# Patient Record
Sex: MALE | Race: WHITE | NOT HISPANIC OR LATINO | Employment: OTHER | URBAN - METROPOLITAN AREA
[De-identification: names, ages, dates, MRNs, and addresses within clinical notes are randomized per-mention and may not be internally consistent; named-entity substitution may affect disease eponyms.]

---

## 2017-04-19 ENCOUNTER — LAB (OUTPATIENT)
Dept: FAMILY MEDICINE CLINIC | Facility: CLINIC | Age: 67
End: 2017-04-19

## 2017-04-19 DIAGNOSIS — I10 ESSENTIAL HYPERTENSION: ICD-10-CM

## 2017-04-19 DIAGNOSIS — Z13.1 SCREENING FOR DIABETES MELLITUS (DM): ICD-10-CM

## 2017-04-19 DIAGNOSIS — E03.9 ACQUIRED HYPOTHYROIDISM: ICD-10-CM

## 2017-04-19 DIAGNOSIS — E03.9 ACQUIRED HYPOTHYROIDISM: Primary | ICD-10-CM

## 2017-04-19 DIAGNOSIS — N40.1 BENIGN NON-NODULAR PROSTATIC HYPERPLASIA WITH LOWER URINARY TRACT SYMPTOMS: Primary | ICD-10-CM

## 2017-04-19 DIAGNOSIS — Z11.59 NEED FOR HEPATITIS C SCREENING TEST: ICD-10-CM

## 2017-04-19 DIAGNOSIS — Z79.899 ENCOUNTER FOR LONG-TERM (CURRENT) USE OF MEDICATIONS: ICD-10-CM

## 2017-04-20 LAB
ALBUMIN SERPL-MCNC: 4.1 G/DL (ref 3.5–5.2)
ALBUMIN/GLOB SERPL: 1.6 G/DL
ALP SERPL-CCNC: 63 U/L (ref 39–117)
ALT SERPL-CCNC: 21 U/L (ref 1–41)
AST SERPL-CCNC: 18 U/L (ref 1–40)
BASOPHILS # BLD AUTO: 0.04 10*3/MM3 (ref 0–0.2)
BASOPHILS NFR BLD AUTO: 0.7 % (ref 0–1.5)
BILIRUB SERPL-MCNC: 0.3 MG/DL (ref 0.1–1.2)
BUN SERPL-MCNC: 12 MG/DL (ref 8–23)
BUN/CREAT SERPL: 9.3 (ref 7–25)
CALCIUM SERPL-MCNC: 9.8 MG/DL (ref 8.6–10.5)
CHLORIDE SERPL-SCNC: 100 MMOL/L (ref 98–107)
CHOLEST SERPL-MCNC: 151 MG/DL (ref 0–200)
CO2 SERPL-SCNC: 27.4 MMOL/L (ref 22–29)
CREAT SERPL-MCNC: 1.29 MG/DL (ref 0.76–1.27)
EOSINOPHIL # BLD AUTO: 0.1 10*3/MM3 (ref 0–0.7)
EOSINOPHIL NFR BLD AUTO: 1.7 % (ref 0.3–6.2)
ERYTHROCYTE [DISTWIDTH] IN BLOOD BY AUTOMATED COUNT: 13.5 % (ref 11.5–14.5)
FT4I SERPL CALC-MCNC: 2.4 (ref 1.2–4.9)
GLOBULIN SER CALC-MCNC: 2.6 GM/DL
GLUCOSE SERPL-MCNC: 102 MG/DL (ref 65–99)
HBA1C MFR BLD: 5.8 % (ref 4.8–5.6)
HCT VFR BLD AUTO: 43.3 % (ref 40.4–52.2)
HCV AB S/CO SERPL IA: 0.1 S/CO RATIO (ref 0–0.9)
HDLC SERPL-MCNC: 44 MG/DL (ref 40–60)
HGB BLD-MCNC: 14.1 G/DL (ref 13.7–17.6)
IMM GRANULOCYTES # BLD: 0 10*3/MM3 (ref 0–0.03)
IMM GRANULOCYTES NFR BLD: 0 % (ref 0–0.5)
LDLC SERPL CALC-MCNC: 85 MG/DL (ref 0–100)
LDLC/HDLC SERPL: 1.92 {RATIO}
LYMPHOCYTES # BLD AUTO: 1.49 10*3/MM3 (ref 0.9–4.8)
LYMPHOCYTES NFR BLD AUTO: 26 % (ref 19.6–45.3)
MCH RBC QN AUTO: 31.3 PG (ref 27–32.7)
MCHC RBC AUTO-ENTMCNC: 32.6 G/DL (ref 32.6–36.4)
MCV RBC AUTO: 96.2 FL (ref 79.8–96.2)
MONOCYTES # BLD AUTO: 0.42 10*3/MM3 (ref 0.2–1.2)
MONOCYTES NFR BLD AUTO: 7.3 % (ref 5–12)
NEUTROPHILS # BLD AUTO: 3.69 10*3/MM3 (ref 1.9–8.1)
NEUTROPHILS NFR BLD AUTO: 64.3 % (ref 42.7–76)
PLATELET # BLD AUTO: 229 10*3/MM3 (ref 140–500)
POTASSIUM SERPL-SCNC: 4.7 MMOL/L (ref 3.5–5.2)
PROT SERPL-MCNC: 6.7 G/DL (ref 6–8.5)
PSA SERPL-MCNC: 0.84 NG/ML (ref 0–4)
RBC # BLD AUTO: 4.5 10*6/MM3 (ref 4.6–6)
SODIUM SERPL-SCNC: 141 MMOL/L (ref 136–145)
T3RU NFR SERPL: 28 % (ref 24–39)
T4 SERPL-MCNC: 8.6 UG/DL (ref 4.5–12)
TRIGL SERPL-MCNC: 112 MG/DL (ref 0–150)
TSH SERPL DL<=0.005 MIU/L-ACNC: 1.44 UIU/ML (ref 0.45–4.5)
VLDLC SERPL CALC-MCNC: 22.4 MG/DL (ref 5–40)
WBC # BLD AUTO: 5.74 10*3/MM3 (ref 4.5–10.7)

## 2017-04-26 ENCOUNTER — OFFICE VISIT (OUTPATIENT)
Dept: FAMILY MEDICINE CLINIC | Facility: CLINIC | Age: 67
End: 2017-04-26

## 2017-04-26 VITALS
DIASTOLIC BLOOD PRESSURE: 84 MMHG | TEMPERATURE: 97.6 F | BODY MASS INDEX: 34.16 KG/M2 | SYSTOLIC BLOOD PRESSURE: 136 MMHG | OXYGEN SATURATION: 98 % | HEART RATE: 72 BPM | WEIGHT: 244 LBS | HEIGHT: 71 IN

## 2017-04-26 DIAGNOSIS — I10 ESSENTIAL HYPERTENSION: ICD-10-CM

## 2017-04-26 DIAGNOSIS — M54.16 LUMBAR RADICULOPATHY: Primary | ICD-10-CM

## 2017-04-26 DIAGNOSIS — M25.561 RIGHT KNEE PAIN, UNSPECIFIED CHRONICITY: ICD-10-CM

## 2017-04-26 PROCEDURE — 99213 OFFICE O/P EST LOW 20 MIN: CPT | Performed by: FAMILY MEDICINE

## 2017-04-26 RX ORDER — MECLIZINE HYDROCHLORIDE 25 MG/1
25 TABLET ORAL 3 TIMES DAILY PRN
Qty: 100 TABLET | Refills: 5 | Status: SHIPPED | OUTPATIENT
Start: 2017-04-26 | End: 2017-11-01

## 2017-04-26 RX ORDER — FUROSEMIDE 40 MG/1
40 TABLET ORAL DAILY
Qty: 90 TABLET | Refills: 3 | Status: SHIPPED | OUTPATIENT
Start: 2017-04-26 | End: 2017-11-01 | Stop reason: SDUPTHER

## 2017-04-26 RX ORDER — ESOMEPRAZOLE MAGNESIUM 40 MG/1
40 CAPSULE, DELAYED RELEASE ORAL 2 TIMES DAILY
Qty: 180 CAPSULE | Refills: 3 | Status: SHIPPED | OUTPATIENT
Start: 2017-04-26 | End: 2017-11-01 | Stop reason: SDUPTHER

## 2017-04-26 RX ORDER — PREGABALIN 100 MG/1
100 CAPSULE ORAL 3 TIMES DAILY
Qty: 270 CAPSULE | Refills: 3 | Status: SHIPPED | OUTPATIENT
Start: 2017-04-26 | End: 2017-11-01

## 2017-04-26 RX ORDER — IRBESARTAN 75 MG/1
75 TABLET ORAL NIGHTLY
Qty: 90 TABLET | Refills: 3 | Status: SHIPPED | OUTPATIENT
Start: 2017-04-26 | End: 2017-11-01 | Stop reason: SDUPTHER

## 2017-04-26 RX ORDER — FINASTERIDE 5 MG/1
5 TABLET, FILM COATED ORAL DAILY
Qty: 90 TABLET | Refills: 3 | Status: SHIPPED | OUTPATIENT
Start: 2017-04-26 | End: 2017-11-01 | Stop reason: SDUPTHER

## 2017-04-26 RX ORDER — ESOMEPRAZOLE MAGNESIUM 40 MG/1
40 FOR SUSPENSION ORAL 2 TIMES DAILY
Qty: 180 PACKET | Refills: 3 | Status: SHIPPED | OUTPATIENT
Start: 2017-04-26 | End: 2017-04-26

## 2017-04-26 RX ORDER — LEVOTHYROXINE SODIUM 0.1 MG/1
100 TABLET ORAL DAILY
Qty: 90 TABLET | Refills: 3 | Status: SHIPPED | OUTPATIENT
Start: 2017-04-26 | End: 2017-11-01 | Stop reason: SDUPTHER

## 2017-04-26 NOTE — PROGRESS NOTES
Chief Complaint and HPI I have reviewed the patient's medical history in detail and updated the computerized patient record.    Subjective: Marin Cuenca is a 67 y.o. male presents   here today for     Hypertension (follow up pt doing well); Leg Pain (R knee); and Benign Prostatic Hypertrophy (doing well)      Review of Systems   Constitutional: Negative.    HENT: Negative.    Eyes: Negative.    Respiratory: Negative.    Cardiovascular: Negative.    Gastrointestinal: Negative.    Endocrine: Negative.    Genitourinary: Negative.    Musculoskeletal: Positive for arthralgias and back pain.   Skin: Negative.    Allergic/Immunologic: Positive for environmental allergies.   Neurological: Negative.    Hematological: Negative.    Psychiatric/Behavioral: Negative.        Physical Exam   HENT:   Head: Atraumatic.   Cardiovascular: Normal rate, regular rhythm, normal heart sounds and intact distal pulses.    Pulmonary/Chest: Effort normal.   Musculoskeletal:   R knee wobbles   Skin: Skin is warm.   Psychiatric: His behavior is normal.   Vitals reviewed.      Procedures    Assessment:   Diagnosis Plan   1. Lumbar radiculopathy  Ambulatory Referral to Orthopedic Surgery   2. Essential hypertension  Ambulatory Referral to Orthopedic Surgery   3. Right knee pain, unspecified chronicity  Ambulatory Referral to Orthopedic Surgery       Plan:  Orders Placed This Encounter   Procedures   • Ambulatory Referral to Orthopedic Surgery     Referral Type:   Consultation     Referral Reason:   Specialty Services Required     Referred to Provider:   Srini Moss MD     Requested Specialty:   Orthopedic Surgery     Number of Visits Requested:   1       Requested Prescriptions     Signed Prescriptions Disp Refills   • pregabalin (LYRICA) 100 MG capsule 270 capsule 3     Sig: Take 1 capsule by mouth 3 (Three) Times a Day.   • esomeprazole (NEXIUM) 40 MG packet 180 packet 3     Sig: Take 40 mg by mouth 2 (Two) Times a Day.   •  levothyroxine (SYNTHROID, LEVOTHROID) 100 MCG tablet 90 tablet 3     Sig: Take 1 tablet by mouth Daily.   • finasteride (PROSCAR) 5 MG tablet 90 tablet 3     Sig: Take 1 tablet by mouth Daily.   • irbesartan (AVAPRO) 75 MG tablet 90 tablet 3     Sig: Take 1 tablet by mouth Every Night.   • furosemide (LASIX) 40 MG tablet 90 tablet 3     Sig: Take 1 tablet by mouth Daily.   • aspirin 81 MG tablet 100 tablet 3     Sig: Take 1 tablet by mouth Daily.   • meclizine (ANTIVERT) 25 MG tablet 100 tablet 5     Sig: Take 1 tablet by mouth 3 (Three) Times a Day As Needed for dizziness.       All tests and consults since last visit reviewed with patient    Return in about 6 months (around 10/26/2017).

## 2017-07-06 ENCOUNTER — OFFICE VISIT (OUTPATIENT)
Dept: ORTHOPEDIC SURGERY | Facility: CLINIC | Age: 67
End: 2017-07-06

## 2017-07-06 VITALS — WEIGHT: 230 LBS | BODY MASS INDEX: 32.2 KG/M2 | TEMPERATURE: 97.1 F | HEIGHT: 71 IN

## 2017-07-06 DIAGNOSIS — M25.561 CHRONIC PAIN OF RIGHT KNEE: Primary | ICD-10-CM

## 2017-07-06 DIAGNOSIS — G89.29 CHRONIC PAIN OF RIGHT KNEE: Primary | ICD-10-CM

## 2017-07-06 PROCEDURE — 99203 OFFICE O/P NEW LOW 30 MIN: CPT | Performed by: ORTHOPAEDIC SURGERY

## 2017-07-06 PROCEDURE — 73562 X-RAY EXAM OF KNEE 3: CPT | Performed by: ORTHOPAEDIC SURGERY

## 2017-07-06 NOTE — PROGRESS NOTES
Patient: Marin Cuenca  YOB: 1950 67 y.o. male  Medical Record Number: [id number]    Chief Complaints:   Chief Complaint   Patient presents with   • Right Knee - Pain       History of Present Illness:Marin Cuenca is a 67 y.o. male who presents with Complaints of a history of right knee pain.  About 1 year ago he had an injection into the knee by pain management doctor and he had considerable soreness for months after the injection.  He still occasionally will have soreness if he pivots or twists the knee.  He describes a diffuse ache occurs it is also associated with some swelling.  Overall he still gets around fine without major difficulties    Allergies:   Allergies   Allergen Reactions   • Clarithromycin      Other reaction(s): Abdominal pain   • Tetracyclines & Related Rash       Medications:   Current Outpatient Prescriptions   Medication Sig Dispense Refill   • aspirin 81 MG tablet Take 1 tablet by mouth Daily. 100 tablet 3   • Calcium Carb-Cholecalciferol 500-600 MG-UNIT chewable tablet Chew 1 tablet 2 (Two) Times a Day. 180 tablet 3   • esomeprazole (nexIUM) 40 MG capsule Take 1 capsule by mouth 2 (Two) Times a Day. 180 capsule 3   • finasteride (PROSCAR) 5 MG tablet Take 1 tablet by mouth Daily. 90 tablet 3   • furosemide (LASIX) 40 MG tablet Take 1 tablet by mouth Daily. 90 tablet 3   • irbesartan (AVAPRO) 75 MG tablet Take 1 tablet by mouth Every Night. 90 tablet 3   • levothyroxine (SYNTHROID, LEVOTHROID) 100 MCG tablet Take 1 tablet by mouth Daily. 90 tablet 3   • meclizine (ANTIVERT) 25 MG tablet Take 1 tablet by mouth 3 (Three) Times a Day As Needed for dizziness. 100 tablet 5   • neomycin-colistin-hydrocortisone-thonzonium (CORTISPORIN TC) 3.3-3-10-0.5 MG/ML otic suspension Administer 3 drops into the left ear 4 (Four) Times a Day for 7 days. 5 mL 0   • pregabalin (LYRICA) 100 MG capsule Take 1 capsule by mouth 3 (Three) Times a Day. 270 capsule 3     No current  "facility-administered medications for this visit.          The following portions of the patient's history were reviewed and updated as appropriate: allergies, current medications, past family history, past medical history, past social history, past surgical history and problem list.    Review of Systems:   A 14 point review of systems was performed. All systems negative except pertinent positives/negative listed in HPI above    Physical Exam:   Vitals:    07/06/17 0942   Temp: 97.1 °F (36.2 °C)   Weight: 230 lb (104 kg)   Height: 71\" (180.3 cm)       General: A and O x 3, ASA, NAD    SCLERA:    Normal    DENTITION:   Normal  Knee:  right    ALIGNMENT:     Neutral  ,   Patella tracks   midline    GAIT:     Nonantalgic    SKIN:    No abnormality    RANGE OF MOTION:   0  -  135   DEG    STRENGTH:   5 / 5    LIGAMENTS:    No varus / valgus instability.   Negative  Lachman.    MENISCUS:     Negative   Sirisha       DISTAL PULSES:    Paplable    DISTAL SENSATION :   Intact    LYMPHATICS:     No   lymphadenopathy    OTHER:          - No  effusion      - No crepitance with ROM      - No Swelling /  tenderness to palpation pes anserine bursa        Radiology:  Xrays 3views right knee (ap,lateral, sunrise) were ordered and reviewed for evaluation of knee pain demonstrating mild joint space narrowing and medial and lateral chondrocalcinosis.  There are no previous films for comparison.    Assessment/Plan: Right knee chondrocalcinosis.  I recommended weight management quad strengthening exercises I do not feel that his previous injection was a cause of any damage to the knee joint.  I will see him back on an as-needed basis.      Srini Moss MD  7/6/2017  "

## 2017-11-01 ENCOUNTER — OFFICE VISIT (OUTPATIENT)
Dept: FAMILY MEDICINE CLINIC | Facility: CLINIC | Age: 67
End: 2017-11-01

## 2017-11-01 VITALS
OXYGEN SATURATION: 98 % | DIASTOLIC BLOOD PRESSURE: 68 MMHG | HEIGHT: 71 IN | WEIGHT: 242.2 LBS | HEART RATE: 96 BPM | TEMPERATURE: 98.1 F | SYSTOLIC BLOOD PRESSURE: 122 MMHG | BODY MASS INDEX: 33.91 KG/M2

## 2017-11-01 DIAGNOSIS — N40.1 BENIGN NON-NODULAR PROSTATIC HYPERPLASIA WITH LOWER URINARY TRACT SYMPTOMS: ICD-10-CM

## 2017-11-01 DIAGNOSIS — M75.102 ROTATOR CUFF TEAR ARTHROPATHY, LEFT: ICD-10-CM

## 2017-11-01 DIAGNOSIS — M54.16 LUMBAR RADICULOPATHY: ICD-10-CM

## 2017-11-01 DIAGNOSIS — E03.9 ACQUIRED HYPOTHYROIDISM: Primary | ICD-10-CM

## 2017-11-01 DIAGNOSIS — K21.00 GASTROESOPHAGEAL REFLUX DISEASE WITH ESOPHAGITIS: ICD-10-CM

## 2017-11-01 DIAGNOSIS — M19.219: ICD-10-CM

## 2017-11-01 DIAGNOSIS — I10 ESSENTIAL HYPERTENSION: ICD-10-CM

## 2017-11-01 DIAGNOSIS — M96.1 FAILED BACK SYNDROME OF LUMBAR SPINE: ICD-10-CM

## 2017-11-01 DIAGNOSIS — M75.100: ICD-10-CM

## 2017-11-01 DIAGNOSIS — M12.812 ROTATOR CUFF TEAR ARTHROPATHY, LEFT: ICD-10-CM

## 2017-11-01 PROCEDURE — 99213 OFFICE O/P EST LOW 20 MIN: CPT | Performed by: FAMILY MEDICINE

## 2017-11-01 RX ORDER — PREGABALIN 100 MG/1
100 CAPSULE ORAL 3 TIMES DAILY
Qty: 270 CAPSULE | Refills: 3 | Status: SHIPPED | OUTPATIENT
Start: 2017-11-01 | End: 2017-11-20 | Stop reason: SDUPTHER

## 2017-11-01 RX ORDER — FINASTERIDE 5 MG/1
5 TABLET, FILM COATED ORAL DAILY
Qty: 90 TABLET | Refills: 3 | Status: SHIPPED | OUTPATIENT
Start: 2017-11-01 | End: 2018-05-03

## 2017-11-01 RX ORDER — MECLIZINE HYDROCHLORIDE 25 MG/1
25 TABLET ORAL 3 TIMES DAILY PRN
Qty: 100 TABLET | Refills: 5 | Status: SHIPPED | OUTPATIENT
Start: 2017-11-01 | End: 2018-03-05 | Stop reason: SDUPTHER

## 2017-11-01 RX ORDER — ESOMEPRAZOLE MAGNESIUM 40 MG/1
40 CAPSULE, DELAYED RELEASE ORAL 2 TIMES DAILY
Qty: 180 CAPSULE | Refills: 3 | Status: SHIPPED | OUTPATIENT
Start: 2017-11-01 | End: 2018-03-13 | Stop reason: SDUPTHER

## 2017-11-01 RX ORDER — IRBESARTAN 75 MG/1
75 TABLET ORAL NIGHTLY
Qty: 90 TABLET | Refills: 3 | Status: SHIPPED | OUTPATIENT
Start: 2017-11-01 | End: 2018-05-03

## 2017-11-01 RX ORDER — LEVOTHYROXINE SODIUM 0.1 MG/1
100 TABLET ORAL DAILY
Qty: 90 TABLET | Refills: 3 | Status: SHIPPED | OUTPATIENT
Start: 2017-11-01 | End: 2018-05-03

## 2017-11-01 RX ORDER — MELOXICAM 15 MG/1
15 TABLET ORAL DAILY
Qty: 30 TABLET | Refills: 3 | Status: SHIPPED | OUTPATIENT
Start: 2017-11-01 | End: 2018-05-03

## 2017-11-01 RX ORDER — PROMETHAZINE HYDROCHLORIDE 25 MG/1
25 TABLET ORAL EVERY 6 HOURS PRN
Qty: 90 TABLET | Refills: 5 | Status: SHIPPED | OUTPATIENT
Start: 2017-11-01 | End: 2018-05-03

## 2017-11-01 RX ORDER — OXYCODONE HYDROCHLORIDE AND ACETAMINOPHEN 5; 325 MG/1; MG/1
1 TABLET ORAL EVERY 4 HOURS PRN
Qty: 100 TABLET | Refills: 0 | Status: SHIPPED | OUTPATIENT
Start: 2017-11-01 | End: 2018-05-03

## 2017-11-01 RX ORDER — FUROSEMIDE 40 MG/1
40 TABLET ORAL DAILY
Qty: 90 TABLET | Refills: 3 | Status: SHIPPED | OUTPATIENT
Start: 2017-11-01 | End: 2018-05-03

## 2017-11-01 NOTE — PROGRESS NOTES
Chief Complaint   Patient presents with   • Hypertension     follow up    • shoulder pain     left shoulder has been hurting fot the past few days        Subjective.../HPI  Patient present today with1) Marin has a history of chronic hypertension and has been well controlled on current medications.  Patient reports has had hypertension for 1 years. He is tolerating medications without side effect. He reports no vision changes, headaches or lightheadedness. He is requesting refills of medications  2)BPH- nocturia x1 on Finasteride  3)GERD- good  4) hypothyroid  5) Lshpoulder pain ,no hx of injury, painfu 2-3 days,-1-10 scale =98-10-can't sleep  6) chronic back pain with L leg radiculopathy--Lyrica working well  I have reviewed the patient's medical history in detail and updated the computerized patient record.        Family History   Problem Relation Age of Onset   • Heart disease Father      cardiovascular disease   • Diabetes Father    • Cancer Paternal Grandmother        Social History     Social History   • Marital status:      Spouse name: N/A   • Number of children: N/A   • Years of education: N/A     Occupational History   • Not on file.     Social History Main Topics   • Smoking status: Never Smoker   • Smokeless tobacco: Not on file   • Alcohol use No   • Drug use: No   • Sexual activity: Not on file     Other Topics Concern   • Not on file     Social History Narrative       Review of Systems:   Review of Systems   Constitutional: Negative for chills, fatigue, fever and unexpected weight change.   HENT: Negative for ear pain, hearing loss, sinus pressure, sore throat and tinnitus.    Eyes: Negative for pain, discharge and redness.   Respiratory: Negative for cough, shortness of breath and wheezing.    Cardiovascular: Negative for chest pain, palpitations and leg swelling.   Gastrointestinal: Negative for abdominal pain, constipation, diarrhea and nausea.   Endocrine: Negative for cold intolerance and  "heat intolerance.   Genitourinary: Negative for difficulty urinating, flank pain and urgency.   Musculoskeletal: Negative for back pain, joint swelling and myalgias.   Skin: Negative for rash and wound.   Allergic/Immunologic: Negative for environmental allergies and food allergies.   Neurological: Negative for dizziness, seizures, numbness and headaches.   Hematological: Negative for adenopathy. Does not bruise/bleed easily.   Psychiatric/Behavioral: Negative for decreased concentration, dysphoric mood and sleep disturbance. The patient is not nervous/anxious.    All other systems reviewed and are negative.        Physical Exam   Constitutional: He is oriented to person, place, and time. He appears well-developed and well-nourished.   Cardiovascular: Normal rate, regular rhythm, normal heart sounds and intact distal pulses.    Pulmonary/Chest: Effort normal and breath sounds normal.   Musculoskeletal:   L shoulder with decresed rotation   Neurological: He is alert and oriented to person, place, and time.   Skin: Skin is warm and dry.   Psychiatric: He has a normal mood and affect.   Nursing note and vitals reviewed.        Vital Signs     Vitals:    11/01/17 1011   BP: 122/68   BP Location: Right arm   Patient Position: Sitting   Cuff Size: Adult   Pulse: 96   Temp: 98.1 °F (36.7 °C)   TempSrc: Oral   SpO2: 98%   Weight: 242 lb 3.2 oz (110 kg)   Height: 71\" (180.3 cm)          Results Review:      REVIEWED AND DISCUSSED CLINICAL RESULTS WITH PATIENT      Requested Prescriptions     Signed Prescriptions Disp Refills   • esomeprazole (nexIUM) 40 MG capsule 180 capsule 3     Sig: Take 1 capsule by mouth 2 (Two) Times a Day.   • pregabalin (LYRICA) 100 MG capsule 270 capsule 3     Sig: Take 1 capsule by mouth 3 (Three) Times a Day.   • levothyroxine (SYNTHROID, LEVOTHROID) 100 MCG tablet 90 tablet 3     Sig: Take 1 tablet by mouth Daily.   • finasteride (PROSCAR) 5 MG tablet 90 tablet 3     Sig: Take 1 tablet by mouth " Daily.   • furosemide (LASIX) 40 MG tablet 90 tablet 3     Sig: Take 1 tablet by mouth Daily.   • irbesartan (AVAPRO) 75 MG tablet 90 tablet 3     Sig: Take 1 tablet by mouth Every Night.   • meclizine (ANTIVERT) 25 MG tablet 100 tablet 5     Sig: Take 1 tablet by mouth 3 (Three) Times a Day As Needed for dizziness.   • promethazine (PHENERGAN) 25 MG tablet 90 tablet 5     Sig: Take 1 tablet by mouth Every 6 (Six) Hours As Needed for Nausea or Vomiting.   • meloxicam (MOBIC) 15 MG tablet 30 tablet 3     Sig: Take 1 tablet by mouth Daily.   • oxyCODONE-acetaminophen (PERCOCET) 5-325 MG per tablet 100 tablet 0     Sig: Take 1 tablet by mouth Every 4 (Four) Hours As Needed for Severe Pain .         Current Outpatient Prescriptions:   •  aspirin 81 MG tablet, Take 1 tablet by mouth Daily., Disp: 100 tablet, Rfl: 3  •  Calcium Carb-Cholecalciferol 500-600 MG-UNIT chewable tablet, Chew 1 tablet 2 (Two) Times a Day., Disp: 180 tablet, Rfl: 3  •  esomeprazole (nexIUM) 40 MG capsule, Take 1 capsule by mouth 2 (Two) Times a Day., Disp: 180 capsule, Rfl: 3  •  finasteride (PROSCAR) 5 MG tablet, Take 1 tablet by mouth Daily., Disp: 90 tablet, Rfl: 3  •  furosemide (LASIX) 40 MG tablet, Take 1 tablet by mouth Daily., Disp: 90 tablet, Rfl: 3  •  irbesartan (AVAPRO) 75 MG tablet, Take 1 tablet by mouth Every Night., Disp: 90 tablet, Rfl: 3  •  levothyroxine (SYNTHROID, LEVOTHROID) 100 MCG tablet, Take 1 tablet by mouth Daily., Disp: 90 tablet, Rfl: 3  •  meclizine (ANTIVERT) 25 MG tablet, Take 1 tablet by mouth 3 (Three) Times a Day As Needed for dizziness., Disp: 100 tablet, Rfl: 5  •  meloxicam (MOBIC) 15 MG tablet, Take 1 tablet by mouth Daily., Disp: 30 tablet, Rfl: 3  •  oxyCODONE-acetaminophen (PERCOCET) 5-325 MG per tablet, Take 1 tablet by mouth Every 4 (Four) Hours As Needed for Severe Pain ., Disp: 100 tablet, Rfl: 0  •  pregabalin (LYRICA) 100 MG capsule, Take 1 capsule by mouth 3 (Three) Times a Day., Disp: 270  capsule, Rfl: 3  •  promethazine (PHENERGAN) 25 MG tablet, Take 1 tablet by mouth Every 6 (Six) Hours As Needed for Nausea or Vomiting., Disp: 90 tablet, Rfl: 5    Procedures    Assessment/Plan     Diagnoses and all orders for this visit:    Acquired hypothyroidism  -     levothyroxine (SYNTHROID, LEVOTHROID) 100 MCG tablet; Take 1 tablet by mouth Daily.    Benign non-nodular prostatic hyperplasia with lower urinary tract symptoms  -     finasteride (PROSCAR) 5 MG tablet; Take 1 tablet by mouth Daily.    Essential hypertension  -     furosemide (LASIX) 40 MG tablet; Take 1 tablet by mouth Daily.  -     irbesartan (AVAPRO) 75 MG tablet; Take 1 tablet by mouth Every Night.    Failed back syndrome of lumbar spine    Rotator cuff tear arthropathy, left  -     Ambulatory Referral to Orthopedic Surgery  -     MRI Shoulder Left Without Contrast; Future    Gastroesophageal reflux disease with esophagitis  -     esomeprazole (nexIUM) 40 MG capsule; Take 1 capsule by mouth 2 (Two) Times a Day.    Lumbar radiculopathy  -     pregabalin (LYRICA) 100 MG capsule; Take 1 capsule by mouth 3 (Three) Times a Day.    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  -     oxyCODONE-acetaminophen (PERCOCET) 5-325 MG per tablet; Take 1 tablet by mouth Every 4 (Four) Hours As Needed for Severe Pain .    Other orders  -     meclizine (ANTIVERT) 25 MG tablet; Take 1 tablet by mouth 3 (Three) Times a Day As Needed for dizziness.  -     promethazine (PHENERGAN) 25 MG tablet; Take 1 tablet by mouth Every 6 (Six) Hours As Needed for Nausea or Vomiting.         Return in about 6 months (around 5/1/2018) for Recheck.    Leonid Lockett M.D  11/01/17  11:57 AM

## 2017-11-20 DIAGNOSIS — M54.16 LUMBAR RADICULOPATHY: ICD-10-CM

## 2017-11-20 RX ORDER — PREGABALIN 100 MG/1
100 CAPSULE ORAL 3 TIMES DAILY
Qty: 270 CAPSULE | Refills: 3 | Status: SHIPPED | OUTPATIENT
Start: 2017-11-20 | End: 2018-05-03

## 2018-01-25 ENCOUNTER — TELEPHONE (OUTPATIENT)
Dept: INTERNAL MEDICINE | Facility: CLINIC | Age: 68
End: 2018-01-25

## 2018-01-26 RX ORDER — CYCLOBENZAPRINE HCL 10 MG
10 TABLET ORAL 3 TIMES DAILY PRN
Qty: 30 TABLET | Refills: 0 | Status: SHIPPED | OUTPATIENT
Start: 2018-01-26 | End: 2018-05-03

## 2018-03-05 RX ORDER — MECLIZINE HYDROCHLORIDE 25 MG/1
25 TABLET ORAL 3 TIMES DAILY PRN
Qty: 100 TABLET | Refills: 5 | Status: SHIPPED | OUTPATIENT
Start: 2018-03-05 | End: 2018-05-03

## 2018-03-13 DIAGNOSIS — K21.00 GASTROESOPHAGEAL REFLUX DISEASE WITH ESOPHAGITIS: ICD-10-CM

## 2018-03-13 RX ORDER — ESOMEPRAZOLE MAGNESIUM 40 MG/1
40 CAPSULE, DELAYED RELEASE ORAL 2 TIMES DAILY
Qty: 180 CAPSULE | Refills: 3 | Status: SHIPPED | OUTPATIENT
Start: 2018-03-13 | End: 2018-05-03

## 2018-04-16 ENCOUNTER — OFFICE VISIT (OUTPATIENT)
Dept: SLEEP MEDICINE | Facility: HOSPITAL | Age: 68
End: 2018-04-16
Attending: INTERNAL MEDICINE

## 2018-04-16 VITALS
SYSTOLIC BLOOD PRESSURE: 127 MMHG | HEART RATE: 62 BPM | HEIGHT: 71 IN | BODY MASS INDEX: 33.04 KG/M2 | WEIGHT: 236 LBS | DIASTOLIC BLOOD PRESSURE: 82 MMHG

## 2018-04-16 DIAGNOSIS — E03.9 ACQUIRED HYPOTHYROIDISM: ICD-10-CM

## 2018-04-16 DIAGNOSIS — Z99.89 OSA ON CPAP: Primary | ICD-10-CM

## 2018-04-16 DIAGNOSIS — G47.33 OSA ON CPAP: Primary | ICD-10-CM

## 2018-04-16 DIAGNOSIS — G47.10 HYPERSOMNIA WITH SLEEP APNEA: ICD-10-CM

## 2018-04-16 DIAGNOSIS — G47.30 HYPERSOMNIA WITH SLEEP APNEA: ICD-10-CM

## 2018-04-16 DIAGNOSIS — I10 ESSENTIAL HYPERTENSION: ICD-10-CM

## 2018-04-16 DIAGNOSIS — E66.9 OBESITY (BMI 30-39.9): ICD-10-CM

## 2018-04-16 PROCEDURE — G0463 HOSPITAL OUTPT CLINIC VISIT: HCPCS

## 2018-04-26 DIAGNOSIS — E03.9 ADULT HYPOTHYROIDISM: ICD-10-CM

## 2018-04-26 DIAGNOSIS — N40.1 BENIGN NON-NODULAR PROSTATIC HYPERPLASIA WITH LOWER URINARY TRACT SYMPTOMS: ICD-10-CM

## 2018-04-26 DIAGNOSIS — M54.5 CHRONIC LOW BACK PAIN, UNSPECIFIED BACK PAIN LATERALITY, WITH SCIATICA PRESENCE UNSPECIFIED: ICD-10-CM

## 2018-04-26 DIAGNOSIS — Z00.00 HEALTH MAINTENANCE EXAMINATION: ICD-10-CM

## 2018-04-26 DIAGNOSIS — K21.00 GASTROESOPHAGEAL REFLUX DISEASE WITH ESOPHAGITIS: ICD-10-CM

## 2018-04-26 DIAGNOSIS — G47.10 HYPERSOMNIA WITH SLEEP APNEA: ICD-10-CM

## 2018-04-26 DIAGNOSIS — N40.0 BENIGN PROSTATIC HYPERPLASIA, UNSPECIFIED WHETHER LOWER URINARY TRACT SYMPTOMS PRESENT: ICD-10-CM

## 2018-04-26 DIAGNOSIS — G47.30 HYPERSOMNIA WITH SLEEP APNEA: ICD-10-CM

## 2018-04-26 DIAGNOSIS — Z79.899 ENCOUNTER FOR LONG-TERM (CURRENT) USE OF MEDICATIONS: ICD-10-CM

## 2018-04-26 DIAGNOSIS — G89.29 CHRONIC LOW BACK PAIN, UNSPECIFIED BACK PAIN LATERALITY, WITH SCIATICA PRESENCE UNSPECIFIED: ICD-10-CM

## 2018-04-26 DIAGNOSIS — I10 ESSENTIAL HYPERTENSION: Primary | ICD-10-CM

## 2018-04-26 DIAGNOSIS — N40.0 ENLARGED PROSTATE: ICD-10-CM

## 2018-04-27 LAB
ALBUMIN SERPL-MCNC: 4.2 G/DL (ref 3.5–5.2)
ALBUMIN/GLOB SERPL: 1.8 G/DL
ALP SERPL-CCNC: 69 U/L (ref 39–117)
ALT SERPL-CCNC: 11 U/L (ref 1–41)
AST SERPL-CCNC: 15 U/L (ref 1–40)
BASOPHILS # BLD AUTO: 0.09 10*3/MM3 (ref 0–0.2)
BASOPHILS NFR BLD AUTO: 1.3 % (ref 0–1.5)
BILIRUB SERPL-MCNC: 0.5 MG/DL (ref 0.1–1.2)
BUN SERPL-MCNC: 11 MG/DL (ref 8–23)
BUN/CREAT SERPL: 9.7 (ref 7–25)
CALCIUM SERPL-MCNC: 10.1 MG/DL (ref 8.6–10.5)
CHLORIDE SERPL-SCNC: 100 MMOL/L (ref 98–107)
CHOLEST SERPL-MCNC: 183 MG/DL (ref 0–200)
CO2 SERPL-SCNC: 29.5 MMOL/L (ref 22–29)
CREAT SERPL-MCNC: 1.13 MG/DL (ref 0.76–1.27)
EOSINOPHIL # BLD AUTO: 0.13 10*3/MM3 (ref 0–0.7)
EOSINOPHIL NFR BLD AUTO: 1.9 % (ref 0.3–6.2)
ERYTHROCYTE [DISTWIDTH] IN BLOOD BY AUTOMATED COUNT: 13.1 % (ref 11.5–14.5)
FT4I SERPL CALC-MCNC: 2.1 (ref 1.2–4.9)
GFR SERPLBLD CREATININE-BSD FMLA CKD-EPI: 64 ML/MIN/1.73
GFR SERPLBLD CREATININE-BSD FMLA CKD-EPI: 78 ML/MIN/1.73
GLOBULIN SER CALC-MCNC: 2.4 GM/DL
GLUCOSE SERPL-MCNC: 109 MG/DL (ref 65–99)
HBA1C MFR BLD: 6 % (ref 4.8–5.6)
HCT VFR BLD AUTO: 47.4 % (ref 40.4–52.2)
HCV AB S/CO SERPL IA: <0.1 S/CO RATIO (ref 0–0.9)
HDLC SERPL-MCNC: 57 MG/DL (ref 40–60)
HGB BLD-MCNC: 15.3 G/DL (ref 13.7–17.6)
IMM GRANULOCYTES # BLD: 0.02 10*3/MM3 (ref 0–0.03)
IMM GRANULOCYTES NFR BLD: 0.3 % (ref 0–0.5)
LDLC SERPL CALC-MCNC: 107 MG/DL (ref 0–100)
LDLC/HDLC SERPL: 1.87 {RATIO}
LYMPHOCYTES # BLD AUTO: 1.53 10*3/MM3 (ref 0.9–4.8)
LYMPHOCYTES NFR BLD AUTO: 22.2 % (ref 19.6–45.3)
MCH RBC QN AUTO: 31.8 PG (ref 27–32.7)
MCHC RBC AUTO-ENTMCNC: 32.3 G/DL (ref 32.6–36.4)
MCV RBC AUTO: 98.5 FL (ref 79.8–96.2)
MONOCYTES # BLD AUTO: 0.59 10*3/MM3 (ref 0.2–1.2)
MONOCYTES NFR BLD AUTO: 8.6 % (ref 5–12)
NEUTROPHILS # BLD AUTO: 4.56 10*3/MM3 (ref 1.9–8.1)
NEUTROPHILS NFR BLD AUTO: 66 % (ref 42.7–76)
PLATELET # BLD AUTO: 237 10*3/MM3 (ref 140–500)
POTASSIUM SERPL-SCNC: 4.7 MMOL/L (ref 3.5–5.2)
PROT SERPL-MCNC: 6.6 G/DL (ref 6–8.5)
PSA SERPL-MCNC: 0.82 NG/ML (ref 0–4)
RBC # BLD AUTO: 4.81 10*6/MM3 (ref 4.6–6)
SODIUM SERPL-SCNC: 141 MMOL/L (ref 136–145)
T3RU NFR SERPL: 25 % (ref 24–39)
T4 SERPL-MCNC: 8.5 UG/DL (ref 4.5–12)
TRIGL SERPL-MCNC: 97 MG/DL (ref 0–150)
TSH SERPL DL<=0.005 MIU/L-ACNC: 1.24 UIU/ML (ref 0.45–4.5)
VLDLC SERPL CALC-MCNC: 19.4 MG/DL (ref 5–40)
WBC # BLD AUTO: 6.9 10*3/MM3 (ref 4.5–10.7)

## 2018-05-03 ENCOUNTER — OFFICE VISIT (OUTPATIENT)
Dept: INTERNAL MEDICINE | Facility: CLINIC | Age: 68
End: 2018-05-03

## 2018-05-03 VITALS
WEIGHT: 236 LBS | DIASTOLIC BLOOD PRESSURE: 78 MMHG | BODY MASS INDEX: 33.04 KG/M2 | SYSTOLIC BLOOD PRESSURE: 120 MMHG | HEART RATE: 64 BPM | OXYGEN SATURATION: 98 % | HEIGHT: 71 IN | TEMPERATURE: 97.9 F | RESPIRATION RATE: 16 BRPM

## 2018-05-03 DIAGNOSIS — E03.9 ADULT HYPOTHYROIDISM: ICD-10-CM

## 2018-05-03 DIAGNOSIS — K57.32 DIVERTICULITIS OF LARGE INTESTINE WITHOUT PERFORATION OR ABSCESS WITHOUT BLEEDING: Primary | ICD-10-CM

## 2018-05-03 DIAGNOSIS — E03.9 ACQUIRED HYPOTHYROIDISM: ICD-10-CM

## 2018-05-03 DIAGNOSIS — M54.16 LUMBAR RADICULOPATHY: ICD-10-CM

## 2018-05-03 DIAGNOSIS — N40.1 BENIGN NON-NODULAR PROSTATIC HYPERPLASIA WITH LOWER URINARY TRACT SYMPTOMS: ICD-10-CM

## 2018-05-03 DIAGNOSIS — I10 ESSENTIAL HYPERTENSION: ICD-10-CM

## 2018-05-03 DIAGNOSIS — K21.00 GASTROESOPHAGEAL REFLUX DISEASE WITH ESOPHAGITIS: ICD-10-CM

## 2018-05-03 PROCEDURE — 99213 OFFICE O/P EST LOW 20 MIN: CPT | Performed by: FAMILY MEDICINE

## 2018-05-03 RX ORDER — IRBESARTAN 75 MG/1
75 TABLET ORAL NIGHTLY
Qty: 90 TABLET | Refills: 3 | Status: SHIPPED | OUTPATIENT
Start: 2018-05-03 | End: 2018-05-26 | Stop reason: SDUPTHER

## 2018-05-03 RX ORDER — FINASTERIDE 5 MG/1
5 TABLET, FILM COATED ORAL DAILY
Qty: 90 TABLET | Refills: 3 | Status: SHIPPED | OUTPATIENT
Start: 2018-05-03 | End: 2018-05-26 | Stop reason: SDUPTHER

## 2018-05-03 RX ORDER — LEVOFLOXACIN 500 MG/1
500 TABLET, FILM COATED ORAL DAILY
Qty: 10 TABLET | Refills: 0 | Status: SHIPPED | OUTPATIENT
Start: 2018-05-03 | End: 2018-05-03 | Stop reason: SDUPTHER

## 2018-05-03 RX ORDER — PREGABALIN 100 MG/1
100 CAPSULE ORAL 3 TIMES DAILY
Qty: 270 CAPSULE | Refills: 3 | Status: SHIPPED | OUTPATIENT
Start: 2018-05-03 | End: 2018-08-13 | Stop reason: SDUPTHER

## 2018-05-03 RX ORDER — ESOMEPRAZOLE MAGNESIUM 40 MG/1
40 CAPSULE, DELAYED RELEASE ORAL 2 TIMES DAILY
Qty: 180 CAPSULE | Refills: 3 | Status: SHIPPED | OUTPATIENT
Start: 2018-05-03 | End: 2018-11-08 | Stop reason: SDUPTHER

## 2018-05-03 RX ORDER — LEVOTHYROXINE SODIUM 0.1 MG/1
100 TABLET ORAL DAILY
Qty: 90 TABLET | Refills: 3 | Status: SHIPPED | OUTPATIENT
Start: 2018-05-03 | End: 2018-11-08

## 2018-05-03 RX ORDER — LEVOFLOXACIN 500 MG/1
500 TABLET, FILM COATED ORAL DAILY
Qty: 10 TABLET | Refills: 0 | Status: SHIPPED | OUTPATIENT
Start: 2018-05-03 | End: 2018-05-13

## 2018-05-03 RX ORDER — PREGABALIN 100 MG/1
100 CAPSULE ORAL 3 TIMES DAILY
Qty: 270 CAPSULE | Refills: 3 | Status: SHIPPED | OUTPATIENT
Start: 2018-05-03 | End: 2018-05-03 | Stop reason: SDUPTHER

## 2018-05-03 RX ORDER — FUROSEMIDE 40 MG/1
40 TABLET ORAL DAILY
Qty: 90 TABLET | Refills: 3 | Status: SHIPPED | OUTPATIENT
Start: 2018-05-03 | End: 2018-10-31

## 2018-05-03 NOTE — PROGRESS NOTES
CC:HBP.edema,BPH,Hypothyroid,low back pain,abdominal pain    Subjective.../HPI  Patient present today with 1) abdominal pain 2-3 weeks, BM regulr, Colonoscopic 9 mons  2) Marin has a history of chronic hypertension and has been well controlled on current medications.  Patient reports has had hypertension for 5 years. He is tolerating medications without side effect. He reports no vision changes, headaches or lightheadedness. He is requesting refills of medications  3) BPH- happy with Finasteride  I have reviewed the patient's medical history in detail and updated the computerized patient record.  4) hypothyroid - ok  5)back pain -co ntrolled with Lyrica  6) edema -contrilled withLasix- takes as needed-none in 6 mons.  Past Medical History:   Diagnosis Date   • Arthritis    • Ramirez esophagus    • Benign prostatic hypertrophy    • GERD (gastroesophageal reflux disease)    • Hypertension    • Hypothyroidism    • KARLA on CPAP    • Osteoporosis        Past Surgical History:   Procedure Laterality Date   • CYSTOSCOPY     • HEMORRHOIDECTOMY     • KNEE SURGERY Left    • LUMBAR LAMINECTOMY     • NISSEN FUNDOPLICATION     • UPPER GASTROINTESTINAL ENDOSCOPY         Family History   Problem Relation Age of Onset   • Heart disease Father      cardiovascular disease   • Diabetes Father    • Hypertension Father    • Cancer Paternal Grandmother        Social History     Social History   • Marital status:      Spouse name: N/A   • Number of children: N/A   • Years of education: N/A     Occupational History   • Not on file.     Social History Main Topics   • Smoking status: Never Smoker   • Smokeless tobacco: Not on file   • Alcohol use No   • Drug use: No      Comment: Consumes 3 caffeinated beverages per day usually coffee.   • Sexual activity: Not on file     Other Topics Concern   • Not on file     Social History Narrative   • No narrative on file       Most Recent Immunizations   Administered Date(s) Administered   •  "Pneumococcal Conjugate (PCV7) 06/01/2011   • Pneumococcal Conjugate 13-Valent (PCV13) 03/14/2016   • Tdap 04/18/2016   • Zoster 10/03/2011       Review of Systems:   Review of Systems   Constitutional: Negative.    HENT: Negative.    Respiratory: Negative.    Cardiovascular: Negative.    Gastrointestinal: Negative.    Endocrine: Negative.    Genitourinary: Negative.    Musculoskeletal: Negative.    Skin: Negative.    Allergic/Immunologic: Negative.    Neurological: Negative.    Psychiatric/Behavioral: Negative.          Physical Exam   Constitutional: He appears well-developed and well-nourished.   Cardiovascular: Normal rate and regular rhythm.    Pulmonary/Chest: Effort normal and breath sounds normal.   Abdominal: Soft. There is tenderness.   Neurological: He is alert.   Skin: Skin is warm.   Psychiatric: He has a normal mood and affect. His behavior is normal.   Vitals reviewed.        Vital Signs     Vitals:    05/03/18 1115   BP: 120/78   BP Location: Left arm   Patient Position: Sitting   Cuff Size: Large Adult   Pulse: 64   Resp: 16   Temp: 97.9 °F (36.6 °C)   TempSrc: Tympanic   SpO2: 98%   Weight: 107 kg (236 lb)   Height: 180.3 cm (71\")          Results Review:      REVIEWED AND DISCUSSED CLINICAL RESULTS WITH PATIENT      Requested Prescriptions     Signed Prescriptions Disp Refills   • levoFLOXacin (LEVAQUIN) 500 MG tablet 10 tablet 0     Sig: Take 1 tablet by mouth Daily for 10 days.   • pregabalin (LYRICA) 100 MG capsule 270 capsule 3     Sig: Take 1 capsule by mouth 3 (Three) Times a Day.   • levothyroxine (SYNTHROID, LEVOTHROID) 100 MCG tablet 90 tablet 3     Sig: Take 1 tablet by mouth Daily.   • irbesartan (AVAPRO) 75 MG tablet 90 tablet 3     Sig: Take 1 tablet by mouth Every Night.   • furosemide (LASIX) 40 MG tablet 90 tablet 3     Sig: Take 1 tablet by mouth Daily.   • finasteride (PROSCAR) 5 MG tablet 90 tablet 3     Sig: Take 1 tablet by mouth Daily.         Current Outpatient Prescriptions: "   •  aspirin 81 MG tablet, Take 1 tablet by mouth Daily., Disp: 100 tablet, Rfl: 3  •  Calcium Carb-Cholecalciferol 500-600 MG-UNIT chewable tablet, Chew 1 tablet 2 (Two) Times a Day., Disp: 180 tablet, Rfl: 3  •  esomeprazole (nexIUM) 40 MG capsule, Take 1 capsule by mouth 2 (Two) Times a Day., Disp: 180 capsule, Rfl: 3  •  finasteride (PROSCAR) 5 MG tablet, Take 1 tablet by mouth Daily., Disp: 90 tablet, Rfl: 3  •  furosemide (LASIX) 40 MG tablet, Take 1 tablet by mouth Daily., Disp: 90 tablet, Rfl: 3  •  irbesartan (AVAPRO) 75 MG tablet, Take 1 tablet by mouth Every Night., Disp: 90 tablet, Rfl: 3  •  levothyroxine (SYNTHROID, LEVOTHROID) 100 MCG tablet, Take 1 tablet by mouth Daily., Disp: 90 tablet, Rfl: 3  •  pregabalin (LYRICA) 100 MG capsule, Take 1 capsule by mouth 3 (Three) Times a Day., Disp: 270 capsule, Rfl: 3  •  levoFLOXacin (LEVAQUIN) 500 MG tablet, Take 1 tablet by mouth Daily for 10 days., Disp: 10 tablet, Rfl: 0    Procedures          Diagnoses and all orders for this visit:    Diverticulitis of large intestine without perforation or abscess without bleeding  -     levoFLOXacin (LEVAQUIN) 500 MG tablet; Take 1 tablet by mouth Daily for 10 days.    Adult hypothyroidism    Essential hypertension  -     irbesartan (AVAPRO) 75 MG tablet; Take 1 tablet by mouth Every Night.  -     furosemide (LASIX) 40 MG tablet; Take 1 tablet by mouth Daily.    Lumbar radiculopathy  -     pregabalin (LYRICA) 100 MG capsule; Take 1 capsule by mouth 3 (Three) Times a Day.    Acquired hypothyroidism  -     levothyroxine (SYNTHROID, LEVOTHROID) 100 MCG tablet; Take 1 tablet by mouth Daily.    Benign non-nodular prostatic hyperplasia with lower urinary tract symptoms  -     finasteride (PROSCAR) 5 MG tablet; Take 1 tablet by mouth Daily.         Return in about 6 months (around 11/3/2018).    Leonid Lockett M.D  05/03/18  11:50 AM

## 2018-05-26 DIAGNOSIS — N40.1 BENIGN NON-NODULAR PROSTATIC HYPERPLASIA WITH LOWER URINARY TRACT SYMPTOMS: ICD-10-CM

## 2018-05-26 DIAGNOSIS — I10 ESSENTIAL HYPERTENSION: ICD-10-CM

## 2018-05-29 RX ORDER — FINASTERIDE 5 MG/1
TABLET, FILM COATED ORAL
Qty: 90 TABLET | Refills: 2 | Status: SHIPPED | OUTPATIENT
Start: 2018-05-29 | End: 2018-11-08 | Stop reason: SDUPTHER

## 2018-05-29 RX ORDER — IRBESARTAN 75 MG/1
TABLET ORAL
Qty: 90 TABLET | Refills: 2 | Status: SHIPPED | OUTPATIENT
Start: 2018-05-29 | End: 2018-11-08 | Stop reason: SDUPTHER

## 2018-08-13 DIAGNOSIS — M54.16 LUMBAR RADICULOPATHY: ICD-10-CM

## 2018-08-14 RX ORDER — PREGABALIN 100 MG/1
100 CAPSULE ORAL 3 TIMES DAILY
Qty: 270 CAPSULE | Refills: 3 | Status: SHIPPED | OUTPATIENT
Start: 2018-08-14 | End: 2018-11-08 | Stop reason: SDUPTHER

## 2018-10-31 ENCOUNTER — OFFICE VISIT (OUTPATIENT)
Dept: PAIN MEDICINE | Facility: CLINIC | Age: 68
End: 2018-10-31

## 2018-10-31 VITALS
TEMPERATURE: 98.2 F | WEIGHT: 241 LBS | HEIGHT: 71 IN | BODY MASS INDEX: 33.74 KG/M2 | RESPIRATION RATE: 18 BRPM | SYSTOLIC BLOOD PRESSURE: 134 MMHG | DIASTOLIC BLOOD PRESSURE: 77 MMHG | HEART RATE: 74 BPM | OXYGEN SATURATION: 97 %

## 2018-10-31 DIAGNOSIS — M96.1 FAILED BACK SYNDROME OF LUMBAR SPINE: ICD-10-CM

## 2018-10-31 DIAGNOSIS — M54.16 LUMBAR RADICULOPATHY: Primary | ICD-10-CM

## 2018-10-31 DIAGNOSIS — G89.29 OTHER CHRONIC PAIN: ICD-10-CM

## 2018-10-31 PROCEDURE — 99213 OFFICE O/P EST LOW 20 MIN: CPT | Performed by: ANESTHESIOLOGY

## 2018-10-31 RX ORDER — CHLORAL HYDRATE 500 MG
1000 CAPSULE ORAL 2 TIMES DAILY WITH MEALS
COMMUNITY
End: 2023-03-20

## 2018-11-08 ENCOUNTER — OFFICE VISIT (OUTPATIENT)
Dept: INTERNAL MEDICINE | Facility: CLINIC | Age: 68
End: 2018-11-08

## 2018-11-08 VITALS
HEIGHT: 71 IN | DIASTOLIC BLOOD PRESSURE: 79 MMHG | SYSTOLIC BLOOD PRESSURE: 127 MMHG | WEIGHT: 237.6 LBS | TEMPERATURE: 97.5 F | OXYGEN SATURATION: 98 % | BODY MASS INDEX: 33.26 KG/M2 | HEART RATE: 71 BPM

## 2018-11-08 DIAGNOSIS — K21.00 GASTROESOPHAGEAL REFLUX DISEASE WITH ESOPHAGITIS: ICD-10-CM

## 2018-11-08 DIAGNOSIS — Z12.5 PROSTATE CANCER SCREENING: ICD-10-CM

## 2018-11-08 DIAGNOSIS — E03.9 ADULT HYPOTHYROIDISM: ICD-10-CM

## 2018-11-08 DIAGNOSIS — Z79.899 ENCOUNTER FOR LONG-TERM (CURRENT) DRUG USE: ICD-10-CM

## 2018-11-08 DIAGNOSIS — M96.1 FAILED BACK SYNDROME OF LUMBAR SPINE: ICD-10-CM

## 2018-11-08 DIAGNOSIS — I10 ESSENTIAL HYPERTENSION: Primary | ICD-10-CM

## 2018-11-08 DIAGNOSIS — E03.9 ACQUIRED HYPOTHYROIDISM: ICD-10-CM

## 2018-11-08 DIAGNOSIS — M54.16 LUMBAR RADICULOPATHY: ICD-10-CM

## 2018-11-08 DIAGNOSIS — N40.1 BENIGN NON-NODULAR PROSTATIC HYPERPLASIA WITH LOWER URINARY TRACT SYMPTOMS: ICD-10-CM

## 2018-11-08 PROCEDURE — 99213 OFFICE O/P EST LOW 20 MIN: CPT | Performed by: FAMILY MEDICINE

## 2018-11-08 RX ORDER — IRBESARTAN 75 MG/1
75 TABLET ORAL DAILY
Qty: 90 TABLET | Refills: 3 | Status: SHIPPED | OUTPATIENT
Start: 2018-11-08 | End: 2019-05-09 | Stop reason: SDUPTHER

## 2018-11-08 RX ORDER — PREGABALIN 100 MG/1
100 CAPSULE ORAL 3 TIMES DAILY
Qty: 270 CAPSULE | Refills: 3 | Status: SHIPPED | OUTPATIENT
Start: 2018-11-08 | End: 2018-11-09 | Stop reason: SDUPTHER

## 2018-11-08 RX ORDER — ESOMEPRAZOLE MAGNESIUM 40 MG/1
40 CAPSULE, DELAYED RELEASE ORAL 2 TIMES DAILY
Qty: 180 CAPSULE | Refills: 3 | Status: SHIPPED | OUTPATIENT
Start: 2018-11-08 | End: 2018-11-09 | Stop reason: SDUPTHER

## 2018-11-08 RX ORDER — LEVOTHYROXINE SODIUM 0.1 MG/1
100 TABLET ORAL DAILY
Qty: 90 TABLET | Refills: 3 | Status: SHIPPED | OUTPATIENT
Start: 2018-11-08 | End: 2019-05-09 | Stop reason: SDUPTHER

## 2018-11-08 RX ORDER — AZELASTINE HYDROCHLORIDE 137 UG/1
1 SPRAY, METERED NASAL 2 TIMES DAILY
Qty: 30 ML | Refills: 11 | Status: SHIPPED | OUTPATIENT
Start: 2018-11-08 | End: 2019-02-01 | Stop reason: SDUPTHER

## 2018-11-08 RX ORDER — FINASTERIDE 5 MG/1
5 TABLET, FILM COATED ORAL DAILY
Qty: 90 TABLET | Refills: 3 | Status: SHIPPED | OUTPATIENT
Start: 2018-11-08 | End: 2019-05-09 | Stop reason: SDUPTHER

## 2018-11-08 RX ORDER — PROMETHAZINE HYDROCHLORIDE 25 MG/1
25 TABLET ORAL EVERY 6 HOURS PRN
Qty: 60 TABLET | Refills: 5 | Status: SHIPPED | OUTPATIENT
Start: 2018-11-08 | End: 2019-10-19 | Stop reason: HOSPADM

## 2018-11-08 RX ORDER — MECLIZINE HYDROCHLORIDE 25 MG/1
25 TABLET ORAL 3 TIMES DAILY PRN
Qty: 60 TABLET | Refills: 5 | Status: SHIPPED | OUTPATIENT
Start: 2018-11-08 | End: 2020-06-01

## 2018-11-08 RX ORDER — FINASTERIDE 5 MG/1
5 TABLET, FILM COATED ORAL DAILY
Qty: 90 TABLET | Refills: 3 | Status: SHIPPED | OUTPATIENT
Start: 2018-11-08 | End: 2018-11-08

## 2018-11-08 RX ORDER — FUROSEMIDE 40 MG/1
40 TABLET ORAL DAILY
Qty: 90 TABLET | Refills: 3 | Status: SHIPPED | OUTPATIENT
Start: 2018-11-08 | End: 2019-04-04

## 2018-11-08 RX ORDER — FUROSEMIDE 40 MG/1
40 TABLET ORAL DAILY
Qty: 90 TABLET | Refills: 3 | Status: SHIPPED | OUTPATIENT
Start: 2018-11-08 | End: 2019-05-09 | Stop reason: SDUPTHER

## 2018-11-08 NOTE — PROGRESS NOTES
CC:htn,neuropathy,hypothyroid    Subjective.../HPI  Patient present today with1) hbsrikanth Funes has a history of chronic hypertension and has been well controlled on current medications.  Patient reports has had hypertension for 3 years. He is tolerating medications without side effect. He reports no vision changes, headaches or lightheadedness. He is requesting refills of medications  2) neuropathy-radiculopathy- doing well with lyrica  3) hypothyroid    I have reviewed the patient's medical history in detail and updated the computerized patient record.    Past Medical History:   Diagnosis Date   • Arthritis    • Ramirez esophagus    • Benign prostatic hypertrophy    • GERD (gastroesophageal reflux disease)    • Hypertension    • Hypothyroidism    • KARLA on CPAP    • Osteoporosis        Past Surgical History:   Procedure Laterality Date   • CYSTOSCOPY     • HEMORRHOIDECTOMY     • KNEE SURGERY Left    • LUMBAR LAMINECTOMY     • NISSEN FUNDOPLICATION     • UPPER GASTROINTESTINAL ENDOSCOPY         Family History   Problem Relation Age of Onset   • Heart disease Father         cardiovascular disease   • Diabetes Father    • Hypertension Father    • Cancer Paternal Grandmother        Social History     Social History   • Marital status:      Spouse name: N/A   • Number of children: N/A   • Years of education: N/A     Occupational History   • Not on file.     Social History Main Topics   • Smoking status: Never Smoker   • Smokeless tobacco: Not on file   • Alcohol use No   • Drug use: No      Comment: Consumes 3 caffeinated beverages per day usually coffee.   • Sexual activity: Not on file     Other Topics Concern   • Not on file     Social History Narrative   • No narrative on file       Most Recent Immunizations   Administered Date(s) Administered   • Hepatitis A 04/25/2018   • Pneumococcal Conjugate (PCV7) 06/01/2011   • Pneumococcal Conjugate 13-Valent (PCV13) 03/14/2016   • Tdap 04/18/2016   • Zostavax 10/03/2011  "      Review of Systems:   Review of Systems   Constitutional: Negative.    HENT: Negative.    Eyes: Negative.    Respiratory: Negative.    Cardiovascular: Negative.    Gastrointestinal: Negative.    Endocrine: Negative.    Genitourinary: Negative.    Musculoskeletal: Negative.    Skin: Negative.    Allergic/Immunologic: Negative.    Hematological: Negative.    Psychiatric/Behavioral: Negative.          Physical Exam   Constitutional: He is oriented to person, place, and time. He appears well-developed and well-nourished.   Cardiovascular: Normal rate, regular rhythm and normal heart sounds.    Pulmonary/Chest: Effort normal and breath sounds normal.   Neurological: He is alert and oriented to person, place, and time.   Skin: Skin is warm and dry.   Psychiatric: He has a normal mood and affect. His behavior is normal.   Vitals reviewed.        Vital Signs     Vitals:    11/08/18 1120   BP: 127/79   BP Location: Left arm   Patient Position: Sitting   Cuff Size: Large Adult   Pulse: 71   Temp: 97.5 °F (36.4 °C)   TempSrc: Oral   SpO2: 98%   Weight: 108 kg (237 lb 9.6 oz)   Height: 180.3 cm (71\")          Results Review:      REVIEWED AND DISCUSSED CLINICAL RESULTS WITH PATIENT      Requested Prescriptions     Pending Prescriptions Disp Refills   • furosemide (LASIX) 40 MG tablet 90 tablet 3     Sig: Take 1 tablet by mouth Daily.     Signed Prescriptions Disp Refills   • irbesartan (AVAPRO) 75 MG tablet 90 tablet 3     Sig: Take 1 tablet by mouth Daily.   • pregabalin (LYRICA) 100 MG capsule 270 capsule 3     Sig: Take 1 capsule by mouth 3 (Three) Times a Day.   • esomeprazole (nexIUM) 40 MG capsule 180 capsule 3     Sig: Take 1 capsule by mouth 2 (Two) Times a Day.   • levothyroxine (SYNTHROID, LEVOTHROID) 100 MCG tablet 90 tablet 3     Sig: Take 1 tablet by mouth Daily.   • furosemide (LASIX) 40 MG tablet 90 tablet 3     Sig: Take 1 tablet by mouth Daily.   • meclizine (ANTIVERT) 25 MG tablet 60 tablet 5     Sig: " Take 1 tablet by mouth 3 (Three) Times a Day As Needed for dizziness.   • promethazine (PHENERGAN) 25 MG tablet 60 tablet 5     Sig: Take 1 tablet by mouth Every 6 (Six) Hours As Needed for Nausea or Vomiting.   • Azelastine HCl 137 MCG/SPRAY solution 30 mL 11     Si spray into the nostril(s) as directed by provider 2 (Two) Times a Day.   • finasteride (PROSCAR) 5 MG tablet 90 tablet 3     Sig: Take 1 tablet by mouth Daily.         Current Outpatient Prescriptions:   •  Calcium Carb-Cholecalciferol 500-600 MG-UNIT chewable tablet, Chew 1 tablet 2 (Two) Times a Day., Disp: 180 tablet, Rfl: 3  •  esomeprazole (nexIUM) 40 MG capsule, Take 1 capsule by mouth 2 (Two) Times a Day., Disp: 180 capsule, Rfl: 3  •  finasteride (PROSCAR) 5 MG tablet, Take 1 tablet by mouth Daily., Disp: 90 tablet, Rfl: 3  •  irbesartan (AVAPRO) 75 MG tablet, Take 1 tablet by mouth Daily., Disp: 90 tablet, Rfl: 3  •  levothyroxine (SYNTHROID, LEVOTHROID) 100 MCG tablet, Take 1 tablet by mouth Daily., Disp: 90 tablet, Rfl: 3  •  Omega-3 Fatty Acids (FISH OIL) 1000 MG capsule capsule, Take 1,000 mg by mouth 2 (Two) Times a Day With Meals., Disp: , Rfl:   •  pregabalin (LYRICA) 100 MG capsule, Take 1 capsule by mouth 3 (Three) Times a Day., Disp: 270 capsule, Rfl: 3  •  aspirin 81 MG tablet, Take 1 tablet by mouth Daily., Disp: 100 tablet, Rfl: 3  •  Azelastine HCl 137 MCG/SPRAY solution, 1 spray into the nostril(s) as directed by provider 2 (Two) Times a Day., Disp: 30 mL, Rfl: 11  •  furosemide (LASIX) 40 MG tablet, Take 1 tablet by mouth Daily., Disp: 90 tablet, Rfl: 3  •  meclizine (ANTIVERT) 25 MG tablet, Take 1 tablet by mouth 3 (Three) Times a Day As Needed for dizziness., Disp: 60 tablet, Rfl: 5  •  promethazine (PHENERGAN) 25 MG tablet, Take 1 tablet by mouth Every 6 (Six) Hours As Needed for Nausea or Vomiting., Disp: 60 tablet, Rfl: 5    Procedures          Diagnoses and all orders for this visit:    Essential hypertension  -      Comprehensive Metabolic Panel; Future  -     irbesartan (AVAPRO) 75 MG tablet; Take 1 tablet by mouth Daily.    Failed back syndrome of lumbar spine    Prostate cancer screening  -     PSA SCREENING; Future    Adult hypothyroidism  -     TSH; Future    Encounter for long-term (current) drug use  -     CBC & Differential; Future    Lumbar radiculopathy  -     pregabalin (LYRICA) 100 MG capsule; Take 1 capsule by mouth 3 (Three) Times a Day.    Benign non-nodular prostatic hyperplasia with lower urinary tract symptoms  -     Discontinue: finasteride (PROSCAR) 5 MG tablet; Take 1 tablet by mouth Daily.  -     finasteride (PROSCAR) 5 MG tablet; Take 1 tablet by mouth Daily.    Gastroesophageal reflux disease with esophagitis  -     esomeprazole (nexIUM) 40 MG capsule; Take 1 capsule by mouth 2 (Two) Times a Day.    Acquired hypothyroidism  -     levothyroxine (SYNTHROID, LEVOTHROID) 100 MCG tablet; Take 1 tablet by mouth Daily.    Other orders  -     furosemide (LASIX) 40 MG tablet; Take 1 tablet by mouth Daily.  -     meclizine (ANTIVERT) 25 MG tablet; Take 1 tablet by mouth 3 (Three) Times a Day As Needed for dizziness.  -     promethazine (PHENERGAN) 25 MG tablet; Take 1 tablet by mouth Every 6 (Six) Hours As Needed for Nausea or Vomiting.  -     Azelastine HCl 137 MCG/SPRAY solution; 1 spray into the nostril(s) as directed by provider 2 (Two) Times a Day.         Return in about 6 months (around 5/8/2019) for Recheck.    Leonid Lockett M.D  11/08/18  12:12 PM

## 2018-11-09 DIAGNOSIS — M54.16 LUMBAR RADICULOPATHY: ICD-10-CM

## 2018-11-09 DIAGNOSIS — K21.00 GASTROESOPHAGEAL REFLUX DISEASE WITH ESOPHAGITIS: ICD-10-CM

## 2018-11-09 RX ORDER — ESOMEPRAZOLE MAGNESIUM 40 MG/1
40 CAPSULE, DELAYED RELEASE ORAL 2 TIMES DAILY
Qty: 180 CAPSULE | Refills: 3 | Status: SHIPPED | OUTPATIENT
Start: 2018-11-09 | End: 2019-01-17 | Stop reason: SDUPTHER

## 2018-11-09 RX ORDER — PREGABALIN 100 MG/1
100 CAPSULE ORAL 3 TIMES DAILY
Qty: 270 CAPSULE | Refills: 3 | Status: SHIPPED | OUTPATIENT
Start: 2018-11-09 | End: 2019-05-09 | Stop reason: SDUPTHER

## 2018-11-13 ENCOUNTER — RESULTS ENCOUNTER (OUTPATIENT)
Dept: INTERNAL MEDICINE | Facility: CLINIC | Age: 68
End: 2018-11-13

## 2018-11-13 DIAGNOSIS — Z12.5 PROSTATE CANCER SCREENING: ICD-10-CM

## 2018-11-13 DIAGNOSIS — I10 ESSENTIAL HYPERTENSION: ICD-10-CM

## 2018-11-13 DIAGNOSIS — E03.9 ADULT HYPOTHYROIDISM: ICD-10-CM

## 2018-11-13 DIAGNOSIS — Z79.899 ENCOUNTER FOR LONG-TERM (CURRENT) DRUG USE: ICD-10-CM

## 2018-11-15 ENCOUNTER — OUTSIDE FACILITY SERVICE (OUTPATIENT)
Dept: PAIN MEDICINE | Facility: CLINIC | Age: 68
End: 2018-11-15

## 2018-11-15 ENCOUNTER — DOCUMENTATION (OUTPATIENT)
Dept: PAIN MEDICINE | Facility: CLINIC | Age: 68
End: 2018-11-15

## 2018-11-15 PROCEDURE — 64484 NJX AA&/STRD TFRM EPI L/S EA: CPT | Performed by: ANESTHESIOLOGY

## 2018-11-15 PROCEDURE — 64483 NJX AA&/STRD TFRM EPI L/S 1: CPT | Performed by: ANESTHESIOLOGY

## 2018-11-15 NOTE — PROGRESS NOTES
Lumbar Transforaminal Epidural Steroid Injection (two levels)  [facility]      PREOPERATIVE DIAGNOSIS:  Lumbar Postlaminectomy Syndrome, Other Chronic Pain and left Lumbar Radiculopathy    POSTOPERATIVE DIAGNOSIS:  Same as preop diagnosis    PROCEDURE:    1. CPT 24232 --  Diagnostic Transforaminal Epidural Steroid Injection at the L4 level, on the left   2. CPT 14336 --  Diagnostic Transforaminal Epidural Steroid Injection at the L5 level, on the left     PRE-PROCEDURE DISCUSSION WITH PATIENT:    Risks and complications were discussed with the patient prior to starting the procedure and informed consent was obtained.  We discussed various topics including but not limited to bleeding, infection, injury, nerve injury, paralysis, coma, death, postprocedural painful flare-up, postprocedural site soreness, and a lack of pain relief.  We discussed the diagnostic aspect of transforaminal epidural / selective nerve root blockade.    SURGEON:  Lee Mg MD    REASON FOR PROCEDURE:    Previous clinically significant therapeutic effect is noted., Radiating pattern of pain is likely consistent with degenerative changes in the area. and Acute pain flareup is noted & problematic, and the injection is used to attempt to break the flareup.      SEDATION:  Versed 4mg & Fentanyl 50 mcg IV  ANESTHETIC:  Marcaine 0.25%  STEROID:  Methylprednisolone (DEPO MEDROL) 80mg/ml    DESCRIPTON OF PROCEDURE:  After obtaining informed consent, an I.V. was started in the preoperative area. The patient taken to the operating room and was placed in the prone position with a pillow under the abdomen.  All pressure points were well padded.  EKG, blood pressure, and pulse oximeter were monitored.  The lumbar area was prepped with Chloraprep and draped in a sterile fashion. Under fluoroscopic guidance in an oblique dimension on the above mentioned side, the transverse process of the first aforementioned vertebra at the junction  of the body at 6 o'clock position was identified. Skin and subcutaneous tissue was anesthetized with 1% lidocaine. A 22-gauge spinal needle was introduced under fluoroscopic guidance at the above junction into the foramen without parasthesias and into the epidural space. After confirming the position of the needle with PA, lateral, and oblique fluoroscopic views, aspiration was checked and was clear of blood or CSF.  Next, 1 mL of Omnipaque was injected. After seeing adequate spread on the corresponding nerve root, a total volume 2mL of injectate containing local anesthetic as above and half of the above mentioned corticosteroid was injected into the epidural space.  The needle was removed intact.      Next, in similar fashion, the second level mentioned above was addressed and a similar amount of injectate was delivered after similar imaging was achieved.      Vital signs were stable throughout.          ESTIMATED BLOOD LOSS:  <5 mL  SPECIMENS:  none    COMPLICATIONS:   No complications were noted., There was no indication of vascular uptake on live injection of contrast dye. and The patient did not have any signs of postprocedure numbness nor weakness.    TOLERANCE & DISCHARGE CONDITION:    The patient tolerated the procedure well.  The patient was transported to the recovery area without difficulties.  The patient was discharged to home under the care of family in stable and satisfactory condition.    PLAN OF CARE:  1. The patient was given our standard instruction sheet.  2. The patient will Repeat injection 4 wks.  3. The patient will resume all medications as per the medication reconciliation sheet.

## 2018-11-20 ENCOUNTER — TELEPHONE (OUTPATIENT)
Dept: PAIN MEDICINE | Facility: CLINIC | Age: 68
End: 2018-11-20

## 2018-11-20 NOTE — TELEPHONE ENCOUNTER
Pt called c/o soreness after injection on 11-15-18. I assured him soreness can be a normal side effect following injections. I instructed him to try tylenol PRN, as well as alternating ice and heat. He states he hasn't tried this yet. Reviewed red flag symptoms and he is not experiencing any of this.

## 2019-01-17 DIAGNOSIS — K21.00 GASTROESOPHAGEAL REFLUX DISEASE WITH ESOPHAGITIS: ICD-10-CM

## 2019-01-17 RX ORDER — ESOMEPRAZOLE MAGNESIUM 40 MG/1
40 CAPSULE, DELAYED RELEASE ORAL 2 TIMES DAILY
Qty: 180 CAPSULE | Refills: 3 | Status: SHIPPED | OUTPATIENT
Start: 2019-01-17 | End: 2019-05-09 | Stop reason: SDUPTHER

## 2019-02-01 RX ORDER — AZELASTINE HYDROCHLORIDE 137 UG/1
1 SPRAY, METERED NASAL 2 TIMES DAILY
Qty: 30 ML | Refills: 11 | Status: SHIPPED | OUTPATIENT
Start: 2019-02-01 | End: 2019-04-04

## 2019-04-04 ENCOUNTER — OFFICE VISIT (OUTPATIENT)
Dept: INTERNAL MEDICINE | Facility: CLINIC | Age: 69
End: 2019-04-04

## 2019-04-04 VITALS
HEIGHT: 71 IN | HEART RATE: 78 BPM | OXYGEN SATURATION: 97 % | DIASTOLIC BLOOD PRESSURE: 78 MMHG | BODY MASS INDEX: 32.98 KG/M2 | SYSTOLIC BLOOD PRESSURE: 118 MMHG | WEIGHT: 235.6 LBS | TEMPERATURE: 97.5 F | RESPIRATION RATE: 16 BRPM

## 2019-04-04 DIAGNOSIS — R19.7 DIARRHEA OF PRESUMED INFECTIOUS ORIGIN: Primary | ICD-10-CM

## 2019-04-04 PROCEDURE — 99213 OFFICE O/P EST LOW 20 MIN: CPT | Performed by: NURSE PRACTITIONER

## 2019-04-04 RX ORDER — OFLOXACIN 3 MG/ML
SOLUTION/ DROPS OPHTHALMIC
COMMUNITY
Start: 2019-03-18 | End: 2019-05-17

## 2019-04-04 RX ORDER — MONTELUKAST SODIUM 4 MG/1
1 TABLET, CHEWABLE ORAL DAILY
Qty: 30 TABLET | Refills: 0 | Status: SHIPPED | OUTPATIENT
Start: 2019-04-04 | End: 2019-09-28

## 2019-04-04 NOTE — PROGRESS NOTES
"Subjective   Marin Cuenca is a 68 y.o. male.   Chief Complaint   Patient presents with   • Diarrhea     X 6 Days, pt has been taking probiotics since saturday       Patient presents for evaluation of diarrhea.  This has been going on for 6 days.  This is a 68-year-old male patient of Dr. Lockett with a history of hypertension, diverticulitis, KARLA, GERD.  He states that 6 days ago, he ate at a restaurant with a salad bar, and the next morning woke up with diarrhea continually throughout the day.  He describes the diarrhea as \"watery\" absent of mucus or blood, and has multiple episodes of it throughout the day.  He states that it is progressively worsening over the past few days.  He is having no abdominal pain, only slight cramping precipitating episodes of diarrhea.  He denies nausea or vomiting, shortness of breath, chest discomfort, fever or chills.  He also states that he has having odorous belching throughout the day.  He has been taking a probiotic for 5 days, which he states he does not see helping much.  He denies any recent antibiotic use, but does have a history of C. difficile, \"about 7 years ago.\"  He denies development of any other new issues today.         The following portions of the patient's history were reviewed and updated as appropriate: allergies, current medications, past family history, past medical history, past social history, past surgical history and problem list.    Review of Systems   Constitutional: Negative for activity change, chills, fatigue, fever, unexpected weight gain and unexpected weight loss.   HENT: Negative for congestion, hearing loss, postnasal drip, sinus pressure, sneezing, sore throat and tinnitus.    Eyes: Negative for photophobia, pain and visual disturbance.   Respiratory: Negative for cough, chest tightness, shortness of breath and wheezing.    Cardiovascular: Negative for chest pain, palpitations and leg swelling.   Gastrointestinal: Positive for diarrhea and " "indigestion (  Increased belching). Negative for abdominal distention, abdominal pain, anal bleeding, blood in stool, constipation, nausea, rectal pain, vomiting and GERD.   Endocrine: Negative for polydipsia, polyphagia and polyuria.   Genitourinary: Negative for dysuria, frequency, hematuria and urgency.   Neurological: Negative for dizziness, weakness, numbness and headache.   All other systems reviewed and are negative.      Objective    /78 (BP Location: Left arm, Patient Position: Sitting, Cuff Size: Large Adult)   Pulse 78   Temp 97.5 °F (36.4 °C) (Oral)   Resp 16   Ht 180.3 cm (71\")   Wt 107 kg (235 lb 9.6 oz)   SpO2 97%   BMI 32.86 kg/m²     Physical Exam   Constitutional: He is oriented to person, place, and time. He appears well-developed and well-nourished. No distress.   HENT:   Head: Normocephalic and atraumatic.   Right Ear: External ear normal.   Left Ear: External ear normal.   Nose: Nose normal.   Mouth/Throat: Oropharynx is clear and moist. No oropharyngeal exudate.   Eyes: Conjunctivae and EOM are normal. Pupils are equal, round, and reactive to light. Right eye exhibits no discharge. Left eye exhibits no discharge.   Neck: Normal range of motion. Neck supple. No JVD present. No tracheal deviation present. No thyromegaly present.   Cardiovascular: Normal rate, regular rhythm, normal heart sounds and intact distal pulses. Exam reveals no gallop and no friction rub.   No murmur heard.  Pulmonary/Chest: Effort normal and breath sounds normal. No stridor. No respiratory distress. He has no wheezes. He has no rales. He exhibits no tenderness.   Lungs are CTA bilaterally   Abdominal: Soft. Normal appearance and bowel sounds are normal. He exhibits no distension and no mass. There is no tenderness. There is no rebound, no guarding and no CVA tenderness. No hernia.   Bowel sounds active x4 quadrants.  No pain to light or deep palpation x4 quadrants   Musculoskeletal: Normal range of motion. "   Lymphadenopathy:     He has no cervical adenopathy.   Neurological: He is alert and oriented to person, place, and time.   Skin: Skin is warm and dry. Capillary refill takes less than 2 seconds. He is not diaphoretic.   Psychiatric: He has a normal mood and affect. His behavior is normal. Judgment and thought content normal.   Nursing note and vitals reviewed.        Assessment/Plan   Marin was seen today for diarrhea.    Diagnoses and all orders for this visit:    Diarrhea of presumed infectious origin  -     colestipol (COLESTID) 1 g tablet; Take 1 tablet by mouth Daily.  -     Clostridium Difficile Toxin, PCR - Stool, Per Rectum; Future  -     IBD Expanded Panel  -     CBC & Differential  -     Comprehensive metabolic panel  -     Stool Culture - Stool, Per Rectum; Future  -     Fecal Lactoferrin - Stool, Per Rectum; Future  -     Giardia Antigen - Stool, Per Rectum; Future      -Given the severity of the diarrhea, which he states is progressing as well as his history of C. difficile, we will check stool for C. difficile, stool culture, fecal lactoferrin, Giardia.  We will also check a CBC and CMP, as well as IBD panel.  Provided colestipol 1 g daily, and he may use Imodium sparingly.  He may continue the probiotic.    -We will call patient with results of his labs and further recommendations.  Follow-up as needed and routinely with PCP, Dr. Lockett.

## 2019-04-09 DIAGNOSIS — K52.9 CHRONIC DIARRHEA: Primary | ICD-10-CM

## 2019-04-09 LAB
ALBUMIN SERPL-MCNC: 4.4 G/DL (ref 3.6–4.8)
ALBUMIN/GLOB SERPL: 1.8 {RATIO} (ref 1.2–2.2)
ALP SERPL-CCNC: 78 IU/L (ref 39–117)
ALT SERPL-CCNC: 13 IU/L (ref 0–44)
AST SERPL-CCNC: 20 IU/L (ref 0–40)
BAKER'S YEAST IGG QN IA: 32 UNITS (ref 0–50)
BASOPHILS # BLD AUTO: 0.1 X10E3/UL (ref 0–0.2)
BASOPHILS NFR BLD AUTO: 1 %
BILIRUB SERPL-MCNC: 0.5 MG/DL (ref 0–1.2)
BUN SERPL-MCNC: 14 MG/DL (ref 8–27)
BUN/CREAT SERPL: 11 (ref 10–24)
CALCIUM SERPL-MCNC: 9.7 MG/DL (ref 8.6–10.2)
CHITOBIOSIDE IGA SERPL IA-ACNC: 13 UNITS (ref 0–90)
CHLORIDE SERPL-SCNC: 104 MMOL/L (ref 96–106)
CO2 SERPL-SCNC: 25 MMOL/L (ref 20–29)
CREAT SERPL-MCNC: 1.23 MG/DL (ref 0.76–1.27)
EOSINOPHIL # BLD AUTO: 0.2 X10E3/UL (ref 0–0.4)
EOSINOPHIL NFR BLD AUTO: 2 %
ERYTHROCYTE [DISTWIDTH] IN BLOOD BY AUTOMATED COUNT: 12.7 % (ref 12.3–15.4)
GLOBULIN SER CALC-MCNC: 2.4 G/DL (ref 1.5–4.5)
GLUCOSE SERPL-MCNC: 105 MG/DL (ref 65–99)
HCT VFR BLD AUTO: 43.8 % (ref 37.5–51)
HGB BLD-MCNC: 15 G/DL (ref 13–17.7)
IMM GRANULOCYTES # BLD AUTO: 0 X10E3/UL (ref 0–0.1)
IMM GRANULOCYTES NFR BLD AUTO: 0 %
LABORATORY COMMENT REPORT: ABNORMAL
LAMINARIBIOSIDE IGG SERPL IA-ACNC: 76 UNITS (ref 0–60)
LYMPHOCYTES # BLD AUTO: 1.3 X10E3/UL (ref 0.7–3.1)
LYMPHOCYTES NFR BLD AUTO: 20 %
MANNOBIOSIDE IGG SERPL IA-ACNC: 63 UNITS (ref 0–100)
MCH RBC QN AUTO: 31.7 PG (ref 26.6–33)
MCHC RBC AUTO-ENTMCNC: 34.2 G/DL (ref 31.5–35.7)
MCV RBC AUTO: 93 FL (ref 79–97)
MONOCYTES # BLD AUTO: 0.6 X10E3/UL (ref 0.1–0.9)
MONOCYTES NFR BLD AUTO: 9 %
NEUTROPHILS # BLD AUTO: 4.6 X10E3/UL (ref 1.4–7)
NEUTROPHILS NFR BLD AUTO: 68 %
P-ANCA ATYPICAL SER QL IF: NEGATIVE
PLATELET # BLD AUTO: 275 X10E3/UL (ref 150–379)
POTASSIUM SERPL-SCNC: 4.8 MMOL/L (ref 3.5–5.2)
PROT SERPL-MCNC: 6.8 G/DL (ref 6–8.5)
RBC # BLD AUTO: 4.73 X10E6/UL (ref 4.14–5.8)
SODIUM SERPL-SCNC: 143 MMOL/L (ref 134–144)
WBC # BLD AUTO: 6.7 X10E3/UL (ref 3.4–10.8)

## 2019-04-09 NOTE — PROGRESS NOTES
Please inform patient that his bloodwork came back stable from a fluid and electrolyte standpoint. He had one marker on his inflammatory bowel disease panel that was elevated and can be indicative of Crohn's disease. I am going to refer him to GI for further evaluation. Please inquire as to whether the diarrhea is any better. Please let him know that I do not have the stool labs back yet, but will let him know the results as soon as I do.

## 2019-04-10 LAB
BACTERIA SPEC CULT: NORMAL
BACTERIA SPEC CULT: NORMAL
C DIFF TOX GENS STL QL NAA+PROBE: NEGATIVE
CAMPYLOBACTER STL CULT: NORMAL
E COLI SXT STL QL IA: NEGATIVE
G LAMBLIA AG STL QL IA: NEGATIVE
LACTOFERRIN STL-MCNC: 5.22 UG/ML(G) (ref 0–7.24)
SALM + SHIG STL CULT: NORMAL

## 2019-04-11 NOTE — PROGRESS NOTES
Please let patient know that his stool labs have come back and they are negative. Cdiff was negative, giardia, salmonella, and other toxins.

## 2019-04-25 DIAGNOSIS — E03.9 ACQUIRED HYPOTHYROIDISM: ICD-10-CM

## 2019-04-25 DIAGNOSIS — Z79.899 ENCOUNTER FOR LONG-TERM (CURRENT) DRUG USE: ICD-10-CM

## 2019-04-25 DIAGNOSIS — Z12.5 PROSTATE CANCER SCREENING: ICD-10-CM

## 2019-04-25 DIAGNOSIS — R60.0 LOCALIZED EDEMA: Primary | ICD-10-CM

## 2019-04-30 ENCOUNTER — RESULTS ENCOUNTER (OUTPATIENT)
Dept: INTERNAL MEDICINE | Facility: CLINIC | Age: 69
End: 2019-04-30

## 2019-04-30 DIAGNOSIS — E03.9 ACQUIRED HYPOTHYROIDISM: ICD-10-CM

## 2019-04-30 DIAGNOSIS — Z79.899 ENCOUNTER FOR LONG-TERM (CURRENT) DRUG USE: ICD-10-CM

## 2019-04-30 DIAGNOSIS — R60.0 LOCALIZED EDEMA: ICD-10-CM

## 2019-05-02 LAB
ALBUMIN SERPL-MCNC: 4.1 G/DL (ref 3.5–5.2)
ALBUMIN/GLOB SERPL: 1.8 G/DL
ALP SERPL-CCNC: 75 U/L (ref 39–117)
ALT SERPL-CCNC: 11 U/L (ref 1–41)
AST SERPL-CCNC: 11 U/L (ref 1–40)
BASOPHILS # BLD AUTO: 0.09 10*3/MM3 (ref 0–0.2)
BASOPHILS NFR BLD AUTO: 1.3 % (ref 0–1.5)
BILIRUB SERPL-MCNC: 0.4 MG/DL (ref 0.2–1.2)
BUN SERPL-MCNC: 13 MG/DL (ref 8–23)
BUN/CREAT SERPL: 11.2 (ref 7–25)
CALCIUM SERPL-MCNC: 9.6 MG/DL (ref 8.6–10.5)
CHLORIDE SERPL-SCNC: 102 MMOL/L (ref 98–107)
CO2 SERPL-SCNC: 29.7 MMOL/L (ref 22–29)
CREAT SERPL-MCNC: 1.16 MG/DL (ref 0.76–1.27)
EOSINOPHIL # BLD AUTO: 0.24 10*3/MM3 (ref 0–0.4)
EOSINOPHIL NFR BLD AUTO: 3.4 % (ref 0.3–6.2)
ERYTHROCYTE [DISTWIDTH] IN BLOOD BY AUTOMATED COUNT: 12.6 % (ref 12.3–15.4)
GLOBULIN SER CALC-MCNC: 2.3 GM/DL
GLUCOSE SERPL-MCNC: 97 MG/DL (ref 65–99)
HCT VFR BLD AUTO: 43.8 % (ref 37.5–51)
HGB BLD-MCNC: 13.8 G/DL (ref 13–17.7)
IMM GRANULOCYTES # BLD AUTO: 0.03 10*3/MM3 (ref 0–0.05)
IMM GRANULOCYTES NFR BLD AUTO: 0.4 % (ref 0–0.5)
LYMPHOCYTES # BLD AUTO: 1.7 10*3/MM3 (ref 0.7–3.1)
LYMPHOCYTES NFR BLD AUTO: 24 % (ref 19.6–45.3)
MCH RBC QN AUTO: 30.9 PG (ref 26.6–33)
MCHC RBC AUTO-ENTMCNC: 31.5 G/DL (ref 31.5–35.7)
MCV RBC AUTO: 98.2 FL (ref 79–97)
MONOCYTES # BLD AUTO: 0.55 10*3/MM3 (ref 0.1–0.9)
MONOCYTES NFR BLD AUTO: 7.8 % (ref 5–12)
NEUTROPHILS # BLD AUTO: 4.47 10*3/MM3 (ref 1.7–7)
NEUTROPHILS NFR BLD AUTO: 63.1 % (ref 42.7–76)
NRBC BLD AUTO-RTO: 0 /100 WBC (ref 0–0.2)
PLATELET # BLD AUTO: 236 10*3/MM3 (ref 140–450)
POTASSIUM SERPL-SCNC: 4.6 MMOL/L (ref 3.5–5.2)
PROT SERPL-MCNC: 6.4 G/DL (ref 6–8.5)
PSA SERPL-MCNC: 0.68 NG/ML (ref 0–4)
RBC # BLD AUTO: 4.46 10*6/MM3 (ref 4.14–5.8)
SODIUM SERPL-SCNC: 142 MMOL/L (ref 136–145)
TSH SERPL DL<=0.005 MIU/L-ACNC: 2.07 MIU/ML (ref 0.27–4.2)
WBC # BLD AUTO: 7.08 10*3/MM3 (ref 3.4–10.8)

## 2019-05-09 ENCOUNTER — OFFICE VISIT (OUTPATIENT)
Dept: INTERNAL MEDICINE | Facility: CLINIC | Age: 69
End: 2019-05-09

## 2019-05-09 VITALS
BODY MASS INDEX: 32.48 KG/M2 | DIASTOLIC BLOOD PRESSURE: 80 MMHG | OXYGEN SATURATION: 98 % | HEART RATE: 88 BPM | HEIGHT: 71 IN | SYSTOLIC BLOOD PRESSURE: 118 MMHG | TEMPERATURE: 97.8 F | WEIGHT: 232 LBS

## 2019-05-09 DIAGNOSIS — I10 ESSENTIAL HYPERTENSION: ICD-10-CM

## 2019-05-09 DIAGNOSIS — Z28.39 IMMUNIZATION DEFICIENCY: Primary | ICD-10-CM

## 2019-05-09 DIAGNOSIS — N40.1 BENIGN NON-NODULAR PROSTATIC HYPERPLASIA WITH LOWER URINARY TRACT SYMPTOMS: ICD-10-CM

## 2019-05-09 DIAGNOSIS — M54.16 LUMBAR RADICULOPATHY: ICD-10-CM

## 2019-05-09 DIAGNOSIS — K21.00 GASTROESOPHAGEAL REFLUX DISEASE WITH ESOPHAGITIS: ICD-10-CM

## 2019-05-09 DIAGNOSIS — E03.9 ACQUIRED HYPOTHYROIDISM: ICD-10-CM

## 2019-05-09 PROCEDURE — 99214 OFFICE O/P EST MOD 30 MIN: CPT | Performed by: FAMILY MEDICINE

## 2019-05-09 RX ORDER — FUROSEMIDE 40 MG/1
40 TABLET ORAL DAILY
Qty: 90 TABLET | Refills: 3 | Status: SHIPPED | OUTPATIENT
Start: 2019-05-09 | End: 2019-05-20 | Stop reason: HOSPADM

## 2019-05-09 RX ORDER — IRBESARTAN 75 MG/1
75 TABLET ORAL DAILY
Qty: 90 TABLET | Refills: 3 | Status: SHIPPED | OUTPATIENT
Start: 2019-05-09 | End: 2019-09-10 | Stop reason: SDUPTHER

## 2019-05-09 RX ORDER — PREGABALIN 100 MG/1
100 CAPSULE ORAL 3 TIMES DAILY
Qty: 270 CAPSULE | Refills: 3 | Status: SHIPPED | OUTPATIENT
Start: 2019-05-09 | End: 2019-05-09 | Stop reason: SDUPTHER

## 2019-05-09 RX ORDER — PREGABALIN 100 MG/1
100 CAPSULE ORAL 3 TIMES DAILY
Qty: 270 CAPSULE | Refills: 3 | Status: SHIPPED | OUTPATIENT
Start: 2019-05-09 | End: 2019-05-14 | Stop reason: SDUPTHER

## 2019-05-09 RX ORDER — ERYTHROMYCIN 5 MG/G
1 OINTMENT OPHTHALMIC 3 TIMES DAILY
COMMUNITY
Start: 2019-04-26 | End: 2019-09-28

## 2019-05-09 RX ORDER — LEVOTHYROXINE SODIUM 0.1 MG/1
100 TABLET ORAL DAILY
Qty: 90 TABLET | Refills: 3 | Status: SHIPPED | OUTPATIENT
Start: 2019-05-09 | End: 2019-11-11 | Stop reason: SDUPTHER

## 2019-05-09 RX ORDER — FINASTERIDE 5 MG/1
5 TABLET, FILM COATED ORAL DAILY
Qty: 90 TABLET | Refills: 3 | Status: SHIPPED | OUTPATIENT
Start: 2019-05-09 | End: 2019-11-11 | Stop reason: SDUPTHER

## 2019-05-09 RX ORDER — ESOMEPRAZOLE MAGNESIUM 40 MG/1
40 CAPSULE, DELAYED RELEASE ORAL 2 TIMES DAILY
Qty: 180 CAPSULE | Refills: 3 | Status: SHIPPED | OUTPATIENT
Start: 2019-05-09 | End: 2019-11-11 | Stop reason: SDUPTHER

## 2019-05-09 RX ORDER — ESOMEPRAZOLE MAGNESIUM 40 MG/1
40 CAPSULE, DELAYED RELEASE ORAL 2 TIMES DAILY
Qty: 180 CAPSULE | Refills: 3 | Status: SHIPPED | OUTPATIENT
Start: 2019-05-09 | End: 2019-05-09 | Stop reason: SDUPTHER

## 2019-05-09 NOTE — PROGRESS NOTES
CC:peripheral neuropathy,hbp,hyothyroid,BPH    Subjective.../HPI  Patient present today with1) Marin has a history of chronic hypertension and has been well controlled on current medications.  Patient reports has had hypertension for 10 years. He is tolerating medications without side effect. He reports no vision changes, headaches or lightheadedness. He is requesting refills of medications  2)bph- nocturia x1  3)neuropathy-feet- doing well with Lyrica  I have reviewed the patient's medical history in detail and updated the computerized patient record.  4) GERD- on omeprazole  Past Medical History:   Diagnosis Date   • Arthritis    • Ramirez esophagus    • Benign prostatic hypertrophy    • GERD (gastroesophageal reflux disease)    • Hypertension    • Hypothyroidism    • KARLA on CPAP    • Osteoporosis        Past Surgical History:   Procedure Laterality Date   • CYSTOSCOPY     • HEMORRHOIDECTOMY     • KNEE SURGERY Left    • LUMBAR LAMINECTOMY     • NISSEN FUNDOPLICATION     • UPPER GASTROINTESTINAL ENDOSCOPY         Family History   Problem Relation Age of Onset   • Heart disease Father         cardiovascular disease   • Diabetes Father    • Hypertension Father    • Cancer Paternal Grandmother        Social History     Socioeconomic History   • Marital status:      Spouse name: Not on file   • Number of children: Not on file   • Years of education: Not on file   • Highest education level: Not on file   Tobacco Use   • Smoking status: Never Smoker   Substance and Sexual Activity   • Alcohol use: No   • Drug use: No     Comment: Consumes 3 caffeinated beverages per day usually coffee.       Most Recent Immunizations   Administered Date(s) Administered   • Hepatitis A 04/25/2018   • Pneumococcal Conjugate 13-Valent (PCV13) 03/14/2016   • Pneumococcal, Unspecified 06/01/2011   • Tdap 04/18/2016   • Zostavax 10/03/2011       Review of Systems:   Review of Systems   Constitutional: Negative.    HENT: Negative.   "  Eyes: Negative.    Respiratory: Negative.    Cardiovascular: Negative.    Gastrointestinal: Negative.    Endocrine: Negative.    Genitourinary: Negative.    Musculoskeletal: Negative.    Skin: Negative.    Neurological: Negative.    Hematological: Negative.    Psychiatric/Behavioral: Negative.          Physical Exam   Constitutional: He is oriented to person, place, and time. He appears well-developed and well-nourished.   Cardiovascular: Regular rhythm, normal heart sounds and intact distal pulses.   Pulmonary/Chest: Effort normal and breath sounds normal.   Abdominal: Soft. Bowel sounds are normal.   Neurological: He is alert and oriented to person, place, and time.   Psychiatric: He has a normal mood and affect. His behavior is normal. Thought content normal.   Vitals reviewed.        Vital Signs     Vitals:    05/09/19 1218   BP: 118/80   BP Location: Left arm   Patient Position: Sitting   Cuff Size: Large Adult   Pulse: 88   Temp: 97.8 °F (36.6 °C)   TempSrc: Oral   SpO2: 98%   Weight: 105 kg (232 lb)   Height: 180.3 cm (71\")          Results Review:      REVIEWED AND DISCUSSED LAB RESULTS WITH PATIENT      Requested Prescriptions     Signed Prescriptions Disp Refills   • esomeprazole (nexIUM) 40 MG capsule 180 capsule 3     Sig: Take 1 capsule by mouth 2 (Two) Times a Day.   • pregabalin (LYRICA) 100 MG capsule 270 capsule 3     Sig: Take 1 capsule by mouth 3 (Three) Times a Day.   • levothyroxine (SYNTHROID, LEVOTHROID) 100 MCG tablet 90 tablet 3     Sig: Take 1 tablet by mouth Daily.   • finasteride (PROSCAR) 5 MG tablet 90 tablet 3     Sig: Take 1 tablet by mouth Daily.   • furosemide (LASIX) 40 MG tablet 90 tablet 3     Sig: Take 1 tablet by mouth Daily.   • irbesartan (AVAPRO) 75 MG tablet 90 tablet 3     Sig: Take 1 tablet by mouth Daily.         Current Outpatient Medications:   •  aspirin 81 MG tablet, Take 1 tablet by mouth Daily., Disp: 100 tablet, Rfl: 3  •  Calcium Carb-Cholecalciferol 500-600 " MG-UNIT chewable tablet, Chew 1 tablet 2 (Two) Times a Day., Disp: 180 tablet, Rfl: 3  •  esomeprazole (nexIUM) 40 MG capsule, Take 1 capsule by mouth 2 (Two) Times a Day., Disp: 180 capsule, Rfl: 3  •  finasteride (PROSCAR) 5 MG tablet, Take 1 tablet by mouth Daily., Disp: 90 tablet, Rfl: 3  •  furosemide (LASIX) 40 MG tablet, Take 1 tablet by mouth Daily., Disp: 90 tablet, Rfl: 3  •  irbesartan (AVAPRO) 75 MG tablet, Take 1 tablet by mouth Daily., Disp: 90 tablet, Rfl: 3  •  levothyroxine (SYNTHROID, LEVOTHROID) 100 MCG tablet, Take 1 tablet by mouth Daily., Disp: 90 tablet, Rfl: 3  •  meclizine (ANTIVERT) 25 MG tablet, Take 1 tablet by mouth 3 (Three) Times a Day As Needed for dizziness., Disp: 60 tablet, Rfl: 5  •  ofloxacin (OCUFLOX) 0.3 % ophthalmic solution, , Disp: , Rfl:   •  Omega-3 Fatty Acids (FISH OIL) 1000 MG capsule capsule, Take 1,000 mg by mouth 2 (Two) Times a Day With Meals., Disp: , Rfl:   •  pregabalin (LYRICA) 100 MG capsule, Take 1 capsule by mouth 3 (Three) Times a Day., Disp: 270 capsule, Rfl: 3  •  promethazine (PHENERGAN) 25 MG tablet, Take 1 tablet by mouth Every 6 (Six) Hours As Needed for Nausea or Vomiting., Disp: 60 tablet, Rfl: 5  •  colestipol (COLESTID) 1 g tablet, Take 1 tablet by mouth Daily., Disp: 30 tablet, Rfl: 0  •  erythromycin (ROMYCIN) 5 MG/GM ophthalmic ointment, , Disp: , Rfl:     Procedures          Diagnoses and all orders for this visit:    Immunization deficiency  -     Measles / Mumps / Rubella Immunity    Gastroesophageal reflux disease with esophagitis  -     esomeprazole (nexIUM) 40 MG capsule; Take 1 capsule by mouth 2 (Two) Times a Day.    Lumbar radiculopathy  -     pregabalin (LYRICA) 100 MG capsule; Take 1 capsule by mouth 3 (Three) Times a Day.    Acquired hypothyroidism  -     levothyroxine (SYNTHROID, LEVOTHROID) 100 MCG tablet; Take 1 tablet by mouth Daily.    Benign non-nodular prostatic hyperplasia with lower urinary tract symptoms  -     finasteride  (PROSCAR) 5 MG tablet; Take 1 tablet by mouth Daily.    Essential hypertension  -     irbesartan (AVAPRO) 75 MG tablet; Take 1 tablet by mouth Daily.    Other orders  -     erythromycin (ROMYCIN) 5 MG/GM ophthalmic ointment  -     furosemide (LASIX) 40 MG tablet; Take 1 tablet by mouth Daily.        There are no Patient Instructions on file for this visit.     Return in about 6 months (around 11/9/2019).    Leonid Lockett M.D  05/09/19  12:58 PM

## 2019-05-10 LAB
MEV IGG SER IA-ACNC: >300 AU/ML
MUV IGG SER IA-ACNC: <9 AU/ML
RUBV IGG SERPL IA-ACNC: 7.68 INDEX

## 2019-05-13 DIAGNOSIS — M54.16 LUMBAR RADICULOPATHY: ICD-10-CM

## 2019-05-14 DIAGNOSIS — M54.16 LUMBAR RADICULOPATHY: ICD-10-CM

## 2019-05-14 RX ORDER — PREGABALIN 100 MG/1
100 CAPSULE ORAL 3 TIMES DAILY
Qty: 270 CAPSULE | Refills: 3 | Status: SHIPPED | OUTPATIENT
Start: 2019-05-14 | End: 2019-12-06 | Stop reason: SDUPTHER

## 2019-05-14 NOTE — TELEPHONE ENCOUNTER
Pt called states he would like his lyrica med sent to the [email or web address] instead of [facility]    Pt last seen 05/09/2019

## 2019-05-17 ENCOUNTER — HOSPITAL ENCOUNTER (INPATIENT)
Facility: HOSPITAL | Age: 69
LOS: 3 days | Discharge: HOME | End: 2019-05-20
Attending: EMERGENCY MEDICINE | Admitting: INTERNAL MEDICINE

## 2019-05-17 ENCOUNTER — APPOINTMENT (OUTPATIENT)
Dept: CT IMAGING | Facility: HOSPITAL | Age: 69
End: 2019-05-17

## 2019-05-17 DIAGNOSIS — R10.9 ABDOMINAL PAIN, UNSPECIFIED ABDOMINAL LOCATION: Primary | ICD-10-CM

## 2019-05-17 DIAGNOSIS — R19.7 DIARRHEA OF PRESUMED INFECTIOUS ORIGIN: ICD-10-CM

## 2019-05-17 DIAGNOSIS — E86.0 DEHYDRATION: ICD-10-CM

## 2019-05-17 PROBLEM — N17.9 ACUTE KIDNEY INJURY: Status: ACTIVE | Noted: 2019-05-17

## 2019-05-17 PROBLEM — K56.600 PARTIAL SMALL BOWEL OBSTRUCTION: Status: ACTIVE | Noted: 2019-05-17

## 2019-05-17 LAB
ADV 40+41 DNA STL QL NAA+NON-PROBE: NOT DETECTED
ALBUMIN SERPL-MCNC: 4.4 G/DL (ref 3.5–5.2)
ALBUMIN/GLOB SERPL: 1.3 G/DL
ALP SERPL-CCNC: 85 U/L (ref 39–117)
ALT SERPL W P-5'-P-CCNC: 16 U/L (ref 1–41)
ANION GAP SERPL CALCULATED.3IONS-SCNC: 12.3 MMOL/L
AST SERPL-CCNC: 26 U/L (ref 1–40)
ASTRO TYP 1-8 RNA STL QL NAA+NON-PROBE: NOT DETECTED
BASOPHILS # BLD AUTO: 0.1 10*3/MM3 (ref 0–0.2)
BASOPHILS NFR BLD AUTO: 0.7 % (ref 0–1.5)
BILIRUB SERPL-MCNC: 0.5 MG/DL (ref 0.2–1.2)
BUN BLD-MCNC: 18 MG/DL (ref 8–23)
BUN/CREAT SERPL: 11.9 (ref 7–25)
C CAYETANENSIS DNA STL QL NAA+NON-PROBE: NOT DETECTED
C DIFF TOX GENS STL QL NAA+PROBE: NEGATIVE
CALCIUM SPEC-SCNC: 10.2 MG/DL (ref 8.6–10.5)
CAMPY SP DNA.DIARRHEA STL QL NAA+PROBE: NOT DETECTED
CHLORIDE SERPL-SCNC: 101 MMOL/L (ref 98–107)
CO2 SERPL-SCNC: 20.7 MMOL/L (ref 22–29)
CREAT BLD-MCNC: 1.51 MG/DL (ref 0.76–1.27)
CRYPTOSP STL CULT: NOT DETECTED
DEPRECATED RDW RBC AUTO: 43.4 FL (ref 37–54)
E COLI DNA SPEC QL NAA+PROBE: NOT DETECTED
E HISTOLYT AG STL-ACNC: NOT DETECTED
EAEC PAA PLAS AGGR+AATA ST NAA+NON-PRB: NOT DETECTED
EC STX1 + STX2 GENES STL NAA+PROBE: NOT DETECTED
EOSINOPHIL # BLD AUTO: 0.19 10*3/MM3 (ref 0–0.4)
EOSINOPHIL NFR BLD AUTO: 1.4 % (ref 0.3–6.2)
EPEC EAE GENE STL QL NAA+NON-PROBE: NOT DETECTED
ERYTHROCYTE [DISTWIDTH] IN BLOOD BY AUTOMATED COUNT: 12.4 % (ref 12.3–15.4)
ETEC LTA+ST1A+ST1B TOX ST NAA+NON-PROBE: NOT DETECTED
G LAMBLIA DNA SPEC QL NAA+PROBE: NOT DETECTED
GFR SERPL CREATININE-BSD FRML MDRD: 46 ML/MIN/1.73
GLOBULIN UR ELPH-MCNC: 3.4 GM/DL
GLUCOSE BLD-MCNC: 124 MG/DL (ref 65–99)
HCT VFR BLD AUTO: 49.9 % (ref 37.5–51)
HGB BLD-MCNC: 16.4 G/DL (ref 13–17.7)
HOLD SPECIMEN: NORMAL
HOLD SPECIMEN: NORMAL
IMM GRANULOCYTES # BLD AUTO: 0.06 10*3/MM3 (ref 0–0.05)
IMM GRANULOCYTES NFR BLD AUTO: 0.4 % (ref 0–0.5)
LIPASE SERPL-CCNC: 12 U/L (ref 13–60)
LYMPHOCYTES # BLD AUTO: 1.76 10*3/MM3 (ref 0.7–3.1)
LYMPHOCYTES NFR BLD AUTO: 12.8 % (ref 19.6–45.3)
MCH RBC QN AUTO: 31.2 PG (ref 26.6–33)
MCHC RBC AUTO-ENTMCNC: 32.9 G/DL (ref 31.5–35.7)
MCV RBC AUTO: 95 FL (ref 79–97)
MONOCYTES # BLD AUTO: 1.06 10*3/MM3 (ref 0.1–0.9)
MONOCYTES NFR BLD AUTO: 7.7 % (ref 5–12)
NEUTROPHILS # BLD AUTO: 10.61 10*3/MM3 (ref 1.7–7)
NEUTROPHILS NFR BLD AUTO: 77 % (ref 42.7–76)
NOROVIRUS GI+II RNA STL QL NAA+NON-PROBE: NOT DETECTED
NRBC BLD AUTO-RTO: 0 /100 WBC (ref 0–0.2)
P SHIGELLOIDES DNA STL QL NAA+NON-PROBE: NOT DETECTED
PLATELET # BLD AUTO: 282 10*3/MM3 (ref 140–450)
PMV BLD AUTO: 10.4 FL (ref 6–12)
POTASSIUM BLD-SCNC: 4.5 MMOL/L (ref 3.5–5.2)
PROT SERPL-MCNC: 7.8 G/DL (ref 6–8.5)
RBC # BLD AUTO: 5.25 10*6/MM3 (ref 4.14–5.8)
RV RNA STL NAA+PROBE: NOT DETECTED
SALMONELLA DNA SPEC QL NAA+PROBE: NOT DETECTED
SAPO I+II+IV+V RNA STL QL NAA+NON-PROBE: NOT DETECTED
SHIGELLA SP+EIEC IPAH STL QL NAA+PROBE: NOT DETECTED
SODIUM BLD-SCNC: 134 MMOL/L (ref 136–145)
V CHOLERAE DNA SPEC QL NAA+PROBE: NOT DETECTED
VIBRIO DNA SPEC NAA+PROBE: NOT DETECTED
WBC NRBC COR # BLD: 13.78 10*3/MM3 (ref 3.4–10.8)
WHOLE BLOOD HOLD SPECIMEN: NORMAL
WHOLE BLOOD HOLD SPECIMEN: NORMAL
YERSINIA STL CULT: NOT DETECTED

## 2019-05-17 PROCEDURE — 85025 COMPLETE CBC W/AUTO DIFF WBC: CPT | Performed by: EMERGENCY MEDICINE

## 2019-05-17 PROCEDURE — 99284 EMERGENCY DEPT VISIT MOD MDM: CPT

## 2019-05-17 PROCEDURE — 87493 C DIFF AMPLIFIED PROBE: CPT | Performed by: EMERGENCY MEDICINE

## 2019-05-17 PROCEDURE — 87999 UNLISTED MICROBIOLOGY PX: CPT | Performed by: EMERGENCY MEDICINE

## 2019-05-17 PROCEDURE — 83690 ASSAY OF LIPASE: CPT | Performed by: EMERGENCY MEDICINE

## 2019-05-17 PROCEDURE — 80053 COMPREHEN METABOLIC PANEL: CPT | Performed by: EMERGENCY MEDICINE

## 2019-05-17 PROCEDURE — 74176 CT ABD & PELVIS W/O CONTRAST: CPT

## 2019-05-17 RX ORDER — LEVOTHYROXINE SODIUM 0.1 MG/1
100 TABLET ORAL
Status: DISCONTINUED | OUTPATIENT
Start: 2019-05-18 | End: 2019-05-20 | Stop reason: HOSPADM

## 2019-05-17 RX ORDER — ONDANSETRON 4 MG/1
4 TABLET, FILM COATED ORAL EVERY 6 HOURS PRN
Status: DISCONTINUED | OUTPATIENT
Start: 2019-05-17 | End: 2019-05-20 | Stop reason: HOSPADM

## 2019-05-17 RX ORDER — ACETAMINOPHEN 325 MG/1
650 TABLET ORAL EVERY 6 HOURS PRN
Status: DISCONTINUED | OUTPATIENT
Start: 2019-05-17 | End: 2019-05-20 | Stop reason: HOSPADM

## 2019-05-17 RX ORDER — FINASTERIDE 5 MG/1
5 TABLET, FILM COATED ORAL DAILY
Status: DISCONTINUED | OUTPATIENT
Start: 2019-05-18 | End: 2019-05-20 | Stop reason: HOSPADM

## 2019-05-17 RX ORDER — ERYTHROMYCIN 5 MG/G
1 OINTMENT OPHTHALMIC 3 TIMES DAILY
Status: DISCONTINUED | OUTPATIENT
Start: 2019-05-17 | End: 2019-05-20 | Stop reason: HOSPADM

## 2019-05-17 RX ORDER — ONDANSETRON 2 MG/ML
4 INJECTION INTRAMUSCULAR; INTRAVENOUS EVERY 6 HOURS PRN
Status: DISCONTINUED | OUTPATIENT
Start: 2019-05-17 | End: 2019-05-20 | Stop reason: HOSPADM

## 2019-05-17 RX ORDER — SODIUM CHLORIDE 9 MG/ML
75 INJECTION, SOLUTION INTRAVENOUS CONTINUOUS
Status: DISCONTINUED | OUTPATIENT
Start: 2019-05-17 | End: 2019-05-19

## 2019-05-17 RX ORDER — PREGABALIN 50 MG/1
100 CAPSULE ORAL 3 TIMES DAILY
Status: DISCONTINUED | OUTPATIENT
Start: 2019-05-17 | End: 2019-05-20 | Stop reason: HOSPADM

## 2019-05-17 RX ORDER — SODIUM CHLORIDE 0.9 % (FLUSH) 0.9 %
10 SYRINGE (ML) INJECTION AS NEEDED
Status: DISCONTINUED | OUTPATIENT
Start: 2019-05-17 | End: 2019-05-20 | Stop reason: HOSPADM

## 2019-05-17 RX ORDER — NITROGLYCERIN 0.4 MG/1
0.4 TABLET SUBLINGUAL
Status: DISCONTINUED | OUTPATIENT
Start: 2019-05-17 | End: 2019-05-20 | Stop reason: HOSPADM

## 2019-05-17 RX ORDER — PANTOPRAZOLE SODIUM 40 MG/10ML
40 INJECTION, POWDER, LYOPHILIZED, FOR SOLUTION INTRAVENOUS EVERY 12 HOURS SCHEDULED
Status: DISCONTINUED | OUTPATIENT
Start: 2019-05-17 | End: 2019-05-18

## 2019-05-17 RX ADMIN — PREGABALIN 100 MG: 50 CAPSULE ORAL at 23:11

## 2019-05-17 RX ADMIN — PANTOPRAZOLE SODIUM 40 MG: 40 INJECTION, POWDER, LYOPHILIZED, FOR SOLUTION INTRAVENOUS at 23:11

## 2019-05-17 RX ADMIN — SODIUM CHLORIDE 1000 ML: 9 INJECTION, SOLUTION INTRAVENOUS at 17:23

## 2019-05-17 RX ADMIN — SODIUM CHLORIDE 100 ML/HR: 9 INJECTION, SOLUTION INTRAVENOUS at 23:11

## 2019-05-17 RX ADMIN — ERYTHROMYCIN 1 APPLICATION: 5 OINTMENT OPHTHALMIC at 23:11

## 2019-05-17 RX ADMIN — SODIUM CHLORIDE 1000 ML: 9 INJECTION, SOLUTION INTRAVENOUS at 17:24

## 2019-05-17 NOTE — ED PROVIDER NOTES
EMERGENCY DEPARTMENT ENCOUNTER    CHIEF COMPLAINT  Chief Complaint: Diarrhea  History given by: Pt  History limited by: none  Room Number: 14/14  PMD: Leonid Lockett MD      HPI:  Pt is a 69 y.o. male who presents complaining of intermittent diarrhea since April this year, worsening last night. Pt states he has gone around 20 times since last night. Watery. No blood. Pt also c/o abd pain last night and this afternoon, currently gone. Pain was severe at worse. Pt also c/o nausea (resolved), one episode of vomiting, and generalized weakness. Pt denies fever, headache, CP, SOA, dysuria, leg pain, leg swelling. Pt states he is being seen by Dr. Dunbar GI and is being scoped next week for possible Crohn's Disease. Pt still taking Nexium. Pt reports Hx of C. Diff. Pt denies recent abx use.     Duration/Onset/Timing: worse since last night, gradual, episodic  Location: GI  Radiation: none  Quality: diarrhea, watery  Intensity/Severity: moderate  Associated Symptoms: abd pain last night and this afternoon, resolved, one episode of vomiting, and generalized weakness  Aggravating or Alleviating Factors: none  Previous Episodes: Hx of intermittent diarrhea since April this year.       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 11/01/2017   • Gastroesophageal reflux disease with esophagitis 11/01/2017   • Obesity (BMI 30-39.9) 04/16/2018   • Hypersomnia with sleep apnea 04/16/2018   • KARLA on CPAP 04/16/2018   • Diverticulitis of large intestine without bleeding 05/03/2018   • Prostate  cancer screening 11/08/2018   • Hypothyroidism 11/08/2018   • Encounter for long-term (current) drug use 11/08/2018     Resolved Ambulatory Problems     Diagnosis Date Noted   • No Resolved Ambulatory Problems     Past Medical History:   Diagnosis Date   • Arthritis    • Ramirez esophagus    • Benign prostatic hypertrophy    • GERD (gastroesophageal reflux disease)    • Hypertension    • Hypothyroidism    • KARLA on CPAP    • Osteoporosis        PAST SURGICAL HISTORY  Past Surgical History:   Procedure Laterality Date   • CYSTOSCOPY     • HEMORRHOIDECTOMY     • KNEE SURGERY Left    • LUMBAR LAMINECTOMY     • NISSEN FUNDOPLICATION     • UPPER GASTROINTESTINAL ENDOSCOPY         FAMILY HISTORY  Family History   Problem Relation Age of Onset   • Heart disease Father         cardiovascular disease   • Diabetes Father    • Hypertension Father    • Cancer Paternal Grandmother        SOCIAL HISTORY  Social History     Socioeconomic History   • Marital status:      Spouse name: Not on file   • Number of children: Not on file   • Years of education: Not on file   • Highest education level: Not on file   Tobacco Use   • Smoking status: Never Smoker   Substance and Sexual Activity   • Alcohol use: No   • Drug use: No     Comment: Consumes 3 caffeinated beverages per day usually coffee.       ALLERGIES  Clarithromycin and Tetracyclines & related    REVIEW OF SYSTEMS  Review of Systems   Constitutional: Negative for fever.   HENT: Negative.  Negative for sore throat.    Eyes: Negative.    Respiratory: Negative.  Negative for cough.    Cardiovascular: Negative.  Negative for chest pain.   Gastrointestinal: Positive for abdominal pain (last night and this afternoon, resolved), diarrhea (watery, intermittent since April this year, worse last night), nausea (resolved) and vomiting (one episode). Negative for blood in stool.   Genitourinary: Negative.  Negative for dysuria.   Musculoskeletal: Negative.  Negative for back pain.    Skin: Negative.  Negative for rash.   Neurological: Positive for weakness (generalized). Negative for headaches.   All other systems reviewed and are negative.      PHYSICAL EXAM  ED Triage Vitals   Temp Heart Rate Resp BP SpO2   05/17/19 1602 05/17/19 1602 05/17/19 1602 05/17/19 1602 05/17/19 1602   98.3 °F (36.8 °C) 111 16 (!) 71/47 98 %      Temp src Heart Rate Source Patient Position BP Location FiO2 (%)   05/17/19 1602 05/17/19 1602 05/17/19 1631 05/17/19 1631 --   Oral Monitor Lying Right arm        Physical Exam   Constitutional: No distress.   HENT:   Head: Normocephalic and atraumatic.   Mouth/Throat: Oropharynx is clear and moist. No oropharyngeal exudate.   Eyes: Conjunctivae are normal.   Neck: Normal range of motion.   Cardiovascular: Regular rhythm. Tachycardia present.   No murmur heard.  Pulmonary/Chest: Effort normal and breath sounds normal. No respiratory distress. He has no wheezes. He has no rales.   Abdominal: Soft. Bowel sounds are normal. He exhibits no distension. There is no tenderness.   Musculoskeletal: He exhibits no edema or tenderness.   Neurological: He is alert.   Skin: No rash noted.   Nursing note and vitals reviewed.      LAB RESULTS  Lab Results (last 24 hours)     Procedure Component Value Units Date/Time    CBC & Differential [[id number]] Collected:  05/17/19 1631    Specimen:  Blood Updated:  05/17/19 1647    Narrative:       The following orders were created for panel order CBC & Differential.  Procedure                               Abnormality         Status                     ---------                               -----------         ------                     CBC Auto Differential[[id number]]        Abnormal            Final result                 Please view results for these tests on the individual orders.    Comprehensive Metabolic Panel [[id number]]  (Abnormal) Collected:  05/17/19 1631    Specimen:  Blood Updated:  05/17/19 1706     Glucose 124 mg/dL      BUN 18  mg/dL      Creatinine 1.51 mg/dL      Sodium 134 mmol/L      Potassium 4.5 mmol/L      Chloride 101 mmol/L      CO2 20.7 mmol/L      Calcium 10.2 mg/dL      Total Protein 7.8 g/dL      Albumin 4.40 g/dL      ALT (SGPT) 16 U/L      AST (SGOT) 26 U/L      Alkaline Phosphatase 85 U/L      Total Bilirubin 0.5 mg/dL      eGFR Non African Amer 46 mL/min/1.73      Globulin 3.4 gm/dL      A/G Ratio 1.3 g/dL      BUN/Creatinine Ratio 11.9     Anion Gap 12.3 mmol/L     Narrative:       GFR Normal >60  Chronic Kidney Disease <60  Kidney Failure <15    Lipase [[id number]]  (Abnormal) Collected:  05/17/19 1631    Specimen:  Blood Updated:  05/17/19 1706     Lipase 12 U/L     CBC Auto Differential [[id number]]  (Abnormal) Collected:  05/17/19 1631    Specimen:  Blood Updated:  05/17/19 1647     WBC 13.78 10*3/mm3      RBC 5.25 10*6/mm3      Hemoglobin 16.4 g/dL      Hematocrit 49.9 %      MCV 95.0 fL      MCH 31.2 pg      MCHC 32.9 g/dL      RDW 12.4 %      RDW-SD 43.4 fl      MPV 10.4 fL      Platelets 282 10*3/mm3      Neutrophil % 77.0 %      Lymphocyte % 12.8 %      Monocyte % 7.7 %      Eosinophil % 1.4 %      Basophil % 0.7 %      Immature Grans % 0.4 %      Neutrophils, Absolute 10.61 10*3/mm3      Lymphocytes, Absolute 1.76 10*3/mm3      Monocytes, Absolute 1.06 10*3/mm3      Eosinophils, Absolute 0.19 10*3/mm3      Basophils, Absolute 0.10 10*3/mm3      Immature Grans, Absolute 0.06 10*3/mm3      nRBC 0.0 /100 WBC     Clostridium Difficile Toxin - Stool, Per Rectum [[id number]] Collected:  05/17/19 1723    Specimen:  Stool from Per Rectum Updated:  05/17/19 5511    Narrative:       The following orders were created for panel order Clostridium Difficile Toxin - Stool, Per Rectum.  Procedure                               Abnormality         Status                     ---------                               -----------         ------                     Clostridium Difficile To...[[id number]]                      In process                    Please view results for these tests on the individual orders.    Gastrointestinal Panel, PCR - Stool, Per Rectum [[id number]] Collected:  05/17/19 1723    Specimen:  Stool from Per Rectum Updated:  05/17/19 1831    Clostridium Difficile Toxin, PCR - Stool, Per Rectum [[id number]] Collected:  05/17/19 1723    Specimen:  Stool from Per Rectum Updated:  05/17/19 1831          I ordered the above labs and reviewed the results    RADIOLOGY  CT Abdomen Pelvis Without Contrast   Preliminary Result   1. Mild small bowel dilatation with segments of decompressed distal   small bowel. Findings could represent partial small bowel obstruction.   2. Fluid-filled small and large bowel.   3. Colonic diverticulosis.               Radiation dose reduction techniques were utilized, including automated   exposure control and exposure modulation based on body size.                   I ordered the above noted radiological studies. Interpreted by radiologist. Discussed with radiologist (Dr. aVldez). Reviewed by me in PACS.       PROCEDURES  Procedures      PROGRESS AND CONSULTS  ED Course as of May 17 2013   Fri May 17, 2019   1709 70-year-old male with recurrent diarrhea since April, worsened over the last 24 hours.  He is followed by Dr. Dunbar and is scheduled to have a colonoscopy next week for possible Crohn's disease.  He does report a history of C. Difficile.  On exam he is well-appearing in no apparent distress.  There is no significant abdominal tenderness to palpation.  [DB]   1925 Discussed CT abdomen with Dr. Kel valdez.  CT is suspicious for partial small bowel obstruction or ileus.  There is no obvious colonic thickening to suggest a colitis.  Results were discussed with patient family at the bedside.  [DB]      ED Course User Index  [DB] Pankaj Jackson MD     1701  HR = 118. Informed pt concern for dehydration from diarrhea. Will give IVF's here and further evaluate with lab work. Pt is agreeable.      1711  Ordered IVF's for hydration. Ordered blood work, lipase, C. Diff and GI panel for further evaluation.     1820  Ordered CT abd/pelvis for further evaluation.     1937  Rechecked pt. Pt is resting comfortably. Notified pt of CT abd/pelvis results that is concerning for possible partial SBO or ileus. Discussed the plan to admit the pt for further workup. Pt understands and agrees with the plan, all questions answered.    2000  Discussed the pt's case with PARVIN Randle who agrees to admit the pt    MEDICAL DECISION MAKING  Results were reviewed/discussed with the patient and they were also made aware of online access. Pt also made aware that some labs, such as cultures, will not be resulted during ER visit and follow up with PMD is necessary.     MDM  Number of Diagnoses or Management Options  Abdominal pain, unspecified abdominal location:   Dehydration:   Diarrhea of presumed infectious origin:      Amount and/or Complexity of Data Reviewed  Clinical lab tests: reviewed and ordered (lipase=12; CBC: WBC=13.78; CMP: BUN = 18, creatinine=1.51)  Tests in the radiology section of CPT®: reviewed and ordered (CT abd/pelvis shows possible partial SBO or ileus)  Discussion of test results with the performing providers: yes (Dr. Rivera, Radiology)  Decide to obtain previous medical records or to obtain history from someone other than the patient: yes  Discuss the patient with other providers: yes ( Dr. Velasquez MAGDY)  Independent visualization of images, tracings, or specimens: yes           DIAGNOSIS  Final diagnoses:   Abdominal pain, unspecified abdominal location   Diarrhea of presumed infectious origin   Dehydration       DISPOSITION  ADMISSION    Discussed treatment plan and reason for admission with pt/family and admitting physician.  Pt/family voiced understanding of the plan for admission for further testing/treatment as needed.         Latest Documented Vital Signs:  As of 8:13 PM  BP- 119/68 HR- 84 Temp- 98.3  °F (36.8 °C) (Oral) O2 sat- 98%    --  Documentation assistance provided by yoshi Montgomery for Dr. Jackson.  Information recorded by the scribe was done at my direction and has been verified and validated by me.               Alex Montgomery  05/17/19 2015       Pankaj Jackson MD  05/17/19 2015

## 2019-05-17 NOTE — ED NOTES
Pt reports lower abdominal pain that started last night. Pt reports diarrhea on and off since April.      Camelia Rodriguez, RN  05/17/19 3146

## 2019-05-18 ENCOUNTER — APPOINTMENT (OUTPATIENT)
Dept: GENERAL RADIOLOGY | Facility: HOSPITAL | Age: 69
End: 2019-05-18

## 2019-05-18 LAB
ANION GAP SERPL CALCULATED.3IONS-SCNC: 8.9 MMOL/L
BUN BLD-MCNC: 16 MG/DL (ref 8–23)
BUN/CREAT SERPL: 16.2 (ref 7–25)
CALCIUM SPEC-SCNC: 8.6 MG/DL (ref 8.6–10.5)
CHLORIDE SERPL-SCNC: 110 MMOL/L (ref 98–107)
CO2 SERPL-SCNC: 19.1 MMOL/L (ref 22–29)
CREAT BLD-MCNC: 0.99 MG/DL (ref 0.76–1.27)
DEPRECATED RDW RBC AUTO: 43 FL (ref 37–54)
ERYTHROCYTE [DISTWIDTH] IN BLOOD BY AUTOMATED COUNT: 12.4 % (ref 12.3–15.4)
GFR SERPL CREATININE-BSD FRML MDRD: 75 ML/MIN/1.73
GLUCOSE BLD-MCNC: 97 MG/DL (ref 65–99)
HCT VFR BLD AUTO: 44.4 % (ref 37.5–51)
HGB BLD-MCNC: 14.4 G/DL (ref 13–17.7)
MCH RBC QN AUTO: 30.9 PG (ref 26.6–33)
MCHC RBC AUTO-ENTMCNC: 32.4 G/DL (ref 31.5–35.7)
MCV RBC AUTO: 95.3 FL (ref 79–97)
PLATELET # BLD AUTO: 219 10*3/MM3 (ref 140–450)
PMV BLD AUTO: 10.8 FL (ref 6–12)
POTASSIUM BLD-SCNC: 4.1 MMOL/L (ref 3.5–5.2)
RBC # BLD AUTO: 4.66 10*6/MM3 (ref 4.14–5.8)
SODIUM BLD-SCNC: 138 MMOL/L (ref 136–145)
WBC NRBC COR # BLD: 7.85 10*3/MM3 (ref 3.4–10.8)

## 2019-05-18 PROCEDURE — 99221 1ST HOSP IP/OBS SF/LOW 40: CPT | Performed by: SURGERY

## 2019-05-18 PROCEDURE — 80048 BASIC METABOLIC PNL TOTAL CA: CPT | Performed by: HOSPITALIST

## 2019-05-18 PROCEDURE — 74018 RADEX ABDOMEN 1 VIEW: CPT

## 2019-05-18 PROCEDURE — 85027 COMPLETE CBC AUTOMATED: CPT | Performed by: HOSPITALIST

## 2019-05-18 RX ORDER — BISACODYL 10 MG
10 SUPPOSITORY, RECTAL RECTAL DAILY PRN
Status: DISCONTINUED | OUTPATIENT
Start: 2019-05-18 | End: 2019-05-20 | Stop reason: HOSPADM

## 2019-05-18 RX ORDER — PANTOPRAZOLE SODIUM 40 MG/1
40 TABLET, DELAYED RELEASE ORAL
Status: DISCONTINUED | OUTPATIENT
Start: 2019-05-18 | End: 2019-05-20 | Stop reason: HOSPADM

## 2019-05-18 RX ADMIN — PREGABALIN 100 MG: 50 CAPSULE ORAL at 08:32

## 2019-05-18 RX ADMIN — ERYTHROMYCIN 1 APPLICATION: 5 OINTMENT OPHTHALMIC at 08:32

## 2019-05-18 RX ADMIN — ERYTHROMYCIN 1 APPLICATION: 5 OINTMENT OPHTHALMIC at 21:23

## 2019-05-18 RX ADMIN — PANTOPRAZOLE SODIUM 40 MG: 40 TABLET, DELAYED RELEASE ORAL at 17:11

## 2019-05-18 RX ADMIN — LEVOTHYROXINE SODIUM 100 MCG: 100 TABLET ORAL at 08:32

## 2019-05-18 RX ADMIN — PANTOPRAZOLE SODIUM 40 MG: 40 INJECTION, POWDER, LYOPHILIZED, FOR SOLUTION INTRAVENOUS at 08:32

## 2019-05-18 RX ADMIN — PREGABALIN 100 MG: 50 CAPSULE ORAL at 17:12

## 2019-05-18 RX ADMIN — PREGABALIN 100 MG: 50 CAPSULE ORAL at 21:23

## 2019-05-18 RX ADMIN — FINASTERIDE 5 MG: 5 TABLET, FILM COATED ORAL at 08:32

## 2019-05-18 RX ADMIN — SODIUM CHLORIDE 75 ML/HR: 9 INJECTION, SOLUTION INTRAVENOUS at 23:52

## 2019-05-18 RX ADMIN — ERYTHROMYCIN 1 APPLICATION: 5 OINTMENT OPHTHALMIC at 17:11

## 2019-05-18 RX ADMIN — SODIUM CHLORIDE, PRESERVATIVE FREE 10 ML: 5 INJECTION INTRAVENOUS at 08:33

## 2019-05-18 NOTE — CONSULTS
REASON FOR CONSULT:    Abnormal CT imaging of the small bowel concerning for obstruction.    REQUESTING PHYSICIAN:    George Velasquez M.D.    HISTORY OF PRESENT ILLNESS:    Marin Cuenca is a 69 y.o. male who was admitted to the hospital through the emergency department with colicky lower abdominal pain that does not radiate.  He has been having intermittent problems with diarrhea for the last several months.  He usually deals with diarrhea.  Occasionally he will have a week of normal consistency stool.  Occasionally he has watery stools.  Over the last 2 weeks it is been increasing diarrhea.  There has been no blood in the stool.  There is no vomiting, but there is nausea.  He has been dizzy and lightheaded.  He has been scheduled to undergo colonoscopy for evaluation by his gastroenterologist, Dr. Dunbar in a week at [facility].    Past Medical History:   Diagnosis Date   • Arthritis    • Ramirez esophagus    • Benign prostatic hypertrophy    • GERD (gastroesophageal reflux disease)    • Hypertension    • Hypothyroidism    • KARLA on CPAP    • Osteoporosis        Past Surgical History:   Procedure Laterality Date   • CYSTOSCOPY     • HEMORRHOIDECTOMY     • KNEE SURGERY Left    • LUMBAR LAMINECTOMY     • NISSEN FUNDOPLICATION     • UPPER GASTROINTESTINAL ENDOSCOPY           Current Facility-Administered Medications:   •  acetaminophen (TYLENOL) tablet 650 mg, 650 mg, Oral, Q6H PRN, George Velasquez MD  •  bisacodyl (DULCOLAX) suppository 10 mg, 10 mg, Rectal, Daily PRN, Lester Jimenez MD  •  erythromycin (ROMYCIN) ophthalmic ointment 1 application, 1 application, Right Eye, TID, George Velasquez MD, 1 application at 05/18/19 0832  •  finasteride (PROSCAR) tablet 5 mg, 5 mg, Oral, Daily, George Velasquez MD, 5 mg at 05/18/19 0832  •  levothyroxine (SYNTHROID, LEVOTHROID) tablet 100 mcg, 100 mcg, Oral, Q AM, George Velasquez MD, 100 mcg at 05/18/19 0832  •  nitroglycerin (NITROSTAT) SL tablet 0.4 mg, 0.4 mg,  Sublingual, Q5 Min PRN, George Velasquez MD  •  ondansetron (ZOFRAN) tablet 4 mg, 4 mg, Oral, Q6H PRN **OR** ondansetron (ZOFRAN) injection 4 mg, 4 mg, Intravenous, Q6H PRN, George Velasquez MD  •  pantoprazole (PROTONIX) EC tablet 40 mg, 40 mg, Oral, BID AC, Lester Jimenez MD  •  polyethylene glycol 3350 powder (packet), 17 g, Oral, Daily PRN, Lester Jimenez MD  •  pregabalin (LYRICA) capsule 100 mg, 100 mg, Oral, TID, George Velasquez MD, 100 mg at 05/18/19 0832  •  sodium chloride 0.9 % flush 10 mL, 10 mL, Intravenous, PRN, Renetta, Pankaj NAVAS MD, 10 mL at 05/18/19 0833  •  sodium chloride 0.9 % infusion, 75 mL/hr, Intravenous, Continuous, Lester Jimenez MD, Last Rate: 75 mL/hr at 05/18/19 0929, 75 mL/hr at 05/18/19 0929    Allergies   Allergen Reactions   • Clarithromycin      Other reaction(s): Abdominal pain   • Tetracyclines & Related Rash       Family History   Problem Relation Age of Onset   • Heart disease Father         cardiovascular disease   • Diabetes Father    • Hypertension Father    • Cancer Paternal Grandmother        Social History     Socioeconomic History   • Marital status:      Spouse name: Not on file   • Number of children: Not on file   • Years of education: Not on file   • Highest education level: Not on file   Tobacco Use   • Smoking status: Never Smoker   Substance and Sexual Activity   • Alcohol use: No   • Drug use: No     Comment: Consumes 3 caffeinated beverages per day usually coffee.       Review of Systems   Constitutional: Negative for fever.   HENT: Negative for trouble swallowing.    Eyes: Negative for visual disturbance.   Respiratory: Negative for shortness of breath.    Cardiovascular: Negative for chest pain.   Gastrointestinal: Positive for abdominal pain, diarrhea and nausea. Negative for vomiting.   Endocrine: Negative for polyuria.   Genitourinary: Negative for dysuria.   Neurological: Positive for light-headedness. Negative for syncope.  "  Psychiatric/Behavioral: Negative for confusion.       Objective     /74 (BP Location: Right arm, Patient Position: Lying)   Pulse 62   Temp 97.7 °F (36.5 °C) (Oral)   Resp 18   Ht 180.3 cm (71\")   Wt 103 kg (226 lb)   SpO2 97%   BMI 31.52 kg/m²     Physical Exam   Constitutional: He is oriented to person, place, and time. He appears well-developed and well-nourished. No distress.   HENT:   Head: Normocephalic and atraumatic.   Eyes: Conjunctivae are normal. No scleral icterus.   Neck: Neck supple. No tracheal deviation present.   Cardiovascular: Normal rate, regular rhythm, normal heart sounds and intact distal pulses.   No murmur heard.  Pulmonary/Chest: Effort normal and breath sounds normal. No respiratory distress. He has no wheezes. He has no rales.   Abdominal: Soft. Bowel sounds are normal. He exhibits no distension and no mass. There is no tenderness. There is no rebound and no guarding. No hernia.   Musculoskeletal: He exhibits no edema or deformity.   Neurological: He is alert and oriented to person, place, and time.   Skin: Skin is warm and dry.   Psychiatric: He has a normal mood and affect. His behavior is normal.   Vitals reviewed.      DIAGNOSTIC DATA:    I reviewed the images and the report of a CT scan of the abdomen and pelvis performed yesterday.  There are fluid-filled proximal small bowel loops measuring up to 3 cm.  The distal small bowel loops near the terminal ileum measure 1.5 cm.  There is gas and liquid stool throughout the nondistended colon.    ASSESSMENT:    Abnormal appearance of small bowel on Abdominal CT    PLAN:    I think not likely a SBO, but it can be difficult to be sure on the basis of one CT study that does not show significant differences in bowel caliber or a transition point..   Will check KUB to help in following his progress.   Will go slow with dietary advancement.  Will follow along  "

## 2019-05-18 NOTE — PLAN OF CARE
Problem: Patient Care Overview  Goal: Plan of Care Review  Outcome: Ongoing (interventions implemented as appropriate)   05/18/19 9954   Coping/Psychosocial   Plan of Care Reviewed With patient   Coping/Psychosocial   Patient Agreement with Plan of Care agrees   Plan of Care Review   Progress improving   OTHER   Outcome Summary cont clear liquids for now and cont to monitor.

## 2019-05-18 NOTE — H&P
Patient Name:  Marin Cuenca  YOB: 1950  MRN:  [id number]  Admit Date:  5/17/2019  Patient Care Team:  Leonid Lockett MD as PCP - General  Leonid Lockett MD as PCP - Claims Attributed  Bubba Dos Santos Jr., MD as Consulting Physician (Urology)      Subjective   History Present Illness     Chief Complaint   Patient presents with   • Diarrhea     intermittent since april - is scheduled for a scope fri to r/o crohns       Mr. Cuenca is a 69 y.o. non-smoker with a history of hypertension, GERD, Ramirez's esophagus and sleep apnea that presents to [facility] complaining of intermittent diarrhea for a few weeks.    Abdominal Pain   This is a recurrent problem. The current episode started more than 1 month ago. The onset quality is undetermined. The problem occurs intermittently. The problem has been gradually worsening. The pain is located in the generalized abdominal region. The pain is moderate. The quality of the pain is aching and sharp. The abdominal pain does not radiate. Associated symptoms include diarrhea, nausea and weight loss. Pertinent negatives include no anorexia, fever, hematochezia, melena or vomiting. The pain is aggravated by palpation. The pain is relieved by nothing. Treatments tried: colestid. The treatment provided mild relief. Prior diagnostic workup includes CT scan. His past medical history is significant for abdominal surgery (fundoplication (Jaun)) and GERD.   colonoscopy about 3 yrs ago negative, due again soon  +Crohn test positive w/ PCP    Review of Systems   Constitutional: Positive for unexpected weight change and weight loss. Negative for fever.   HENT: Negative.    Eyes: Negative.    Respiratory: Negative.    Gastrointestinal: Positive for abdominal pain, diarrhea and nausea. Negative for anorexia, hematochezia, melena and vomiting.        See above   Endocrine: Negative.    Genitourinary: Negative.    Musculoskeletal: Negative.    Skin:  Negative.    Neurological: Negative.    Hematological: Negative.    Psychiatric/Behavioral: Negative.         Personal History     Past Medical History:   Diagnosis Date   • Arthritis    • Ramirez esophagus    • Benign prostatic hypertrophy    • GERD (gastroesophageal reflux disease)    • Hypertension    • Hypothyroidism    • KARLA on CPAP    • Osteoporosis      Past Surgical History:   Procedure Laterality Date   • CYSTOSCOPY     • HEMORRHOIDECTOMY     • KNEE SURGERY Left    • LUMBAR LAMINECTOMY     • NISSEN FUNDOPLICATION     • UPPER GASTROINTESTINAL ENDOSCOPY       Family History   Problem Relation Age of Onset   • Heart disease Father         cardiovascular disease   • Diabetes Father    • Hypertension Father    • Cancer Paternal Grandmother      Social History     Tobacco Use   • Smoking status: Never Smoker   Substance Use Topics   • Alcohol use: No   • Drug use: No     Comment: Consumes 3 caffeinated beverages per day usually coffee.     Medications Prior to Admission   Medication Sig Dispense Refill Last Dose   • aspirin 81 MG tablet Take 1 tablet by mouth Daily. 100 tablet 3 5/17/2019 at Unknown time   • Calcium Carb-Cholecalciferol 500-600 MG-UNIT chewable tablet Chew 1 tablet 2 (Two) Times a Day. 180 tablet 3 5/17/2019 at am   • colestipol (COLESTID) 1 g tablet Take 1 tablet by mouth Daily. 30 tablet 0 5/17/2019 at Unknown time   • erythromycin (ROMYCIN) 5 MG/GM ophthalmic ointment Administer 1 application to the right eye 3 (Three) Times a Day.   5/17/2019 at am   • esomeprazole (nexIUM) 40 MG capsule Take 1 capsule by mouth 2 (Two) Times a Day. 180 capsule 3 5/17/2019 at am   • finasteride (PROSCAR) 5 MG tablet Take 1 tablet by mouth Daily. 90 tablet 3 5/17/2019 at Unknown time   • furosemide (LASIX) 40 MG tablet Take 1 tablet by mouth Daily. 90 tablet 3 5/17/2019 at Unknown time   • irbesartan (AVAPRO) 75 MG tablet Take 1 tablet by mouth Daily. 90 tablet 3 5/17/2019 at Unknown time   • levothyroxine  (SYNTHROID, LEVOTHROID) 100 MCG tablet Take 1 tablet by mouth Daily. 90 tablet 3 5/17/2019 at Unknown time   • meclizine (ANTIVERT) 25 MG tablet Take 1 tablet by mouth 3 (Three) Times a Day As Needed for dizziness. 60 tablet 5 Taking   • Omega-3 Fatty Acids (FISH OIL) 1000 MG capsule capsule Take 1,000 mg by mouth 2 (Two) Times a Day With Meals.   5/17/2019 at am   • pregabalin (LYRICA) 100 MG capsule Take 1 capsule by mouth 3 (Three) Times a Day. 270 capsule 3 5/17/2019 at am   • promethazine (PHENERGAN) 25 MG tablet Take 1 tablet by mouth Every 6 (Six) Hours As Needed for Nausea or Vomiting. 60 tablet 5 Taking     Allergies:    Allergies   Allergen Reactions   • Clarithromycin      Other reaction(s): Abdominal pain   • Tetracyclines & Related Rash       Objective    Objective     Vital Signs  Temp:  [97.6 °F (36.4 °C)-98.3 °F (36.8 °C)] 97.6 °F (36.4 °C)  Heart Rate:  [] 92  Resp:  [16-18] 18  BP: ()/(47-88) 134/88  SpO2:  [95 %-100 %] 98 %  on   ;   Device (Oxygen Therapy): room air  Body mass index is 31.52 kg/m².    Physical Exam   Constitutional: He is oriented to person, place, and time. He appears well-developed and well-nourished.   HENT:   Head: Normocephalic and atraumatic.   Eyes: Conjunctivae and EOM are normal. No scleral icterus.   Neck: Normal range of motion. Neck supple. No JVD present.   Cardiovascular: Normal rate and regular rhythm.   No murmur heard.  Pulmonary/Chest: Effort normal and breath sounds normal. No respiratory distress.   Abdominal: Soft. Normal appearance. He exhibits no distension. Bowel sounds are increased. There is generalized tenderness.   Musculoskeletal: He exhibits no edema.   Neurological: He is alert and oriented to person, place, and time. No cranial nerve deficit.   Skin: Skin is warm and dry.   Psychiatric: He has a normal mood and affect. His behavior is normal.   Vitals reviewed.      Results Review:  I reviewed the patient's new clinical results.  I  reviewed the patient's new imaging results and agree with the interpretation.  I reviewed the patient's other test results and agree with the interpretation  I personally viewed and interpreted the patient's EKG/Telemetry data  Discussed with ED provider.    Lab Results (last 24 hours)     Procedure Component Value Units Date/Time    CBC & Differential [[id number]] Collected:  05/17/19 1631    Specimen:  Blood Updated:  05/17/19 1647    Narrative:       The following orders were created for panel order CBC & Differential.  Procedure                               Abnormality         Status                     ---------                               -----------         ------                     CBC Auto Differential[[id number]]        Abnormal            Final result                 Please view results for these tests on the individual orders.    Comprehensive Metabolic Panel [[id number]]  (Abnormal) Collected:  05/17/19 1631    Specimen:  Blood Updated:  05/17/19 1706     Glucose 124 mg/dL      BUN 18 mg/dL      Creatinine 1.51 mg/dL      Sodium 134 mmol/L      Potassium 4.5 mmol/L      Chloride 101 mmol/L      CO2 20.7 mmol/L      Calcium 10.2 mg/dL      Total Protein 7.8 g/dL      Albumin 4.40 g/dL      ALT (SGPT) 16 U/L      AST (SGOT) 26 U/L      Alkaline Phosphatase 85 U/L      Total Bilirubin 0.5 mg/dL      eGFR Non African Amer 46 mL/min/1.73      Globulin 3.4 gm/dL      A/G Ratio 1.3 g/dL      BUN/Creatinine Ratio 11.9     Anion Gap 12.3 mmol/L     Narrative:       GFR Normal >60  Chronic Kidney Disease <60  Kidney Failure <15    Lipase [[id number]]  (Abnormal) Collected:  05/17/19 1631    Specimen:  Blood Updated:  05/17/19 1706     Lipase 12 U/L     CBC Auto Differential [[id number]]  (Abnormal) Collected:  05/17/19 1631    Specimen:  Blood Updated:  05/17/19 1647     WBC 13.78 10*3/mm3      RBC 5.25 10*6/mm3      Hemoglobin 16.4 g/dL      Hematocrit 49.9 %      MCV 95.0 fL      MCH 31.2 pg      MCHC 32.9 g/dL       RDW 12.4 %      RDW-SD 43.4 fl      MPV 10.4 fL      Platelets 282 10*3/mm3      Neutrophil % 77.0 %      Lymphocyte % 12.8 %      Monocyte % 7.7 %      Eosinophil % 1.4 %      Basophil % 0.7 %      Immature Grans % 0.4 %      Neutrophils, Absolute 10.61 10*3/mm3      Lymphocytes, Absolute 1.76 10*3/mm3      Monocytes, Absolute 1.06 10*3/mm3      Eosinophils, Absolute 0.19 10*3/mm3      Basophils, Absolute 0.10 10*3/mm3      Immature Grans, Absolute 0.06 10*3/mm3      nRBC 0.0 /100 WBC     Clostridium Difficile Toxin - Stool, Per Rectum [[id number]] Collected:  05/17/19 1723    Specimen:  Stool from Per Rectum Updated:  05/17/19 2026    Narrative:       The following orders were created for panel order Clostridium Difficile Toxin - Stool, Per Rectum.  Procedure                               Abnormality         Status                     ---------                               -----------         ------                     Clostridium Difficile To...[[id number]]  Normal              Final result                 Please view results for these tests on the individual orders.    Gastrointestinal Panel, PCR - Stool, Per Rectum [[id number]]  (Normal) Collected:  05/17/19 1723    Specimen:  Stool from Per Rectum Updated:  05/17/19 2038     Campylobacter Not Detected     Plesiomonas shigelloides Not Detected     Salmonella Not Detected     Vibrio Not Detected     Vibrio cholerae Not Detected     Yersinia enterocolitica Not Detected     Enteroaggregative E. coli (EAEC) Not Detected     Enteropathogenic E. coli (EPEC) Not Detected     Enterotoxigenic E. coli (ETEC) lt/st Not Detected     Shiga-like toxin-producing E. coli (STEC) stx1/stx2 Not Detected     E. coli O157 Not Detected     Shigella/Enteroinvasive E. coli (EIEC) Not Detected     Cryptosporidium Not Detected     Cyclospora cayetanensis Not Detected     Entamoeba histolytica Not Detected     Giardia lamblia Not Detected     Adenovirus F40/41 Not Detected      Astrovirus Not Detected     Norovirus GI/GII Not Detected     Rotavirus A Not Detected     Sapovirus (I, II, IV or V) Not Detected    Narrative:       If Aeromonas, Staphylococcus aureus or Bacillus cereus are suspected, please order [id number]: Stool Culture, Aeromonas, S aureus, B Cereus.    Clostridium Difficile Toxin, PCR - Stool, Per Rectum [[id number]]  (Normal) Collected:  05/17/19 1723    Specimen:  Stool from Per Rectum Updated:  05/17/19 2026     C. Difficile Toxins by PCR Negative          Imaging Results (last 24 hours)     Procedure Component Value Units Date/Time    CT Abdomen Pelvis Without Contrast [[id number]] Collected:  05/17/19 1935     Updated:  05/17/19 2039    Narrative:       CT OF THE ABDOMEN AND PELVIS WITHOUT CONTRAST 05/17/2019.     HISTORY: Abdominal pain.     TECHNIQUE: Axial images were obtained from the lung bases to the  symphysis pubis. No intravenous or oral contrast was given.     FINDINGS: Small hiatal hernia is seen. There is some calcification of  the karishma of the diaphragm. The liver, gallbladder, spleen, pancreas,  adrenals and kidneys appear unremarkable.     There are multiple segments of mildly dilated small bowel and distally  the small bowel appears decompressed. Findings could represent partial  small bowel obstruction. There is fluid within a small and large bowel.  Colonic diverticulosis is seen.     No free fluid or free air is seen. Urinary bladder and prostate gland  appear normal.       Impression:       1. Mild small bowel dilatation with segments of decompressed distal  small bowel. Findings could represent partial small bowel obstruction.  2. Fluid-filled small and large bowel.  3. Colonic diverticulosis.           Radiation dose reduction techniques were utilized, including automated  exposure control and exposure modulation based on body size.     This report was finalized on 5/17/2019 8:35 PM by Dr. Nhan Rivera M.D.                  No orders to display         Assessment/Plan     Active Hospital Problems    Diagnosis POA   • **Partial small bowel obstruction (CMS/HCC) [K56.600] Yes   • Acute kidney injury (CMS/HCC) [N17.9] Yes   • Obesity (BMI 30-39.9) [E66.9] Yes   • KARLA on CPAP [G47.33, Z99.89] Not Applicable   • Gastroesophageal reflux disease with esophagitis [K21.0] Yes   • Essential hypertension [I10] Yes       Mr. Cuenca is a 69 y.o. non-smoker with a history of abdominal pain and diarrhea who is admitted with CT scan evidence of partial small bowel obstruction.    · Will keep NPO and consult general surgery due to findings of bowel obstruction on CT.  · IV fluids.  Monitor creatinine.  Likely dehydrated.  · IV medication for pain and nausea.  · Stool studies in the ED negative.  · I discussed the patients findings and my recommendations with patient and nursing staff.    VTE Prophylaxis - SCDs .  Code Status - Full code.       George Velasquez MD  [facility]  05/17/19  9:37 PM

## 2019-05-18 NOTE — ED NOTES
/  Nursing report ED to floor  Marin Cuenca  69 y.o.  male    HPI (triage note):   Chief Complaint   Patient presents with   • Diarrhea     intermittent since april - is scheduled for a scope fri to r/o crohns       Admitting doctor:   George Velasquez MD    Admitting diagnosis:   The primary encounter diagnosis was Abdominal pain, unspecified abdominal location. Diagnoses of Diarrhea of presumed infectious origin and Dehydration were also pertinent to this visit.    Code status:   Current Code Status     This patient does not have a recorded code status. Please follow your organizational policy for patients in this situation.          Allergies:   Clarithromycin and Tetracyclines & related    Weight:       05/17/19  1631   Weight: 105 kg (232 lb)       Most recent vitals:   Vitals:    05/17/19 1938 05/17/19 1944 05/17/19 1959 05/17/19 2014   BP: 119/68      BP Location:       Patient Position:       Pulse:       Resp:       Temp:       TempSrc:       SpO2: 97% 99% 98% 99%   Weight:       Height:           Active LDAs/IV Access:   Lines, Drains & Airways    Active LDAs     Name:   Placement date:   Placement time:   Site:   Days:    Peripheral IV 05/17/19 1631 Left Antecubital   05/17/19    1631    Antecubital   less than 1                Labs (abnormal labs have a star):   Labs Reviewed   COMPREHENSIVE METABOLIC PANEL - Abnormal; Notable for the following components:       Result Value    Glucose 124 (*)     Creatinine 1.51 (*)     Sodium 134 (*)     CO2 20.7 (*)     eGFR Non  Amer 46 (*)     All other components within normal limits    Narrative:     GFR Normal >60  Chronic Kidney Disease <60  Kidney Failure <15   LIPASE - Abnormal; Notable for the following components:    Lipase 12 (*)     All other components within normal limits   CBC WITH AUTO DIFFERENTIAL - Abnormal; Notable for the following components:    WBC 13.78 (*)     Neutrophil % 77.0 (*)     Lymphocyte % 12.8 (*)     Neutrophils, Absolute 10.61  (*)     Monocytes, Absolute 1.06 (*)     Immature Grans, Absolute 0.06 (*)     All other components within normal limits   CLOSTRIDIUM DIFFICILE TOXIN    Narrative:     The following orders were created for panel order Clostridium Difficile Toxin - Stool, Per Rectum.  Procedure                               Abnormality         Status                     ---------                               -----------         ------                     Clostridium Difficile To...[[id number]]                      In process                   Please view results for these tests on the individual orders.   GASTROINTESTINAL PANEL, PCR   CLOSTRIDIUM DIFFICILE TOXIN, PCR   RAINBOW DRAW    Narrative:     The following orders were created for panel order Bern Draw.  Procedure                               Abnormality         Status                     ---------                               -----------         ------                     Light Blue Top[[id number]]                                   Final result               Green Top (Gel)[[id number]]                                  Final result               Lavender Top[[id number]]                                     Final result               Gold Top - SST[[id number]]                                   Final result                 Please view results for these tests on the individual orders.   CBC AND DIFFERENTIAL    Narrative:     The following orders were created for panel order CBC & Differential.  Procedure                               Abnormality         Status                     ---------                               -----------         ------                     CBC Auto Differential[[id number]]        Abnormal            Final result                 Please view results for these tests on the individual orders.   LIGHT BLUE TOP   GREEN TOP   LAVENDER TOP   GOLD TOP - SST       EKG:   No orders to display       Meds given in ED:   Medications   sodium chloride 0.9 % flush 10 mL  (not administered)   sodium chloride 0.9 % bolus 1,000 mL (1,000 mL Intravenous New Bag 5/17/19 1723)   sodium chloride 0.9 % bolus 1,000 mL (1,000 mL Intravenous New Bag 5/17/19 1724)       Imaging results:  Ct Abdomen Pelvis Without Contrast    Result Date: 5/17/2019  1. Mild small bowel dilatation with segments of decompressed distal small bowel. Findings could represent partial small bowel obstruction. 2. Fluid-filled small and large bowel. 3. Colonic diverticulosis.    Radiation dose reduction techniques were utilized, including automated exposure control and exposure modulation based on body size.         Ambulatory status:   - ambulates unassisted    Social issues:   Social History     Socioeconomic History   • Marital status:      Spouse name: Not on file   • Number of children: Not on file   • Years of education: Not on file   • Highest education level: Not on file   Tobacco Use   • Smoking status: Never Smoker   Substance and Sexual Activity   • Alcohol use: No   • Drug use: No     Comment: Consumes 3 caffeinated beverages per day usually coffee.        Julita Reddy RN  05/17/19 2025

## 2019-05-18 NOTE — PLAN OF CARE
Problem: Patient Care Overview  Goal: Plan of Care Review  Outcome: Ongoing (interventions implemented as appropriate)   05/18/19 0653   Coping/Psychosocial   Plan of Care Reviewed With patient   Coping/Psychosocial   Patient Agreement with Plan of Care agrees   Plan of Care Review   Progress improving   OTHER   Outcome Summary VSS. NO DIARRHEA OR C/O'S DISCOMFORT OVERNIGHT. RESTED WELL.       Problem: Bowel Obstruction (Adult)  Goal: Signs and Symptoms of Listed Potential Problems Will be Absent, Minimized or Managed (Bowel Obstruction)  Outcome: Ongoing (interventions implemented as appropriate)

## 2019-05-18 NOTE — PROGRESS NOTES
Name: Marin Cuenca ADMIT: 2019   : 1950  PCP: Leonid Lockett MD    MRN: [id number] LOS: 1 days   AGE/SEX: 69 y.o. male  ROOM: [facility]   Subjective   Abdominal pain was BLQ and non-radiating. Carmping and intermittent in nature. Reports he had bowel movement in ER prior to transport to the floor. No additional bowel movement or flatus overnight. No nausea or vomting. No fevers/chills. Reports that he was undergoing workup for abdominal symptoms at [facility] where he has positive chron's blood work? He as getting EGD serially for reflux esophagitis/barretts and was going to have c-scope added to his next scheduled EGD. No BRBPR or melena reported. No CP SOA.    Looking through records, he had IBD panel with positive ALCA and is scheduled for EGD/Colonoscopy at [facility] next week.    Objective   Vital Signs  Temp:  [97.6 °F (36.4 °C)-98.3 °F (36.8 °C)] 97.7 °F (36.5 °C)  Heart Rate:  [] 62  Resp:  [16-18] 18  BP: ()/(47-88) 123/74  SpO2:  [95 %-100 %] 97 %  on   ;   Device (Oxygen Therapy): room air  Body mass index is 31.52 kg/m².    Physical Exam   Constitutional: He appears well-developed. No distress.   HENT:   Head: Atraumatic.   Nose: Nose normal.   Eyes: Conjunctivae and EOM are normal.   Neck: Normal range of motion. Neck supple.   Cardiovascular: Normal rate, regular rhythm and intact distal pulses.   Pulmonary/Chest: Effort normal and breath sounds normal. He has no wheezes.   Abdominal: Soft. He exhibits no distension. There is tenderness (blq). There is no rebound and no guarding.   Musculoskeletal: Normal range of motion. He exhibits no edema.   Neurological: He is alert. No cranial nerve deficit.   Skin: Skin is warm and dry. He is not diaphoretic.   Psychiatric: He has a normal mood and affect. His behavior is normal.   Nursing note and vitals reviewed.      Results Review:       I reviewed the patient's new clinical results.     I reviewed imaging, agree with  interpretation.     I reviewed telemetry/EKG results, sinus.      Results from last 7 days   Lab Units 05/18/19  0527 05/17/19  1631   WBC 10*3/mm3 7.85 13.78*   HEMOGLOBIN g/dL 14.4 16.4   PLATELETS 10*3/mm3 219 282     Results from last 7 days   Lab Units 05/18/19  0527 05/17/19  1631   SODIUM mmol/L 138 134*   POTASSIUM mmol/L 4.1 4.5   CHLORIDE mmol/L 110* 101   CO2 mmol/L 19.1* 20.7*   BUN mg/dL 16 18   CREATININE mg/dL 0.99 1.51*   GLUCOSE mg/dL 97 124*   Estimated Creatinine Clearance: 84.8 mL/min (by C-G formula based on SCr of 0.99 mg/dL).  Results from last 7 days   Lab Units 05/18/19  0527 05/17/19  1631   CALCIUM mg/dL 8.6 10.2   ALBUMIN g/dL  --  4.40         erythromycin 1 application Right Eye TID   finasteride 5 mg Oral Daily   levothyroxine 100 mcg Oral Q AM   pantoprazole 40 mg Intravenous Q12H   pregabalin 100 mg Oral TID       sodium chloride 100 mL/hr Last Rate: 100 mL/hr (05/17/19 2311)   Diet Clear Liquid      Assessment/Plan      Active Hospital Problems    Diagnosis  POA   • **Partial small bowel obstruction (CMS/HCC) [K56.600]  Yes   • Acute kidney injury (CMS/HCC) [N17.9]  Yes   • Obesity (BMI 30-39.9) [E66.9]  Yes   • KARLA on CPAP [G47.33, Z99.89]  Not Applicable   • Gastroesophageal reflux disease with esophagitis [K21.0]  Yes   • Essential hypertension [I10]  Yes      Resolved Hospital Problems   No resolved problems to display.     - Partial SBO: Will give clear liquids and continue his fluids. He had bowel movement per report so will start bowel regimen. Surgery consulted.  - Abn IBD Panel: Has planned endoscopy at [facility]. Will check inflammatory markers.  - Barretts: Continue PPI but will change to oral.  - JENNIFER: Improved with fluids.  - HTN: Improved BP, was hypotensive on admission. Monitor.  - Disposition: Home/TBD    Lester Jimenez MD  [facility]  05/18/19  9:15 AM

## 2019-05-18 NOTE — PROGRESS NOTES
Clinical Pharmacy Services: Medication History    Marin Cuenca is a 69 y.o. male presenting to [facility] for   Chief Complaint   Patient presents with   • Diarrhea     intermittent since april - is scheduled for a scope fri to r/o crohns       He  has a past medical history of Arthritis, Ramirez esophagus, Benign prostatic hypertrophy, GERD (gastroesophageal reflux disease), Hypertension, Hypothyroidism, KARLA on CPAP, and Osteoporosis.    Allergies as of 05/17/2019 - Reviewed 05/17/2019   Allergen Reaction Noted   • Clarithromycin  02/05/2016   • Tetracyclines & related Rash 02/05/2016       Medication information was obtained from: Patient  Pharmacy and Phone Number: Jadyn [phone]    Prior to Admission Medications     Prescriptions Last Dose Informant Patient Reported? Taking?    aspirin 81 MG tablet 5/17/2019 Self No Yes    Take 1 tablet by mouth Daily.    Calcium Carb-Cholecalciferol 500-600 MG-UNIT chewable tablet 5/17/2019 Self No Yes    Chew 1 tablet 2 (Two) Times a Day.    colestipol (COLESTID) 1 g tablet 5/17/2019 Self No Yes    Take 1 tablet by mouth Daily.    erythromycin (ROMYCIN) 5 MG/GM ophthalmic ointment 5/17/2019 Self Yes Yes    Administer 1 application to the right eye 3 (Three) Times a Day.    esomeprazole (nexIUM) 40 MG capsule 5/17/2019 Self No Yes    Take 1 capsule by mouth 2 (Two) Times a Day.    finasteride (PROSCAR) 5 MG tablet 5/17/2019 Self No Yes    Take 1 tablet by mouth Daily.    furosemide (LASIX) 40 MG tablet 5/17/2019 Self No Yes    Take 1 tablet by mouth Daily.    irbesartan (AVAPRO) 75 MG tablet 5/17/2019 Self No Yes    Take 1 tablet by mouth Daily.    levothyroxine (SYNTHROID, LEVOTHROID) 100 MCG tablet 5/17/2019 Self No Yes    Take 1 tablet by mouth Daily.    meclizine (ANTIVERT) 25 MG tablet  Self No Yes    Take 1 tablet by mouth 3 (Three) Times a Day As Needed for dizziness.    Omega-3 Fatty Acids (FISH OIL) 1000 MG capsule capsule 5/17/2019 Self Yes Yes     Take 1,000 mg by mouth 2 (Two) Times a Day With Meals.    pregabalin (LYRICA) 100 MG capsule 5/17/2019 Self No Yes    Take 1 capsule by mouth 3 (Three) Times a Day.    promethazine (PHENERGAN) 25 MG tablet  Self No Yes    Take 1 tablet by mouth Every 6 (Six) Hours As Needed for Nausea or Vomiting.            Medication notes: Ofloxacin removed per patient, therapy complete.    This medication list is complete to the best of my knowledge as of 5/17/2019    Please call if questions.    Nancy Felton, Medication History Technician  5/17/2019 8:58 PM

## 2019-05-19 LAB
ANION GAP SERPL CALCULATED.3IONS-SCNC: 8 MMOL/L
BUN BLD-MCNC: 11 MG/DL (ref 8–23)
BUN/CREAT SERPL: 10.8 (ref 7–25)
CALCIUM SPEC-SCNC: 8.4 MG/DL (ref 8.6–10.5)
CHLORIDE SERPL-SCNC: 108 MMOL/L (ref 98–107)
CO2 SERPL-SCNC: 23 MMOL/L (ref 22–29)
CREAT BLD-MCNC: 1.02 MG/DL (ref 0.76–1.27)
CRP SERPL-MCNC: 0.34 MG/DL (ref 0–0.5)
DEPRECATED RDW RBC AUTO: 43.6 FL (ref 37–54)
ERYTHROCYTE [DISTWIDTH] IN BLOOD BY AUTOMATED COUNT: 12.5 % (ref 12.3–15.4)
ERYTHROCYTE [SEDIMENTATION RATE] IN BLOOD: 5 MM/HR (ref 0–20)
GFR SERPL CREATININE-BSD FRML MDRD: 72 ML/MIN/1.73
GLUCOSE BLD-MCNC: 92 MG/DL (ref 65–99)
HCT VFR BLD AUTO: 40.5 % (ref 37.5–51)
HGB BLD-MCNC: 13.2 G/DL (ref 13–17.7)
MCH RBC QN AUTO: 31.5 PG (ref 26.6–33)
MCHC RBC AUTO-ENTMCNC: 32.6 G/DL (ref 31.5–35.7)
MCV RBC AUTO: 96.7 FL (ref 79–97)
PLATELET # BLD AUTO: 202 10*3/MM3 (ref 140–450)
PMV BLD AUTO: 10.8 FL (ref 6–12)
POTASSIUM BLD-SCNC: 4.3 MMOL/L (ref 3.5–5.2)
RBC # BLD AUTO: 4.19 10*6/MM3 (ref 4.14–5.8)
SODIUM BLD-SCNC: 139 MMOL/L (ref 136–145)
WBC NRBC COR # BLD: 6.38 10*3/MM3 (ref 3.4–10.8)

## 2019-05-19 PROCEDURE — 85652 RBC SED RATE AUTOMATED: CPT | Performed by: INTERNAL MEDICINE

## 2019-05-19 PROCEDURE — 86140 C-REACTIVE PROTEIN: CPT | Performed by: INTERNAL MEDICINE

## 2019-05-19 PROCEDURE — 85027 COMPLETE CBC AUTOMATED: CPT | Performed by: INTERNAL MEDICINE

## 2019-05-19 PROCEDURE — 80048 BASIC METABOLIC PNL TOTAL CA: CPT | Performed by: INTERNAL MEDICINE

## 2019-05-19 PROCEDURE — 99232 SBSQ HOSP IP/OBS MODERATE 35: CPT | Performed by: SURGERY

## 2019-05-19 RX ORDER — SODIUM CHLORIDE 9 MG/ML
75 INJECTION, SOLUTION INTRAVENOUS CONTINUOUS
Status: DISCONTINUED | OUTPATIENT
Start: 2019-05-19 | End: 2019-05-19

## 2019-05-19 RX ADMIN — PREGABALIN 100 MG: 50 CAPSULE ORAL at 16:50

## 2019-05-19 RX ADMIN — LEVOTHYROXINE SODIUM 100 MCG: 100 TABLET ORAL at 07:12

## 2019-05-19 RX ADMIN — PREGABALIN 100 MG: 50 CAPSULE ORAL at 08:08

## 2019-05-19 RX ADMIN — PREGABALIN 100 MG: 50 CAPSULE ORAL at 20:40

## 2019-05-19 RX ADMIN — ERYTHROMYCIN 1 APPLICATION: 5 OINTMENT OPHTHALMIC at 20:40

## 2019-05-19 RX ADMIN — ERYTHROMYCIN 1 APPLICATION: 5 OINTMENT OPHTHALMIC at 08:06

## 2019-05-19 RX ADMIN — PANTOPRAZOLE SODIUM 40 MG: 40 TABLET, DELAYED RELEASE ORAL at 07:13

## 2019-05-19 RX ADMIN — ERYTHROMYCIN 1 APPLICATION: 5 OINTMENT OPHTHALMIC at 16:50

## 2019-05-19 RX ADMIN — FINASTERIDE 5 MG: 5 TABLET, FILM COATED ORAL at 08:05

## 2019-05-19 RX ADMIN — PANTOPRAZOLE SODIUM 40 MG: 40 TABLET, DELAYED RELEASE ORAL at 16:50

## 2019-05-19 NOTE — PLAN OF CARE
Problem: Patient Care Overview  Goal: Plan of Care Review  Outcome: Outcome(s) achieved Date Met: 05/19/19 05/19/19 7208   Coping/Psychosocial   Plan of Care Reviewed With patient   Plan of Care Review   Progress no change   OTHER   Outcome Summary VSS. no diarrhea reported. one formed BM. tolerating full liquid. advance to regular. d/c home soon and f/u outpatient c-scope

## 2019-05-19 NOTE — PLAN OF CARE
Problem: Patient Care Overview  Goal: Plan of Care Review  Outcome: Ongoing (interventions implemented as appropriate)   05/19/19 6447   Coping/Psychosocial   Plan of Care Reviewed With patient;spouse   Plan of Care Review   Progress improving   OTHER   Outcome Summary VS stable and pt afebrile from 3pm when took over care, no diarrhea reported, pt diet progressed to regular and able to tolerate regular diet, pt possible d/c home tomorrow, fall free during shift, will continue to monitor.

## 2019-05-19 NOTE — PLAN OF CARE
Problem: Bowel Obstruction (Adult)  Goal: Signs and Symptoms of Listed Potential Problems Will be Absent, Minimized or Managed (Bowel Obstruction)  Outcome: Outcome(s) achieved Date Met: 05/19/19 05/19/19 4416   Goal/Outcome Evaluation   Problems Assessed (Bowel Obstruction) diarrhea;nausea and vomiting;undernutrition   Problems Present (Bowel Obstruction) None; Surgeon Dr. Vale KUB shows not an obstruction but gas pattern

## 2019-05-19 NOTE — PROGRESS NOTES
Name: Marin Cuenca ADMIT: 2019   : 1950  PCP: Leonid Lockett MD    MRN: [id number] LOS: 2 days   AGE/SEX: 69 y.o. male  ROOM: [facility]/   Subjective   Tolerated clears. Had more formed bowel movement, yellow color. No CP SOA NV. Abdominal discomfort described. No pain currently.    Objective   Vital Signs  Temp:  [97.3 °F (36.3 °C)-97.8 °F (36.6 °C)] 97.8 °F (36.6 °C)  Heart Rate:  [56-85] 66  Resp:  [16-18] 18  BP: (104-132)/(60-86) 104/61  SpO2:  [99 %] 99 %  on   ;   Device (Oxygen Therapy): room air  Body mass index is 31.52 kg/m².    Physical Exam   Constitutional: He appears well-developed. No distress.   HENT:   Head: Atraumatic.   Nose: Nose normal.   Eyes: Conjunctivae and EOM are normal.   Neck: Normal range of motion. Neck supple.   Cardiovascular: Normal rate, regular rhythm and intact distal pulses.   Pulmonary/Chest: Effort normal and breath sounds normal. He has no wheezes.   Abdominal: Soft. He exhibits no distension. There is tenderness (blq). There is no rebound and no guarding.   Musculoskeletal: Normal range of motion. He exhibits no edema.   Neurological: He is alert. No cranial nerve deficit.   Skin: Skin is warm and dry. He is not diaphoretic.   Psychiatric: He has a normal mood and affect. His behavior is normal.   Nursing note and vitals reviewed.      Results Review:       I reviewed the patient's new clinical results.    Results from last 7 days   Lab Units 19  0548 19  0519  1631   WBC 10*3/mm3 6.38 7.85 13.78*   HEMOGLOBIN g/dL 13.2 14.4 16.4   PLATELETS 10*3/mm3 202 219 282     Results from last 7 days   Lab Units 19  0548 19  0527 19  1631   SODIUM mmol/L 139 138 134*   POTASSIUM mmol/L 4.3 4.1 4.5   CHLORIDE mmol/L 108* 110* 101   CO2 mmol/L 23.0 19.1* 20.7*   BUN mg/dL 11 16 18   CREATININE mg/dL 1.02 0.99 1.51*   GLUCOSE mg/dL 92 97 124*   Estimated Creatinine Clearance: 82.4 mL/min (by C-G formula based on SCr of 1.02  mg/dL).  Results from last 7 days   Lab Units 05/19/19  0548 05/18/19  0527 05/17/19  1631   CALCIUM mg/dL 8.4* 8.6 10.2   ALBUMIN g/dL  --   --  4.40         erythromycin 1 application Right Eye TID   finasteride 5 mg Oral Daily   levothyroxine 100 mcg Oral Q AM   pantoprazole 40 mg Oral BID AC   pregabalin 100 mg Oral TID       sodium chloride 75 mL/hr Last Rate: 75 mL/hr (05/18/19 2352)   Diet Full Liquid      Assessment/Plan      Active Hospital Problems    Diagnosis  POA   • **Partial small bowel obstruction (CMS/HCC) [K56.600]  Yes   • Acute kidney injury (CMS/HCC) [N17.9]  Yes   • Obesity (BMI 30-39.9) [E66.9]  Yes   • KARLA on CPAP [G47.33, Z99.89]  Not Applicable   • Gastroesophageal reflux disease with esophagitis [K21.0]  Yes   • Essential hypertension [I10]  Yes      Resolved Hospital Problems   No resolved problems to display.     - Partial SBO: Can try full liquid diet today. He refused bowel regimen due to diarrhea yesterday. That is improving. PRN bowel regimen. Surgery following.  - Abn IBD Panel: Has planned endoscopy at [place]. ESR/CRP ok.  - Barretts: PPI.  - JENNIFER: Improved with fluids. Tolerating PO now so will stop IVF.  - HTN: Improved from hypotensive admission. Not acutely volume overloaded and was dehydrated on admission. Off arb and lasix for the time being. Will resume stepwise as needed.  - Disposition: Home/1-2 days    Lester Jimenez MD  [facility]  05/19/19  10:54 AM

## 2019-05-19 NOTE — PLAN OF CARE
Problem: Bowel Obstruction (Adult)  Goal: Signs and Symptoms of Listed Potential Problems Will be Absent, Minimized or Managed (Bowel Obstruction)  Outcome: Ongoing (interventions implemented as appropriate)      Problem: Patient Care Overview  Goal: Plan of Care Review  Outcome: Ongoing (interventions implemented as appropriate)   05/19/19 0641   Coping/Psychosocial   Plan of Care Reviewed With patient   Plan of Care Review   Progress improving   OTHER   Outcome Summary TOLERATING CLEAR LIQUID DIET WITHOUT PAIN OR NAUSEA. NO DIARRHEA . RESTED WELL OVERNIGHT WITHOUT C/O'S.

## 2019-05-19 NOTE — PROGRESS NOTES
Chief Complaint:    Abnormal CT imaging of the abdomen    Subjective:    Wants to advance diet.  No nausea.    Objective:    Vitals:    05/18/19 2258 05/19/19 0726 05/19/19 0810 05/19/19 1253   BP: 122/79 104/61  151/89   BP Location: Right arm Left arm  Left arm   Patient Position: Lying Lying  Sitting   Pulse: 56 66  87   Resp: 16 18  20   Temp: 97.3 °F (36.3 °C)  97.8 °F (36.6 °C) 97.7 °F (36.5 °C)   TempSrc: Oral Oral  Oral   SpO2:       Weight:       Height:           Lungs: Clear  Heart: Regular  Abdomen: Soft.  Nondistended.  Bowel sounds present.  Extremities: Warm    Labs reviewed.  WBC 6.38, Hgb 13.2, platelets 202.  Creat 1.02.  CO2 23.0.  C. difficile toxin negative  Gastrointestinal panel negative    KUB shows scattered gas pattern within stomach, small bowel, and colon.    Assessment:    Abnormal appearance of small bowel on Abdominal CT    Plan:    Move forward with dietary advancement.  Will follow along.

## 2019-05-20 VITALS
TEMPERATURE: 98 F | SYSTOLIC BLOOD PRESSURE: 123 MMHG | DIASTOLIC BLOOD PRESSURE: 85 MMHG | WEIGHT: 226 LBS | BODY MASS INDEX: 31.64 KG/M2 | HEART RATE: 64 BPM | RESPIRATION RATE: 16 BRPM | HEIGHT: 71 IN | OXYGEN SATURATION: 99 %

## 2019-05-20 RX ORDER — LOSARTAN POTASSIUM 25 MG/1
25 TABLET ORAL
Status: DISCONTINUED | OUTPATIENT
Start: 2019-05-20 | End: 2019-05-20 | Stop reason: HOSPADM

## 2019-05-20 RX ADMIN — FINASTERIDE 5 MG: 5 TABLET, FILM COATED ORAL at 09:55

## 2019-05-20 RX ADMIN — PREGABALIN 100 MG: 50 CAPSULE ORAL at 09:55

## 2019-05-20 RX ADMIN — LEVOTHYROXINE SODIUM 100 MCG: 100 TABLET ORAL at 06:28

## 2019-05-20 RX ADMIN — PANTOPRAZOLE SODIUM 40 MG: 40 TABLET, DELAYED RELEASE ORAL at 06:30

## 2019-05-20 RX ADMIN — ERYTHROMYCIN 1 APPLICATION: 5 OINTMENT OPHTHALMIC at 09:55

## 2019-05-20 NOTE — PROGRESS NOTES
Name: Marin Cuenca ADMIT: 2019   : 1950  PCP: Leonid Lockett MD    MRN: [id number] LOS: 3 days   AGE/SEX: 69 y.o. male  ROOM: [facility]   Subjective   Anxious about return home with abdominal discomfort. Also concerned about dehydration if diarrhea returns. Discussed outpatient plan and reasons for return. No CP SOA NVD currently.    Objective   Vital Signs  Temp:  [97.4 °F (36.3 °C)-97.7 °F (36.5 °C)] 97.4 °F (36.3 °C)  Heart Rate:  [59-87] 68  Resp:  [16-20] 16  BP: (118-151)/(70-89) 133/83     on   ;   Device (Oxygen Therapy): room air  Body mass index is 31.52 kg/m².    Physical Exam   Constitutional: He appears well-developed. No distress.   HENT:   Head: Atraumatic.   Nose: Nose normal.   Eyes: Conjunctivae and EOM are normal.   Neck: Normal range of motion. Neck supple.   Cardiovascular: Normal rate, regular rhythm and intact distal pulses.   Pulmonary/Chest: Effort normal and breath sounds normal. He has no wheezes.   Abdominal: Soft. He exhibits no distension. There is tenderness (blq). There is no rebound and no guarding.   Musculoskeletal: Normal range of motion. He exhibits no edema.   Neurological: He is alert. No cranial nerve deficit.   Skin: Skin is warm and dry. He is not diaphoretic.   Psychiatric: He has a normal mood and affect. His behavior is normal.   Nursing note and vitals reviewed.      Results Review:       I reviewed the patient's new clinical results.      Results from last 7 days   Lab Units 19  0548 19  0519  1631   WBC 10*3/mm3 6.38 7.85 13.78*   HEMOGLOBIN g/dL 13.2 14.4 16.4   PLATELETS 10*3/mm3 202 219 282     Results from last 7 days   Lab Units 19  0548 19  0527 19  1631   SODIUM mmol/L 139 138 134*   POTASSIUM mmol/L 4.3 4.1 4.5   CHLORIDE mmol/L 108* 110* 101   CO2 mmol/L 23.0 19.1* 20.7*   BUN mg/dL 11 16 18   CREATININE mg/dL 1.02 0.99 1.51*   GLUCOSE mg/dL 92 97 124*   Estimated Creatinine Clearance: 82.4 mL/min (by  C-G formula based on SCr of 1.02 mg/dL).  Results from last 7 days   Lab Units 05/19/19  0548 05/18/19  0527 05/17/19  1631   CALCIUM mg/dL 8.4* 8.6 10.2   ALBUMIN g/dL  --   --  4.40         erythromycin 1 application Right Eye TID   finasteride 5 mg Oral Daily   levothyroxine 100 mcg Oral Q AM   pantoprazole 40 mg Oral BID AC   pregabalin 100 mg Oral TID      Diet Regular      Assessment/Plan      Active Hospital Problems    Diagnosis  POA   • **Partial small bowel obstruction (CMS/HCC) [K56.600]  Yes   • Acute kidney injury (CMS/HCC) [N17.9]  Yes   • Obesity (BMI 30-39.9) [E66.9]  Yes   • KARLA on CPAP [G47.33, Z99.89]  Not Applicable   • Gastroesophageal reflux disease with esophagitis [K21.0]  Yes   • Essential hypertension [I10]  Yes      Resolved Hospital Problems   No resolved problems to display.     - Partial SBO: Now on regular diet. PRN bowel regimen. Some discomfort after breakfast. Surgery following.  - Abn IBD Panel: Has planned endoscopy at [place]. ESR/CRP ok.  - Barretts: PPI.  - JENNIFER: resolved.  - HTN: Resume ARB. Plan to discharge off diuretic with his recent Gi issues and dehydration.  - Disposition: Home/possibly this afternoon    Lester Jimenez MD  [facility]  05/20/19  10:18 AM

## 2019-05-20 NOTE — DISCHARGE SUMMARY
Date of Admission: 5/17/2019  Date of Discharge:  5/20/2019  Primary Care Physician: Leonid Lockett MD     Discharge Diagnosis:  Active Hospital Problems    Diagnosis  POA   • **Partial small bowel obstruction (CMS/HCC) [K56.600]  Yes   • Acute kidney injury (CMS/HCC) [N17.9]  Yes   • Obesity (BMI 30-39.9) [E66.9]  Yes   • KARLA on CPAP [G47.33, Z99.89]  Not Applicable   • Gastroesophageal reflux disease with esophagitis [K21.0]  Yes   • Essential hypertension [I10]  Yes      Resolved Hospital Problems   No resolved problems to display.       Presenting Problem/History of Present Illness:  Diarrhea of presumed infectious origin [R19.7]  Dehydration [E86.0]  Abdominal pain, unspecified abdominal location [R10.9]     Mr. Cuenca is a 69 y.o. non-smoker with a history of hypertension, GERD, Ramirez's esophagus and sleep apnea that presents to [facility] complaining of intermittent diarrhea for a few weeks.     Abdominal Pain   This is a recurrent problem. The current episode started more than 1 month ago. The onset quality is undetermined. The problem occurs intermittently. The problem has been gradually worsening. The pain is located in the generalized abdominal region. The pain is moderate. The quality of the pain is aching and sharp. The abdominal pain does not radiate. Associated symptoms include diarrhea, nausea and weight loss. Pertinent negatives include no anorexia, fever, hematochezia, melena or vomiting. The pain is aggravated by palpation. The pain is relieved by nothing. Treatments tried: colestid. The treatment provided mild relief. Prior diagnostic workup includes CT scan. His past medical history is significant for abdominal surgery (fundoplication (Jaun)) and GERD.   colonoscopy about 3 yrs ago negative, due again soon  +Crohn test positive w/ PCP    Hospital Course:  The patient is a 69 y.o. male who presented with abdominal pain and possible small bowel obstruction.  See review  results below for CT imaging.  He was admitted given supportive care and symptoms were controlled.  Surgery was consulted and he did not require any operative intervention.  It was felt that this most likely does not represent complete small bowel obstruction although it is difficult to determine if he had a partial small bowel obstruction which resolved spontaneously.  He has a history of intermittent abdominal issues and abnormal inflammatory bowel disease panel from [facility].  We did check inflammatory markers and they were not elevated.  He was having bowel movements and his diet was slowly advanced.  He is now tolerating oral intake.  He should follow-up with Dr. Dunbar for his scheduled colonoscopy to further investigate the etiology of his GI symptoms.    He was admitted with acute renal failure secondary to dehydration.  This is twofold due to decreased intake and diarrhea and also diuretic use.  His antihypertensives were held and he was given fluids.  His renal function returned to baseline.  Today his blood pressure was slightly elevated so I have resumed his ARB.  He is concerned about additional dehydration so current plan is to have him not take his home Lasix for the time being until he is tolerating a steady diet.  Would recommend him follow-up with his primary care provider for additional blood pressure checks and titration as needed.    He has a history of Ramirez's esophagus and is undergoing serial endoscopy monitoring at [facility].  He was continued on PPI.    Exam Today:  Constitutional: He appears well-developed. No distress.   HENT:   Head: Atraumatic.   Nose: Nose normal.   Eyes: Conjunctivae and EOM are normal.   Neck: Normal range of motion. Neck supple.   Cardiovascular: Normal rate, regular rhythm and intact distal pulses.   Pulmonary/Chest: Effort normal and breath sounds normal. He has no wheezes.   Abdominal: Soft. He exhibits no distension. There is tenderness (blq). There is no  rebound and no guarding.   Musculoskeletal: Normal range of motion. He exhibits no edema.   Neurological: He is alert. No cranial nerve deficit.   Skin: Skin is warm and dry. He is not diaphoretic.   Psychiatric: He has a normal mood and affect. His behavior is normal.     Results:  CT Abdomen/Pelvis  1. Mild small bowel dilatation with segments of decompressed distal  small bowel. Findings could represent partial small bowel obstruction.  2. Fluid-filled small and large bowel.  3. Colonic diverticulosis.    Procedures Performed:         Consults:   Consults     Date and Time Order Name Status Description    5/17/2019 2146 Inpatient General Surgery Consult Completed     5/17/2019 1929 [facility] (on-call MD unless specified) Completed            Discharge Disposition:  Home or Self Care    Discharge Medications:     Discharge Medications      Continue These Medications      Instructions Start Date   aspirin 81 MG tablet   81 mg, Oral, Daily      Calcium Carb-Cholecalciferol 500-600 MG-UNIT chewable tablet   1 tablet, Oral, 2 Times Daily      colestipol 1 g tablet  Commonly known as:  COLESTID   1 g, Oral, Daily      erythromycin 5 MG/GM ophthalmic ointment  Commonly known as:  ROMYCIN   1 application, Right Eye, 3 Times Daily      esomeprazole 40 MG capsule  Commonly known as:  nexIUM   40 mg, Oral, 2 Times Daily      finasteride 5 MG tablet  Commonly known as:  PROSCAR   5 mg, Oral, Daily      fish oil 1000 MG capsule capsule   1,000 mg, Oral, 2 Times Daily With Meals      irbesartan 75 MG tablet  Commonly known as:  AVAPRO   75 mg, Oral, Daily      levothyroxine 100 MCG tablet  Commonly known as:  SYNTHROID, LEVOTHROID   100 mcg, Oral, Daily      meclizine 25 MG tablet  Commonly known as:  ANTIVERT   25 mg, Oral, 3 Times Daily PRN      pregabalin 100 MG capsule  Commonly known as:  LYRICA   100 mg, Oral, 3 Times Daily      promethazine 25 MG tablet  Commonly known as:  PHENERGAN   25 mg, Oral, Every 6 Hours PRN          Stop These Medications    furosemide 40 MG tablet  Commonly known as:  LASIX            Discharge Diet:   Diet Instructions     Advance Diet As Tolerated            Activity at Discharge:   Activity Instructions     Activity as Tolerated            Follow-up Appointments:  Follow-up Information     Leonid Lockett MD Follow up.    Specialty:  Family Medicine  Contact information:  [address]  [facility] [address]  [place] [address]  [phone]             Marco Dunbar MD Follow up.    Specialty:  Gastroenterology  Contact information:  [address]  [facility] [address]  [place] [id number] [phone]                   Test Results Pending at Discharge:       Lester Jimenez MD  05/20/19  12:56 PM    Time Spent on Discharge Activities: >30 minutes

## 2019-05-20 NOTE — PROGRESS NOTES
Case Management Discharge Note    Final Note: Home no needs    Destination      No service has been selected for the patient.      Durable Medical Equipment      No service has been selected for the patient.      Dialysis/Infusion      No service has been selected for the patient.      Home Medical Care      No service has been selected for the patient.      Therapy      No service has been selected for the patient.      Community Resources      No service has been selected for the patient.             Final Discharge Disposition Code: 01 - home or self-care

## 2019-05-20 NOTE — PROGRESS NOTES
Discharge Planning Assessment  [facility]     Patient Name: Marin Cuenca  MRN: [id number]  Today's Date: 5/20/2019    Admit Date: 5/17/2019    Discharge Needs Assessment     Row Name 05/20/19 1318       Living Environment    Lives With  spouse    Current Living Arrangements  home/apartment/condo    Primary Care Provided by  self;spouse/significant other    Provides Primary Care For  no one    Family Caregiver if Needed  spouse    Quality of Family Relationships  supportive    Able to Return to Prior Arrangements  yes       Resource/Environmental Concerns    Resource/Environmental Concerns  none    Transportation Concerns  car, none       Transition Planning    Patient/Family Anticipates Transition to  home    Patient/Family Anticipated Services at Transition  none    Transportation Anticipated  family or friend will provide       Discharge Needs Assessment    Concerns to be Addressed  no discharge needs identified    Equipment Currently Used at Home  none    Anticipated Changes Related to Illness  none    Equipment Needed After Discharge  none        Discharge Plan     Row Name 05/20/19 1319       Plan    Plan  Home no needs    Plan Comments  IMM noted.  CCP met spoke to patient and wife at bedside to discuss d/c planning. Face sheet verified. CCP role explained.  Pt emergency contact is his wife Lorie, [phone].   His PCP is Dr. Leonid Lockett.   Pt lives in a patio house with his wife   He uses no DME to ambulate.  He uses a C-Pap.  He is independent with ADL's.  Pt has no Home Health history.  He has not been to rehab in the past.  Pt obtains his medications at [facility] pharmacy on [facility].  Plan is home no needs CCP following        Destination      No service coordination in this encounter.      Durable Medical Equipment      No service coordination in this encounter.      Dialysis/Infusion      No service coordination in this encounter.      Home Medical Care      No service coordination in  this encounter.      Therapy      No service coordination in this encounter.      Community Resources      No service coordination in this encounter.        Expected Discharge Date and Time     Expected Discharge Date Expected Discharge Time    May 20, 2019         Demographic Summary    No documentation.       Functional Status     Row Name 05/20/19 1317       Functional Status    Current Activity Tolerance  good       Functional Status, IADL    Medications  independent    Meal Preparation  independent    Housekeeping  independent    Laundry  independent    Shopping  independent       Mental Status    General Appearance WDL  WDL       Mental Status Summary    Recent Changes in Mental Status/Cognitive Functioning  no changes       Employment/    Employment Status  retired        Psychosocial    No documentation.       Abuse/Neglect    No documentation.       Legal    No documentation.       Substance Abuse    No documentation.       Patient Forms    No documentation.           Lois Delgado RN

## 2019-05-20 NOTE — PLAN OF CARE
Problem: Patient Care Overview  Goal: Plan of Care Review  Outcome: Ongoing (interventions implemented as appropriate)   05/20/19 0324   Coping/Psychosocial   Plan of Care Reviewed With patient   Plan of Care Review   Progress improving   OTHER   Outcome Summary No n/v no diarrhea this shift. Pt had no complaints of pain or discomfort. Pt advanced to a regular diet, seems to be tolerating fine. Out pt colonoscopy scheduled. Pt rested well overnight. Pt possibly home today. Cont to monitor, safety maintained.

## 2019-08-19 ENCOUNTER — OFFICE VISIT (OUTPATIENT)
Dept: SLEEP MEDICINE | Facility: HOSPITAL | Age: 69
End: 2019-08-19

## 2019-08-19 VITALS
HEIGHT: 72 IN | BODY MASS INDEX: 31.53 KG/M2 | HEART RATE: 66 BPM | DIASTOLIC BLOOD PRESSURE: 77 MMHG | OXYGEN SATURATION: 98 % | WEIGHT: 232.8 LBS | SYSTOLIC BLOOD PRESSURE: 128 MMHG

## 2019-08-19 DIAGNOSIS — Z99.89 OSA ON CPAP: Primary | ICD-10-CM

## 2019-08-19 DIAGNOSIS — G47.10 HYPERSOMNIA WITH SLEEP APNEA: ICD-10-CM

## 2019-08-19 DIAGNOSIS — E03.9 ADULT HYPOTHYROIDISM: ICD-10-CM

## 2019-08-19 DIAGNOSIS — I10 ESSENTIAL HYPERTENSION: ICD-10-CM

## 2019-08-19 DIAGNOSIS — G47.33 OSA ON CPAP: Primary | ICD-10-CM

## 2019-08-19 DIAGNOSIS — G47.30 HYPERSOMNIA WITH SLEEP APNEA: ICD-10-CM

## 2019-08-19 PROCEDURE — G0463 HOSPITAL OUTPT CLINIC VISIT: HCPCS

## 2019-08-19 NOTE — PROGRESS NOTES
Sleep Disorder Follow Up    Patient Name: Marin Cuenca  Age/Sex: 69 y.o. male  : 1950  MRN: [id number]    Date of Encounter Visit: 19    Referring Provider: Anaebll Duarte MD  Place of Service: [facility]  Patient Care Team:  Leonid Lockett MD as PCP - General  Leonid Lockett MD as PCP - Claims [facility]  Bubba Dos Santos Jr., MD as Consulting Physician (Urology)    PROBLEM LIST:  1. KARLA on CPAP   2. Hypersomnia with sleep apnea   3. Obesity (BMI 30-39.9)   4. Essential hypertension   5. Acquired hypothyroidism       History of Present Illness:  Marin Cuenca is a 69 y.o. male who is here for follow up on KARLA.   Patient was last seen in the office on 2018.  He did have some food poisoning and GI issues in  that required hospitalization and affected his compliance, but as of July he was back to his usual 100% more than 8 hours average nightly use.    Patient uses machine every night with no complaints from the mask or the pressure.  Patient uses a nasal pillows mask, which does  fit well. Patient no air leak. no dry mouth.   Patient's equipment supplier is [employer]  and last mask replacement was every 3 months.  Patient sleeps better and has a deeper sleep with the machine and feels more energy during the day time.  Currently on CPAP 10 cm H20.  His machine is more than 5 years old but he would like to have a replacement  [place] Sleepiness Scale (ESS) is 9.  Compliance download was reviewed and documented below.  Weight is down 4 since last visit.  Other comorbidities include HTN, KARLA, hypothyroidism     Review of Systems:   A ten-system review was conducted and was negative except for GERD symptoms, and ear pain.   Please refer to the follow up sleep questionnaire page one for details.    Past Medical History:  Past medical, surgical, social, and family history, except as mentioned above, was unchanged from the last visit.     Past Medical  History:   Diagnosis Date   • Arthritis    • Ramirez esophagus    • Benign prostatic hypertrophy    • GERD (gastroesophageal reflux disease)    • Hypertension    • Hypothyroidism    • KARLA on CPAP    • Osteoporosis        Past Surgical History:   Procedure Laterality Date   • CYSTOSCOPY     • HEMORRHOIDECTOMY     • KNEE SURGERY Left    • LUMBAR LAMINECTOMY     • NISSEN FUNDOPLICATION     • UPPER GASTROINTESTINAL ENDOSCOPY         Home Medications:     Current Outpatient Medications:   •  aspirin 81 MG tablet, Take 1 tablet by mouth Daily., Disp: 100 tablet, Rfl: 3  •  Calcium Carb-Cholecalciferol 500-600 MG-UNIT chewable tablet, Chew 1 tablet 2 (Two) Times a Day., Disp: 180 tablet, Rfl: 3  •  colestipol (COLESTID) 1 g tablet, Take 1 tablet by mouth Daily., Disp: 30 tablet, Rfl: 0  •  erythromycin (ROMYCIN) 5 MG/GM ophthalmic ointment, Administer 1 application to the right eye 3 (Three) Times a Day., Disp: , Rfl:   •  esomeprazole (nexIUM) 40 MG capsule, Take 1 capsule by mouth 2 (Two) Times a Day., Disp: 180 capsule, Rfl: 3  •  finasteride (PROSCAR) 5 MG tablet, Take 1 tablet by mouth Daily., Disp: 90 tablet, Rfl: 3  •  irbesartan (AVAPRO) 75 MG tablet, Take 1 tablet by mouth Daily., Disp: 90 tablet, Rfl: 3  •  levothyroxine (SYNTHROID, LEVOTHROID) 100 MCG tablet, Take 1 tablet by mouth Daily., Disp: 90 tablet, Rfl: 3  •  meclizine (ANTIVERT) 25 MG tablet, Take 1 tablet by mouth 3 (Three) Times a Day As Needed for dizziness., Disp: 60 tablet, Rfl: 5  •  Omega-3 Fatty Acids (FISH OIL) 1000 MG capsule capsule, Take 1,000 mg by mouth 2 (Two) Times a Day With Meals., Disp: , Rfl:   •  pregabalin (LYRICA) 100 MG capsule, Take 1 capsule by mouth 3 (Three) Times a Day., Disp: 270 capsule, Rfl: 3  •  promethazine (PHENERGAN) 25 MG tablet, Take 1 tablet by mouth Every 6 (Six) Hours As Needed for Nausea or Vomiting., Disp: 60 tablet, Rfl: 5    Allergies:  Allergies   Allergen Reactions   • Clarithromycin      Other  "reaction(s): Abdominal pain   • Tetracyclines & Related Rash       Past Social History:  Social History     Socioeconomic History   • Marital status:      Spouse name: Not on file   • Number of children: Not on file   • Years of education: Not on file   • Highest education level: Not on file   Tobacco Use   • Smoking status: Never Smoker   Substance and Sexual Activity   • Alcohol use: No   • Drug use: No     Comment: Consumes 3 caffeinated beverages per day usually coffee.       Past Family History:  Family History   Problem Relation Age of Onset   • Heart disease Father         cardiovascular disease   • Diabetes Father    • Hypertension Father    • Cancer Paternal Grandmother          Objective:   Done and documented on sleep disorders center physical examination sheet, please refer to hand written note on the chart for details about the other pertinent negative findings.    Vital Signs:   Visit Vitals  /77 (BP Location: Left arm, Patient Position: Sitting)   Pulse 66   Ht 182.9 cm (72\")   Wt 106 kg (232 lb 12.8 oz)   SpO2 98%   BMI 31.57 kg/m²     Wt Readings from Last 3 Encounters:   08/19/19 106 kg (232 lb 12.8 oz)   05/17/19 103 kg (226 lb)   05/09/19 105 kg (232 lb)          Physical Exam:   General: AAOx3. Normal mood and affect.   HEENT:  Conjunctiva normal.  PERRLA.  Moist mucous membranes.  Septum midline. Mallampati 4 airway. Normal size tongue and uvula    Neck: Neck supple. Trachea midline.  Normal thyroid.   LUNGS: Non-labored breathing. CTAB.  No wheezing.  HEART: regular rate and rhythm. No murmur. Normal s1/s2.  EXTREMITIES: 1+ left greater than right edema, which he reports is baseline. No cyanosis or clubbing. Normal gait.    Diagnostic Data:  NPSG on 10/9/11 and a weight of 232 pounds showed mild obstructive sleep apnea with AHI of 11.8 and RDI of 18.4.  Optimal control with recommended CPAP at 10 cm H2O.    Compliance download from 5/21/19-8/18/19 showed 84% compliant with average " use of 6 hours and 16 minutes.  Residual AHI 0.7 on CPAP at 10 cm H2O with average large air leak of  44 seconds per day.        Assessment and Plan:       ICD-10-CM ICD-9-CM   1. KARLA on CPAP G47.33 327.23    Z99.89 V46.8   2. Hypersomnia with sleep apnea G47.10 780.53    G47.30    3. Essential hypertension I10 401.9   4. Adult hypothyroidism E03.9 244.9       Recommendations:     Patient needs a new machine, his current machine is more than 5 years old, will order a CPAP at the same setting  Will follow in 2 months to review download and resume the yearly follow up afterwards  He is complaint with the CPAP and he has subjective and clinical benefits and it is recommended to be continued  He is to continue to work on the weight loss    he is to work on the weight loss    Summary of recommendations:  · Order new CPAP machine at the same setting like his old one at 10 cm of water  · Continue to work on the weight loss  · Follow-up in 2 months and then yearly thereafter    No orders of the defined types were placed in this encounter.    No orders of the defined types were placed in this encounter.    Return in about 2 months (around 10/19/2019).    Anabell Duarte MD   [facility]   08/19/19  11:15 AM    Dictated utilizing Dragon dictation

## 2019-08-29 DIAGNOSIS — M51.16 LUMBAR DISC DISEASE WITH RADICULOPATHY: Primary | ICD-10-CM

## 2019-09-10 DIAGNOSIS — I10 ESSENTIAL HYPERTENSION: ICD-10-CM

## 2019-09-10 RX ORDER — IRBESARTAN 75 MG/1
TABLET ORAL
Qty: 90 TABLET | Refills: 0 | Status: SHIPPED | OUTPATIENT
Start: 2019-09-10 | End: 2019-11-11 | Stop reason: SDUPTHER

## 2019-09-17 DIAGNOSIS — M51.9 LUMBAR DISC DISEASE: Primary | ICD-10-CM

## 2019-09-18 ENCOUNTER — HOSPITAL ENCOUNTER (OUTPATIENT)
Dept: MRI IMAGING | Facility: HOSPITAL | Age: 69
Discharge: HOME | End: 2019-09-18
Admitting: FAMILY MEDICINE

## 2019-09-18 DIAGNOSIS — M51.16 LUMBAR DISC DISEASE WITH RADICULOPATHY: ICD-10-CM

## 2019-09-18 PROCEDURE — 82565 ASSAY OF CREATININE: CPT

## 2019-09-18 PROCEDURE — A9577 INJ MULTIHANCE: HCPCS | Performed by: FAMILY MEDICINE

## 2019-09-18 PROCEDURE — 72158 MRI LUMBAR SPINE W/O & W/DYE: CPT

## 2019-09-18 PROCEDURE — 0 GADOBENATE DIMEGLUMINE 529 MG/ML SOLUTION: Performed by: FAMILY MEDICINE

## 2019-09-18 RX ADMIN — GADOBENATE DIMEGLUMINE 20 ML: 529 INJECTION, SOLUTION INTRAVENOUS at 12:46

## 2019-09-19 LAB — CREAT BLDA-MCNC: 1.3 MG/DL (ref 0.6–1.3)

## 2019-09-20 NOTE — PROGRESS NOTES
Subjective   Patient ID: Marin Cuenca is a 69 y.o. male is being seen for consultation today at the request of Leonid Lockett MD for neck that radiates into his left arm and back pain with left foot numbness. He has not had any recent conservative treatment.    History of Present Illness    This patient has been having pain in his back with radiation into his left leg and numbness in his left foot.  He did have a left L4-5 laminectomy by Dr. Cid about 20 years ago.  Subsequent to that he did pretty well until the last couple of years when he developed these symptoms.  His right leg is okay and he has no difficulty of bowel bladder control or other associated symptoms.  He says the pain is worse with any kind of activity.  He has also developed pain in his neck with radiation into his left arm.  Is been treated with epidural blocks but they have not helped.    The following portions of the patient's history were reviewed and updated as appropriate: allergies, current medications, past family history, past medical history, past social history, past surgical history and problem list.    Review of Systems   Constitutional: Positive for activity change.   Respiratory: Negative for chest tightness and shortness of breath.    Cardiovascular: Negative for chest pain.   Musculoskeletal: Positive for back pain, myalgias and neck pain.        Left arm pain   Neurological: Positive for numbness.   All other systems reviewed and are negative.      Objective   Physical Exam   Constitutional: He is oriented to person, place, and time. He appears well-developed and well-nourished.   HENT:   Head: Normocephalic and atraumatic.   Eyes: Conjunctivae and EOM are normal. Pupils are equal, round, and reactive to light.   Fundoscopic exam:       The right eye shows no papilledema. The right eye shows venous pulsations.        The left eye shows no papilledema. The left eye shows venous pulsations.   Neck: Carotid bruit is not  present.   Neurological: He is oriented to person, place, and time. He has a normal Finger-Nose-Finger Test and a normal Heel to Shin Test. Gait normal.   Reflex Scores:       Tricep reflexes are 2+ on the right side and 2+ on the left side.       Bicep reflexes are 2+ on the right side and 2+ on the left side.       Brachioradialis reflexes are 2+ on the right side and 2+ on the left side.       Patellar reflexes are 2+ on the right side and 2+ on the left side.       Achilles reflexes are 2+ on the right side and 2+ on the left side.  Psychiatric: His speech is normal.     Neurologic Exam     Mental Status   Oriented to person, place, and time.   Registration of memory: Good recent and remote memory.   Attention: normal. Concentration: normal.   Speech: speech is normal   Level of consciousness: alert  Knowledge: consistent with education.     Cranial Nerves     CN II   Visual fields full to confrontation.   Visual acuity: normal    CN III, IV, VI   Pupils are equal, round, and reactive to light.  Extraocular motions are normal.     CN V   Facial sensation intact.   Right corneal reflex: normal  Left corneal reflex: normal    CN VII   Facial expression full, symmetric.   Right facial weakness: none  Left facial weakness: none    CN VIII   Hearing: intact    CN IX, X   Palate: symmetric    CN XI   Right sternocleidomastoid strength: normal  Left sternocleidomastoid strength: normal    CN XII   Tongue: not atrophic  Tongue deviation: none    Motor Exam   Muscle bulk: normal  Right arm tone: normal  Left arm tone: normal  Right leg tone: normal  Left leg tone: normal    Strength   Strength 5/5 except as noted.     Sensory Exam   Light touch normal.     Gait, Coordination, and Reflexes     Gait  Gait: normal    Coordination   Finger to nose coordination: normal  Heel to shin coordination: normal    Reflexes   Right brachioradialis: 2+  Left brachioradialis: 2+  Right biceps: 2+  Left biceps: 2+  Right triceps:  2+  Left triceps: 2+  Right patellar: 2+  Left patellar: 2+  Right achilles: 2+  Left achilles: 2+  Right : 2+  Left : 2+      Assessment/Plan   Independent Review of Radiographic Studies:      I reviewed an MRI of his lumbar spine done on 18 September of this year.  This does show some stenosis at L4-5.  There appears to be pretty good alignment of the vertebrae in the lower lumbar spine.  On the axial images L1 to shows fairly open foramina.  L2-3 is also open.  There is a little right lateral recess stenosis at that level.  L3-4 shows some right lateral recess stenosis but L4-5 is really stenotic with more significant left lateral recess stenosis.  L5-S1 mostly looks okay.  He has had previous surgery on the left side at L4-5.  There may also be a synovial cyst on the right at L3-4.  I do not see any imaging of his cervical spine since 2005.    Medical Decision Making:      I told the patient and his wife about the imaging.  I told him that I see little option at this point but to proceed with a cervical and lumbar myelogram.  I told the patient what a myelogram involves.  I explained that there is a 50% chance of developing a bad headache and nausea as a result of the test.  I explained that there is also a very small chance of infection, seizures, and bleeding.  I explained how we would treat a post myelogram headache including bedrest, caffeinated fluids, steroids, and blood patch.  The patient does ask to proceed.    Marin was seen today for back pain and neck pain.    Diagnoses and all orders for this visit:    Lumbar radiculopathy  -     Obtain Informed Consent; Standing  -     IR Myelogram 2 or More Areas; Future  -     CT Lumbar Spine With Intrathecal Contrast; Future  -     XR Spine Lumbar Complete With Flex & Ext; Future  -     No Lab Testing Needed; Standing  -     dexamethasone (DECADRON) 4 MG tablet; Take 2 tablets by mouth Take As Directed. Take both tablets by mouth 2 hours before  myelogram    Cervical radiculitis  -     Obtain Informed Consent; Standing  -     IR Myelogram 2 or More Areas; Future  -     XR Spine Cervical Complete With Flex Ext; Future  -     No Lab Testing Needed; Standing  -     dexamethasone (DECADRON) 4 MG tablet; Take 2 tablets by mouth Take As Directed. Take both tablets by mouth 2 hours before myelogram  -     CT Cervical Spine With Intrathecal Contrast; Future      Return for After radiology test.

## 2019-09-24 ENCOUNTER — OFFICE VISIT (OUTPATIENT)
Dept: NEUROSURGERY | Facility: CLINIC | Age: 69
End: 2019-09-24

## 2019-09-24 VITALS
WEIGHT: 234 LBS | SYSTOLIC BLOOD PRESSURE: 140 MMHG | HEART RATE: 80 BPM | DIASTOLIC BLOOD PRESSURE: 78 MMHG | HEIGHT: 72 IN | BODY MASS INDEX: 31.69 KG/M2

## 2019-09-24 DIAGNOSIS — M54.12 CERVICAL RADICULITIS: ICD-10-CM

## 2019-09-24 DIAGNOSIS — M54.16 LUMBAR RADICULOPATHY: Primary | ICD-10-CM

## 2019-09-24 PROCEDURE — 99204 OFFICE O/P NEW MOD 45 MIN: CPT | Performed by: NEUROLOGICAL SURGERY

## 2019-09-24 RX ORDER — DEXAMETHASONE 4 MG/1
8 TABLET ORAL TAKE AS DIRECTED
Qty: 2 TABLET | Refills: 0 | Status: SHIPPED | OUTPATIENT
Start: 2019-09-24 | End: 2019-10-03 | Stop reason: HOSPADM

## 2019-10-03 ENCOUNTER — HOSPITAL ENCOUNTER (OUTPATIENT)
Dept: CT IMAGING | Facility: HOSPITAL | Age: 69
Discharge: HOME | End: 2019-10-03
Admitting: NEUROLOGICAL SURGERY

## 2019-10-03 ENCOUNTER — HOSPITAL ENCOUNTER (OUTPATIENT)
Dept: GENERAL RADIOLOGY | Facility: HOSPITAL | Age: 69
Discharge: HOME | End: 2019-10-03

## 2019-10-03 VITALS
HEIGHT: 72 IN | TEMPERATURE: 97 F | WEIGHT: 225 LBS | OXYGEN SATURATION: 99 % | SYSTOLIC BLOOD PRESSURE: 134 MMHG | RESPIRATION RATE: 18 BRPM | DIASTOLIC BLOOD PRESSURE: 85 MMHG | BODY MASS INDEX: 30.48 KG/M2 | HEART RATE: 57 BPM

## 2019-10-03 DIAGNOSIS — M54.12 CERVICAL RADICULITIS: ICD-10-CM

## 2019-10-03 DIAGNOSIS — M54.16 LUMBAR RADICULOPATHY: ICD-10-CM

## 2019-10-03 PROCEDURE — 72240 MYELOGRAPHY NECK SPINE: CPT

## 2019-10-03 PROCEDURE — 63710000001 ACETAMINOPHEN 325 MG TABLET: Performed by: NEUROLOGICAL SURGERY

## 2019-10-03 PROCEDURE — 25010000002 IOPAMIDOL 61 % SOLUTION: Performed by: NEUROLOGICAL SURGERY

## 2019-10-03 PROCEDURE — A9270 NON-COVERED ITEM OR SERVICE: HCPCS | Performed by: NEUROLOGICAL SURGERY

## 2019-10-03 PROCEDURE — 72114 X-RAY EXAM L-S SPINE BENDING: CPT

## 2019-10-03 PROCEDURE — 72132 CT LUMBAR SPINE W/DYE: CPT

## 2019-10-03 PROCEDURE — 62305 MYELOGRAPHY LUMBAR INJECTION: CPT

## 2019-10-03 PROCEDURE — 72126 CT NECK SPINE W/DYE: CPT

## 2019-10-03 PROCEDURE — 72052 X-RAY EXAM NECK SPINE 6/>VWS: CPT

## 2019-10-03 PROCEDURE — 25010000003 LIDOCAINE 1 % SOLUTION: Performed by: NEUROLOGICAL SURGERY

## 2019-10-03 RX ORDER — LIDOCAINE HYDROCHLORIDE 10 MG/ML
10 INJECTION, SOLUTION INFILTRATION; PERINEURAL ONCE
Status: COMPLETED | OUTPATIENT
Start: 2019-10-03 | End: 2019-10-03

## 2019-10-03 RX ORDER — HYDROCODONE BITARTRATE AND ACETAMINOPHEN 5; 325 MG/1; MG/1
1 TABLET ORAL EVERY 4 HOURS PRN
Status: DISCONTINUED | OUTPATIENT
Start: 2019-10-03 | End: 2019-10-04 | Stop reason: HOSPADM

## 2019-10-03 RX ORDER — ACETAMINOPHEN 325 MG/1
650 TABLET ORAL EVERY 4 HOURS PRN
Status: DISCONTINUED | OUTPATIENT
Start: 2019-10-03 | End: 2019-10-04 | Stop reason: HOSPADM

## 2019-10-03 RX ADMIN — LIDOCAINE HYDROCHLORIDE 5 ML: 10 INJECTION, SOLUTION INFILTRATION; PERINEURAL at 07:51

## 2019-10-03 RX ADMIN — IOPAMIDOL 15 ML: 612 INJECTION, SOLUTION INTRATHECAL at 07:53

## 2019-10-03 RX ADMIN — ACETAMINOPHEN 650 MG: 325 TABLET ORAL at 08:57

## 2019-10-03 NOTE — DISCHARGE INSTRUCTIONS
EDUCATION /DISCHARGE INSTRUCTIONS:    A myelogram is a special radiology procedure of the spinal cord, spinal nerves and other related structures.  You will be awake during the examination.  An area of your lower back will be cleansed with an antiseptic solution.  The physician will inject a numbing medication in your lower back.  While your back is numb, a needle will be placed in the lower back area.  A small amount of spinal fluid may be withdrawn and sent to the lab if ordered by your physician. While the needle is in the back, an injection of a contrast material (xray dye) will be given through the needle.  The contrast material will allow the physician to see the spinal cord and spinal nerves.  Once injected, the needle will be removed and a band aid will be placed over the injection site.  The table will be tilted during the process to allow the contrast material to flow to particular areas in the spine.  Following the injection and xrays, you will be taken to the CT scan where more pictures will be taken. After the procedure is finished, the contrast material will be absorbed by your body and eliminated through your kidneys.  The radiologist will study and interpret your myelogram and send the results to your physician.  Procedure risks of a myelogram include, but are not limited to:  *  Bleeding   *  seizure  *  Infection   *  Headache, possibly severe requiring  *  Contrast reaction      a blood patch  *  Nerve or cord injury  *  Paralysis and death  Benefits of the procedure:  *  Best examination for delineating pathology related to spinal cord compression from a    disc and/or nerve root compression  Alternatives to the procedure:  MRI - a non invasive procedure requiring intravenous contrast injection.  Cannot be done on patients with certain pacemakers or metal in the body.  MRI risks include possible reaction to the contrast material, movement of metal located in the body.  Benefit to MRI:   Non-invasive and usually painless procedure.  THIS EDUCATION INFORMATION WAS REVIEWED PRIOR TO PROCEDURE AND CONSENT. Patient initials________________Time____0705______________    24 hour rest period ends __1100__________________.  Important information following your myelogram:  * ACTIVITY:   *  Lie down with your head elevated no more than 2 pillows high today & tonight  *  Sit up to eat your meals and use the restroom, otherwise, lie down.  *  Remain less active for two to three days.  *  Do not drive for 48 hours following a myelogram  *  You may remove the bandage and shower in the morning  *  Increase your fluids for the next 24 hours.  Caffeinated drinks are encouraged.    Resume taking blood thinner or aspirin on ___10/4/19 after 1100_    CALL YOUR PHYSICIAN FOR THE FOLLOWING:  * Pain at the injection site  * Reddness, swelling, bruising or drainage at the injection site.  * A fever by mouth of 101.0 or any new symptoms  Headaches are a common side effect after a myelogram.  If you get a headache, you should stay flat in bed and drink plenty of fluids. If the headache persist and does not go away with rest/medication, CALL Dr. Meneses at [phone]

## 2019-10-08 NOTE — H&P (VIEW-ONLY)
Subjective   Patient ID: Marin Cuenca is a 69 y.o. male is here today for follow-up with a new Cervical and Lumbar Myelogram that was ordered at his last office visit 9/24/2019 for neck that radiated into his left arm and back pain with left foot numbness.   Today his symptoms are unchanged from his previous visit.    History of Present Illness    This patient continues with pain in his neck with radiation down his left arm.  This pain comes and goes.  Nothing specific brings it on.  His right arm is okay and he has no difficulty with bowel and bladder control.  He also has more severe pain in his left leg which is there all the time.  His right leg is okay.    The following portions of the patient's history were reviewed and updated as appropriate: allergies, current medications, past family history, past medical history, past social history, past surgical history and problem list.    Review of Systems   Constitutional: Positive for activity change.   Respiratory: Negative for chest tightness and shortness of breath.    Musculoskeletal: Positive for back pain, myalgias and neck pain.        Left arm pain   All other systems reviewed and are negative.      Objective   Physical Exam   Constitutional: He is oriented to person, place, and time. He appears well-developed and well-nourished.   Neurological: He is oriented to person, place, and time.     Neurologic Exam     Mental Status   Oriented to person, place, and time.       Assessment/Plan   Independent Review of Radiographic Studies:      I reviewed his plain films in the cervical and lumbar spine as well as a myelogram and CT scan both in those areas as well.  His plain films in the lumbar spine show fairly mild degenerative disease for his age.  There is no evidence of instability on flexion and extension.  On the cervical spine there is also no evidence of abnormal movement.  On the myelogram itself there is severe narrowing at L4-5 and a subtotal block at  L5-S1 on the initial set of films.  Once he was standing however the L5-S1 level held out pretty well but there is still severe narrowing at L4-5 bilaterally area there is no evidence of abnormal movement at that level.  On the myelogram in the cervical spine there is a nerve root cut out on the right at C4-5 and on the left at C5-6.  On the oblique films the left-sided nerve root cut out appears to fill out better than it does on the AP but there is still some foreshortening of the nerve.  On the right side the C4-5 nerve root cut out is still present.  On the post myelographic CT scan of the cervical spine C2-3 and C3-4 look okay for the most part.  C4-5 shows some definite nerve root compression in the right neuroforamen.  There is a little left foraminal narrowing as well.  C5-6 shows more significant left-sided foraminal narrowing but the right side is a little bit narrow.  C6-7 shows a little foraminal narrowing bilaterally but for the most part looks okay.  C7-T1 looks okay.  I think if any surgery were done he would require C4-5 and C5-6 to be decompressed.  On the post myelographic CT scan of the lumbar spine the lower thoracic spine down to the L3 vertebral body looks okay.  At L3-4 there is some posterior compression of the thecal sac on the right side.  There is severe lateral recess narrowing bilaterally at L4-5.  L5-S1 mostly looks okay.  The lateral recess narrowing at L4-5 is worse on the left than the right.  He has had previous surgery on the left at L4-5.  I do not see a lot of bone missing there however.    Medical Decision Making:      I told the patient and his wife about the imaging.  I told him that I would leave the neck alone for right now as those problems do not seem to be that severe and is more significant pain is in the leg.  I did discuss a left L4-5 laminectomy versus a bilateral laminectomy and fusion with him.  I recommended the more limited surgery but I explained he may  ultimately need a bilateral decompression and fusion.  I told the patient about the risks, complications and expected outcome of the lumbar surgery.  I explained that there was an 80% chance of getting rid of the pain in the leg.  I explained that there would still be back pain after the surgery.  Initially this will be quite severe but will improve over time.  There is a 2 or 3% chance of infection, bleeding, CSF leak, damage to the nerve as a result of surgery, paralysis, as well as anesthetic risk.  There is a 5% chance of late instability which could require a fusion later on and a 10% chance of recurrent disc herniation.  We discussed the postoperative hospital and home course.    He will need to be scheduled for a: Left L4-5 laminectomy and neural lysis    Marin was seen today for back pain.    Diagnoses and all orders for this visit:    Lumbar radiculopathy      Return for 2-3 week post op.

## 2019-10-14 ENCOUNTER — OFFICE VISIT (OUTPATIENT)
Dept: SLEEP MEDICINE | Facility: HOSPITAL | Age: 69
End: 2019-10-14

## 2019-10-14 VITALS
BODY MASS INDEX: 31.83 KG/M2 | HEART RATE: 74 BPM | WEIGHT: 235 LBS | OXYGEN SATURATION: 97 % | DIASTOLIC BLOOD PRESSURE: 74 MMHG | SYSTOLIC BLOOD PRESSURE: 132 MMHG | HEIGHT: 72 IN

## 2019-10-14 DIAGNOSIS — Z99.89 OSA ON CPAP: Primary | ICD-10-CM

## 2019-10-14 DIAGNOSIS — G47.30 HYPERSOMNIA WITH SLEEP APNEA: ICD-10-CM

## 2019-10-14 DIAGNOSIS — G47.33 OSA ON CPAP: Primary | ICD-10-CM

## 2019-10-14 DIAGNOSIS — E66.9 OBESITY (BMI 30-39.9): ICD-10-CM

## 2019-10-14 DIAGNOSIS — G47.10 HYPERSOMNIA WITH SLEEP APNEA: ICD-10-CM

## 2019-10-14 DIAGNOSIS — I10 ESSENTIAL HYPERTENSION: ICD-10-CM

## 2019-10-14 PROCEDURE — G0463 HOSPITAL OUTPT CLINIC VISIT: HCPCS

## 2019-10-14 NOTE — PROGRESS NOTES
Sleep Disorder Follow Up    Patient Name: Marin Cuenca  Age/Sex: 69 y.o. male  : 1950  MRN: [id number]    Date of Encounter Visit: 10/14/19    Referring Provider: Anabell Duarte MD  Place of Service: [facility]  Patient Care Team:  Leonid Lockett MD as PCP - General  Leonid Lockett MD as PCP - [facility] Bubba Guajardo Jr., MD as Consulting Physician (Urology)    PROBLEM LIST:  1. KARLA on CPAP   2. Hypersomnia with sleep apnea   3. Obesity (BMI 30-39.9)   4. Essential hypertension   5. Acquired hypothyroidism       History of Present Illness:  Marin Cuenca is a 69 y.o. male who is here for follow up on KARLA.   Patient was last seen in the office on 19.  He did a new machine to replace his old one and her is here for the 2 months follow up. He likes the new machine, it works even better than the old one and he has no complaints  New machine replaced in AUG 2019  Patient uses machine every night with no complaints from the mask or the pressure.  Patient uses a nasal pillows mask, which does  fit well. Patient no air leak. no dry mouth.   Patient's equipment supplier is [employer]  and last mask replacement was every 3 months.  Patient sleeps better and has a deeper sleep with the machine and feels more energy during the day time.  Currently on CPAP 10 cm H20.  His machine is more than 5 years old but he would like to have a replacement  [place] Sleepiness Scale (ESS) is 7.  Compliance download was reviewed and documented below.  Weight is up by 3 pounds since last visit.  Other comorbidities include HTN, KARLA, hypothyroidism     Review of Systems:   A ten-system review was conducted and was negative except for nasal congestion.   Please refer to the follow up sleep questionnaire page one for details.    Past Medical History:  Past medical, surgical, social, and family history, except as mentioned above, was unchanged from the last visit.     Past  Medical History:   Diagnosis Date   • Arthritis    • Ramirez esophagus    • Benign prostatic hypertrophy    • GERD (gastroesophageal reflux disease)    • Hypertension    • Hypothyroidism    • KARLA on CPAP    • Osteoporosis        Past Surgical History:   Procedure Laterality Date   • CYSTOSCOPY     • HEMORRHOIDECTOMY     • KNEE SURGERY Left     scoped   • LUMBAR LAMINECTOMY     • NISSEN FUNDOPLICATION     • UPPER GASTROINTESTINAL ENDOSCOPY         Home Medications:     Current Outpatient Medications:   •  aspirin 81 MG tablet, Take 1 tablet by mouth Daily., Disp: 100 tablet, Rfl: 3  •  Calcium Carb-Cholecalciferol 500-600 MG-UNIT chewable tablet, Chew 1 tablet 2 (Two) Times a Day., Disp: 180 tablet, Rfl: 3  •  esomeprazole (nexIUM) 40 MG capsule, Take 1 capsule by mouth 2 (Two) Times a Day., Disp: 180 capsule, Rfl: 3  •  finasteride (PROSCAR) 5 MG tablet, Take 1 tablet by mouth Daily., Disp: 90 tablet, Rfl: 3  •  irbesartan (AVAPRO) 75 MG tablet, TAKE ONE TABLET BY MOUTH DAILY, Disp: 90 tablet, Rfl: 0  •  levothyroxine (SYNTHROID, LEVOTHROID) 100 MCG tablet, Take 1 tablet by mouth Daily., Disp: 90 tablet, Rfl: 3  •  meclizine (ANTIVERT) 25 MG tablet, Take 1 tablet by mouth 3 (Three) Times a Day As Needed for dizziness., Disp: 60 tablet, Rfl: 5  •  Omega-3 Fatty Acids (FISH OIL) 1000 MG capsule capsule, Take 1,000 mg by mouth 2 (Two) Times a Day With Meals., Disp: , Rfl:   •  pregabalin (LYRICA) 100 MG capsule, Take 1 capsule by mouth 3 (Three) Times a Day., Disp: 270 capsule, Rfl: 3  •  promethazine (PHENERGAN) 25 MG tablet, Take 1 tablet by mouth Every 6 (Six) Hours As Needed for Nausea or Vomiting., Disp: 60 tablet, Rfl: 5    Allergies:  Allergies   Allergen Reactions   • Clarithromycin GI Intolerance     Other reaction(s): Abdominal pain   • Tetracyclines & Related Rash       Past Social History:  Social History     Socioeconomic History   • Marital status:      Spouse name: Not on file   • Number of  "children: Not on file   • Years of education: Not on file   • Highest education level: Not on file   Tobacco Use   • Smoking status: Never Smoker   Substance and Sexual Activity   • Alcohol use: No   • Drug use: No     Comment: Consumes 3 caffeinated beverages per day usually coffee.       Past Family History:  Family History   Problem Relation Age of Onset   • Heart disease Father         cardiovascular disease   • Diabetes Father    • Hypertension Father    • Cancer Paternal Grandmother          Objective:   Done and documented on sleep disorders center physical examination sheet, please refer to hand written note on the chart for details about the other pertinent negative findings.    Vital Signs:   Visit Vitals  /74   Pulse 74   Ht 182.9 cm (72\")   Wt 107 kg (235 lb)   SpO2 97%   BMI 31.87 kg/m²     Wt Readings from Last 3 Encounters:   10/14/19 107 kg (235 lb)   10/03/19 102 kg (225 lb)   09/24/19 106 kg (234 lb)          Physical Exam:   General: AAOx3. Normal mood and affect.   HEENT:  Conjunctiva normal.  PERRLA.  Moist mucous membranes.  Septum midline. Mallampati 4 airway. Normal size tongue and uvula    Neck: Neck supple. Trachea midline.  Normal thyroid.   LUNGS: Non-labored breathing. CTAB.  No wheezing.  HEART: regular rate and rhythm. No murmur. Normal s1/s2.  EXTREMITIES: 1+ left greater than right edema, which he reports is baseline. No cyanosis or clubbing. Normal gait.    Diagnostic Data:  NPSG on 10/9/11 and a weight of 232 pounds showed mild obstructive sleep apnea with AHI of 11.8 and RDI of 18.4.  Optimal control with recommended CPAP at 10 cm H2O.    Compliance download from 9/12/19-10/11/19 showed 96.7% compliant with average use of 8 hours and 45 minutes.  Residual AHI 1.1 on CPAP at 10 cm H2O with average large air leak of  56 seconds per day.        Assessment and Plan:       ICD-10-CM ICD-9-CM   1. KARLA on CPAP G47.33 327.23    Z99.89 V46.8   2. Essential hypertension I10 401.9   3. " Hypersomnia with sleep apnea G47.10 780.53    G47.30    4. Obesity (BMI 30-39.9) E66.9 278.00       Recommendations:     Patient  Has KARLA that is well controlled on the CPAP, no pressure adjustment needed, he does have clinical and subjective benefits from the CPAP, it is effectve in controlling his KARLA and it is recommended to be cotninued  Continue to work on the weight loss  Follow up in 1 year  He is supposed to have back surgery and was encouraged to bring the CPAP to the hospital to be used post OP      No orders of the defined types were placed in this encounter.    No orders of the defined types were placed in this encounter.    Return in about 1 year (around 10/14/2020).    Anabell Duarte MD   [facility]   10/14/19  10:20 AM    Dictated utilizing Dragon dictation

## 2019-10-15 ENCOUNTER — PREP FOR SURGERY (OUTPATIENT)
Dept: OTHER | Facility: HOSPITAL | Age: 69
End: 2019-10-15

## 2019-10-15 ENCOUNTER — OFFICE VISIT (OUTPATIENT)
Dept: NEUROSURGERY | Facility: CLINIC | Age: 69
End: 2019-10-15

## 2019-10-15 VITALS — DIASTOLIC BLOOD PRESSURE: 74 MMHG | HEART RATE: 67 BPM | SYSTOLIC BLOOD PRESSURE: 124 MMHG

## 2019-10-15 DIAGNOSIS — M54.16 LUMBAR RADICULOPATHY: Primary | ICD-10-CM

## 2019-10-15 PROCEDURE — 99213 OFFICE O/P EST LOW 20 MIN: CPT | Performed by: NEUROLOGICAL SURGERY

## 2019-10-15 RX ORDER — CEFAZOLIN SODIUM 2 G/100ML
2 INJECTION, SOLUTION INTRAVENOUS ONCE
Status: CANCELLED | OUTPATIENT
Start: 2019-10-18 | End: 2019-10-15

## 2019-10-16 ENCOUNTER — APPOINTMENT (OUTPATIENT)
Dept: PREADMISSION TESTING | Facility: HOSPITAL | Age: 69
End: 2019-10-16

## 2019-10-16 ENCOUNTER — TELEPHONE (OUTPATIENT)
Dept: NEUROSURGERY | Facility: CLINIC | Age: 69
End: 2019-10-16

## 2019-10-16 VITALS
DIASTOLIC BLOOD PRESSURE: 83 MMHG | BODY MASS INDEX: 31.83 KG/M2 | SYSTOLIC BLOOD PRESSURE: 131 MMHG | TEMPERATURE: 97.6 F | HEIGHT: 72 IN | HEART RATE: 62 BPM | RESPIRATION RATE: 20 BRPM | OXYGEN SATURATION: 98 % | WEIGHT: 235 LBS

## 2019-10-16 LAB
ANION GAP SERPL CALCULATED.3IONS-SCNC: 11 MMOL/L (ref 5–15)
BUN BLD-MCNC: 14 MG/DL (ref 8–23)
BUN/CREAT SERPL: 11.3 (ref 7–25)
CALCIUM SPEC-SCNC: 9 MG/DL (ref 8.6–10.5)
CHLORIDE SERPL-SCNC: 100 MMOL/L (ref 98–107)
CO2 SERPL-SCNC: 27 MMOL/L (ref 22–29)
CREAT BLD-MCNC: 1.24 MG/DL (ref 0.76–1.27)
DEPRECATED RDW RBC AUTO: 43.1 FL (ref 37–54)
ERYTHROCYTE [DISTWIDTH] IN BLOOD BY AUTOMATED COUNT: 12.4 % (ref 12.3–15.4)
GFR SERPL CREATININE-BSD FRML MDRD: 58 ML/MIN/1.73
GLUCOSE BLD-MCNC: 95 MG/DL (ref 65–99)
HCT VFR BLD AUTO: 41.6 % (ref 37.5–51)
HGB BLD-MCNC: 14 G/DL (ref 13–17.7)
MCH RBC QN AUTO: 31.6 PG (ref 26.6–33)
MCHC RBC AUTO-ENTMCNC: 33.7 G/DL (ref 31.5–35.7)
MCV RBC AUTO: 93.9 FL (ref 79–97)
PLATELET # BLD AUTO: 206 10*3/MM3 (ref 140–450)
PMV BLD AUTO: 10.3 FL (ref 6–12)
POTASSIUM BLD-SCNC: 3.9 MMOL/L (ref 3.5–5.2)
RBC # BLD AUTO: 4.43 10*6/MM3 (ref 4.14–5.8)
SODIUM BLD-SCNC: 138 MMOL/L (ref 136–145)
WBC NRBC COR # BLD: 7.17 10*3/MM3 (ref 3.4–10.8)

## 2019-10-16 PROCEDURE — 85027 COMPLETE CBC AUTOMATED: CPT | Performed by: NEUROLOGICAL SURGERY

## 2019-10-16 PROCEDURE — 93005 ELECTROCARDIOGRAM TRACING: CPT

## 2019-10-16 PROCEDURE — 36415 COLL VENOUS BLD VENIPUNCTURE: CPT

## 2019-10-16 PROCEDURE — 93010 ELECTROCARDIOGRAM REPORT: CPT | Performed by: INTERNAL MEDICINE

## 2019-10-16 PROCEDURE — 80048 BASIC METABOLIC PNL TOTAL CA: CPT | Performed by: NEUROLOGICAL SURGERY

## 2019-10-16 RX ORDER — IRBESARTAN 75 MG/1
75 TABLET ORAL EVERY MORNING
COMMUNITY
End: 2019-11-11

## 2019-10-16 NOTE — TELEPHONE ENCOUNTER
Patient called and wanted to know if he would be able to discontinue the Lyrica after surgery.  Dr. Lcokett has retired and the APRN can't prescribed the medication.

## 2019-10-16 NOTE — DISCHARGE INSTRUCTIONS
Take the following medications the morning of surgery with a small sip of water: nexium, irbesartan  Arrival time 6:00 am      General Instructions:  • Do not eat solid food after midnight the night before surgery.  • You may drink clear liquids day of surgery but must stop at least one hour before your hospital arrival time.  • It is beneficial for you to have a clear drink that contains carbohydrates the day of surgery.  We suggest a 12 to 20 ounce bottle of Gatorade or Powerade for non-diabetic patients or a 12 to 20 ounce bottle of G2 or Powerade Zero for diabetic patients. (Pediatric patients, are not advised to drink a 12 to 20 ounce carbohydrate drink)    Clear liquids are liquids you can see through.  Nothing red in color.     Plain water                               Sports drinks  Sodas                                   Gelatin (Jell-O)  Fruit juices without pulp such as white grape juice and apple juice  Popsicles that contain no fruit or yogurt  Tea or coffee (no cream or milk added)  Gatorade / Powerade  G2 / Powerade Zero    • Infants may have breast milk up to four hours before surgery.  • Infants drinking formula may drink formula up to six hours before surgery.   • Patients who avoid smoking, chewing tobacco and alcohol for 4 weeks prior to surgery have a reduced risk of post-operative complications.  Quit smoking as many days before surgery as you can.  • Do not smoke, use chewing tobacco or drink alcohol the day of surgery.   • If applicable bring your C-PAP/ BI-PAP machine.  • Bring any papers given to you in the doctor’s office.  • Wear clean comfortable clothes.  • Do not wear contact lenses, false eyelashes or make-up.  Bring a case for your glasses.   • Bring crutches or walker if applicable.  • Remove all piercings.  Leave jewelry and any other valuables at home.  • Hair extensions with metal clips must be removed prior to surgery.  • The Pre-Admission Testing nurse will instruct you to  bring medications if unable to obtain an accurate list in Pre-Admission Testing.        If you were given a blood bank ID arm band remember to bring it with you the day of surgery.    Preventing a Surgical Site Infection:  • For 2 to 3 days before surgery, avoid shaving with a razor because the razor can irritate skin and make it easier to develop an infection.    • Any areas of open skin can increase the risk of a post-operative wound infection by allowing bacteria to enter and travel throughout the body.  Notify your surgeon if you have any skin wounds / rashes even if it is not near the expected surgical site.  The area will need assessed to determine if surgery should be delayed until it is healed.  • The night prior to surgery sleep in a clean bed with clean clothing.  Do not allow pets to sleep with you.  • Shower on the morning of surgery using a fresh bar of anti-bacterial soap (such as Dial) and clean washcloth.  Dry with a clean towel and dress in clean clothing.  • Ask your surgeon if you will be receiving antibiotics prior to surgery.  • Make sure you, your family, and all healthcare providers clean their hands with soap and water or an alcohol based hand  before caring for you or your wound.    Day of surgery:  Your arrival time is approximately two hours before your scheduled surgery time.  Upon arrival, a Pre-op nurse and Anesthesiologist will review your health history, obtain vital signs, and answer questions you may have.  The only belongings needed at this time will be a list of your home medications and if applicable your C-PAP/BI-PAP machine.  If you are staying overnight your family can leave the rest of your belongings in the car and bring them to your room later.  A Pre-op nurse will start an IV and you may receive medication in preparation for surgery, including something to help you relax.  Your family will be able to see you in the Pre-op area.  Two visitors at a time will be  allowed in the Pre-op room.  While you are in surgery your family should notify the waiting room  if they leave the waiting room area and provide a contact phone number.    Please be aware that surgery does come with discomfort.  We want to make every effort to control your discomfort so please discuss any uncontrolled symptoms with your nurse.   Your doctor will most likely have prescribed pain medications.      If you are going home after surgery you will receive individualized written care instructions before being discharged.  A responsible adult must drive you to and from the hospital on the day of your surgery and stay with you for 24 hours.    If you are staying overnight following surgery, you will be transported to your hospital room following the recovery period.  [facility] has all private rooms.    You have received a list of surgical assistants for your reference.  If you have any questions please call Pre-Admission Testing at [phone].  Deductibles and co-payments are collected on the day of service. Please be prepared to pay the required co-pay, deductible or deposit on the day of service as defined by your plan.    2% CHLORAHEXIDINE GLUCONATE* CLOTH  Preparing or “prepping” skin before surgery can reduce the risk of infection at the surgical site. To make the process easier, [facility] has chosen disposable cloths moistened with a rinse-free, 2% Chlorhexidine Gluconate (CHG) antiseptic solution. The steps below outline the prepping process and should be carefully followed.        Use the prep cloth on the area that is circled in the diagram             Directions Night before Surgery  1) Shower using a fresh bar of anti-bacterial soap (such as Dial) and clean washcloth.  Use a clean towel to completely dry your skin.  2) Do not use any lotions, oils or creams on your skin.  3) Open the package and remove 1 cloth, wipe your skin for 30 seconds in a circular  motion.  Allow to dry for 3 minutes.  4) Repeat #3 with second cloth.  5) Do not touch your eyes, ears, or mouth with the prep cloth.  6) Allow the wet prep solution to air dry.  7) Discard the prep cloth and wash your hands with soap and water.   8) Dress in clean bed clothes and sleep on fresh clean bed sheets.   9) You may experience some temporary itching after the prep.    Directions Day of Surgery  1) Repeat steps 1,2,3,4,5,6,7, and 9.   2) Dress in clean clothes before coming to the hospital.    BACTROBAN NASAL OINTMENT  There are many germs normally in your nose. Bactroban is an ointment that will help reduce these germs. Please follow these instructions for Bactroban use:      ____The day before surgery in the morning  Date________    ____The day before surgery in the evening              Date________    ____The day of surgery in the morning    Date________    **Squirt ½ package of Bactroban Ointment onto a cotton applicator and apply to inside of 1st nostril.  Squirt the remaining Bactroban and apply to the inside of the other nostril.    PERIDEX- ORAL:  Use only if your surgeon has ordered  Use the night before and morning of surgery - Swish, gargle, and spit - do not swallow.

## 2019-10-18 ENCOUNTER — APPOINTMENT (OUTPATIENT)
Dept: GENERAL RADIOLOGY | Facility: HOSPITAL | Age: 69
End: 2019-10-18

## 2019-10-18 ENCOUNTER — HOSPITAL ENCOUNTER (OUTPATIENT)
Facility: HOSPITAL | Age: 69
Discharge: HOME | End: 2019-10-19
Attending: NEUROLOGICAL SURGERY | Admitting: NEUROLOGICAL SURGERY

## 2019-10-18 ENCOUNTER — ANESTHESIA EVENT (OUTPATIENT)
Dept: PERIOP | Facility: HOSPITAL | Age: 69
End: 2019-10-18

## 2019-10-18 ENCOUNTER — ANESTHESIA (OUTPATIENT)
Dept: PERIOP | Facility: HOSPITAL | Age: 69
End: 2019-10-18

## 2019-10-18 DIAGNOSIS — M54.16 LUMBAR RADICULOPATHY: ICD-10-CM

## 2019-10-18 PROCEDURE — 25010000003 CEFAZOLIN IN DEXTROSE 2-4 GM/100ML-% SOLUTION: Performed by: NEUROLOGICAL SURGERY

## 2019-10-18 PROCEDURE — 25010000002 FENTANYL CITRATE (PF) 100 MCG/2ML SOLUTION: Performed by: REGISTERED NURSE

## 2019-10-18 PROCEDURE — 25810000003 SODIUM CHLORIDE 0.9 % WITH KCL 20 MEQ 20-0.9 MEQ/L-% SOLUTION: Performed by: NEUROLOGICAL SURGERY

## 2019-10-18 PROCEDURE — A9270 NON-COVERED ITEM OR SERVICE: HCPCS | Performed by: NEUROLOGICAL SURGERY

## 2019-10-18 PROCEDURE — 94799 UNLISTED PULMONARY SVC/PX: CPT

## 2019-10-18 PROCEDURE — 25010000002 PROMETHAZINE PER 50 MG: Performed by: REGISTERED NURSE

## 2019-10-18 PROCEDURE — 63710000001 PREGABALIN 100 MG CAPSULE: Performed by: NEUROLOGICAL SURGERY

## 2019-10-18 PROCEDURE — 63047 LAM FACETEC & FORAMOT LUMBAR: CPT | Performed by: NEUROLOGICAL SURGERY

## 2019-10-18 PROCEDURE — 76000 FLUOROSCOPY <1 HR PHYS/QHP: CPT

## 2019-10-18 PROCEDURE — 25010000002 KETOROLAC TROMETHAMINE PER 15 MG: Performed by: REGISTERED NURSE

## 2019-10-18 PROCEDURE — 25010000002 NEOSTIGMINE PER 0.5 MG: Performed by: REGISTERED NURSE

## 2019-10-18 PROCEDURE — 25010000002 PROPOFOL 10 MG/ML EMULSION: Performed by: REGISTERED NURSE

## 2019-10-18 PROCEDURE — 63710000001 OXYCODONE-ACETAMINOPHEN 5-325 MG TABLET: Performed by: NURSE PRACTITIONER

## 2019-10-18 PROCEDURE — 25010000002 SUCCINYLCHOLINE PER 20 MG: Performed by: REGISTERED NURSE

## 2019-10-18 PROCEDURE — A9270 NON-COVERED ITEM OR SERVICE: HCPCS | Performed by: NURSE PRACTITIONER

## 2019-10-18 PROCEDURE — 25010000002 ONDANSETRON PER 1 MG: Performed by: REGISTERED NURSE

## 2019-10-18 PROCEDURE — 72100 X-RAY EXAM L-S SPINE 2/3 VWS: CPT

## 2019-10-18 PROCEDURE — 25010000002 MIDAZOLAM PER 1 MG: Performed by: ANESTHESIOLOGY

## 2019-10-18 PROCEDURE — 63710000001 FINASTERIDE 5 MG TABLET: Performed by: NEUROLOGICAL SURGERY

## 2019-10-18 PROCEDURE — 63710000001 LEVOTHYROXINE 100 MCG TABLET: Performed by: NEUROLOGICAL SURGERY

## 2019-10-18 PROCEDURE — 25010000002 DEXAMETHASONE PER 1 MG: Performed by: REGISTERED NURSE

## 2019-10-18 DEVICE — SEALANT FIBRIN TISSEEL FZ 4ML: Type: IMPLANTABLE DEVICE | Site: EPIDURAL SPACE | Status: FUNCTIONAL

## 2019-10-18 DEVICE — SSC BONE WAX
Type: IMPLANTABLE DEVICE | Site: SPINE LUMBAR | Status: FUNCTIONAL
Brand: SSC BONE WAX

## 2019-10-18 RX ORDER — DIPHENHYDRAMINE HCL 25 MG
25 CAPSULE ORAL
Status: DISCONTINUED | OUTPATIENT
Start: 2019-10-18 | End: 2019-10-18 | Stop reason: HOSPADM

## 2019-10-18 RX ORDER — FLUMAZENIL 0.1 MG/ML
0.2 INJECTION INTRAVENOUS AS NEEDED
Status: DISCONTINUED | OUTPATIENT
Start: 2019-10-18 | End: 2019-10-18 | Stop reason: HOSPADM

## 2019-10-18 RX ORDER — PROMETHAZINE HYDROCHLORIDE 25 MG/ML
12.5 INJECTION, SOLUTION INTRAMUSCULAR; INTRAVENOUS ONCE AS NEEDED
Status: DISCONTINUED | OUTPATIENT
Start: 2019-10-18 | End: 2019-10-18 | Stop reason: HOSPADM

## 2019-10-18 RX ORDER — HYDRALAZINE HYDROCHLORIDE 20 MG/ML
5 INJECTION INTRAMUSCULAR; INTRAVENOUS
Status: DISCONTINUED | OUTPATIENT
Start: 2019-10-18 | End: 2019-10-18 | Stop reason: HOSPADM

## 2019-10-18 RX ORDER — ONDANSETRON 2 MG/ML
4 INJECTION INTRAMUSCULAR; INTRAVENOUS EVERY 6 HOURS PRN
Status: DISCONTINUED | OUTPATIENT
Start: 2019-10-18 | End: 2019-10-19 | Stop reason: HOSPADM

## 2019-10-18 RX ORDER — HYDROCODONE BITARTRATE AND ACETAMINOPHEN 7.5; 325 MG/1; MG/1
1 TABLET ORAL ONCE AS NEEDED
Status: DISCONTINUED | OUTPATIENT
Start: 2019-10-18 | End: 2019-10-18 | Stop reason: HOSPADM

## 2019-10-18 RX ORDER — CEFAZOLIN SODIUM 2 G/100ML
2 INJECTION, SOLUTION INTRAVENOUS EVERY 8 HOURS
Status: COMPLETED | OUTPATIENT
Start: 2019-10-18 | End: 2019-10-19

## 2019-10-18 RX ORDER — PROMETHAZINE HYDROCHLORIDE 25 MG/1
25 TABLET ORAL ONCE AS NEEDED
Status: DISCONTINUED | OUTPATIENT
Start: 2019-10-18 | End: 2019-10-18 | Stop reason: HOSPADM

## 2019-10-18 RX ORDER — LABETALOL HYDROCHLORIDE 5 MG/ML
5 INJECTION, SOLUTION INTRAVENOUS
Status: DISCONTINUED | OUTPATIENT
Start: 2019-10-18 | End: 2019-10-18 | Stop reason: HOSPADM

## 2019-10-18 RX ORDER — ONDANSETRON 2 MG/ML
4 INJECTION INTRAMUSCULAR; INTRAVENOUS ONCE AS NEEDED
Status: DISCONTINUED | OUTPATIENT
Start: 2019-10-18 | End: 2019-10-18 | Stop reason: HOSPADM

## 2019-10-18 RX ORDER — NALOXONE HCL 0.4 MG/ML
0.4 VIAL (ML) INJECTION
Status: DISCONTINUED | OUTPATIENT
Start: 2019-10-18 | End: 2019-10-19 | Stop reason: HOSPADM

## 2019-10-18 RX ORDER — OXYCODONE HYDROCHLORIDE AND ACETAMINOPHEN 5; 325 MG/1; MG/1
1 TABLET ORAL EVERY 6 HOURS PRN
Status: DISCONTINUED | OUTPATIENT
Start: 2019-10-18 | End: 2019-10-19 | Stop reason: HOSPADM

## 2019-10-18 RX ORDER — KETOROLAC TROMETHAMINE 30 MG/ML
INJECTION, SOLUTION INTRAMUSCULAR; INTRAVENOUS AS NEEDED
Status: DISCONTINUED | OUTPATIENT
Start: 2019-10-18 | End: 2019-10-18 | Stop reason: SURG

## 2019-10-18 RX ORDER — PROMETHAZINE HYDROCHLORIDE 25 MG/1
25 SUPPOSITORY RECTAL ONCE AS NEEDED
Status: DISCONTINUED | OUTPATIENT
Start: 2019-10-18 | End: 2019-10-18 | Stop reason: HOSPADM

## 2019-10-18 RX ORDER — ACETAMINOPHEN 325 MG/1
650 TABLET ORAL ONCE AS NEEDED
Status: DISCONTINUED | OUTPATIENT
Start: 2019-10-18 | End: 2019-10-18 | Stop reason: HOSPADM

## 2019-10-18 RX ORDER — SUCCINYLCHOLINE CHLORIDE 20 MG/ML
INJECTION INTRAMUSCULAR; INTRAVENOUS AS NEEDED
Status: DISCONTINUED | OUTPATIENT
Start: 2019-10-18 | End: 2019-10-18 | Stop reason: SURG

## 2019-10-18 RX ORDER — EPHEDRINE SULFATE 50 MG/ML
INJECTION, SOLUTION INTRAVENOUS AS NEEDED
Status: DISCONTINUED | OUTPATIENT
Start: 2019-10-18 | End: 2019-10-18 | Stop reason: SURG

## 2019-10-18 RX ORDER — SODIUM CHLORIDE 9 MG/ML
INJECTION, SOLUTION INTRAVENOUS AS NEEDED
Status: DISCONTINUED | OUTPATIENT
Start: 2019-10-18 | End: 2019-10-18 | Stop reason: HOSPADM

## 2019-10-18 RX ORDER — FENTANYL CITRATE 50 UG/ML
50 INJECTION, SOLUTION INTRAMUSCULAR; INTRAVENOUS
Status: DISCONTINUED | OUTPATIENT
Start: 2019-10-18 | End: 2019-10-18 | Stop reason: HOSPADM

## 2019-10-18 RX ORDER — ONDANSETRON 2 MG/ML
INJECTION INTRAMUSCULAR; INTRAVENOUS AS NEEDED
Status: DISCONTINUED | OUTPATIENT
Start: 2019-10-18 | End: 2019-10-18 | Stop reason: SURG

## 2019-10-18 RX ORDER — GLYCOPYRROLATE 0.2 MG/ML
INJECTION INTRAMUSCULAR; INTRAVENOUS AS NEEDED
Status: DISCONTINUED | OUTPATIENT
Start: 2019-10-18 | End: 2019-10-18 | Stop reason: SURG

## 2019-10-18 RX ORDER — SODIUM CHLORIDE 0.9 % (FLUSH) 0.9 %
3-10 SYRINGE (ML) INJECTION AS NEEDED
Status: DISCONTINUED | OUTPATIENT
Start: 2019-10-18 | End: 2019-10-18 | Stop reason: HOSPADM

## 2019-10-18 RX ORDER — MIDAZOLAM HYDROCHLORIDE 1 MG/ML
1 INJECTION INTRAMUSCULAR; INTRAVENOUS
Status: DISCONTINUED | OUTPATIENT
Start: 2019-10-18 | End: 2019-10-18 | Stop reason: HOSPADM

## 2019-10-18 RX ORDER — PROMETHAZINE HYDROCHLORIDE 25 MG/ML
6.25 INJECTION, SOLUTION INTRAMUSCULAR; INTRAVENOUS
Status: DISCONTINUED | OUTPATIENT
Start: 2019-10-18 | End: 2019-10-18 | Stop reason: HOSPADM

## 2019-10-18 RX ORDER — SODIUM CHLORIDE AND POTASSIUM CHLORIDE 150; 900 MG/100ML; MG/100ML
100 INJECTION, SOLUTION INTRAVENOUS CONTINUOUS
Status: DISCONTINUED | OUTPATIENT
Start: 2019-10-18 | End: 2019-10-19 | Stop reason: HOSPADM

## 2019-10-18 RX ORDER — DEXAMETHASONE SODIUM PHOSPHATE 10 MG/ML
INJECTION INTRAMUSCULAR; INTRAVENOUS AS NEEDED
Status: DISCONTINUED | OUTPATIENT
Start: 2019-10-18 | End: 2019-10-18 | Stop reason: SURG

## 2019-10-18 RX ORDER — CEFAZOLIN SODIUM 2 G/100ML
2 INJECTION, SOLUTION INTRAVENOUS ONCE
Status: COMPLETED | OUTPATIENT
Start: 2019-10-18 | End: 2019-10-18

## 2019-10-18 RX ORDER — SODIUM CHLORIDE 0.9 % (FLUSH) 0.9 %
10 SYRINGE (ML) INJECTION AS NEEDED
Status: DISCONTINUED | OUTPATIENT
Start: 2019-10-18 | End: 2019-10-19 | Stop reason: HOSPADM

## 2019-10-18 RX ORDER — LEVOTHYROXINE SODIUM 0.1 MG/1
100 TABLET ORAL EVERY MORNING
Status: DISCONTINUED | OUTPATIENT
Start: 2019-10-18 | End: 2019-10-19 | Stop reason: HOSPADM

## 2019-10-18 RX ORDER — LIDOCAINE HYDROCHLORIDE 20 MG/ML
INJECTION, SOLUTION INFILTRATION; PERINEURAL AS NEEDED
Status: DISCONTINUED | OUTPATIENT
Start: 2019-10-18 | End: 2019-10-18 | Stop reason: SURG

## 2019-10-18 RX ORDER — SODIUM CHLORIDE 0.9 % (FLUSH) 0.9 %
3 SYRINGE (ML) INJECTION EVERY 12 HOURS SCHEDULED
Status: DISCONTINUED | OUTPATIENT
Start: 2019-10-18 | End: 2019-10-19 | Stop reason: HOSPADM

## 2019-10-18 RX ORDER — HYDROCODONE BITARTRATE AND ACETAMINOPHEN 5; 325 MG/1; MG/1
1 TABLET ORAL EVERY 4 HOURS PRN
Status: DISCONTINUED | OUTPATIENT
Start: 2019-10-18 | End: 2019-10-18

## 2019-10-18 RX ORDER — DIPHENHYDRAMINE HYDROCHLORIDE 50 MG/ML
12.5 INJECTION INTRAMUSCULAR; INTRAVENOUS
Status: DISCONTINUED | OUTPATIENT
Start: 2019-10-18 | End: 2019-10-18 | Stop reason: HOSPADM

## 2019-10-18 RX ORDER — HYDROMORPHONE HYDROCHLORIDE 1 MG/ML
0.5 INJECTION, SOLUTION INTRAMUSCULAR; INTRAVENOUS; SUBCUTANEOUS
Status: DISCONTINUED | OUTPATIENT
Start: 2019-10-18 | End: 2019-10-18 | Stop reason: HOSPADM

## 2019-10-18 RX ORDER — CYCLOBENZAPRINE HCL 10 MG
10 TABLET ORAL 3 TIMES DAILY PRN
Status: DISCONTINUED | OUTPATIENT
Start: 2019-10-18 | End: 2019-10-19 | Stop reason: HOSPADM

## 2019-10-18 RX ORDER — MIDAZOLAM HYDROCHLORIDE 1 MG/ML
2 INJECTION INTRAMUSCULAR; INTRAVENOUS
Status: DISCONTINUED | OUTPATIENT
Start: 2019-10-18 | End: 2019-10-18 | Stop reason: HOSPADM

## 2019-10-18 RX ORDER — FINASTERIDE 5 MG/1
5 TABLET, FILM COATED ORAL EVERY MORNING
Status: DISCONTINUED | OUTPATIENT
Start: 2019-10-18 | End: 2019-10-19 | Stop reason: HOSPADM

## 2019-10-18 RX ORDER — MORPHINE SULFATE 2 MG/ML
2 INJECTION, SOLUTION INTRAMUSCULAR; INTRAVENOUS EVERY 4 HOURS PRN
Status: DISCONTINUED | OUTPATIENT
Start: 2019-10-18 | End: 2019-10-19 | Stop reason: HOSPADM

## 2019-10-18 RX ORDER — ROCURONIUM BROMIDE 10 MG/ML
INJECTION, SOLUTION INTRAVENOUS AS NEEDED
Status: DISCONTINUED | OUTPATIENT
Start: 2019-10-18 | End: 2019-10-18 | Stop reason: SURG

## 2019-10-18 RX ORDER — FAMOTIDINE 10 MG/ML
20 INJECTION, SOLUTION INTRAVENOUS ONCE
Status: COMPLETED | OUTPATIENT
Start: 2019-10-18 | End: 2019-10-18

## 2019-10-18 RX ORDER — ONDANSETRON 4 MG/1
4 TABLET, FILM COATED ORAL EVERY 6 HOURS PRN
Status: DISCONTINUED | OUTPATIENT
Start: 2019-10-18 | End: 2019-10-19 | Stop reason: HOSPADM

## 2019-10-18 RX ORDER — PROMETHAZINE HYDROCHLORIDE 25 MG/1
25 TABLET ORAL EVERY 6 HOURS PRN
Status: DISCONTINUED | OUTPATIENT
Start: 2019-10-18 | End: 2019-10-19 | Stop reason: HOSPADM

## 2019-10-18 RX ORDER — NALOXONE HCL 0.4 MG/ML
0.2 VIAL (ML) INJECTION AS NEEDED
Status: DISCONTINUED | OUTPATIENT
Start: 2019-10-18 | End: 2019-10-18 | Stop reason: HOSPADM

## 2019-10-18 RX ORDER — SODIUM CHLORIDE, SODIUM LACTATE, POTASSIUM CHLORIDE, CALCIUM CHLORIDE 600; 310; 30; 20 MG/100ML; MG/100ML; MG/100ML; MG/100ML
9 INJECTION, SOLUTION INTRAVENOUS CONTINUOUS
Status: DISCONTINUED | OUTPATIENT
Start: 2019-10-18 | End: 2019-10-18

## 2019-10-18 RX ORDER — LOSARTAN POTASSIUM 25 MG/1
25 TABLET ORAL
Status: DISCONTINUED | OUTPATIENT
Start: 2019-10-19 | End: 2019-10-19 | Stop reason: HOSPADM

## 2019-10-18 RX ORDER — ASPIRIN 81 MG/1
81 TABLET ORAL DAILY
Status: DISCONTINUED | OUTPATIENT
Start: 2019-10-18 | End: 2019-10-19 | Stop reason: HOSPADM

## 2019-10-18 RX ORDER — PROPOFOL 10 MG/ML
VIAL (ML) INTRAVENOUS AS NEEDED
Status: DISCONTINUED | OUTPATIENT
Start: 2019-10-18 | End: 2019-10-18 | Stop reason: SURG

## 2019-10-18 RX ORDER — SODIUM CHLORIDE 0.9 % (FLUSH) 0.9 %
3 SYRINGE (ML) INJECTION EVERY 12 HOURS SCHEDULED
Status: DISCONTINUED | OUTPATIENT
Start: 2019-10-18 | End: 2019-10-18 | Stop reason: HOSPADM

## 2019-10-18 RX ORDER — OXYCODONE AND ACETAMINOPHEN 7.5; 325 MG/1; MG/1
1 TABLET ORAL ONCE AS NEEDED
Status: DISCONTINUED | OUTPATIENT
Start: 2019-10-18 | End: 2019-10-18 | Stop reason: HOSPADM

## 2019-10-18 RX ORDER — EPHEDRINE SULFATE 50 MG/ML
5 INJECTION, SOLUTION INTRAVENOUS ONCE AS NEEDED
Status: DISCONTINUED | OUTPATIENT
Start: 2019-10-18 | End: 2019-10-18 | Stop reason: HOSPADM

## 2019-10-18 RX ORDER — CYCLOBENZAPRINE HCL 10 MG
10 TABLET ORAL 3 TIMES DAILY
Status: DISCONTINUED | OUTPATIENT
Start: 2019-10-18 | End: 2019-10-18

## 2019-10-18 RX ORDER — PREGABALIN 100 MG/1
100 CAPSULE ORAL 3 TIMES DAILY
Status: DISCONTINUED | OUTPATIENT
Start: 2019-10-18 | End: 2019-10-19 | Stop reason: HOSPADM

## 2019-10-18 RX ORDER — FENTANYL CITRATE 50 UG/ML
INJECTION, SOLUTION INTRAMUSCULAR; INTRAVENOUS AS NEEDED
Status: DISCONTINUED | OUTPATIENT
Start: 2019-10-18 | End: 2019-10-18 | Stop reason: SURG

## 2019-10-18 RX ADMIN — FENTANYL CITRATE 100 MCG: 50 INJECTION INTRAMUSCULAR; INTRAVENOUS at 08:23

## 2019-10-18 RX ADMIN — LIDOCAINE HYDROCHLORIDE 80 MG: 20 INJECTION, SOLUTION INFILTRATION; PERINEURAL at 08:23

## 2019-10-18 RX ADMIN — PREGABALIN 100 MG: 100 CAPSULE ORAL at 16:12

## 2019-10-18 RX ADMIN — FENTANYL CITRATE 50 MCG: 50 INJECTION INTRAMUSCULAR; INTRAVENOUS at 09:01

## 2019-10-18 RX ADMIN — CEFAZOLIN SODIUM 1 G: 2 INJECTION, SOLUTION INTRAVENOUS at 09:24

## 2019-10-18 RX ADMIN — FAMOTIDINE 20 MG: 10 INJECTION INTRAVENOUS at 07:10

## 2019-10-18 RX ADMIN — POTASSIUM CHLORIDE AND SODIUM CHLORIDE 100 ML/HR: 900; 150 INJECTION, SOLUTION INTRAVENOUS at 12:10

## 2019-10-18 RX ADMIN — FENTANYL CITRATE 50 MCG: 50 INJECTION, SOLUTION INTRAMUSCULAR; INTRAVENOUS at 10:26

## 2019-10-18 RX ADMIN — ROCURONIUM BROMIDE 50 MG: 10 INJECTION INTRAVENOUS at 08:28

## 2019-10-18 RX ADMIN — EPHEDRINE SULFATE 10 MG: 50 INJECTION INTRAMUSCULAR; INTRAVENOUS; SUBCUTANEOUS at 09:14

## 2019-10-18 RX ADMIN — NEOSTIGMINE METHYLSULFATE 4 MG: 1 INJECTION INTRAMUSCULAR; INTRAVENOUS; SUBCUTANEOUS at 09:46

## 2019-10-18 RX ADMIN — SODIUM CHLORIDE, PRESERVATIVE FREE 3 ML: 5 INJECTION INTRAVENOUS at 14:05

## 2019-10-18 RX ADMIN — PROMETHAZINE HYDROCHLORIDE 6.25 MG: 25 INJECTION INTRAMUSCULAR; INTRAVENOUS at 10:40

## 2019-10-18 RX ADMIN — FENTANYL CITRATE 50 MCG: 50 INJECTION INTRAMUSCULAR; INTRAVENOUS at 09:46

## 2019-10-18 RX ADMIN — PREGABALIN 100 MG: 100 CAPSULE ORAL at 21:44

## 2019-10-18 RX ADMIN — OXYCODONE AND ACETAMINOPHEN 1 TABLET: 5; 325 TABLET ORAL at 21:44

## 2019-10-18 RX ADMIN — EPHEDRINE SULFATE 10 MG: 50 INJECTION INTRAMUSCULAR; INTRAVENOUS; SUBCUTANEOUS at 08:51

## 2019-10-18 RX ADMIN — FINASTERIDE 5 MG: 5 TABLET, FILM COATED ORAL at 14:04

## 2019-10-18 RX ADMIN — ONDANSETRON 4 MG: 2 INJECTION INTRAMUSCULAR; INTRAVENOUS at 09:25

## 2019-10-18 RX ADMIN — SUCCINYLCHOLINE CHLORIDE 150 MG: 20 INJECTION, SOLUTION INTRAMUSCULAR; INTRAVENOUS; PARENTERAL at 08:23

## 2019-10-18 RX ADMIN — LEVOTHYROXINE SODIUM 100 MCG: 100 TABLET ORAL at 14:04

## 2019-10-18 RX ADMIN — SODIUM CHLORIDE, POTASSIUM CHLORIDE, SODIUM LACTATE AND CALCIUM CHLORIDE 9 ML/HR: 600; 310; 30; 20 INJECTION, SOLUTION INTRAVENOUS at 07:10

## 2019-10-18 RX ADMIN — MIDAZOLAM 1 MG: 1 INJECTION INTRAMUSCULAR; INTRAVENOUS at 07:10

## 2019-10-18 RX ADMIN — PROPOFOL 180 MG: 10 INJECTION, EMULSION INTRAVENOUS at 08:23

## 2019-10-18 RX ADMIN — CEFAZOLIN SODIUM 2 G: 2 INJECTION, SOLUTION INTRAVENOUS at 16:12

## 2019-10-18 RX ADMIN — KETOROLAC TROMETHAMINE 30 MG: 30 INJECTION, SOLUTION INTRAMUSCULAR; INTRAVENOUS at 09:31

## 2019-10-18 RX ADMIN — OXYCODONE AND ACETAMINOPHEN 1 TABLET: 5; 325 TABLET ORAL at 14:47

## 2019-10-18 RX ADMIN — GLYCOPYRROLATE 0.8 MG: 0.2 INJECTION INTRAMUSCULAR; INTRAVENOUS at 09:46

## 2019-10-18 RX ADMIN — DEXAMETHASONE SODIUM PHOSPHATE 10 MG: 10 INJECTION INTRAMUSCULAR; INTRAVENOUS at 08:40

## 2019-10-18 RX ADMIN — POTASSIUM CHLORIDE AND SODIUM CHLORIDE 100 ML/HR: 900; 150 INJECTION, SOLUTION INTRAVENOUS at 23:29

## 2019-10-18 RX ADMIN — CEFAZOLIN SODIUM 2 G: 2 INJECTION, SOLUTION INTRAVENOUS at 08:26

## 2019-10-18 NOTE — BRIEF OP NOTE
LUMBAR LAMINECTOMY DISCECTOMY DECOMPRESSION POSTERIOR 1-2 LEVELS  Progress Note    Marin Cuenca  10/18/2019    Pre-op Diagnosis:   Lumbar radiculopathy [M54.16]       Post-Op Diagnosis Codes:     * Lumbar radiculopathy [M54.16]    Procedure/CPT® Codes:      Procedure(s):  Left L4-5 laminectomy and neural lysis    Surgeon(s):  Wayne Meneses MD    Anesthesia: General    Staff:   Circulator: Lorri Caban RN; Lidia Stockton RN  Radiology Technologist: Mattingly-Epley, Alyna D.  Scrub Person: Lor Taylor  Assistant: Rosemary Banks CSA    Estimated Blood Loss: 50 mL    Urine Voided: 0 mL    Specimens:                None          Drains:      Findings: Severe lateral recess stenosis    Complications: None      Wayne Meneses MD     Date: 10/18/2019  Time: 9:36 AM

## 2019-10-18 NOTE — ANESTHESIA POSTPROCEDURE EVALUATION
Patient: Marin Cuenca    Procedure Summary     Date:  10/18/19 Room / Location:  [facility] OR  / [facility] MAIN OR    Anesthesia Start:  0805 Anesthesia Stop:  1008    Procedure:  Left L4-5 laminectomy and neural lysis (Left Spine Lumbar) Diagnosis:       Lumbar radiculopathy      (Lumbar radiculopathy [M54.16])    Surgeon:  Wayne Meneses MD Provider:  Jordyn Rivera MD    Anesthesia Type:  general ASA Status:  3          Anesthesia Type: general  Last vitals  BP   121/70 (10/18/19 1030)   Temp   36.4 °C (97.6 °F) (10/18/19 1004)   Pulse   84 (10/18/19 1030)   Resp   16 (10/18/19 1030)     SpO2   96 % (10/18/19 1030)     Post Anesthesia Care and Evaluation    Patient location during evaluation: PACU  Patient participation: complete - patient participated  Level of consciousness: awake and alert  Pain management: adequate  Airway patency: patent  Anesthetic complications: No anesthetic complications    Cardiovascular status: acceptable  Respiratory status: acceptable  Hydration status: acceptable    Comments: /70   Pulse 84   Temp 36.4 °C (97.6 °F) (Oral)   Resp 16   Wt 105 kg (231 lb 11.3 oz)   SpO2 96%   BMI 31.42 kg/m²

## 2019-10-18 NOTE — ANESTHESIA PROCEDURE NOTES
Airway  Urgency: elective    Date/Time: 10/18/2019 8:25 AM  Airway not difficult    General Information and Staff    Patient location during procedure: OR  CRNA: Lizet Ivey CRNA    Indications and Patient Condition  Indications for airway management: airway protection    Preoxygenated: yes  MILS maintained throughout  Mask difficulty assessment: 3 - difficult mask (inadequate, unstable or two providers) +/- NMBA    Final Airway Details  Final airway type: endotracheal airway      Successful airway: ETT  Cuffed: yes   Successful intubation technique: direct laryngoscopy  Facilitating devices/methods: cricoid pressure and intubating stylet  Endotracheal tube insertion site: oral  Blade: Agbo  Blade size: 4  ETT size (mm): 7.5  Cormack-Lehane Classification: grade IIb - view of arytenoids or posterior of glottis only  Placement verified by: chest auscultation and capnometry   Measured from: lips  ETT/EBT  to lips (cm): 21  Number of attempts at approach: 1  Assessment: lips, teeth, and gum same as pre-op and atraumatic intubation    Additional Comments  Atraumatic insertion

## 2019-10-18 NOTE — PLAN OF CARE
Problem: Patient Care Overview  Goal: Plan of Care Review  Outcome: Ongoing (interventions implemented as appropriate)   10/18/19 1736   Coping/Psychosocial   Plan of Care Reviewed With patient;spouse   Plan of Care Review   Progress improving   OTHER   Outcome Summary patient admitted to floor from PACU. vital signs stable. LOCX4. no C/O nausea or vomiting. PT assist X1 when ambulating to bathroom with staff. pain well controlled with PRN pain medications. PT tolerating Regular diet. Dressing, clean, dry & intact. possible D/C home tomorrow.        Problem: Fall Risk (Adult)  Goal: Identify Related Risk Factors and Signs and Symptoms  Outcome: Ongoing (interventions implemented as appropriate)   10/18/19 1736   Fall Risk (Adult)   Related Risk Factors (Fall Risk) gait/mobility problems;environment unfamiliar   Signs and Symptoms (Fall Risk) presence of risk factors     Goal: Absence of Fall  Outcome: Ongoing (interventions implemented as appropriate)   10/18/19 1736   Fall Risk (Adult)   Absence of Fall making progress toward outcome

## 2019-10-18 NOTE — ANESTHESIA PREPROCEDURE EVALUATION
Anesthesia Evaluation     no history of anesthetic complications:               Airway   Mallampati: I  TM distance: >3 FB  Neck ROM: full  Dental      Comment: Bridge upper R front    Pulmonary    (+) sleep apnea on CPAP,   (-) asthma, shortness of breath, recent URI, not a smoker  Cardiovascular     (+) hypertension,   (-) dysrhythmias, angina, hyperlipidemia      Neuro/Psych  (-) dizziness/light headedness, syncope  GI/Hepatic/Renal/Endo    (+) obesity,  GERD,      Musculoskeletal     Abdominal    Substance History      OB/GYN          Other                        Anesthesia Plan    ASA 3     general     intravenous induction   Anesthetic plan, all risks, benefits, and alternatives have been provided, discussed and informed consent has been obtained with: patient.

## 2019-10-18 NOTE — OP NOTE
Preoperative diagnosis: Lateral recess stenosis with small disc recurrence left L4-5 lumbar radiculopathy and neurogenic claudication    Postoperative diagnosis: Same    Procedure performed: Left L4-5 laminectomy, foraminotomies, medial facetectomy, neural lysis and discectomy    Surgeon: Wayne Meneses M.D.    Asst.: Rosemary Banks CFA who was instrumental in helping with hemostasis, visualization of neural structures and retraction of neural structures    Estimated blood loss, crystalloid, colloid, blood: Please see anesthesia record    Material to lab:   None    Drains: None    Complications: None    Indications for the procedure: This is a patient with severe pain in his left leg with previous myelography showing severe lateral recess stenosis on the left side.  Because of that and the failure of conservative treatment he elected to proceed with surgery.    Operative summary:  The patient was brought into the operating room and placed under general endotracheal anesthesia using intravenous and inhalational agents.  The patient was then positioned on the operating table in the prone position.  All pressure points were padded including peripheral points of entrapment.  His back was prepped with ChloraPrep and then draped with Ioban, towels, half sheets and a split sheet.    An incision was made in the midline excising the previous scar.  This was carried down to the thoracolumbar fascia which was opened in the left paramedian incision.  The muscles and scar tissue were dissected off of the spinous process and laminar arch of L4 and L5 out to the level of the facet.  A Sarah retractor was used to hold the muscles out of the way and a retraction stitch was placed in the cuff of fascia that was left toward the midline.  5 pounds of weight were used to hold the retractor.  Following this remainder the operation was done under high-power medication in the operating microscope using microsurgical technique  microsurgical instrumentation.  Scar tissue was removed from the supralaminar space until the laminar arch of L4 laminar arch of L5 were exposed.  Also the medial aspect of the facet.  Following this the inferior laminar arch of L4 superior laminar arch of L5 the medial aspect of the facet were removed with the 24Symbols drill.  The respective scar tissue was then mobilized off of the dura and removed out to the level of the medial pedicle of both L4 and L5.  When doing this there was a significantly hypertrophic facet impinging upon the thecal sac and the nerve root and this was removed completely allowing the thecal sac to gently come back out toward the medial pedicles.  I also inspected the disc and there was a small disc herniation which was also removed.  Once this was done both nerve roots were checked to be sure they were completely completely decompressed.  Bleeding was then controlled with bipolar cautery thrombin Gelfoam and FloSeal and then I placed some Tisseel over the thrombin and Gelfoam to reduce scarring.  There was no evidence of an obvious CSF leak.  Both nerves were thoroughly decompressed.  Following this the retractor was removed the paraspinous musculature was injected with 100 cc 1 a some Marcaine 1 5000 epinephrine solution and then the incision was closed in layers dressed and the patient was taken to the recovery room in stable condition.  There were no apparent complications and the sponge, instrument and needle counts were correct at the end of the procedure.

## 2019-10-18 NOTE — PERIOPERATIVE NURSING NOTE
PT STATES PAIN ON LEFT RADIATING DOWN LEFT LEG TO TOES. STATES PAIN AS STABBING AND NUMBNESS WORSE WITH STANDING.

## 2019-10-19 VITALS
SYSTOLIC BLOOD PRESSURE: 120 MMHG | DIASTOLIC BLOOD PRESSURE: 68 MMHG | HEART RATE: 81 BPM | TEMPERATURE: 97.3 F | HEIGHT: 72 IN | RESPIRATION RATE: 18 BRPM | BODY MASS INDEX: 31.35 KG/M2 | WEIGHT: 231.48 LBS | OXYGEN SATURATION: 96 %

## 2019-10-19 LAB
BASOPHILS # BLD AUTO: 0.03 10*3/MM3 (ref 0–0.2)
BASOPHILS NFR BLD AUTO: 0.2 % (ref 0–1.5)
DEPRECATED RDW RBC AUTO: 41.7 FL (ref 37–54)
EOSINOPHIL # BLD AUTO: 0 10*3/MM3 (ref 0–0.4)
EOSINOPHIL NFR BLD AUTO: 0 % (ref 0.3–6.2)
ERYTHROCYTE [DISTWIDTH] IN BLOOD BY AUTOMATED COUNT: 12 % (ref 12.3–15.4)
HCT VFR BLD AUTO: 38.1 % (ref 37.5–51)
HGB BLD-MCNC: 12.8 G/DL (ref 13–17.7)
IMM GRANULOCYTES # BLD AUTO: 0.16 10*3/MM3 (ref 0–0.05)
IMM GRANULOCYTES NFR BLD AUTO: 1 % (ref 0–0.5)
LYMPHOCYTES # BLD AUTO: 0.77 10*3/MM3 (ref 0.7–3.1)
LYMPHOCYTES NFR BLD AUTO: 4.9 % (ref 19.6–45.3)
MCH RBC QN AUTO: 31.6 PG (ref 26.6–33)
MCHC RBC AUTO-ENTMCNC: 33.6 G/DL (ref 31.5–35.7)
MCV RBC AUTO: 94.1 FL (ref 79–97)
MONOCYTES # BLD AUTO: 0.91 10*3/MM3 (ref 0.1–0.9)
MONOCYTES NFR BLD AUTO: 5.7 % (ref 5–12)
NEUTROPHILS # BLD AUTO: 13.98 10*3/MM3 (ref 1.7–7)
NEUTROPHILS NFR BLD AUTO: 88.2 % (ref 42.7–76)
NRBC BLD AUTO-RTO: 0 /100 WBC (ref 0–0.2)
PLATELET # BLD AUTO: 205 10*3/MM3 (ref 140–450)
PMV BLD AUTO: 10.3 FL (ref 6–12)
RBC # BLD AUTO: 4.05 10*6/MM3 (ref 4.14–5.8)
WBC NRBC COR # BLD: 15.85 10*3/MM3 (ref 3.4–10.8)

## 2019-10-19 PROCEDURE — A9270 NON-COVERED ITEM OR SERVICE: HCPCS | Performed by: NEUROLOGICAL SURGERY

## 2019-10-19 PROCEDURE — 63710000001 ONDANSETRON PER 8 MG: Performed by: NEUROLOGICAL SURGERY

## 2019-10-19 PROCEDURE — 25010000003 CEFAZOLIN IN DEXTROSE 2-4 GM/100ML-% SOLUTION: Performed by: NEUROLOGICAL SURGERY

## 2019-10-19 PROCEDURE — 63710000001 PANTOPRAZOLE 40 MG TABLET DELAYED-RELEASE: Performed by: NEUROLOGICAL SURGERY

## 2019-10-19 PROCEDURE — 97110 THERAPEUTIC EXERCISES: CPT

## 2019-10-19 PROCEDURE — 97162 PT EVAL MOD COMPLEX 30 MIN: CPT

## 2019-10-19 PROCEDURE — 63710000001 PREGABALIN 100 MG CAPSULE: Performed by: NEUROLOGICAL SURGERY

## 2019-10-19 PROCEDURE — 63710000001 ASPIRIN 81 MG TABLET DELAYED-RELEASE: Performed by: NEUROLOGICAL SURGERY

## 2019-10-19 PROCEDURE — A9270 NON-COVERED ITEM OR SERVICE: HCPCS | Performed by: NURSE PRACTITIONER

## 2019-10-19 PROCEDURE — 85025 COMPLETE CBC W/AUTO DIFF WBC: CPT | Performed by: NEUROLOGICAL SURGERY

## 2019-10-19 PROCEDURE — 63710000001 LEVOTHYROXINE 100 MCG TABLET: Performed by: NEUROLOGICAL SURGERY

## 2019-10-19 PROCEDURE — 63710000001 FINASTERIDE 5 MG TABLET: Performed by: NEUROLOGICAL SURGERY

## 2019-10-19 PROCEDURE — 63710000001 OXYCODONE-ACETAMINOPHEN 5-325 MG TABLET: Performed by: NURSE PRACTITIONER

## 2019-10-19 RX ORDER — PANTOPRAZOLE SODIUM 40 MG/1
40 TABLET, DELAYED RELEASE ORAL
Status: DISCONTINUED | OUTPATIENT
Start: 2019-10-19 | End: 2019-10-19 | Stop reason: HOSPADM

## 2019-10-19 RX ORDER — ONDANSETRON 4 MG/1
4 TABLET, FILM COATED ORAL EVERY 6 HOURS PRN
Qty: 30 TABLET | Refills: 0 | Status: SHIPPED | OUTPATIENT
Start: 2019-10-19 | End: 2020-09-03

## 2019-10-19 RX ORDER — OXYCODONE HYDROCHLORIDE AND ACETAMINOPHEN 5; 325 MG/1; MG/1
1 TABLET ORAL EVERY 6 HOURS PRN
Qty: 40 TABLET | Refills: 0 | Status: SHIPPED | OUTPATIENT
Start: 2019-10-19 | End: 2019-10-28

## 2019-10-19 RX ORDER — CYCLOBENZAPRINE HCL 10 MG
10 TABLET ORAL 3 TIMES DAILY PRN
Qty: 30 TABLET | Refills: 0 | Status: SHIPPED | OUTPATIENT
Start: 2019-10-19 | End: 2019-11-04 | Stop reason: SDUPTHER

## 2019-10-19 RX ADMIN — LEVOTHYROXINE SODIUM 100 MCG: 100 TABLET ORAL at 06:40

## 2019-10-19 RX ADMIN — ONDANSETRON HYDROCHLORIDE 4 MG: 4 TABLET, FILM COATED ORAL at 07:07

## 2019-10-19 RX ADMIN — PREGABALIN 100 MG: 100 CAPSULE ORAL at 09:33

## 2019-10-19 RX ADMIN — OXYCODONE AND ACETAMINOPHEN 1 TABLET: 5; 325 TABLET ORAL at 07:07

## 2019-10-19 RX ADMIN — FINASTERIDE 5 MG: 5 TABLET, FILM COATED ORAL at 06:40

## 2019-10-19 RX ADMIN — SODIUM CHLORIDE, PRESERVATIVE FREE 3 ML: 5 INJECTION INTRAVENOUS at 09:33

## 2019-10-19 RX ADMIN — ASPIRIN 81 MG: 81 TABLET, COATED ORAL at 09:33

## 2019-10-19 RX ADMIN — PANTOPRAZOLE SODIUM 40 MG: 40 TABLET, DELAYED RELEASE ORAL at 09:33

## 2019-10-19 RX ADMIN — CEFAZOLIN SODIUM 2 G: 2 INJECTION, SOLUTION INTRAVENOUS at 01:47

## 2019-10-19 NOTE — THERAPY EVALUATION
Patient Name: Marin Cuenca  : 1950    MRN: [id number]                              Today's Date: 10/19/2019       Admit Date: 10/18/2019    Visit Dx:     ICD-10-CM ICD-9-CM   1. 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   • Gastroesophageal reflux disease with esophagitis   • Obesity (BMI 30-39.9)   • Hypersomnia with sleep apnea   • KARLA on CPAP   • Diverticulitis of large intestine without bleeding   • Prostate cancer screening   • Hypothyroidism   • Encounter for long-term (current) drug use   • Partial small bowel obstruction (CMS/HCC)   • Acute kidney injury (CMS/HCC)   • Cervical radiculitis     Past Medical History:   Diagnosis Date   • Arthritis    • Ramirez esophagus    • Benign prostatic hypertrophy    • Cataract     saloni   • Ear pain, left     occ   • GERD (gastroesophageal reflux disease)    • History of MRSA infection     2010  blood bhl   • Hypertension    • Hypothyroidism    • KARLA on CPAP    • Osteoporosis      Past Surgical History:   Procedure Laterality Date   • CYSTOSCOPY     • HEMORRHOIDECTOMY     • KNEE SURGERY Left     scoped   • LUMBAR LAMINECTOMY     • NISSEN FUNDOPLICATION     • UPPER GASTROINTESTINAL ENDOSCOPY       General Information     Row Name 10/19/19 1133          PT Evaluation Time/Intention    Document Type  evaluation  -CS     Mode of Treatment  physical therapy  -CS     Row Name 10/19/19 1135          General Information    Patient Profile Reviewed?  yes  -CS     Prior Level of Function  independent:;all household mobility;community mobility  -CS     Existing Precautions/Restrictions  fall  -CS     Row Name 10/19/19 1131           Relationship/Environment    Lives With  spouse  -CS     Row Name 10/19/19 1133          Resource/Environmental Concerns    Current Living Arrangements  home/apartment/condo  -CS     Row Name 10/19/19 1133          Cognitive Assessment/Intervention- PT/OT    Orientation Status (Cognition)  oriented x 3  -CS     Row Name 10/19/19 1133          Safety Issues, Functional Mobility    Impairments Affecting Function (Mobility)  balance  -CS       User Key  (r) = Recorded By, (t) = Taken By, (c) = Cosigned By    Initials Name Provider Type    CS Meng Luna, PT Physical Therapist        Mobility     Row Name 10/19/19 1134          Sit-Stand Transfer    Sit-Stand [place] (Transfers)  conditional independence  -CS     Row Name 10/19/19 1134          Gait/Stairs Assessment/Training    [place] Level (Gait)  contact guard  -CS     Distance in Feet (Gait)  400  -CS     Deviations/Abnormal Patterns (Gait)  gait speed decreased  -CS       User Key  (r) = Recorded By, (t) = Taken By, (c) = Cosigned By    Initials Name Provider Type    Meng Parham, PT Physical Therapist        Obj/Interventions     Row Name 10/19/19 1134          General ROM    GENERAL ROM COMMENTS  WFL  -CS     Row Name 10/19/19 1134          MMT (Manual Muscle Testing)    General MMT Comments  >3/5  -CS     Row Name 10/19/19 1134          Sensory Assessment/Intervention    Sensory General Assessment  -- Tingling in L foot  -CS       User Key  (r) = Recorded By, (t) = Taken By, (c) = Cosigned By    Initials Name Provider Type    Meng Parham, PT Physical Therapist        Goals/Plan     Row Name 10/19/19 1135          Gait Training Goal 1 (PT)    Activity/Assistive Device (Gait Training Goal 1, PT)  gait (walking locomotion)  -CS     [place] Level (Gait Training Goal 1, PT)  independent  -CS     Distance (Gait Goal 1, PT)  400  -CS     Time Frame (Gait Training Goal 1, PT)  1 week  -     Row Name 10/19/19 1135           Stairs Goal 1 (PT)    [place] Level/Cues Needed (Stairs Goal 1, PT)  conditional independence  -CS     Number of Stairs (Stairs Goal 1, PT)  15  -CS     Time Frame (Stairs Goal 1, PT)  1 week  -CS       User Key  (r) = Recorded By, (t) = Taken By, (c) = Cosigned By    Initials Name Provider Type    CS Meng Luna, PT Physical Therapist        Clinical Impression     Row Name 10/19/19 1135          Pain Assessment    Additional Documentation  Pain Scale: FACES Pre/Post-Treatment (Group)  -CS     Row Name 10/19/19 1135          Pain Scale: Numbers Pre/Post-Treatment    Pain Location - Orientation  incisional  -CS     Pain Intervention(s)  Repositioned;Ambulation/increased activity  -CS     Row Name 10/19/19 1135          Pain Scale: FACES Pre/Post-Treatment    Pain: FACES Scale, Pretreatment  2-->hurts little bit  -CS     Pain: FACES Scale, Post-Treatment  2-->hurts little bit  -CS     Row Name 10/19/19 1139          Plan of Care Review    Plan of Care Reviewed With  patient  -CS     Row Name 10/19/19 1135          Physical Therapy Clinical Impression    Criteria for Skilled Interventions Met (PT Clinical Impression)  yes  -CS     Rehab Potential (PT Clinical Summary)  good, to achieve stated therapy goals  -CS     Row Name 10/19/19 1132          Positioning and Restraints    Pre-Treatment Position  in bed  -CS     Post Treatment Position  bed  -CS     In Bed  supine;call light within reach;encouraged to call for assist;with family/caregiver  -CS       User Key  (r) = Recorded By, (t) = Taken By, (c) = Cosigned By    Initials Name Provider Type    Meng Parham, PT Physical Therapist        Outcome Measures     Row Name 10/19/19 1134          How much help from another person do you currently need...    Turning from your back to your side while in flat bed without using bedrails?  4  -CS     Moving from lying on back to sitting on the side of a flat bed without bedrails?  4  -CS     Moving to and from  a bed to a chair (including a wheelchair)?  4  -CS     Standing up from a chair using your arms (e.g., wheelchair, bedside chair)?  4  -CS     Climbing 3-5 steps with a railing?  3  -CS     To walk in hospital room?  3  -CS     AM-PAC 6 Clicks Score (PT)  22  -CS     Row Name 10/19/19 1139          Functional Assessment    Outcome Measure Options  AM-PAC 6 Clicks Basic Mobility (PT)  -CS       User Key  (r) = Recorded By, (t) = Taken By, (c) = Cosigned By    Initials Name Provider Type    Meng Parham, PT Physical Therapist          PT Recommendation and Plan  Planned Therapy Interventions (PT Eval): balance training, stair training, gait training  Outcome Summary/Treatment Plan (PT)  Anticipated Discharge Disposition (PT): home with OP services, home  Plan of Care Reviewed With: patient  Outcome Summary: Pt presents s/p lumbar laminectomy and neural lysis POD 1. This visit he walked 400'CGA mild balance deficits. He says he had some tingling in his L foot in bed laying down but no tingling while he was walking. PLOF is independent, lives with spouse. Plan is d/c home and may benefit from OP PT services for pain control, strengthening, improve balance and function.      Time Calculation:   PT Charges     Row Name 10/19/19 1139             Time Calculation    Start Time  1120  -CS      Stop Time  1132  -CS      Time Calculation (min)  12 min  -CS      PT Received On  10/19/19  -CS      PT - Next Appointment  10/21/19  -      PT Goal Re-Cert Due Date  10/26/19  -CS        User Key  (r) = Recorded By, (t) = Taken By, (c) = Cosigned By    Initials Name Provider Type    Meng Parham, PT Physical Therapist        Therapy Charges for Today     Code Description Service Date Service Provider Modifiers Qty    85144125567 HC PT EVAL MOD COMPLEXITY 2 10/19/2019 Meng Luna, PT GP 1    96690262659 HC PT THER PROC EA 15 MIN 10/19/2019 Meng Luna, PT GP 1          PT G-Codes  Outcome Measure Options:  -PAC 6 Clicks Basic Mobility (PT)  -[facility] 6 Clicks Score (PT): 22    Meng Luna, PT  10/19/2019

## 2019-10-19 NOTE — DISCHARGE SUMMARY
Date of Discharge:  10/19/2019    Discharge Diagnosis: Lumbar radiculopathy    Presenting Problem/History of Present Illness  Active Hospital Problems    Diagnosis  POA   • **Lumbar radiculopathy [M54.16]  Unknown      Resolved Hospital Problems   No resolved problems to display.          Hospital Course  Patient is a 69 y.o. male presented with pain in his back with radiation into his leg on the left side.  He had had previous imaging which showed lateral recess stenosis at L4-5 on the left.  He had had previous surgery at that level.  He underwent a left lumbar laminectomy on the day of admission and is feeling substantially better at the time of discharge.  He is walking without difficulty.    Procedures Performed    Procedure(s):  Left L4-5 laminectomy and neural lysis  -------------------       Consults:   Consults     No orders found for last 30 day(s).          Pertinent Test Results: He is afebrile with a white count of 15.9.    Condition on Discharge: Good    Vital Signs  Temp:  [97.3 °F (36.3 °C)-99.1 °F (37.3 °C)] 97.3 °F (36.3 °C)  Heart Rate:  [] 81  Resp:  [16-18] 18  BP: (118-135)/(65-88) 120/68    Physical Exam:   He has good movement in both lower extremities and his incision looks okay.    Discharge Disposition  Home or Self Care    Discharge Medications     Discharge Medications      New Medications      Instructions Start Date   cyclobenzaprine 10 MG tablet  Commonly known as:  FLEXERIL   10 mg, Oral, 3 Times Daily PRN      ondansetron 4 MG tablet  Commonly known as:  ZOFRAN   4 mg, Oral, Every 6 Hours PRN      oxyCODONE-acetaminophen 5-325 MG per tablet  Commonly known as:  PERCOCET   1 tablet, Oral, Every 6 Hours PRN         Changes to Medications      Instructions Start Date   aspirin 81 MG tablet  What changed:    · when to take this  · additional instructions   81 mg, Oral, Daily      finasteride 5 MG tablet  Commonly known as:  PROSCAR  What changed:  when to take this   5 mg, Oral,  Daily      levothyroxine 100 MCG tablet  Commonly known as:  SYNTHROID, LEVOTHROID  What changed:  when to take this   100 mcg, Oral, Daily         Continue These Medications      Instructions Start Date   Calcium Carb-Cholecalciferol 500-600 MG-UNIT chewable tablet   1 tablet, Oral, 2 Times Daily      esomeprazole 40 MG capsule  Commonly known as:  nexIUM   40 mg, Oral, 2 Times Daily      fish oil 1000 MG capsule capsule   1,000 mg, Oral, 2 Times Daily With Meals, Stopped 10/16/19      irbesartan 75 MG tablet  Commonly known as:  AVAPRO   75 mg, Oral, Every Morning      irbesartan 75 MG tablet  Commonly known as:  AVAPRO   TAKE ONE TABLET BY MOUTH DAILY      meclizine 25 MG tablet  Commonly known as:  ANTIVERT   25 mg, Oral, 3 Times Daily PRN      pregabalin 100 MG capsule  Commonly known as:  LYRICA   100 mg, Oral, 3 Times Daily         Stop These Medications    Chlorhexidine Gluconate 2 % pads     promethazine 25 MG tablet  Commonly known as:  PHENERGAN            Discharge Diet: regular    Activity at Discharge: no heavy lifting    Follow-up Appointments  Future Appointments   Date Time Provider Department Center   11/4/2019 10:30 AM Emily Le APRN [facility] None   12/5/2019 10:45 AM Patel Montero MD [facility] None   5/13/2020  8:30 AM Tory Rivero APRN [id number] None   10/12/2020 10:30 AM Anabell Duarte MD [facility] None         Test Results Pending at Discharge       Wayne Meneses MD  10/19/19  12:05 PM

## 2019-10-19 NOTE — PLAN OF CARE
Problem: Patient Care Overview  Goal: Plan of Care Review  Outcome: Ongoing (interventions implemented as appropriate)   10/19/19 7237   Coping/Psychosocial   Plan of Care Reviewed With patient   OTHER   Outcome Summary Pt presents s/p lumbar laminectomy and neural lysis POD 1. This visit he walked 400'CGA mild balance deficits. He says he had some tingling in his L foot in bed laying down but no tingling while he was walking. PLOF is independent, lives with spouse. Plan is d/c home and may benefit from OP PT services for pain control, strengthening, improve balance and function.

## 2019-10-21 ENCOUNTER — TELEPHONE (OUTPATIENT)
Dept: NEUROSURGERY | Facility: CLINIC | Age: 69
End: 2019-10-21

## 2019-10-21 NOTE — TELEPHONE ENCOUNTER
Pt had surgery on 10/18 by Dr Meneses for lumbar.    Pt says his back feels hard where he did the surgery.  His wife says it looks fine. She says it has a little heavy scab.   Please advise

## 2019-10-21 NOTE — TELEPHONE ENCOUNTER
Called the patient's wife, his incision is not red, swollen, it is dry with no drainage. He was referring to the glue, he wanted her to pull it off too with his other bandages. I told her to leave the grayish blue glue alone. It will peel off as his incision heals. They verbalized understanding.

## 2019-10-23 ENCOUNTER — TELEPHONE (OUTPATIENT)
Dept: NEUROSURGERY | Facility: CLINIC | Age: 69
End: 2019-10-23

## 2019-10-23 NOTE — TELEPHONE ENCOUNTER
How are you feeling? OK    Are you having any pain? Where? Incisional pain    Rate pain from 1-10 - 4    Are you taking the pain RX? yes    Do you think it's helping? yes    Do you feel better than before surgery? Sometimes    Dermabond OK? yes    Is you incision red, swollen or bleeding? None, no fever    Are you having any trouble with nausea or constipation? Constipation, advised to use OTC meds    Were your discharge instructions easy to understand? yes    Any other questions?    Confirm post op appt - yes

## 2019-11-04 ENCOUNTER — HOSPITAL ENCOUNTER (OUTPATIENT)
Dept: CARDIOLOGY | Facility: HOSPITAL | Age: 69
Discharge: HOME | End: 2019-11-04
Admitting: NURSE PRACTITIONER

## 2019-11-04 ENCOUNTER — HOSPITAL ENCOUNTER (EMERGENCY)
Facility: HOSPITAL | Age: 69
Discharge: HOME | End: 2019-11-04
Attending: EMERGENCY MEDICINE | Admitting: EMERGENCY MEDICINE

## 2019-11-04 ENCOUNTER — OFFICE VISIT (OUTPATIENT)
Dept: NEUROSURGERY | Facility: CLINIC | Age: 69
End: 2019-11-04

## 2019-11-04 VITALS
HEART RATE: 120 BPM | WEIGHT: 231 LBS | DIASTOLIC BLOOD PRESSURE: 72 MMHG | SYSTOLIC BLOOD PRESSURE: 120 MMHG | HEIGHT: 72 IN | BODY MASS INDEX: 31.29 KG/M2

## 2019-11-04 VITALS
WEIGHT: 231 LBS | RESPIRATION RATE: 18 BRPM | HEART RATE: 77 BPM | DIASTOLIC BLOOD PRESSURE: 79 MMHG | BODY MASS INDEX: 31.29 KG/M2 | OXYGEN SATURATION: 94 % | SYSTOLIC BLOOD PRESSURE: 124 MMHG | HEIGHT: 72 IN | TEMPERATURE: 98 F

## 2019-11-04 DIAGNOSIS — Z09 SURGICAL FOLLOW-UP CARE: Primary | ICD-10-CM

## 2019-11-04 DIAGNOSIS — I82.461 ACUTE DEEP VEIN THROMBOSIS (DVT) OF CALF MUSCLE VEIN OF RIGHT LOWER EXTREMITY (HCC): ICD-10-CM

## 2019-11-04 DIAGNOSIS — I82.461 ACUTE DEEP VEIN THROMBOSIS (DVT) OF CALF MUSCLE VEIN OF RIGHT LOWER EXTREMITY (HCC): Primary | ICD-10-CM

## 2019-11-04 DIAGNOSIS — Z09 SURGICAL FOLLOW-UP CARE: ICD-10-CM

## 2019-11-04 DIAGNOSIS — M79.605 PAIN OF LEFT LOWER EXTREMITY: ICD-10-CM

## 2019-11-04 LAB
ALBUMIN SERPL-MCNC: 3.7 G/DL (ref 3.5–5.2)
ALBUMIN/GLOB SERPL: 1.4 G/DL
ALP SERPL-CCNC: 76 U/L (ref 39–117)
ALT SERPL W P-5'-P-CCNC: 11 U/L (ref 1–41)
ANION GAP SERPL CALCULATED.3IONS-SCNC: 9 MMOL/L (ref 5–15)
AST SERPL-CCNC: 15 U/L (ref 1–40)
BASOPHILS # BLD AUTO: 0.13 10*3/MM3 (ref 0–0.2)
BASOPHILS NFR BLD AUTO: 1.7 % (ref 0–1.5)
BH CV LOW VAS RIGHT GASTRONEMIUS VESSEL: 1
BH CV LOWER VASCULAR LEFT COMMON FEMORAL AUGMENT: NORMAL
BH CV LOWER VASCULAR LEFT COMMON FEMORAL COMPETENT: NORMAL
BH CV LOWER VASCULAR LEFT COMMON FEMORAL COMPRESS: NORMAL
BH CV LOWER VASCULAR LEFT COMMON FEMORAL PHASIC: NORMAL
BH CV LOWER VASCULAR LEFT COMMON FEMORAL SPONT: NORMAL
BH CV LOWER VASCULAR LEFT DISTAL FEMORAL COMPRESS: NORMAL
BH CV LOWER VASCULAR LEFT GASTRONEMIUS COMPRESS: NORMAL
BH CV LOWER VASCULAR LEFT GREATER SAPH AK COMPRESS: NORMAL
BH CV LOWER VASCULAR LEFT GREATER SAPH BK COMPRESS: NORMAL
BH CV LOWER VASCULAR LEFT MID FEMORAL AUGMENT: NORMAL
BH CV LOWER VASCULAR LEFT MID FEMORAL COMPETENT: NORMAL
BH CV LOWER VASCULAR LEFT MID FEMORAL COMPRESS: NORMAL
BH CV LOWER VASCULAR LEFT MID FEMORAL PHASIC: NORMAL
BH CV LOWER VASCULAR LEFT MID FEMORAL SPONT: NORMAL
BH CV LOWER VASCULAR LEFT PERONEAL COMPRESS: NORMAL
BH CV LOWER VASCULAR LEFT POPLITEAL AUGMENT: NORMAL
BH CV LOWER VASCULAR LEFT POPLITEAL COMPETENT: NORMAL
BH CV LOWER VASCULAR LEFT POPLITEAL COMPRESS: NORMAL
BH CV LOWER VASCULAR LEFT POPLITEAL PHASIC: NORMAL
BH CV LOWER VASCULAR LEFT POPLITEAL SPONT: NORMAL
BH CV LOWER VASCULAR LEFT POSTERIOR TIBIAL COMPRESS: NORMAL
BH CV LOWER VASCULAR LEFT PROFUNDA FEMORAL COMPRESS: NORMAL
BH CV LOWER VASCULAR LEFT PROXIMAL FEMORAL COMPRESS: NORMAL
BH CV LOWER VASCULAR LEFT SAPHENOFEMORAL JUNCTION COMPRESS: NORMAL
BH CV LOWER VASCULAR RIGHT COMMON FEMORAL AUGMENT: NORMAL
BH CV LOWER VASCULAR RIGHT COMMON FEMORAL COMPETENT: NORMAL
BH CV LOWER VASCULAR RIGHT COMMON FEMORAL COMPRESS: NORMAL
BH CV LOWER VASCULAR RIGHT COMMON FEMORAL PHASIC: NORMAL
BH CV LOWER VASCULAR RIGHT COMMON FEMORAL SPONT: NORMAL
BH CV LOWER VASCULAR RIGHT DISTAL FEMORAL COMPRESS: NORMAL
BH CV LOWER VASCULAR RIGHT GASTRONEMIUS COMPRESS: NORMAL
BH CV LOWER VASCULAR RIGHT GASTRONEMIUS THROMBUS: NORMAL
BH CV LOWER VASCULAR RIGHT GREATER SAPH AK COMPRESS: NORMAL
BH CV LOWER VASCULAR RIGHT GREATER SAPH BK COMPRESS: NORMAL
BH CV LOWER VASCULAR RIGHT MID FEMORAL AUGMENT: NORMAL
BH CV LOWER VASCULAR RIGHT MID FEMORAL COMPETENT: NORMAL
BH CV LOWER VASCULAR RIGHT MID FEMORAL COMPRESS: NORMAL
BH CV LOWER VASCULAR RIGHT MID FEMORAL PHASIC: NORMAL
BH CV LOWER VASCULAR RIGHT MID FEMORAL SPONT: NORMAL
BH CV LOWER VASCULAR RIGHT PERONEAL COMPRESS: NORMAL
BH CV LOWER VASCULAR RIGHT POPLITEAL AUGMENT: NORMAL
BH CV LOWER VASCULAR RIGHT POPLITEAL COMPETENT: NORMAL
BH CV LOWER VASCULAR RIGHT POPLITEAL COMPRESS: NORMAL
BH CV LOWER VASCULAR RIGHT POPLITEAL PHASIC: NORMAL
BH CV LOWER VASCULAR RIGHT POPLITEAL SPONT: NORMAL
BH CV LOWER VASCULAR RIGHT POSTERIOR TIBIAL COMPRESS: NORMAL
BH CV LOWER VASCULAR RIGHT PROFUNDA FEMORAL COMPRESS: NORMAL
BH CV LOWER VASCULAR RIGHT PROXIMAL FEMORAL COMPRESS: NORMAL
BH CV LOWER VASCULAR RIGHT SAPHENOFEMORAL JUNCTION COMPRESS: NORMAL
BILIRUB SERPL-MCNC: 0.3 MG/DL (ref 0.2–1.2)
BUN BLD-MCNC: 16 MG/DL (ref 8–23)
BUN/CREAT SERPL: 12.4 (ref 7–25)
CALCIUM SPEC-SCNC: 9 MG/DL (ref 8.6–10.5)
CHLORIDE SERPL-SCNC: 102 MMOL/L (ref 98–107)
CO2 SERPL-SCNC: 27 MMOL/L (ref 22–29)
CREAT BLD-MCNC: 1.29 MG/DL (ref 0.76–1.27)
DEPRECATED RDW RBC AUTO: 42.4 FL (ref 37–54)
EOSINOPHIL # BLD AUTO: 0.09 10*3/MM3 (ref 0–0.4)
EOSINOPHIL NFR BLD AUTO: 1.2 % (ref 0.3–6.2)
ERYTHROCYTE [DISTWIDTH] IN BLOOD BY AUTOMATED COUNT: 12.4 % (ref 12.3–15.4)
GFR SERPL CREATININE-BSD FRML MDRD: 55 ML/MIN/1.73
GLOBULIN UR ELPH-MCNC: 2.6 GM/DL
GLUCOSE BLD-MCNC: 106 MG/DL (ref 65–99)
HCT VFR BLD AUTO: 42.6 % (ref 37.5–51)
HGB BLD-MCNC: 14.1 G/DL (ref 13–17.7)
IMM GRANULOCYTES # BLD AUTO: 0.03 10*3/MM3 (ref 0–0.05)
IMM GRANULOCYTES NFR BLD AUTO: 0.4 % (ref 0–0.5)
INR PPP: 1.06 (ref 0.9–1.1)
LYMPHOCYTES # BLD AUTO: 1.31 10*3/MM3 (ref 0.7–3.1)
LYMPHOCYTES NFR BLD AUTO: 17.2 % (ref 19.6–45.3)
MCH RBC QN AUTO: 30.8 PG (ref 26.6–33)
MCHC RBC AUTO-ENTMCNC: 33.1 G/DL (ref 31.5–35.7)
MCV RBC AUTO: 93 FL (ref 79–97)
MONOCYTES # BLD AUTO: 0.58 10*3/MM3 (ref 0.1–0.9)
MONOCYTES NFR BLD AUTO: 7.6 % (ref 5–12)
NEUTROPHILS # BLD AUTO: 5.48 10*3/MM3 (ref 1.7–7)
NEUTROPHILS NFR BLD AUTO: 71.9 % (ref 42.7–76)
NRBC BLD AUTO-RTO: 0 /100 WBC (ref 0–0.2)
PLATELET # BLD AUTO: 269 10*3/MM3 (ref 140–450)
PMV BLD AUTO: 10 FL (ref 6–12)
POTASSIUM BLD-SCNC: 4.3 MMOL/L (ref 3.5–5.2)
PROT SERPL-MCNC: 6.3 G/DL (ref 6–8.5)
PROTHROMBIN TIME: 13.5 SECONDS (ref 11.7–14.2)
RBC # BLD AUTO: 4.58 10*6/MM3 (ref 4.14–5.8)
SODIUM BLD-SCNC: 138 MMOL/L (ref 136–145)
WBC NRBC COR # BLD: 7.62 10*3/MM3 (ref 3.4–10.8)

## 2019-11-04 PROCEDURE — 80053 COMPREHEN METABOLIC PANEL: CPT | Performed by: NURSE PRACTITIONER

## 2019-11-04 PROCEDURE — 93005 ELECTROCARDIOGRAM TRACING: CPT | Performed by: NURSE PRACTITIONER

## 2019-11-04 PROCEDURE — 93970 EXTREMITY STUDY: CPT

## 2019-11-04 PROCEDURE — 99283 EMERGENCY DEPT VISIT LOW MDM: CPT

## 2019-11-04 PROCEDURE — 85025 COMPLETE CBC W/AUTO DIFF WBC: CPT | Performed by: NURSE PRACTITIONER

## 2019-11-04 PROCEDURE — 93010 ELECTROCARDIOGRAM REPORT: CPT | Performed by: INTERNAL MEDICINE

## 2019-11-04 PROCEDURE — 99024 POSTOP FOLLOW-UP VISIT: CPT | Performed by: NURSE PRACTITIONER

## 2019-11-04 PROCEDURE — 36415 COLL VENOUS BLD VENIPUNCTURE: CPT

## 2019-11-04 PROCEDURE — 85610 PROTHROMBIN TIME: CPT | Performed by: NURSE PRACTITIONER

## 2019-11-04 RX ORDER — CYCLOBENZAPRINE HCL 10 MG
10 TABLET ORAL 3 TIMES DAILY PRN
Qty: 30 TABLET | Refills: 0 | Status: SHIPPED | OUTPATIENT
Start: 2019-11-04 | End: 2020-01-20

## 2019-11-04 NOTE — PROGRESS NOTES
Clinical Pharmacy Services: Anticoagulation Discharge Counseling    Marin Cuenca is a 69 y.o. male who was diagnosed in the ER with right calf vein thrombosis.    Patient is being discharged on rivaroxaban as a new medication. I counseled the patient about this medication, including the following information:  · Indication  · Dose and frequency  · Administration instructions: Take with food  · Drug interactions: Avoid NSAIDs  · Side effects  · Monitoring for signs and symptoms of bleeding    Patient/family verbalized good understanding of information and were able to repeat rodriguez points back to me. Patient was also provided with a starter pack for first 30 days of medication. Instructed they will need to have an appointment with whichever provider will take over the prescribing to ensure they have consistent medication supply.  Patient provided opportunity to ask questions.    Please call if questions.    Tori Clarke, PharmD, [facility]  Clinical Pharmacy Specialist, Emergency Medicine   Phone: [phone]

## 2019-11-04 NOTE — ED PROVIDER NOTES
Pt presents to the ED c/o sudden onset of right calf pain earlier today while at a outpatient appointment. Pt was seeing NP Emily Le for surgery f/u and she ordered a Doppler of the RLE. Doppler was positive for acute DVT. Pt denies calf pain currently, leg swelling, SOA, CP, and history of blood clots. Pt has chronic numbness in his left toes.     On exam,  Pt in NAD  Heart--RRR  Lungs-CTAB  Abd-soft, non tender  Extremities-no calf tenderness, normal right pedal pulse and brisk cap refill in right toes.     EKG          EKG time: 1524  Rhythm/Rate: NSR 78  P waves and DC: normal  QRS, axis: LAD   ST and T waves: normal     Interpreted Contemporaneously by me, independently viewed  unchanged compared to prior 10/16/19    Plan-Check labs      Attestation:  The BABAR and I have discussed this patient's history, physical exam, and treatment plan.  I have reviewed the documentation and personally had a face to face interaction with the patient. I affirm the documentation and agree with the treatment and plan.  The attached note describes my personal findings.      Documentation assistance provided by yoshi Carmona for Dr. Yip. Information recorded by the scribe was done at my direction and has been verified and validated by me.       Nazia Carmona  11/04/19 6621       Terrence Yip MD  11/04/19 [id number]

## 2019-11-04 NOTE — DISCHARGE INSTRUCTIONS
Take Xarelto as directed    Return Precautions    Although you are being discharged from the ED today, I encourage you to return for worsening symptoms.  Things can, and do, change such that treatment at home with medication may not be adequate.      Specifically, return for any of the following:    Chest pain, shortness of breath, pain or nausea and vomiting not controlled by medications provided.    Please make a follow up with your Primary Care Provider for a blood pressure recheck.

## 2019-11-04 NOTE — ED TRIAGE NOTES
Patient presents to er from vascular lab.  Patient was seeing Emily Le in office today for follow up L4-5 two weeks by Gideon.  Abrupt onset of left leg pain in office.  Had a negative vascular scan here today.

## 2019-11-04 NOTE — PROGRESS NOTES
Bilateral lower extremity venous doppler completed. Prelim positive for Rt acute calf vein thrombus and Lt negative given to MACHO Jacques. Pt sent to ER.

## 2019-11-04 NOTE — ED PROVIDER NOTES
"EMERGENCY DEPARTMENT ENCOUNTER    Room Number:  42/42  Date seen:  11/4/2019  Time seen: 2:54 PM  PCP: System, Provider Not In  Historian: Pt    HPI:  Chief complaint: right calf DVT  Context:Marin Cuenca is a 69 y.o. male, s/p lumbar laminectomy on 10/18/19, who presents to the ED from vascular lab after having a doppler BLE that showed right calf DVT. The pt states he was seeing Dr. Le, Neurosurgery today for f/u after his surgery. He states he sat down in the waiting room when he developed a sudden pain to his left leg that improved after he stood up. He had a doppler of his BLE done and was found to have a DVT in his right calf. He was sent here for further evaluation. He denies SOA, fever, or CP. His PCP recently retired and is scheduled to see Dr. Montero early December.     MEDICAL RECORD REVIEW  Clinical Indication     Left leg pain, bilateral leg swelling - call with result   Dx: Surgical follow-up care [Z09 (ICD-10-CM)]; Pain of left lower extremity [M79.605 (ICD-10-CM)]   Interpretation Summary     · Acute right lower extremity deep vein thrombosis noted in the gastrocnemius/soleal.  · All other veins appeared normal bilaterally.      Patient Hx Of Height, Weight, and Vitals     Height Weight BSA (Calculated - sq m) BMI (kg/m2) Pulse BP   182.9 cm (72.01\") 105 kg (231 lb) 2.27 sq meters 31.39 120 120/72   Study Impression     • Right Common Femoral: No deep vein thrombosis noted.   • Right Saphenofemoral Junction: No superficial thrombophlebitis noted.   • Right Proximal Femoral: No deep vein thrombosis noted.   • Right Mid Femoral: No deep vein thrombosis noted.   • Right Distal Femoral: No deep vein thrombosis noted.   • Right Popliteal: No deep vein thrombosis noted.   • Right Posterior Tibial: No deep vein thrombosis noted.   • Right Peroneal: No deep vein thrombosis noted.   • Right Gastrocnemius Soleal: Acute deep vein thrombosis noted.   • Right Greater Saphenous Above Knee: No superficial " thrombophlebitis noted.   • Right Greater Saphenous Below Knee: No superficial thrombophlebitis noted.   • Left Common Femoral: No deep vein thrombosis noted.   • Left Saphenofemoral Junction: No superficial thrombophlebitis noted.   • Left Proximal Femoral: No deep vein thrombosis noted.   • Left Mid Femoral: No deep vein thrombosis noted.   • Left Distal Femoral: No deep vein thrombosis noted.   • Left Popliteal: No deep vein thrombosis noted.   • Left Posterior Tibial: No deep vein thrombosis noted.   • Left Peroneal: No deep vein thrombosis noted.   • Left Gastrocnemius Soleal: No deep vein thrombosis noted.   • Left Greater Saphenous Above Knee: No superficial thrombophlebitis noted.   • Left Greater Saphenous Below Knee: No superficial thrombophlebitis noted.         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 11/01/2017   • Gastroesophageal reflux disease with esophagitis 11/01/2017   • Obesity (BMI 30-39.9) 04/16/2018   • Hypersomnia with sleep apnea 04/16/2018   • KARLA on CPAP 04/16/2018   • Diverticulitis of large intestine without bleeding 05/03/2018   • Prostate cancer screening 11/08/2018   • Hypothyroidism 11/08/2018   • Encounter for long-term (current) drug use 11/08/2018   • Partial small bowel obstruction (CMS/HCC) 05/17/2019   • Acute kidney injury (CMS/HCC) 05/17/2019   • Cervical radiculitis 09/24/2019     Resolved Ambulatory Problems     Diagnosis Date Noted   • No Resolved Ambulatory Problems     Past Medical  History:   Diagnosis Date   • Arthritis    • Ramirez esophagus    • Benign prostatic hypertrophy    • Cataract    • Ear pain, left    • GERD (gastroesophageal reflux disease)    • History of MRSA infection    • Hypertension    • Hypothyroidism    • KARLA on CPAP    • Osteoporosis        PAST SURGICAL HISTORY  Past Surgical History:   Procedure Laterality Date   • CYSTOSCOPY     • HEMORRHOIDECTOMY     • KNEE SURGERY Left     scoped   • LUMBAR LAMINECTOMY     • LUMBAR LAMINECTOMY DISCECTOMY DECOMPRESSION Left 10/18/2019    Procedure: Left L4-5 laminectomy and neural lysis;  Surgeon: Wayne Meneses MD;  Location: [facility] OR;  Service: Neurosurgery   • NISSEN FUNDOPLICATION     • UPPER GASTROINTESTINAL ENDOSCOPY         FAMILY HISTORY  Family History   Problem Relation Age of Onset   • Heart disease Father         cardiovascular disease   • Diabetes Father    • Hypertension Father    • Cancer Paternal Grandmother    • Malig Hyperthermia Neg Hx        SOCIAL HISTORY  Social History     Socioeconomic History   • Marital status:      Spouse name: Not on file   • Number of children: Not on file   • Years of education: Not on file   • Highest education level: Not on file   Tobacco Use   • Smoking status: Never Smoker   • Smokeless tobacco: Never Used   Substance and Sexual Activity   • Alcohol use: No   • Drug use: No     Comment: Consumes 3 caffeinated beverages per day usually coffee.   • Sexual activity: Defer       REVIEW OF SYSTEMS  Review of Systems   Constitutional: Negative for activity change, appetite change, diaphoresis and fever.   HENT: Negative for trouble swallowing.    Eyes: Negative for visual disturbance.   Respiratory: Negative for cough, chest tightness, shortness of breath and wheezing.    Cardiovascular: Negative for chest pain, palpitations and leg swelling.   Gastrointestinal: Negative for abdominal pain, diarrhea, nausea and vomiting.   Musculoskeletal: Positive for myalgias (left leg  pain). Negative for back pain.   Skin: Negative for rash.   Neurological: Negative for dizziness.       PHYSICAL EXAM  ED Triage Vitals [11/04/19 1427]   Temp Heart Rate Resp BP SpO2   97.9 °F (36.6 °C) 111 16 120/70 99 %      Temp src Heart Rate Source Patient Position BP Location FiO2 (%)   Tympanic Monitor -- -- --     Physical Exam   Constitutional: He is oriented to person, place, and time and well-developed, well-nourished, and in no distress. No distress.   HENT:   Head: Normocephalic and atraumatic.   Eyes: Pupils are equal, round, and reactive to light.   Neck: Normal range of motion. Neck supple.   Cardiovascular: Regular rhythm, S1 normal, S2 normal and normal heart sounds. Tachycardia present. Exam reveals no gallop and no friction rub.   No murmur heard.  Pulses:       Dorsalis pedis pulses are 1+ on the right side, and 1+ on the left side.   Pulmonary/Chest: Effort normal and breath sounds normal. No respiratory distress. He has no decreased breath sounds. He has no wheezes. He has no rales. He exhibits no tenderness.   Abdominal: Soft. There is no rebound and no guarding.   Musculoskeletal: Normal range of motion. He exhibits no deformity.   No focal calf tenderness or erythema.    Lymphadenopathy:     He has no cervical adenopathy.   Neurological: He is alert and oriented to person, place, and time.   Skin: Skin is warm and dry.   Psychiatric: Affect normal.   Nursing note and vitals reviewed.      LAB RESULTS  Recent Results (from the past 24 hour(s))   Duplex Venous Lower Extremity - Bilateral CAR    Collection Time: 11/04/19  1:53 PM   Result Value Ref Range    Right GastronemiusSoleal Vessel 1     Right Common Femoral Spont Y     Right Common Femoral Phasic Y     Right Common Femoral Augment Y     Right Common Femoral Competent Y     Right Common Femoral Compress C     Right Saphenofemoral Junction Compress C     Right Profunda Femoral Compress C     Right Proximal Femoral Compress C     Right  Mid Femoral Spont Y     Right Mid Femoral Phasic Y     Right Mid Femoral Augment Y     Right Mid Femoral Competent Y     Right Mid Femoral Compress C     Right Distal Femoral Compress C     Right Popliteal Spont Y     Right Popliteal Phasic Y     Right Popliteal Augment Y     Right Popliteal Competent Y     Right Popliteal Compress C     Right Posterior Tibial Compress C     Right Peroneal Compress C     Right GastronemiusSoleal Compress N     Right GastronemiusSoleal Thrombus A     Right Greater Saph AK Compress C     Right Greater Saph BK Compress C     Left Common Femoral Spont Y     Left Common Femoral Phasic Y     Left Common Femoral Augment Y     Left Common Femoral Competent Y     Left Common Femoral Compress C     Left Saphenofemoral Junction Compress C     Left Profunda Femoral Compress C     Left Proximal Femoral Compress C     Left Mid Femoral Spont Y     Left Mid Femoral Phasic Y     Left Mid Femoral Augment Y     Left Mid Femoral Competent Y     Left Mid Femoral Compress C     Left Distal Femoral Compress C     Left Popliteal Spont Y     Left Popliteal Phasic Y     Left Popliteal Augment Y     Left Popliteal Competent Y     Left Popliteal Compress C     Left Posterior Tibial Compress C     Left Peroneal Compress C     Left GastronemiusSoleal Compress C     Left Greater Saph AK Compress C     Left Greater Saph BK Compress C    Comprehensive Metabolic Panel    Collection Time: 11/04/19  3:17 PM   Result Value Ref Range    Glucose 106 (H) 65 - 99 mg/dL    BUN 16 8 - 23 mg/dL    Creatinine 1.29 (H) 0.76 - 1.27 mg/dL    Sodium 138 136 - 145 mmol/L    Potassium 4.3 3.5 - 5.2 mmol/L    Chloride 102 98 - 107 mmol/L    CO2 27.0 22.0 - 29.0 mmol/L    Calcium 9.0 8.6 - 10.5 mg/dL    Total Protein 6.3 6.0 - 8.5 g/dL    Albumin 3.70 3.50 - 5.20 g/dL    ALT (SGPT) 11 1 - 41 U/L    AST (SGOT) 15 1 - 40 U/L    Alkaline Phosphatase 76 39 - 117 U/L    Total Bilirubin 0.3 0.2 - 1.2 mg/dL    eGFR Non African Amer 55 (L)  >60 mL/min/1.73    Globulin 2.6 gm/dL    A/G Ratio 1.4 g/dL    BUN/Creatinine Ratio 12.4 7.0 - 25.0    Anion Gap 9.0 5.0 - 15.0 mmol/L   Protime-INR    Collection Time: 11/04/19  3:17 PM   Result Value Ref Range    Protime 13.5 11.7 - 14.2 Seconds    INR 1.06 0.90 - 1.10   CBC Auto Differential    Collection Time: 11/04/19  3:17 PM   Result Value Ref Range    WBC 7.62 3.40 - 10.80 10*3/mm3    RBC 4.58 4.14 - 5.80 10*6/mm3    Hemoglobin 14.1 13.0 - 17.7 g/dL    Hematocrit 42.6 37.5 - 51.0 %    MCV 93.0 79.0 - 97.0 fL    MCH 30.8 26.6 - 33.0 pg    MCHC 33.1 31.5 - 35.7 g/dL    RDW 12.4 12.3 - 15.4 %    RDW-SD 42.4 37.0 - 54.0 fl    MPV 10.0 6.0 - 12.0 fL    Platelets 269 140 - 450 10*3/mm3    Neutrophil % 71.9 42.7 - 76.0 %    Lymphocyte % 17.2 (L) 19.6 - 45.3 %    Monocyte % 7.6 5.0 - 12.0 %    Eosinophil % 1.2 0.3 - 6.2 %    Basophil % 1.7 (H) 0.0 - 1.5 %    Immature Grans % 0.4 0.0 - 0.5 %    Neutrophils, Absolute 5.48 1.70 - 7.00 10*3/mm3    Lymphocytes, Absolute 1.31 0.70 - 3.10 10*3/mm3    Monocytes, Absolute 0.58 0.10 - 0.90 10*3/mm3    Eosinophils, Absolute 0.09 0.00 - 0.40 10*3/mm3    Basophils, Absolute 0.13 0.00 - 0.20 10*3/mm3    Immature Grans, Absolute 0.03 0.00 - 0.05 10*3/mm3    nRBC 0.0 0.0 - 0.2 /100 WBC       Ordered the above labs and independently reviewed the results.         MEDICATIONS GIVEN IN ER  Medications - No data to display    EKG  Interpreted by ED Physician    PROCEDURES  Procedures        PROGRESS, DATA ANALYSIS, CONSULTS AND MEDICAL DECISION MAKING  All labs have been independently reviewed by me.  All radiology studies have been reviewed by me and discussed with radiologist dictating the report.  EKG's independently viewed and interpreted by me unless stated otherwise. Discussion below represents my analysis of pertinent findings related to patient's condition, differential diagnosis, treatment plan and final disposition.          1520 Reviewed pt's history and workup with   "Phi.  After a bedside evaluation, Dr. Yip agrees with the plan of care. I clarified with patient that his DVT is in RLE.  His reported pain was in LLE.     1540 Pt recheck. Notified pt of unremarkable lab work results and EKG results. Discussed the pan to discharge the pt home with a Rx for Xarelto starter pack. Pharm D has educated him on this starter pack. Advised pt to f/u with his PCP. The patient's history, physical exam, and lab findings were discussed with the physician, who also performed a face to face history and physical exam.  I discussed all results and noted any abnormalities with patient.  Discussed absoute need to recheck abnormalities with their family physician.  I answered any of the patient's questions.  Discussed plan for discharge, as there is no emergent indication for admission.  Pt is agreeable and understands need for follow up and repeat testing.  Pt is aware that discharge does not mean that nothing is wrong but it indicates no emergency is present and they must continue care with their family physician.  Pt is discharged with instructions to follow up with primary care doctor to have their blood pressure rechecked.         Disposition vitals:  /79   Pulse 77   Temp 98 °F (36.7 °C) (Oral)   Resp 18   Ht 182.9 cm (72\")   Wt 105 kg (231 lb)   SpO2 94%   BMI 31.33 kg/m²       DIAGNOSIS  Final diagnoses:   Acute deep vein thrombosis (DVT) of calf muscle vein of right lower extremity       FOLLOW UP   Patel Montero MD  [address] [address]  [phone]    Schedule an appointment as soon as possible for a visit         RX     Medication List      New Prescriptions    XARELTO STARTER PACK tablet therapy pack starter pack  Generic drug:  Rivaroxaban  Take one 15 mg tablet twice daily with food for 21 days.  Followed by one   20 mg tablet by mouth once daily with food. Take as directed        Changed    aspirin 81 MG tablet  Take 1 tablet by mouth " Daily.  What changed:    when to take this  additional instructions     finasteride 5 MG tablet  Commonly known as:  PROSCAR  Take 1 tablet by mouth Daily.  What changed:  when to take this     levothyroxine 100 MCG tablet  Commonly known as:  SYNTHROID, LEVOTHROID  Take 1 tablet by mouth Daily.  What changed:  when to take this              Documentation assistance provided by yoshi Montgomery for SANTA Pham.  Information recorded by the deliblisandro was done at my direction and has been verified and validated by me.             Alex Montgomery  11/04/19 0424       Nichole Yepez APRN  11/04/19 8542

## 2019-11-06 ENCOUNTER — TELEPHONE (OUTPATIENT)
Dept: NEUROSURGERY | Facility: CLINIC | Age: 69
End: 2019-11-06

## 2019-11-06 NOTE — TELEPHONE ENCOUNTER
Pt called asking when could resume regular activities as well as driving. Pt is 3 weeks out from surgery and was seen by vascular doctors for a DVT and has been treated for it. Pt also asks how long can he sit for 30 minutes a day , 3 times a day? Can he increase his activity as tolerated? Please call and advise.

## 2019-11-06 NOTE — TELEPHONE ENCOUNTER
He can sit but I'd prefer that he not sit in one spot for more than 30 minutes at a time. He needs to be walking around in between as much as is comfortable. He can lift up to 10 lbs. He needs a f/u appointment scheduled as I ordered yesterday (I don't see one scheduled) for 2 weeks. PT can be discussed at that time. He should keep his appointment with the vascular surgery doctors as well.

## 2019-11-11 ENCOUNTER — TELEPHONE (OUTPATIENT)
Dept: FAMILY MEDICINE CLINIC | Facility: CLINIC | Age: 69
End: 2019-11-11

## 2019-11-11 ENCOUNTER — OFFICE VISIT (OUTPATIENT)
Dept: FAMILY MEDICINE CLINIC | Facility: CLINIC | Age: 69
End: 2019-11-11

## 2019-11-11 VITALS
HEIGHT: 72 IN | OXYGEN SATURATION: 97 % | TEMPERATURE: 97.6 F | WEIGHT: 230.6 LBS | BODY MASS INDEX: 31.23 KG/M2 | DIASTOLIC BLOOD PRESSURE: 78 MMHG | HEART RATE: 64 BPM | SYSTOLIC BLOOD PRESSURE: 142 MMHG

## 2019-11-11 DIAGNOSIS — E03.9 ACQUIRED HYPOTHYROIDISM: ICD-10-CM

## 2019-11-11 DIAGNOSIS — M54.16 LUMBAR RADICULOPATHY: ICD-10-CM

## 2019-11-11 DIAGNOSIS — I10 ESSENTIAL HYPERTENSION: ICD-10-CM

## 2019-11-11 DIAGNOSIS — K21.00 GASTROESOPHAGEAL REFLUX DISEASE WITH ESOPHAGITIS: ICD-10-CM

## 2019-11-11 DIAGNOSIS — N40.1 BENIGN NON-NODULAR PROSTATIC HYPERPLASIA WITH LOWER URINARY TRACT SYMPTOMS: ICD-10-CM

## 2019-11-11 DIAGNOSIS — I82.4Z1 ACUTE DEEP VEIN THROMBOSIS (DVT) OF DISTAL VEIN OF RIGHT LOWER EXTREMITY (HCC): Primary | ICD-10-CM

## 2019-11-11 PROCEDURE — 99214 OFFICE O/P EST MOD 30 MIN: CPT | Performed by: NURSE PRACTITIONER

## 2019-11-11 RX ORDER — ESOMEPRAZOLE MAGNESIUM 40 MG/1
40 CAPSULE, DELAYED RELEASE ORAL 2 TIMES DAILY
Qty: 180 CAPSULE | Refills: 3 | Status: SHIPPED | OUTPATIENT
Start: 2019-11-11 | End: 2020-01-08 | Stop reason: SDUPTHER

## 2019-11-11 RX ORDER — LEVOTHYROXINE SODIUM 0.1 MG/1
100 TABLET ORAL DAILY
Qty: 90 TABLET | Refills: 3 | Status: SHIPPED | OUTPATIENT
Start: 2019-11-11 | End: 2020-09-08 | Stop reason: SDUPTHER

## 2019-11-11 RX ORDER — IRBESARTAN 75 MG/1
75 TABLET ORAL DAILY
Qty: 90 TABLET | Refills: 3 | Status: SHIPPED | OUTPATIENT
Start: 2019-11-11 | End: 2020-11-13 | Stop reason: SDUPTHER

## 2019-11-11 RX ORDER — FINASTERIDE 5 MG/1
5 TABLET, FILM COATED ORAL EVERY MORNING
Qty: 90 TABLET | Refills: 3 | Status: SHIPPED | OUTPATIENT
Start: 2019-11-11 | End: 2020-09-03 | Stop reason: SDUPTHER

## 2019-11-11 NOTE — PROGRESS NOTES
"Subjective   Marin Cuenca is a 69 y.o. male.     History of Present Illness   New patient to me today.    Pt had Back surgery by Dr. Anthony on Oct 18th and went for back up appointment on Nov 4th and had swelling in right leg so was sent for a doppler and had confirmed DVT and placed on Xarelto.  He needs a referral to a vascular doctor to follow this. Needing refill of xarelta.    .  He has Essential Hypertension and well controlled on current medication, GERD controlled on PPI Rx, Hypothyroidism well controlled on current medication and will continue Rx and BPH controlled, lumbar radiculopathy os controlled.  he has been compliant with current medications have reviewed them.  The patient denies medication side effects.  Will refill medications. /78 (BP Location: Left arm, Patient Position: Sitting)   Pulse 64   Temp 97.6 °F (36.4 °C)   Ht 182.9 cm (72.01\")   Wt 105 kg (230 lb 9.6 oz)   SpO2 97%   BMI 31.27 kg/m²     Results for orders placed or performed during the hospital encounter of 11/04/19   Duplex Venous Lower Extremity - Bilateral CAR   Result Value Ref Range    Right GastronemiusSoleal Vessel 1     Right Common Femoral Spont Y     Right Common Femoral Phasic Y     Right Common Femoral Augment Y     Right Common Femoral Competent Y     Right Common Femoral Compress C     Right Saphenofemoral Junction Compress C     Right Profunda Femoral Compress C     Right Proximal Femoral Compress C     Right Mid Femoral Spont Y     Right Mid Femoral Phasic Y     Right Mid Femoral Augment Y     Right Mid Femoral Competent Y     Right Mid Femoral Compress C     Right Distal Femoral Compress C     Right Popliteal Spont Y     Right Popliteal Phasic Y     Right Popliteal Augment Y     Right Popliteal Competent Y     Right Popliteal Compress C     Right Posterior Tibial Compress C     Right Peroneal Compress C     Right GastronemiusSoleal Compress N     Right GastronemiusSoleal Thrombus A     Right Greater Saph " "AK Compress C     Right Greater Saph BK Compress C     Left Common Femoral Spont Y     Left Common Femoral Phasic Y     Left Common Femoral Augment Y     Left Common Femoral Competent Y     Left Common Femoral Compress C     Left Saphenofemoral Junction Compress C     Left Profunda Femoral Compress C     Left Proximal Femoral Compress C     Left Mid Femoral Spont Y     Left Mid Femoral Phasic Y     Left Mid Femoral Augment Y     Left Mid Femoral Competent Y     Left Mid Femoral Compress C     Left Distal Femoral Compress C     Left Popliteal Spont Y     Left Popliteal Phasic Y     Left Popliteal Augment Y     Left Popliteal Competent Y     Left Popliteal Compress C     Left Posterior Tibial Compress C     Left Peroneal Compress C     Left GastronemiusSoleal Compress C     Left Greater Saph AK Compress C     Left Greater Saph BK Compress C            The following portions of the patient's history were reviewed and updated as appropriate: allergies, current medications, past social history and problem list.    Review of Systems   Constitutional: Negative for fever.   Respiratory: Negative for cough and shortness of breath.    Cardiovascular: Negative for chest pain.   Gastrointestinal: Negative for abdominal pain.   Neurological: Negative for dizziness.       Objective   /78 (BP Location: Left arm, Patient Position: Sitting)   Pulse 64   Temp 97.6 °F (36.4 °C)   Ht 182.9 cm (72.01\")   Wt 105 kg (230 lb 9.6 oz)   SpO2 97%   BMI 31.27 kg/m²   Physical Exam   Constitutional: He is oriented to person, place, and time. Vital signs are normal. He appears well-developed and well-nourished. No distress.   HENT:   Head: Normocephalic.   Cardiovascular: Normal rate, regular rhythm and normal heart sounds.   Pulmonary/Chest: Effort normal and breath sounds normal.   Neurological: He is alert and oriented to person, place, and time. Gait normal.   Psychiatric: He has a normal mood and affect. His behavior is normal. " Judgment and thought content normal.   Vitals reviewed.      Assessment/Plan      Diagnosis Plan   1. Acute deep vein thrombosis (DVT) of distal vein of right lower extremity (CMS/HCC)  Ambulatory Referral to Vascular Surgery    rivaroxaban (XARELTO) 20 MG tablet   2. Gastroesophageal reflux disease with esophagitis  esomeprazole (nexIUM) 40 MG capsule   3. Lumbar radiculopathy     4. Acquired hypothyroidism  levothyroxine (SYNTHROID, LEVOTHROID) 100 MCG tablet   5. Essential hypertension  irbesartan (AVAPRO) 75 MG tablet   6. Benign non-nodular prostatic hyperplasia with lower urinary tract symptoms  finasteride (PROSCAR) 5 MG tablet     Cont same with meds  rto in 3 faraz Simpson, SANTA  11/11/2019

## 2019-11-11 NOTE — TELEPHONE ENCOUNTER
Marin is scheduled for another A1C check in three months. Marin is curious if fasting labs will be drawn then or should patient wait till May to do Labs and a wellness? please advise.

## 2019-11-18 ENCOUNTER — OFFICE VISIT (OUTPATIENT)
Dept: NEUROSURGERY | Facility: CLINIC | Age: 69
End: 2019-11-18

## 2019-11-18 VITALS
BODY MASS INDEX: 31.05 KG/M2 | DIASTOLIC BLOOD PRESSURE: 66 MMHG | SYSTOLIC BLOOD PRESSURE: 122 MMHG | WEIGHT: 229 LBS | HEART RATE: 80 BPM

## 2019-11-18 DIAGNOSIS — M54.16 LUMBAR RADICULOPATHY: ICD-10-CM

## 2019-11-18 DIAGNOSIS — M96.1 FAILED BACK SYNDROME OF LUMBAR SPINE: Primary | ICD-10-CM

## 2019-11-18 PROCEDURE — 99024 POSTOP FOLLOW-UP VISIT: CPT | Performed by: NURSE PRACTITIONER

## 2019-11-18 NOTE — PROGRESS NOTES
Subjective   Patient ID: Marin Cuenca is a 69 y.o. male is here today for follow-up of L4/5 sonia 10/18 .  No recent imaging.    History of Present Illness    He returns the office today for ongoing follow-up with history of an L4-5 laminectomy on October 18 with Dr. Meneses.  He presented with severe pain in his left leg myelography revealed severe lateral recess stenosis.  He failed conservative management and elected to proceed forward with surgery.    He denies any ongoing left leg pain but does have some minimal numbness and tingling in the left calf.  He also denies any low back pain.  He denies any issues with the incision site that has healed well.  He is off all pain medication.  He would like to resume driving.  He is walking daily without any issue.  Overall he is very pleased with his postoperative status.    He presents with his wife.      /66   Pulse 80   Wt 104 kg (229 lb)   BMI 31.05 kg/m²     The following portions of the patient's history were reviewed and updated as appropriate: allergies, current medications, past family history, past medical history, past social history, past surgical history and problem list.    Review of Systems   Genitourinary: Negative for difficulty urinating and enuresis.   Musculoskeletal: Positive for back pain (L leg to calf). Negative for gait problem.   Neurological: Positive for numbness (L foot/toes). Negative for weakness.   Psychiatric/Behavioral: Negative for sleep disturbance.       Objective   Physical Exam   Constitutional: He is oriented to person, place, and time. Vital signs are normal. He appears well-developed and well-nourished. He is cooperative.  Non-toxic appearance. He does not have a sickly appearance. He does not appear ill.   HENT:   Head: Atraumatic.   Eyes: EOM are normal.   Neck: No tracheal deviation present.   Pulmonary/Chest: Effort normal.   Musculoskeletal: Normal range of motion. He exhibits no deformity.        Lumbar back: He  exhibits normal range of motion, no tenderness and no pain.   Strength is equal and intact bilateral lower extremities  Normal muscle bulk and tone   Neurological: He is alert and oriented to person, place, and time. He has normal strength. No sensory deficit. Coordination and gait normal. GCS eye subscore is 4. GCS verbal subscore is 5. GCS motor subscore is 6.   Gait is stable and upright, able to heel and toe walk   Skin: Skin is warm and dry.   Well-healing left lower lumbar incision site, flat, normal surrounding skin   Psychiatric: He has a normal mood and affect. His behavior is normal. Thought content normal.   Vitals reviewed.    Neurologic Exam     Mental Status   Oriented to person, place, and time.     Cranial Nerves     CN III, IV, VI   Extraocular motions are normal.     Motor Exam     Strength   Strength 5/5 throughout.       Assessment/Plan   Independent Review of Radiographic Studies:    No new imaging    Medical Decision Making:    He presents to the office today for first postop visit status post lumbar decompression.  Exam as noted above, no red flags.  Left leg pain is completely resolved but he does have some residual numbness and tingling.  I explained that this is expected and will continue to get better with time.  The posterior lumbar incision site is healed well.  He is off on narcotics.  He is okay to resume driving.  Encouraged daily walking as this will help continue to heal the nerve issues.  Okay lifting upwards of 15 pounds and he can increase his weight limit slowly and as tolerated.  He denies any other issues or concerns at this time.  Overall he is doing very well.  We will see him back in 2 months for second postop visit.      Plan: Return to office in 2 months for follow-up    Marin was seen today for l4/5 sonia 10/18.    Diagnoses and all orders for this visit:    Failed back syndrome of lumbar spine    Lumbar radiculopathy      Return in about 2 months (around  1/18/2020).

## 2019-11-27 ENCOUNTER — NURSE TRIAGE (OUTPATIENT)
Dept: CALL CENTER | Facility: HOSPITAL | Age: 69
End: 2019-11-27

## 2019-11-27 NOTE — TELEPHONE ENCOUNTER
Suggested to try ice to area, does not have any numbness or loss of bowel or bladder control    Reason for Disposition  • Back pain    Additional Information  • Negative: Passed out (i.e., lost consciousness, collapsed and was not responding)  • Negative: Shock suspected (e.g., cold/pale/clammy skin, too weak to stand, low BP, rapid pulse)  • Negative: Sounds like a life-threatening emergency to the triager  • Negative: Major injury to the back (e.g., MVA, fall > 10 feet or 3 meters, penetrating injury, etc.)  • Negative: Followed a tailbone injury  • Negative: [1] Pain in the upper back over the ribs (rib cage) AND [2] radiates (travels, goes) into chest  • Negative: [1] Pain in the upper back over the ribs (rib cage) AND [2] worsened by coughing (or clearly increases with breathing)  • Negative: Back pain during pregnancy  • Negative: Pain mainly in flank (i.e., in the side, over the lower ribs or just below the ribs)  • Negative: [1] SEVERE back pain (e.g., excruciating) AND [2] sudden onset AND [3] age > 60  • Negative: [1] Unable to urinate (or only a few drops) > 4 hours AND     [2] bladder feels very full (e.g., palpable bladder or strong urge to urinate)  • Negative: [1] Urinary or bowel incontinence (i.e., loss of bladder or bowel control) AND [2] new onset  • Negative: Numbness in groin or rectal area (i.e., loss of sensation)  • Negative: [1] SEVERE abdominal pain AND [2] present > 1 hour  • Negative: [1] Abdominal pain AND [2] age > 60  • Negative: Weakness of a leg or foot (e.g., unable to bear weight, dragging foot)  • Negative: Unable to walk  • Negative: Patient sounds very sick or weak to the triager  • Negative: [1] SEVERE back pain (e.g., excruciating, unable to do any normal activities) AND [2] not improved 2 hours after pain medicine  • Negative: [1] Pain radiates into the thigh or further down the leg AND [2] both legs  • Negative: [1] Fever > 100.0 F (37.8 C) AND [2] flank pain (i.e., in side,  "below ribs and above hip)  • Negative: [1] Pain or burning with urination AND [2] flank pain (i.e., in side, below ribs and above hip)  • Negative: Numbness in a leg or foot (i.e., loss of sensation)  • Negative: High-risk adult (e.g., history of cancer, HIV, or IV drug abuse)  • Negative: [1] Fever AND [2] no symptoms of UTI  (Exception: has generalized muscle pains, not localized back pain)  • Negative: Rash in same area as pain (may be described as \"small blisters\")  • Negative: Blood in urine (red, pink, or tea-colored)  • Negative: [1] MODERATE back pain (e.g., interferes with normal activities) AND [2] present > 3 days  • Negative: [1] Pain radiates into the thigh or further down the leg AND [2] one leg  • Negative: [1] Age > 50 AND [2] no history of prior similar back pain  • Negative: Back pain present > 2 weeks  • Negative: Back pain is a chronic symptom (recurrent or ongoing AND present > 4 weeks)  • Negative: Caused by a twisting, bending, or lifting injury  • Negative: Caused by overuse from recent vigorous activity (e.g., exercise, gardening, lifting and carrying, sports)    Answer Assessment - Initial Assessment Questions  1. ONSET: \"When did the pain begin?\"       Today after pulling on a curtain which was in the dryer  2. LOCATION: \"Where does it hurt?\" (upper, mid or lower back)      Lower back  3. SEVERITY: \"How bad is the pain?\"  (e.g., Scale 1-10; mild, moderate, or severe)    - MILD (1-3): doesn't interfere with normal activities     - MODERATE (4-7): interferes with normal activities or awakens from sleep     - SEVERE (8-10): excruciating pain, unable to do any normal activities       moderate  4. PATTERN: \"Is the pain constant?\" (e.g., yes, no; constant, intermittent)       No pain , feels like a muscle strain  5. RADIATION: \"Does the pain shoot into your legs or elsewhere?\"      no  6. CAUSE:  \"What do you think is causing the back pain?\"       Pulling on the cutain  7. BACK OVERUSE:  \"Any " "recent lifting of heavy objects, strenuous work or exercise?\"      yes  8. MEDICATIONS: \"What have you taken so far for the pain?\" (e.g., nothing, acetaminophen, NSAIDS)      no  9. NEUROLOGIC SYMPTOMS: \"Do you have any weakness, numbness, or problems with bowel/bladder control?\"      Denies numbness or  weakness  10. OTHER SYMPTOMS: \"Do you have any other symptoms?\" (e.g., fever, abdominal pain, burning with urination, blood in urine)        no  11. PREGNANCY: \"Is there any chance you are pregnant?\" (e.g., yes, no; LMP)        no    Protocols used: BACK PAIN-ADULT-AH      "

## 2019-12-06 DIAGNOSIS — M54.16 LUMBAR RADICULOPATHY: ICD-10-CM

## 2019-12-06 RX ORDER — PREGABALIN 100 MG/1
100 CAPSULE ORAL 3 TIMES DAILY
Qty: 270 CAPSULE | Refills: 3 | Status: SHIPPED | OUTPATIENT
Start: 2019-12-06 | End: 2020-06-30

## 2019-12-11 ENCOUNTER — TELEPHONE (OUTPATIENT)
Dept: FAMILY MEDICINE CLINIC | Facility: CLINIC | Age: 69
End: 2019-12-11

## 2019-12-11 NOTE — TELEPHONE ENCOUNTER
Left message for patient to call back to clarify if he was asking for cough syrup or nausea medication

## 2019-12-12 RX ORDER — PROMETHAZINE HYDROCHLORIDE 25 MG/1
25 TABLET ORAL EVERY 6 HOURS PRN
Qty: 30 TABLET | Refills: 0 | Status: SHIPPED | OUTPATIENT
Start: 2019-12-12 | End: 2020-09-03

## 2020-01-08 DIAGNOSIS — K21.00 GASTROESOPHAGEAL REFLUX DISEASE WITH ESOPHAGITIS: ICD-10-CM

## 2020-01-08 RX ORDER — ESOMEPRAZOLE MAGNESIUM 40 MG/1
40 CAPSULE, DELAYED RELEASE ORAL 2 TIMES DAILY
Qty: 180 CAPSULE | Refills: 3 | Status: SHIPPED | OUTPATIENT
Start: 2020-01-08 | End: 2020-09-08 | Stop reason: SDUPTHER

## 2020-01-10 NOTE — PROGRESS NOTES
Subjective   Patient ID: Marin Cuenca is a 69 y.o. male is here today for a f/u of L4/5 sonia on 10/18/19 by Dr Meneses.   No recent imaging.  Last seen on 11/18/1 and pt reported N/T in L calf.  Today pt reports tingling in both feet.   No back pain.    History of Present Illness  He reports no recurrence of left leg pain. His saloni foot tingling is chronic. He reports pulling on shower curtain that got stuck to the back of his dryer late November- has had intermittent needle pain in the left low back. It is better with ice. No new weakness or numbness. He is walking at least 1 mile daily. He feels like balance off at times. Since last visit, he was diagnosed with RLE DVT- he is on Xarelto and followed by vascular surgeon.     The following portions of the patient's history were reviewed and updated as appropriate: allergies, current medications and problem list.    Review of Systems   Genitourinary: Negative for difficulty urinating and enuresis.   Musculoskeletal: Negative for back pain and gait problem.   Neurological: Positive for numbness (tingling in both feet). Negative for weakness.   Psychiatric/Behavioral: Negative for sleep disturbance.       Objective   Physical Exam   Constitutional: He appears well-developed and well-nourished.   Pulmonary/Chest: Effort normal.   Musculoskeletal:        Lumbar back: He exhibits decreased range of motion. He exhibits no tenderness, no bony tenderness and no pain.   Neurological: He is alert.   Skin:     Well-healed midline lumbar incision     Neurologic Exam     Mental Status   Level of consciousness: alert    Motor Exam   Muscle bulk: normal  5/5 lateral lower extremities     Gait, Coordination, and Reflexes     Gait  Gait: wide-based  Able to heel and toe walk bilaterally.  Able to shallow squat to stand       Assessment/Plan   Independent Review of Radiographic Studies:    No new imaging    Medical Decision Making:    Patient presents for follow-up now 3 months status  post 1 level lumbar decompression.  No recurrence of leg pain.  He did have a DVT diagnosed in the right lower extremity since his last office visit.  He is on Xarelto and compression socks.  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  Does not hurt him constantly, but he has some intermittent needlelike discomfort.  Ice seems to help.  Muscle relaxant PRN.  Exam without red flags.  Incision well-healed.  Strength okay.  I think he would benefit from some physical therapy to work on core strengthening and gait/balance.  No routine follow-up needed, but will happy to see him back if he has any progressive or new problems.    Plan: Return to office as needed.  Physical therapy referral.    Marin was seen today for f/u on l4/5 laminectomy.    Diagnoses and all orders for this visit:    Postop check  -     Ambulatory Referral to Physical Therapy Evaluate and treat, POST OP    Chronic left-sided low back pain without sciatica  -     Ambulatory Referral to Physical Therapy Evaluate and treat, POST OP      Return if symptoms worsen or fail to improve.

## 2020-01-20 ENCOUNTER — OFFICE VISIT (OUTPATIENT)
Dept: NEUROSURGERY | Facility: CLINIC | Age: 70
End: 2020-01-20

## 2020-01-20 VITALS
DIASTOLIC BLOOD PRESSURE: 90 MMHG | SYSTOLIC BLOOD PRESSURE: 146 MMHG | BODY MASS INDEX: 31.56 KG/M2 | HEIGHT: 72 IN | WEIGHT: 233 LBS | HEART RATE: 60 BPM

## 2020-01-20 DIAGNOSIS — M54.50 CHRONIC LEFT-SIDED LOW BACK PAIN WITHOUT SCIATICA: ICD-10-CM

## 2020-01-20 DIAGNOSIS — G89.29 CHRONIC LEFT-SIDED LOW BACK PAIN WITHOUT SCIATICA: ICD-10-CM

## 2020-01-20 DIAGNOSIS — Z09 POSTOP CHECK: Primary | ICD-10-CM

## 2020-01-20 PROCEDURE — 99212 OFFICE O/P EST SF 10 MIN: CPT | Performed by: NURSE PRACTITIONER

## 2020-01-20 RX ORDER — KETOCONAZOLE 20 MG/G
CREAM TOPICAL
COMMUNITY
Start: 2020-01-06 | End: 2020-02-11

## 2020-01-20 RX ORDER — KETOCONAZOLE 20 MG/ML
SHAMPOO TOPICAL
COMMUNITY
Start: 2020-01-06 | End: 2020-06-01

## 2020-01-31 ENCOUNTER — TELEPHONE (OUTPATIENT)
Dept: SLEEP MEDICINE | Facility: HOSPITAL | Age: 70
End: 2020-01-31

## 2020-01-31 NOTE — TELEPHONE ENCOUNTER
Pt called and is unable to get supplies from [facility]. Tech spoke to Shruthi @ [facility] and will await fax for CMN for Dr. Duarte to sign.

## 2020-02-11 ENCOUNTER — OFFICE VISIT (OUTPATIENT)
Dept: FAMILY MEDICINE CLINIC | Facility: CLINIC | Age: 70
End: 2020-02-11

## 2020-02-11 VITALS
HEIGHT: 72 IN | WEIGHT: 232 LBS | HEART RATE: 86 BPM | SYSTOLIC BLOOD PRESSURE: 128 MMHG | TEMPERATURE: 98.6 F | BODY MASS INDEX: 31.42 KG/M2 | DIASTOLIC BLOOD PRESSURE: 78 MMHG | OXYGEN SATURATION: 98 %

## 2020-02-11 DIAGNOSIS — Z13.6 SCREENING FOR ISCHEMIC HEART DISEASE: ICD-10-CM

## 2020-02-11 DIAGNOSIS — I10 ESSENTIAL HYPERTENSION: Primary | ICD-10-CM

## 2020-02-11 DIAGNOSIS — Z23 NEED FOR PNEUMOCOCCAL VACCINATION: ICD-10-CM

## 2020-02-11 DIAGNOSIS — Z12.5 SPECIAL SCREENING FOR MALIGNANT NEOPLASM OF PROSTATE: ICD-10-CM

## 2020-02-11 DIAGNOSIS — K21.00 GASTROESOPHAGEAL REFLUX DISEASE WITH ESOPHAGITIS: ICD-10-CM

## 2020-02-11 DIAGNOSIS — E03.9 ADULT HYPOTHYROIDISM: ICD-10-CM

## 2020-02-11 DIAGNOSIS — Z13.1 SCREENING FOR DIABETES MELLITUS: ICD-10-CM

## 2020-02-11 PROBLEM — M75.102 ROTATOR CUFF TEAR ARTHROPATHY, LEFT: Status: RESOLVED | Noted: 2017-11-01 | Resolved: 2020-02-11

## 2020-02-11 PROBLEM — G47.30 HYPERSOMNIA WITH SLEEP APNEA: Status: RESOLVED | Noted: 2018-04-16 | Resolved: 2020-02-11

## 2020-02-11 PROBLEM — G47.10 HYPERSOMNIA WITH SLEEP APNEA: Status: RESOLVED | Noted: 2018-04-16 | Resolved: 2020-02-11

## 2020-02-11 PROBLEM — M54.12 CERVICAL RADICULITIS: Status: RESOLVED | Noted: 2019-09-24 | Resolved: 2020-02-11

## 2020-02-11 PROBLEM — K57.32 DIVERTICULITIS OF LARGE INTESTINE WITHOUT BLEEDING: Status: RESOLVED | Noted: 2018-05-03 | Resolved: 2020-02-11

## 2020-02-11 PROBLEM — M12.812 ROTATOR CUFF TEAR ARTHROPATHY, LEFT: Status: RESOLVED | Noted: 2017-11-01 | Resolved: 2020-02-11

## 2020-02-11 PROCEDURE — G0009 ADMIN PNEUMOCOCCAL VACCINE: HCPCS | Performed by: NURSE PRACTITIONER

## 2020-02-11 PROCEDURE — 99214 OFFICE O/P EST MOD 30 MIN: CPT | Performed by: NURSE PRACTITIONER

## 2020-02-11 PROCEDURE — 90732 PPSV23 VACC 2 YRS+ SUBQ/IM: CPT | Performed by: NURSE PRACTITIONER

## 2020-02-11 NOTE — PROGRESS NOTES
"Subjective   Marin Cuenca is a 69 y.o. male.     History of Present Illness    Since the last visit, he has overall felt well.  He has Essential Hypertension and well controlled on current medication, GERD controlled on PPI Rx and Hypothyroidism and must update labs to continue treatment.  he has been compliant with current medications have reviewed them.  The patient denies medication side effects.  Will refill medications. /78 (BP Location: Left arm, Patient Position: Sitting)   Pulse 86   Temp 98.6 °F (37 °C)   Ht 182.9 cm (72\")   Wt 105 kg (232 lb)   SpO2 98%   BMI 31.46 kg/m²     Results for orders placed or performed during the hospital encounter of 11/04/19   Duplex Venous Lower Extremity - Bilateral CAR   Result Value Ref Range    Right GastronemiusSoleal Vessel 1     Right Common Femoral Spont Y     Right Common Femoral Phasic Y     Right Common Femoral Augment Y     Right Common Femoral Competent Y     Right Common Femoral Compress C     Right Saphenofemoral Junction Compress C     Right Profunda Femoral Compress C     Right Proximal Femoral Compress C     Right Mid Femoral Spont Y     Right Mid Femoral Phasic Y     Right Mid Femoral Augment Y     Right Mid Femoral Competent Y     Right Mid Femoral Compress C     Right Distal Femoral Compress C     Right Popliteal Spont Y     Right Popliteal Phasic Y     Right Popliteal Augment Y     Right Popliteal Competent Y     Right Popliteal Compress C     Right Posterior Tibial Compress C     Right Peroneal Compress C     Right GastronemiusSoleal Compress N     Right GastronemiusSoleal Thrombus A     Right Greater Saph AK Compress C     Right Greater Saph BK Compress C     Left Common Femoral Spont Y     Left Common Femoral Phasic Y     Left Common Femoral Augment Y     Left Common Femoral Competent Y     Left Common Femoral Compress C     Left Saphenofemoral Junction Compress C     Left Profunda Femoral Compress C     Left Proximal Femoral Compress C  " "   Left Mid Femoral Spont Y     Left Mid Femoral Phasic Y     Left Mid Femoral Augment Y     Left Mid Femoral Competent Y     Left Mid Femoral Compress C     Left Distal Femoral Compress C     Left Popliteal Spont Y     Left Popliteal Phasic Y     Left Popliteal Augment Y     Left Popliteal Competent Y     Left Popliteal Compress C     Left Posterior Tibial Compress C     Left Peroneal Compress C     Left GastronemiusSoleal Compress C     Left Greater Saph AK Compress C     Left Greater Saph BK Compress C          The following portions of the patient's history were reviewed and updated as appropriate: allergies, current medications, past social history and problem list.    Review of Systems   Constitutional: Negative for fever.   Respiratory: Negative for cough and shortness of breath.    Cardiovascular: Negative for chest pain.   Gastrointestinal: Negative for abdominal pain.   Neurological: Negative for dizziness.       Objective   /78 (BP Location: Left arm, Patient Position: Sitting)   Pulse 86   Temp 98.6 °F (37 °C)   Ht 182.9 cm (72\")   Wt 105 kg (232 lb)   SpO2 98%   BMI 31.46 kg/m²   Physical Exam   Constitutional: He is oriented to person, place, and time. Vital signs are normal. He appears well-developed and well-nourished. No distress.   HENT:   Head: Normocephalic.   Cardiovascular: Normal rate, regular rhythm and normal heart sounds.   Pulmonary/Chest: Effort normal and breath sounds normal.   Neurological: He is alert and oriented to person, place, and time. Gait normal.   Psychiatric: He has a normal mood and affect. His behavior is normal. Judgment and thought content normal.   Vitals reviewed.      Assessment/Plan      Diagnosis Plan   1. Essential hypertension     2. Adult hypothyroidism  TSH   3. Gastroesophageal reflux disease with esophagitis     4. Screening for ischemic heart disease  Lipid Panel With LDL / HDL Ratio   5. Special screening for malignant neoplasm of prostate  PSA " Screen   6. Screening for diabetes mellitus  Comprehensive Metabolic Panel     Follow up after labs   Cont same with ian Simpson, SANTA  2/11/2020

## 2020-02-12 LAB
ALBUMIN SERPL-MCNC: 4.3 G/DL (ref 3.5–5.2)
ALBUMIN/GLOB SERPL: 1.9 G/DL
ALP SERPL-CCNC: 75 U/L (ref 39–117)
ALT SERPL-CCNC: 12 U/L (ref 1–41)
AST SERPL-CCNC: 15 U/L (ref 1–40)
BILIRUB SERPL-MCNC: 0.5 MG/DL (ref 0.2–1.2)
BUN SERPL-MCNC: 14 MG/DL (ref 8–23)
BUN/CREAT SERPL: 10.5 (ref 7–25)
CALCIUM SERPL-MCNC: 9.3 MG/DL (ref 8.6–10.5)
CHLORIDE SERPL-SCNC: 99 MMOL/L (ref 98–107)
CHOLEST SERPL-MCNC: 199 MG/DL (ref 0–200)
CO2 SERPL-SCNC: 26.9 MMOL/L (ref 22–29)
CREAT SERPL-MCNC: 1.33 MG/DL (ref 0.76–1.27)
GLOBULIN SER CALC-MCNC: 2.3 GM/DL
GLUCOSE SERPL-MCNC: 108 MG/DL (ref 65–99)
HDLC SERPL-MCNC: 56 MG/DL (ref 40–60)
LDLC SERPL CALC-MCNC: 123 MG/DL (ref 0–100)
LDLC/HDLC SERPL: 2.19 {RATIO}
POTASSIUM SERPL-SCNC: 4.1 MMOL/L (ref 3.5–5.2)
PROT SERPL-MCNC: 6.6 G/DL (ref 6–8.5)
PSA SERPL-MCNC: 0.64 NG/ML (ref 0–4)
SODIUM SERPL-SCNC: 139 MMOL/L (ref 136–145)
TRIGL SERPL-MCNC: 102 MG/DL (ref 0–150)
TSH SERPL DL<=0.005 MIU/L-ACNC: 1.09 UIU/ML (ref 0.27–4.2)
VLDLC SERPL CALC-MCNC: 20.4 MG/DL

## 2020-02-18 ENCOUNTER — TELEPHONE (OUTPATIENT)
Dept: FAMILY MEDICINE CLINIC | Facility: CLINIC | Age: 70
End: 2020-02-18

## 2020-02-18 NOTE — TELEPHONE ENCOUNTER
Informed patient of his most recent lab results. He is curretnly scheduled for a follow up in mid May, should pt repeat labs sooner and if not, will labs be drawn on May appointment? Pt is needing to know his A1C result? He would also like to know what diet would be best for him to lower his cholesterol? Please advise.

## 2020-04-10 RX ORDER — MELOXICAM 15 MG/1
15 TABLET ORAL DAILY
Qty: 30 TABLET | Refills: 1 | Status: SHIPPED | OUTPATIENT
Start: 2020-04-10 | End: 2020-06-01

## 2020-05-20 ENCOUNTER — TELEPHONE (OUTPATIENT)
Dept: NEUROSURGERY | Facility: CLINIC | Age: 70
End: 2020-05-20

## 2020-05-20 RX ORDER — METHYLPREDNISOLONE 4 MG/1
TABLET ORAL
Qty: 1 EACH | Refills: 0 | Status: SHIPPED | OUTPATIENT
Start: 2020-05-20 | End: 2020-06-01

## 2020-05-20 NOTE — TELEPHONE ENCOUNTER
We can try a Medrol Dosepak.  If he is really having true numbness in his toes make sure he is not having any numbness in his groin as long as there is just a little numbness in his toes and it is not radiating down his legs we can just try the Medrol Dosepak and do an MRI if it does not help.  If he is having any question of numbness in his groin or down his legs then he needs an MRI and the Medrol Dosepak

## 2020-05-20 NOTE — TELEPHONE ENCOUNTER
Patient had left L4-5 Laminectomy, foraminotomies, medial facetectomy, neural lysis and discectomy back on 10/18/2019.

## 2020-05-20 NOTE — TELEPHONE ENCOUNTER
Called the patient he is having mostly just pain, he has some slight numbness in his toes but no where else. The pain is located in his back. He does not have any numbness in his groin or legs. MDP sent to pharmacy. He will call when he finishes if he is no better.

## 2020-05-20 NOTE — TELEPHONE ENCOUNTER
Pt was opening a window and strained his lower back, toes are numb, pt is taking Tylenol and Aleve...plz call pt [phone].

## 2020-06-01 ENCOUNTER — OFFICE VISIT (OUTPATIENT)
Dept: FAMILY MEDICINE CLINIC | Facility: CLINIC | Age: 70
End: 2020-06-01

## 2020-06-01 VITALS
DIASTOLIC BLOOD PRESSURE: 70 MMHG | WEIGHT: 220 LBS | HEIGHT: 72 IN | OXYGEN SATURATION: 99 % | HEART RATE: 77 BPM | SYSTOLIC BLOOD PRESSURE: 126 MMHG | BODY MASS INDEX: 29.8 KG/M2

## 2020-06-01 DIAGNOSIS — S39.011A STRAIN OF ABDOMINAL WALL, INITIAL ENCOUNTER: Primary | ICD-10-CM

## 2020-06-01 PROCEDURE — 99213 OFFICE O/P EST LOW 20 MIN: CPT | Performed by: NURSE PRACTITIONER

## 2020-06-01 NOTE — PROGRESS NOTES
"Subjective   Marin Cuenca is a 70 y.o. male.     History of Present Illness   May 16th was pushing up on a heavy window and felt a sprain in lower abdomin and groin area.  Pain is located saloni lower abdomin and no testicular pain at all.  No meds, heat or ice tried.  Has not seen any lumps or anything abnormal.   The following portions of the patient's history were reviewed and updated as appropriate: allergies, current medications, past social history and problem list.    Review of Systems   Genitourinary:        Possible hernia    All other systems reviewed and are negative.      Objective   /70 (BP Location: Left arm, Patient Position: Sitting)   Pulse 77   Ht 182.9 cm (72.01\")   Wt 99.8 kg (220 lb)   SpO2 99%   BMI 29.83 kg/m²   Physical Exam   Constitutional: He is oriented to person, place, and time. Vital signs are normal. He appears well-developed and well-nourished. No distress.   HENT:   Head: Normocephalic.   Cardiovascular: Normal rate, regular rhythm and normal heart sounds.   Pulmonary/Chest: Effort normal and breath sounds normal.   Neurological: He is alert and oriented to person, place, and time. Gait normal.   Psychiatric: He has a normal mood and affect. His behavior is normal. Judgment and thought content normal.   Vitals reviewed.      Assessment/Plan      Diagnosis Plan   1. Strain of abdominal wall, initial encounter       mobic daily  Ice daily  Rest with no movements that cause ab strain  rto if not better   Kash Simpson, APRN  6/1/2020  "

## 2020-06-25 DIAGNOSIS — M54.16 LUMBAR RADICULOPATHY: ICD-10-CM

## 2020-06-29 NOTE — TELEPHONE ENCOUNTER
Pt called again wondering why this hasn't been filled. Pt informed Kash has been out of the office. He would like a call when this is done

## 2020-06-30 RX ORDER — PREGABALIN 100 MG/1
CAPSULE ORAL
Qty: 270 CAPSULE | Refills: 1 | Status: SHIPPED | OUTPATIENT
Start: 2020-06-30 | End: 2020-11-13 | Stop reason: SDUPTHER

## 2020-08-26 ENCOUNTER — TELEPHONE (OUTPATIENT)
Dept: FAMILY MEDICINE CLINIC | Facility: CLINIC | Age: 70
End: 2020-08-26

## 2020-08-26 NOTE — TELEPHONE ENCOUNTER
I don;t want to do this anymore.  The patients can come in not fasting and then we can make a lab appointment after their visit.  This is generating too much unneeded wrok

## 2020-08-26 NOTE — TELEPHONE ENCOUNTER
We had to reschedule patients appointment to an afternoon appointment. Patient is wondering if we can place an order to get some blood work done beforehand so he doesn't have to fast till the afternoon. If that is ok please enter an order and we will get him scheduled.

## 2020-09-03 ENCOUNTER — OFFICE VISIT (OUTPATIENT)
Dept: FAMILY MEDICINE CLINIC | Facility: CLINIC | Age: 70
End: 2020-09-03

## 2020-09-03 VITALS
TEMPERATURE: 97.7 F | HEIGHT: 72 IN | BODY MASS INDEX: 31.15 KG/M2 | SYSTOLIC BLOOD PRESSURE: 124 MMHG | DIASTOLIC BLOOD PRESSURE: 70 MMHG | WEIGHT: 230 LBS | HEART RATE: 70 BPM | OXYGEN SATURATION: 98 %

## 2020-09-03 DIAGNOSIS — K21.00 GASTROESOPHAGEAL REFLUX DISEASE WITH ESOPHAGITIS: ICD-10-CM

## 2020-09-03 DIAGNOSIS — E03.9 ADULT HYPOTHYROIDISM: ICD-10-CM

## 2020-09-03 DIAGNOSIS — I10 ESSENTIAL HYPERTENSION: Primary | ICD-10-CM

## 2020-09-03 DIAGNOSIS — R73.03 PREDIABETES: ICD-10-CM

## 2020-09-03 DIAGNOSIS — N40.1 BENIGN NON-NODULAR PROSTATIC HYPERPLASIA WITH LOWER URINARY TRACT SYMPTOMS: ICD-10-CM

## 2020-09-03 PROCEDURE — 99214 OFFICE O/P EST MOD 30 MIN: CPT | Performed by: NURSE PRACTITIONER

## 2020-09-03 RX ORDER — FINASTERIDE 5 MG/1
5 TABLET, FILM COATED ORAL EVERY MORNING
Qty: 90 TABLET | Refills: 3 | Status: SHIPPED | OUTPATIENT
Start: 2020-09-03 | End: 2021-09-24 | Stop reason: SDUPTHER

## 2020-09-03 NOTE — PROGRESS NOTES
"Subjective   Marin Cuenca is a 70 y.o. male.     History of Present Illness    Since the last visit, he has overall felt well.  He has Essential Hypertension and well controlled on current medication, GERD controlled on PPI Rx, Hypothyroidism well controlled on current medication and will continue Rx and BPH well controlled, sugar getting checked today.  he has been compliant with current medications have reviewed them.  The patient denies medication side effects.  Will refill medications. /70   Pulse 70   Temp 97.7 °F (36.5 °C)   Ht 182.9 cm (72.01\")   Wt 104 kg (230 lb)   SpO2 98%   BMI 31.19 kg/m²     Results for orders placed or performed in visit on 02/11/20   Comprehensive Metabolic Panel   Result Value Ref Range    Glucose 108 (H) 65 - 99 mg/dL    BUN 14 8 - 23 mg/dL    Creatinine 1.33 (H) 0.76 - 1.27 mg/dL    eGFR Non African Am 53 (L) >60 mL/min/1.73    eGFR African Am 64 >60 mL/min/1.73    BUN/Creatinine Ratio 10.5 7.0 - 25.0    Sodium 139 136 - 145 mmol/L    Potassium 4.1 3.5 - 5.2 mmol/L    Chloride 99 98 - 107 mmol/L    Total CO2 26.9 22.0 - 29.0 mmol/L    Calcium 9.3 8.6 - 10.5 mg/dL    Total Protein 6.6 6.0 - 8.5 g/dL    Albumin 4.30 3.50 - 5.20 g/dL    Globulin 2.3 gm/dL    A/G Ratio 1.9 g/dL    Total Bilirubin 0.5 0.2 - 1.2 mg/dL    Alkaline Phosphatase 75 39 - 117 U/L    AST (SGOT) 15 1 - 40 U/L    ALT (SGPT) 12 1 - 41 U/L   Lipid Panel With LDL / HDL Ratio   Result Value Ref Range    Total Cholesterol 199 0 - 200 mg/dL    Triglycerides 102 0 - 150 mg/dL    HDL Cholesterol 56 40 - 60 mg/dL    VLDL Cholesterol 20.4 mg/dL    LDL Cholesterol  123 (H) 0 - 100 mg/dL    LDL/HDL Ratio 2.19    PSA Screen   Result Value Ref Range    PSA 0.637 0.000 - 4.000 ng/mL   TSH   Result Value Ref Range    TSH 1.090 0.270 - 4.200 uIU/mL         The following portions of the patient's history were reviewed and updated as appropriate: allergies, current medications, past social history and problem " "list.    Review of Systems   Constitutional: Negative for fever.   Respiratory: Negative for cough and shortness of breath.    Cardiovascular: Negative for chest pain.   Gastrointestinal: Negative for abdominal pain.   Neurological: Negative for dizziness.       Objective   /70   Pulse 70   Temp 97.7 °F (36.5 °C)   Ht 182.9 cm (72.01\")   Wt 104 kg (230 lb)   SpO2 98%   BMI 31.19 kg/m²   Physical Exam   Constitutional: He is oriented to person, place, and time. Vital signs are normal. He appears well-developed and well-nourished. No distress.   HENT:   Head: Normocephalic.   Cardiovascular: Normal rate, regular rhythm and normal heart sounds.   Pulmonary/Chest: Effort normal and breath sounds normal.   Neurological: He is alert and oriented to person, place, and time. Gait normal.   Psychiatric: He has a normal mood and affect. His behavior is normal. Judgment and thought content normal.   Vitals reviewed.      Assessment/Plan      Diagnosis Plan   1. Essential hypertension     2. Adult hypothyroidism     3. Gastroesophageal reflux disease with esophagitis     4. Benign non-nodular prostatic hyperplasia with lower urinary tract symptoms  finasteride (PROSCAR) 5 MG tablet   5. Prediabetes  Hemoglobin A1c     Cont same with meds  rto in 6 faraz Simpson, APRN  9/3/2020  "

## 2020-09-04 LAB — HBA1C MFR BLD: 5.6 % (ref 4.8–5.6)

## 2020-09-08 ENCOUNTER — TELEPHONE (OUTPATIENT)
Dept: FAMILY MEDICINE CLINIC | Facility: CLINIC | Age: 70
End: 2020-09-08

## 2020-09-08 DIAGNOSIS — K21.00 GASTROESOPHAGEAL REFLUX DISEASE WITH ESOPHAGITIS: ICD-10-CM

## 2020-09-08 DIAGNOSIS — E03.9 ACQUIRED HYPOTHYROIDISM: ICD-10-CM

## 2020-09-08 RX ORDER — ESOMEPRAZOLE MAGNESIUM 40 MG/1
40 CAPSULE, DELAYED RELEASE ORAL 2 TIMES DAILY
Qty: 180 CAPSULE | Refills: 3 | Status: SHIPPED | OUTPATIENT
Start: 2020-09-08 | End: 2020-09-08 | Stop reason: SDUPTHER

## 2020-09-08 RX ORDER — LEVOTHYROXINE SODIUM 0.1 MG/1
100 TABLET ORAL DAILY
Qty: 90 TABLET | Refills: 3 | Status: SHIPPED | OUTPATIENT
Start: 2020-09-08 | End: 2020-11-13 | Stop reason: SDUPTHER

## 2020-09-08 RX ORDER — ESOMEPRAZOLE MAGNESIUM 40 MG/1
40 CAPSULE, DELAYED RELEASE ORAL 2 TIMES DAILY
Qty: 180 CAPSULE | Refills: 3 | Status: SHIPPED | OUTPATIENT
Start: 2020-09-08 | End: 2020-11-13 | Stop reason: SDUPTHER

## 2020-09-08 NOTE — TELEPHONE ENCOUNTER
Patient requesting two medication refills: levothyroxine 100 mcg to local [facility] and ninety day supply of  esomeprazole to [facility] please

## 2020-09-08 NOTE — TELEPHONE ENCOUNTER
Patient is aware of lab results. Mailed a copy of most recent to patient's residence. Mail out on 9/9/2020.

## 2020-10-02 ENCOUNTER — TELEPHONE (OUTPATIENT)
Dept: NEUROSURGERY | Facility: CLINIC | Age: 70
End: 2020-10-02

## 2020-10-02 NOTE — TELEPHONE ENCOUNTER
Called pt needing to know additional information on pain symptoms. Medications tried or taking? Which hip/leg?

## 2020-10-02 NOTE — TELEPHONE ENCOUNTER
THE PATIENT CALLED REGARDING THE PHYSICAL THERAPY HE WAS REFERRED TO TO IMPROVE HIS BALANCE. HE HAD THREE APPOINTMENTS IN July 2020 AND STATED THAT IT WAS 'A DISASTER.' HE HAD INCREASED HIP PAIN AFTER THE PHYSICAL THERAPY AND WANTED TO BE CALLED TO DISCUSS WHAT HAPPENED. RATES THE PAIN AS A 9/10 AT NIGHT, HE STATES THAT HE DOESN'T HAVE THE HIP PAIN DURING THE DAY, IT'S ONLY WHEN HE'S LYING DOWN.     PLEASE ADVISE. THE PATIENT CAN BE CONTACTED -[phone].    THANK YOU!

## 2020-10-12 ENCOUNTER — APPOINTMENT (OUTPATIENT)
Dept: SLEEP MEDICINE | Facility: HOSPITAL | Age: 70
End: 2020-10-12

## 2020-11-13 ENCOUNTER — OFFICE VISIT (OUTPATIENT)
Dept: FAMILY MEDICINE CLINIC | Facility: CLINIC | Age: 70
End: 2020-11-13

## 2020-11-13 VITALS
BODY MASS INDEX: 30.58 KG/M2 | WEIGHT: 225.8 LBS | HEIGHT: 72 IN | TEMPERATURE: 98.2 F | OXYGEN SATURATION: 98 % | DIASTOLIC BLOOD PRESSURE: 82 MMHG | HEART RATE: 123 BPM | SYSTOLIC BLOOD PRESSURE: 140 MMHG

## 2020-11-13 DIAGNOSIS — E03.9 ACQUIRED HYPOTHYROIDISM: ICD-10-CM

## 2020-11-13 DIAGNOSIS — M54.16 LUMBAR RADICULOPATHY: ICD-10-CM

## 2020-11-13 DIAGNOSIS — A08.4 VIRAL GASTROENTERITIS: Primary | ICD-10-CM

## 2020-11-13 DIAGNOSIS — I10 ESSENTIAL HYPERTENSION: ICD-10-CM

## 2020-11-13 DIAGNOSIS — K21.00 GASTROESOPHAGEAL REFLUX DISEASE WITH ESOPHAGITIS: ICD-10-CM

## 2020-11-13 PROCEDURE — 99214 OFFICE O/P EST MOD 30 MIN: CPT | Performed by: NURSE PRACTITIONER

## 2020-11-13 RX ORDER — IRBESARTAN 75 MG/1
75 TABLET ORAL DAILY
Qty: 90 TABLET | Refills: 3 | Status: SHIPPED | OUTPATIENT
Start: 2020-11-13 | End: 2021-09-24 | Stop reason: SDUPTHER

## 2020-11-13 RX ORDER — LEVOTHYROXINE SODIUM 0.1 MG/1
100 TABLET ORAL DAILY
Qty: 90 TABLET | Refills: 3 | Status: SHIPPED | OUTPATIENT
Start: 2020-11-13 | End: 2021-09-24 | Stop reason: SDUPTHER

## 2020-11-13 RX ORDER — ESOMEPRAZOLE MAGNESIUM 40 MG/1
40 CAPSULE, DELAYED RELEASE ORAL 2 TIMES DAILY
Qty: 180 CAPSULE | Refills: 3 | Status: SHIPPED | OUTPATIENT
Start: 2020-11-13 | End: 2021-09-24 | Stop reason: SDUPTHER

## 2020-11-13 RX ORDER — PREGABALIN 100 MG/1
100 CAPSULE ORAL 3 TIMES DAILY
Qty: 270 CAPSULE | Refills: 0 | Status: SHIPPED | OUTPATIENT
Start: 2020-11-13 | End: 2021-02-09

## 2020-11-13 NOTE — PROGRESS NOTES
"Subjective   Marin Cuenca is a 70 y.o. male.   Chief Complaint   Patient presents with   • vomiting/diarrhea     Since Monday patient has had vomiting and diarrhea he said the vomiting has stopped but the diarrhea is still continuing. He said his gas and stool has a very strong odor.  He does have history of c-diff  ( wore mask and goggles)    • Heartburn     Patient is needing his esomeprazole and pregablin refilled to [facility] he is needing his irbesartan and levothyroxine sent to [facility]        History of Present Illness   Vomiting and diarrhea started Monday after eating hamburger that had been in frig for 2 weeks.  Last time vomited was Monday night, last time had diarrhea was last night. No longer having any abdominal pain but still feels a little weak.  Over feeling 90% better.     Since the last visit, he has overall felt fairly well.  He has Essential Hypertension and well controlled on current medication, GERD controlled on PPI Rx, Hypothyroidism well controlled on current medication and will continue Rx and pain controlled .  he has been compliant with current medications have reviewed them.  The patient denies medication side effects.  Will refill medications. /82   Pulse (!) 123   Temp 98.2 °F (36.8 °C)   Ht 182.9 cm (72.01\")   Wt 102 kg (225 lb 12.8 oz)   SpO2 98%   BMI 30.62 kg/m²     Results for orders placed or performed in visit on 09/03/20   Hemoglobin A1c    Specimen: Blood   Result Value Ref Range    Hemoglobin A1C 5.60 4.80 - 5.60 %         The following portions of the patient's history were reviewed and updated as appropriate: allergies, current medications, past social history and problem list.    Review of Systems   Gastrointestinal: Positive for diarrhea and vomiting.   All other systems reviewed and are negative.      Objective   /82   Pulse (!) 123   Temp 98.2 °F (36.8 °C)   Ht 182.9 cm (72.01\")   Wt 102 kg (225 lb 12.8 oz)   SpO2 98%   BMI 30.62 kg/m² "   Physical Exam  Vitals signs reviewed.   Constitutional:       General: He is not in acute distress.     Appearance: He is well-developed.   HENT:      Head: Normocephalic.   Cardiovascular:      Rate and Rhythm: Normal rate and regular rhythm.      Heart sounds: Normal heart sounds.   Pulmonary:      Effort: Pulmonary effort is normal.      Breath sounds: Normal breath sounds.   Neurological:      Mental Status: He is alert and oriented to person, place, and time.      Gait: Gait normal.   Psychiatric:         Behavior: Behavior normal.         Thought Content: Thought content normal.         Judgment: Judgment normal.       The patient has read and signed the [facility] Controlled Substance Contract.  I will continue to see patient for regular follow up appointments.  They are well controlled on their medication.  TUCKER has been reviewed by me and is updated every 3 months. The patient is aware of the potential for addiction and dependence.    Assessment/Plan      Diagnosis Plan   1. Viral gastroenteritis     2. Lumbar radiculopathy  pregabalin (LYRICA) 100 MG capsule   3. Gastroesophageal reflux disease with esophagitis  esomeprazole (nexIUM) 40 MG capsule   4. Essential hypertension  irbesartan (AVAPRO) 75 MG tablet   5. Acquired hypothyroidism  levothyroxine (SYNTHROID, LEVOTHROID) 100 MCG tablet     Cont same with meds  rto in 3 mons     Mask and googles worn    Kash Simpson, APRN  11/13/2020

## 2020-11-17 ENCOUNTER — TELEPHONE (OUTPATIENT)
Dept: FAMILY MEDICINE CLINIC | Facility: CLINIC | Age: 70
End: 2020-11-17

## 2020-11-17 NOTE — TELEPHONE ENCOUNTER
Pt is currently on the schedule for tomorrow, abdominal pain and diarrhea has worsened. Patient mentioned that he knows someone that only had the symptom of diarrhea and tested positive for COVID. Patient would like Kash's opinion on whether a COVID test should be ordered?

## 2020-11-18 ENCOUNTER — TELEMEDICINE (OUTPATIENT)
Dept: FAMILY MEDICINE CLINIC | Facility: CLINIC | Age: 70
End: 2020-11-18

## 2020-11-18 VITALS — BODY MASS INDEX: 30.48 KG/M2 | WEIGHT: 225 LBS | HEIGHT: 72 IN

## 2020-11-18 DIAGNOSIS — Z20.822 CLOSE EXPOSURE TO COVID-19 VIRUS: Primary | ICD-10-CM

## 2020-11-18 PROCEDURE — 99213 OFFICE O/P EST LOW 20 MIN: CPT | Performed by: NURSE PRACTITIONER

## 2020-11-18 NOTE — PROGRESS NOTES
"Subjective   Marin Cuenca is a 70 y.o. male.   Chief Complaint   Patient presents with   • covid symptoms     Patient is wanting to get a covid test he has diarrhea, abdominal pain, and weakness/fatigue        History of Present Illness   This was an audio and video enabled telemedicine encounter.    Patient presenting to the office today with concerns regarding abdominal pain, diarrhea, fatigue, loss of taste and smell that started 5 days ago.  He also reports a little bit of labored breathing but it is manageable at this time no productive cough.  No headache nor sore throat.  No fever.    The following portions of the patient's history were reviewed and updated as appropriate: allergies, current medications, past social history and problem list.    Review of Systems   Constitutional: Positive for fatigue.   Gastrointestinal: Positive for abdominal pain and diarrhea.       Objective   Ht 182.9 cm (72.01\")   Wt 102 kg (225 lb)   BMI 30.51 kg/m²   Physical Exam  Unable to obtain due to tele health uncounter  Assessment/Plan      Diagnosis Plan   1. Close exposure to COVID-19 virus  COVID-19,[facility] ROUTINE, NP/OP SWAB IN TRANSPORT MEDIA OR ESWAB 72 HR TAT - Swab, Nasopharynx     Continue to quarantine go to the urgent care center for Covid testing follow-up after that.    Visit lasted 15 mins     SANTA Kramer  11/18/2020  "

## 2020-11-23 ENCOUNTER — APPOINTMENT (OUTPATIENT)
Dept: SLEEP MEDICINE | Facility: HOSPITAL | Age: 70
End: 2020-11-23

## 2020-12-04 ENCOUNTER — OFFICE VISIT (OUTPATIENT)
Dept: FAMILY MEDICINE CLINIC | Facility: CLINIC | Age: 70
End: 2020-12-04

## 2020-12-04 VITALS
DIASTOLIC BLOOD PRESSURE: 74 MMHG | BODY MASS INDEX: 31.26 KG/M2 | HEART RATE: 84 BPM | SYSTOLIC BLOOD PRESSURE: 130 MMHG | WEIGHT: 230.8 LBS | OXYGEN SATURATION: 99 % | HEIGHT: 72 IN | TEMPERATURE: 97.8 F

## 2020-12-04 DIAGNOSIS — L08.9 SKIN INFECTION: Primary | ICD-10-CM

## 2020-12-04 PROCEDURE — 99213 OFFICE O/P EST LOW 20 MIN: CPT | Performed by: NURSE PRACTITIONER

## 2020-12-04 RX ORDER — CEPHALEXIN 500 MG/1
500 CAPSULE ORAL 3 TIMES DAILY
Qty: 30 CAPSULE | Refills: 0 | Status: SHIPPED | OUTPATIENT
Start: 2020-12-04 | End: 2021-03-11 | Stop reason: HOSPADM

## 2020-12-04 NOTE — PROGRESS NOTES
"Subjective   Marin Cuenca is a 70 y.o. male.   Chief Complaint   Patient presents with   • stubbed toe     Patient is wanting his left great toe checked due to stubbing it ( wore mask and goggles)        History of Present Illness   Patient presenting to the office today with concerns regarding his toe.  About 2 weeks ago he was walking up the stairs and how shoes that he probably should have been wearing carrying laundry and he stubbed his left great toe on the stairs.  It has been okay but for the past 3 to 4 days it has become more red and also swollen.  Prior to this back in July he had an ingrown toenail corrected from his podiatrist.    The following portions of the patient's history were reviewed and updated as appropriate: allergies, current medications, past social history and problem list.    Review of Systems   Gastrointestinal: Positive for diarrhea.   Skin: Positive for wound.   All other systems reviewed and are negative.      Objective   /74   Pulse 84   Temp 97.8 °F (36.6 °C)   Ht 182.9 cm (72.01\")   Wt 105 kg (230 lb 12.8 oz)   SpO2 99%   BMI 31.30 kg/m²   Physical Exam  Vitals signs reviewed.   Constitutional:       General: He is not in acute distress.     Appearance: He is well-developed.   HENT:      Head: Normocephalic.   Cardiovascular:      Rate and Rhythm: Normal rate and regular rhythm.      Heart sounds: Normal heart sounds.   Pulmonary:      Effort: Pulmonary effort is normal.      Breath sounds: Normal breath sounds.   Neurological:      Mental Status: He is alert and oriented to person, place, and time.      Gait: Gait normal.   Psychiatric:         Behavior: Behavior normal.         Thought Content: Thought content normal.         Judgment: Judgment normal.     Left great toe is swollen with some redness no drainage or discharge from the area but I am concerned for possible infection due to the redness.  He states its not that painful.  Toenail is intact  Assessment/Plan "      Diagnosis Plan   1. Skin infection  cephalexin (Keflex) 500 MG capsule     Can have him start Keflex 3 times a day for 10 days if this does not improve I do believe it would be good for him to follow-up with his podiatrist if he is unable to get in with his podiatrist he can come back here.  Patient verbalized understanding.    Mask and googles worn    Kash Simpson, SANTA  12/4/2020

## 2021-01-25 RX ORDER — METHYLPREDNISOLONE 4 MG/1
TABLET ORAL
Qty: 21 TABLET | Refills: 0 | Status: SHIPPED | OUTPATIENT
Start: 2021-01-25 | End: 2021-03-11 | Stop reason: HOSPADM

## 2021-01-27 ENCOUNTER — TELEPHONE (OUTPATIENT)
Dept: NEUROSURGERY | Facility: CLINIC | Age: 71
End: 2021-01-27

## 2021-01-27 NOTE — TELEPHONE ENCOUNTER
Okay with me to schedule him for an appointment.  There is not much we can do until we see him.  If he simply cannot stand it he can always go to the emergency room.

## 2021-01-27 NOTE — TELEPHONE ENCOUNTER
Spoke with patient and advised that we have him scheduled on 02.23.21 with Dr. Meneses and advised him that Dr. Meneses said if the pain becomes too much, then Mr. Cuenca should go to the ER.

## 2021-01-27 NOTE — TELEPHONE ENCOUNTER
Provider: DR. VIDES  Caller: PATIENT  Reason for Call: PATIENT/CAREGIVER CALLED REQUESTING MEDICAL ADVICE/APPOINTMENT DUE TO RECENT ONSET OF SYMPTOMS OCCURRING FOR THE PAST 10 DAYS. SYMPTOMS ARE DESCRIBED AS SCIATIC PAIN CREATING SPASM SEVERAL TIMES A DAY CREATING WEAKNESS IN LEFT LEG & CAUSING NUMBNESS IN THE LEFT FOOT WHICH RESULTS IN LIMPING. SYMPTOMS APPEARED 10 DAYS AGO AFTER PATIENT ATTEMPTED TO ASSIST WITH GROCERIES & AFTER HE BENT OVER & RAISED UP STRAIGHT, HE FELT A SHARP PAIN THAT HASN'T RESOLVED SINCE. THERE IS NO CURRENT IMAGING IN CHART AT TIME OF CALL BUT PATIENT COMPLETED AN XR ON 1/23 AT [facility]. PATIENT IS CURRENTLY TAKING MED TO MANAGE PAIN BUT WILL RUN OUT TOMORROW. PER LAST OFFICE NOTE, PATIENT CONSIDERED A PRN. Office Visit with Shilpa Yadav APRN (01/20/2020) PATIENT INQUIRING IF HE NEEDS TO CONTINUE WITH HIS CHIROPRACTOR, DR. ANDREW URBINA OR IF HE NEEDS TO RETURN TO [facility] WITH A NEW IMAGING? PATIENT HAD SURGERY IN OCT 2019. ATTEMPTED TO CONTACT [facility] [phone] TWICE TO OBTAIN XR REPORT BUT UNABLE TO CONNECT TO OFFICE AT THIS TIME. PLEASE ADVISE HOW PROVIDER WOULD LIKE TO PROCEED?    When was the patient last seen: 01/20/21  When did it start: 10 DAYS AGO  Where is it located: LOWER BACK, SCIATIC AREA, LEFT LEG/FOOT  Characteristics of symptom/severity: PAIN, SPASMS, NUMBNESS, WEAKNESS  Timing- Is it constant or intermittent: CONSTANT  What makes it worse: AT NIGHT  What therapies/medications have you tried: methylPREDNISolone (MEDROL) 4 MG dose pack     PATIENT CAN BE CONTACTED AT  [phone] WITH FURTHER INSTRUCTIONS.

## 2021-01-28 ENCOUNTER — TELEPHONE (OUTPATIENT)
Dept: NEUROSURGERY | Facility: CLINIC | Age: 71
End: 2021-01-28

## 2021-01-28 DIAGNOSIS — G89.29 CHRONIC LOW BACK PAIN WITH SCIATICA, SCIATICA LATERALITY UNSPECIFIED, UNSPECIFIED BACK PAIN LATERALITY: Primary | ICD-10-CM

## 2021-01-28 DIAGNOSIS — M54.40 CHRONIC LOW BACK PAIN WITH SCIATICA, SCIATICA LATERALITY UNSPECIFIED, UNSPECIFIED BACK PAIN LATERALITY: Primary | ICD-10-CM

## 2021-01-28 NOTE — TELEPHONE ENCOUNTER
Pt has an appointment scheduled for 2/23/21 he is having numbness in L foot and pain and spasms in L leg. He would like to know if he can have a MRI ordered prior to this visit.     He had L4-L5 laminectomy 10/18/19.

## 2021-02-09 DIAGNOSIS — M54.16 LUMBAR RADICULOPATHY: ICD-10-CM

## 2021-02-09 RX ORDER — PREGABALIN 100 MG/1
CAPSULE ORAL
Qty: 90 CAPSULE | Refills: 0 | Status: SHIPPED | OUTPATIENT
Start: 2021-02-09 | End: 2021-02-22 | Stop reason: SDUPTHER

## 2021-02-09 RX ORDER — PREGABALIN 100 MG/1
100 CAPSULE ORAL 3 TIMES DAILY
Qty: 270 CAPSULE | Refills: 0 | Status: CANCELLED | OUTPATIENT
Start: 2021-02-09

## 2021-02-09 NOTE — TELEPHONE ENCOUNTER
Patient last evaluated on 12/4/2020. Pt requesting pregabalin (Lyrica) 100 mg capsule to [facility].

## 2021-02-12 ENCOUNTER — HOSPITAL ENCOUNTER (OUTPATIENT)
Dept: MRI IMAGING | Facility: HOSPITAL | Age: 71
Discharge: HOME | End: 2021-02-12
Admitting: NEUROLOGICAL SURGERY

## 2021-02-12 DIAGNOSIS — M54.40 CHRONIC LOW BACK PAIN WITH SCIATICA, SCIATICA LATERALITY UNSPECIFIED, UNSPECIFIED BACK PAIN LATERALITY: ICD-10-CM

## 2021-02-12 DIAGNOSIS — G89.29 CHRONIC LOW BACK PAIN WITH SCIATICA, SCIATICA LATERALITY UNSPECIFIED, UNSPECIFIED BACK PAIN LATERALITY: ICD-10-CM

## 2021-02-12 LAB — CREAT BLDA-MCNC: 1.1 MG/DL (ref 0.6–1.3)

## 2021-02-12 PROCEDURE — 82565 ASSAY OF CREATININE: CPT

## 2021-02-12 PROCEDURE — 72158 MRI LUMBAR SPINE W/O & W/DYE: CPT

## 2021-02-12 PROCEDURE — A9577 INJ MULTIHANCE: HCPCS | Performed by: NEUROLOGICAL SURGERY

## 2021-02-12 PROCEDURE — 0 GADOBENATE DIMEGLUMINE 529 MG/ML SOLUTION: Performed by: NEUROLOGICAL SURGERY

## 2021-02-12 RX ADMIN — GADOBENATE DIMEGLUMINE 20 ML: 529 INJECTION, SOLUTION INTRAVENOUS at 12:45

## 2021-02-15 ENCOUNTER — APPOINTMENT (OUTPATIENT)
Dept: MRI IMAGING | Facility: HOSPITAL | Age: 71
End: 2021-02-15

## 2021-02-17 NOTE — PROGRESS NOTES
"Subjective   Patient ID: Marin Cuenca is a 70 y.o. male is here today for LBP, numbness, and weakness follow-up with a new MRI Lumbar.    Today patient is low back pain, numbness in the L leg, L leg spasms, and L foot numbness.     Patient, Provider, and MA area all wearing a mask in our office today.     History of Present Illness    This patient returns today.  He is having severe pain and numbness in his left leg.  This began about a month ago and has continued unabated.  It is gotten worse.  He has been treated with some oral steroids but no other treatment so far.  He has no difficulty with bowel bladder control or other associated symptoms.  He has had no other treatment.  He had a left L4-5 laminectomy in October 2019.    The following portions of the patient's history were reviewed and updated as appropriate: allergies, current medications, past family history, past medical history, past social history, past surgical history and problem list.    Review of Systems   Constitutional: Negative for chills and fever.   HENT: Negative for congestion.    Genitourinary: Negative for difficulty urinating and dysuria.   Musculoskeletal: Positive for back pain and gait problem. Negative for neck pain and neck stiffness.   Neurological: Positive for weakness and numbness.       I have reviewed the review of systems as documented by my MA.      Objective     Vitals:    02/18/21 1137   BP: 162/98   Cuff Size: Adult   Pulse: (!) 45   Temp: 97.7 °F (36.5 °C)   Weight: 105 kg (230 lb 12.8 oz)   Height: 182.9 cm (72.01\")     Body mass index is 31.29 kg/m².      Physical Exam  Neurological:      Mental Status: He is alert and oriented to person, place, and time.       Neurologic Exam     Mental Status   Oriented to person, place, and time.           Assessment/Plan   Independent Review of Radiographic Studies:      I personally reviewed the images from the following studies.    I reviewed his MRI of the lumbar spine.  This " was done on 12 February of this year.  This shows improvement in the lateral recess stenosis at L4-5.  The other levels mostly look okay.    Medical Decision Making:      I told the patient I did not see anything here that I would recommend further surgery for but he is very concerned about the numbness that he has.  I recommended that we just go ahead and do a lumbar myelogram.  I told the patient what a myelogram involves.  I explained that there is a 50% chance of developing a bad headache and nausea as a result of the test.  I explained that there is also a very small chance of infection, seizures, and bleeding.  I explained how we would treat a post myelogram headache including bedrest, caffeinated fluids, steroids, and blood patch.  The patient does ask to proceed.    Diagnoses and all orders for this visit:    1. Lumbar radiculopathy (Primary)  -     Obtain Informed Consent; Standing  -     IR Myelogram Lumbar Spine; Future  -     CT Lumbar Spine With Intrathecal Contrast; Future  -     XR Spine Lumbar Complete With Flex & Ext; Future  -     No Lab Testing Needed; Standing  -     dexamethasone (DECADRON) 4 MG tablet; Take 2 tablets by mouth Take As Directed. Take both tablets by mouth 2 hours before myelogram  Dispense: 2 tablet; Refill: 0      Return for After radiology test.

## 2021-02-18 ENCOUNTER — TELEPHONE (OUTPATIENT)
Dept: NEUROSURGERY | Facility: CLINIC | Age: 71
End: 2021-02-18

## 2021-02-18 ENCOUNTER — OFFICE VISIT (OUTPATIENT)
Dept: NEUROSURGERY | Facility: CLINIC | Age: 71
End: 2021-02-18

## 2021-02-18 VITALS
DIASTOLIC BLOOD PRESSURE: 98 MMHG | SYSTOLIC BLOOD PRESSURE: 162 MMHG | HEART RATE: 45 BPM | TEMPERATURE: 97.7 F | WEIGHT: 230.8 LBS | BODY MASS INDEX: 31.26 KG/M2 | HEIGHT: 72 IN

## 2021-02-18 DIAGNOSIS — M54.16 LUMBAR RADICULOPATHY: Primary | ICD-10-CM

## 2021-02-18 PROCEDURE — 99214 OFFICE O/P EST MOD 30 MIN: CPT | Performed by: NEUROLOGICAL SURGERY

## 2021-02-18 RX ORDER — DEXAMETHASONE 4 MG/1
8 TABLET ORAL TAKE AS DIRECTED
Qty: 2 TABLET | Refills: 0 | Status: SHIPPED | OUTPATIENT
Start: 2021-02-18 | End: 2021-03-11 | Stop reason: HOSPADM

## 2021-02-18 NOTE — TELEPHONE ENCOUNTER
Caller: Marin Cuenca    Relationship to patient: Self    Additional notes: PATIENT CALLED BACK, HE IS ON HIS WAY TO DR. VIDES OFFICE. HE WILL BE THERE AS SOON AS HE CAN.

## 2021-02-22 DIAGNOSIS — K21.00 GASTROESOPHAGEAL REFLUX DISEASE WITH ESOPHAGITIS: ICD-10-CM

## 2021-02-22 DIAGNOSIS — M54.16 LUMBAR RADICULOPATHY: ICD-10-CM

## 2021-02-22 RX ORDER — ESOMEPRAZOLE MAGNESIUM 40 MG/1
40 CAPSULE, DELAYED RELEASE ORAL 2 TIMES DAILY
Qty: 180 CAPSULE | Refills: 3 | Status: CANCELLED | OUTPATIENT
Start: 2021-02-22

## 2021-02-22 RX ORDER — PREGABALIN 100 MG/1
100 CAPSULE ORAL 3 TIMES DAILY
Qty: 270 CAPSULE | Refills: 0 | Status: SHIPPED | OUTPATIENT
Start: 2021-02-22 | End: 2021-05-17 | Stop reason: SDUPTHER

## 2021-02-22 NOTE — TELEPHONE ENCOUNTER
Pt is calling because we sent in the wrong amount of his pregabalin, He takes 3 capsules a day but we only sent in a 90 and he needs 270 since he takes it 3 times a day. He would like a new script sent to [facility]

## 2021-02-25 ENCOUNTER — IMMUNIZATION (OUTPATIENT)
Dept: VACCINE CLINIC | Facility: HOSPITAL | Age: 71
End: 2021-02-25

## 2021-02-25 PROCEDURE — 91301 HC SARSCO02 VAC 100MCG/0.5ML IM: CPT | Performed by: INTERNAL MEDICINE

## 2021-02-25 PROCEDURE — 0011A: CPT | Performed by: INTERNAL MEDICINE

## 2021-03-11 ENCOUNTER — HOSPITAL ENCOUNTER (OUTPATIENT)
Dept: CT IMAGING | Facility: HOSPITAL | Age: 71
Discharge: HOME | End: 2021-03-11

## 2021-03-11 ENCOUNTER — HOSPITAL ENCOUNTER (OUTPATIENT)
Dept: GENERAL RADIOLOGY | Facility: HOSPITAL | Age: 71
Discharge: HOME | End: 2021-03-11

## 2021-03-11 VITALS
HEART RATE: 95 BPM | BODY MASS INDEX: 29.8 KG/M2 | WEIGHT: 220 LBS | OXYGEN SATURATION: 98 % | RESPIRATION RATE: 18 BRPM | DIASTOLIC BLOOD PRESSURE: 86 MMHG | SYSTOLIC BLOOD PRESSURE: 131 MMHG | TEMPERATURE: 96.9 F | HEIGHT: 72 IN

## 2021-03-11 DIAGNOSIS — M54.16 LUMBAR RADICULOPATHY: ICD-10-CM

## 2021-03-11 PROCEDURE — A9270 NON-COVERED ITEM OR SERVICE: HCPCS | Performed by: NEUROLOGICAL SURGERY

## 2021-03-11 PROCEDURE — 72240 MYELOGRAPHY NECK SPINE: CPT

## 2021-03-11 PROCEDURE — 72114 X-RAY EXAM L-S SPINE BENDING: CPT

## 2021-03-11 PROCEDURE — 63710000001 HYDROCODONE-ACETAMINOPHEN 5-325 MG TABLET: Performed by: NEUROLOGICAL SURGERY

## 2021-03-11 PROCEDURE — 62304 MYELOGRAPHY LUMBAR INJECTION: CPT

## 2021-03-11 PROCEDURE — 25010000003 LIDOCAINE 1 % SOLUTION: Performed by: NEUROLOGICAL SURGERY

## 2021-03-11 PROCEDURE — 62284 INJECTION FOR MYELOGRAM: CPT | Performed by: NEUROLOGICAL SURGERY

## 2021-03-11 PROCEDURE — 25010000002 TRIMETHOBENZAMIDE PER 200 MG: Performed by: NEUROLOGICAL SURGERY

## 2021-03-11 PROCEDURE — 0 IOPAMIDOL 41 % SOLUTION: Performed by: NEUROLOGICAL SURGERY

## 2021-03-11 PROCEDURE — 72132 CT LUMBAR SPINE W/DYE: CPT

## 2021-03-11 RX ORDER — ACETAMINOPHEN 325 MG/1
650 TABLET ORAL EVERY 4 HOURS PRN
Status: DISCONTINUED | OUTPATIENT
Start: 2021-03-11 | End: 2021-03-12 | Stop reason: HOSPADM

## 2021-03-11 RX ORDER — LIDOCAINE HYDROCHLORIDE 10 MG/ML
10 INJECTION, SOLUTION INFILTRATION; PERINEURAL ONCE
Status: COMPLETED | OUTPATIENT
Start: 2021-03-11 | End: 2021-03-11

## 2021-03-11 RX ORDER — HYDROCODONE BITARTRATE AND ACETAMINOPHEN 5; 325 MG/1; MG/1
1 TABLET ORAL EVERY 4 HOURS PRN
Status: DISCONTINUED | OUTPATIENT
Start: 2021-03-11 | End: 2021-03-12 | Stop reason: HOSPADM

## 2021-03-11 RX ADMIN — HYDROCODONE BITARTRATE AND ACETAMINOPHEN 1 TABLET: 5; 325 TABLET ORAL at 07:59

## 2021-03-11 RX ADMIN — LIDOCAINE HYDROCHLORIDE 2 ML: 10 INJECTION, SOLUTION INFILTRATION; PERINEURAL at 07:02

## 2021-03-11 RX ADMIN — TRIMETHOBENZAMIDE HYDROCHLORIDE 200 MG: 100 INJECTION INTRAMUSCULAR at 10:16

## 2021-03-11 RX ADMIN — IOPAMIDOL 20 ML: 408 INJECTION, SOLUTION INTRATHECAL at 07:05

## 2021-03-11 NOTE — DISCHARGE INSTRUCTIONS
EDUCATION /DISCHARGE INSTRUCTIONS:    A myelogram is a special radiology procedure of the spinal cord, spinal nerves and other related structures.  You will be awake during the examination.  An area of your lower back will be cleansed with an antiseptic solution.  The physician will inject a numbing medication in your lower back.  While your back is numb, a needle will be placed in the lower back area.  A small amount of spinal fluid may be withdrawn and sent to the lab if ordered by your physician. While the needle is in the back, an injection of a contrast material (xray dye) will be given through the needle.  The contrast material will allow the physician to see the spinal cord and spinal nerves.  Once injected, the needle will be removed and a band aid will be placed over the injection site.  The table will be tilted during the process to allow the contrast material to flow to particular areas in the spine.  Following the injection and xrays, you will be taken to the CT scan where more pictures will be taken. After the procedure is finished, the contrast material will be absorbed by your body and eliminated through your kidneys.  The radiologist will study and interpret your myelogram and send the results to your physician.  Procedure risks of a myelogram include, but are not limited to:  *  Bleeding   *  seizure  *  Infection   *  Headache, possibly severe requiring  *  Contrast reaction      a blood patch  *  Nerve or cord injury  *  Paralysis and death  Benefits of the procedure:  *  Best examination for delineating pathology related to spinal cord compression from a    disc and/or nerve root compression  Alternatives to the procedure:  MRI - a non invasive procedure requiring intravenous contrast injection.  Cannot be done on patients with certain pacemakers or metal in the body.  MRI risks include possible reaction to the contrast material, movement of metal located in the body.Benefit to MRI:  Non-invasive  and usually painless procedure.  THIS EDUCATION INFORMATION WAS REVIEWED PRIOR TO PROCEDURE AND CONSENT. Patient initials___DA___Time____0620____    24 hour rest period ends tomorrow, March 12th after 1000 am.  Important information following your myelogram:  * ACTIVITY:   *  Lie down with your head elevated no more than 2 pillows high today & tonight  *  Sit up to eat your meals and use the restroom, otherwise, lie down.  *  Remain less active for two to three days.  *  Do not drive for 48 hours following a myelogram  *  You may remove the bandage and shower in the morning  *  Increase your fluids for the next 24 hours.  Caffeinated drinks are encouraged.  Resume taking or aspirin today    CALL YOUR PHYSICIAN FOR THE FOLLOWING:  * Pain at the injection site  * Reddness, swelling, bruising or drainage at the injection site.  * A fever by mouth of 101.0 or any new symptoms  Headaches are a common side effect after a myelogram.  If you get a headache, you should stay flat in bed and drink plenty of fluids. If the headache persist and does not go away with rest/medication, CALL Dr. Meneses at [phone]

## 2021-03-11 NOTE — NURSING NOTE
Pt arrived to Radiology triage [place] [address] for Lumbar Myelogram.   Pt wearing a mask as well as this RN for any bedside care.

## 2021-03-11 NOTE — NURSING NOTE
Pt assisted to PV via wheelchair by this RN to leave with his spouse. NAD noted.  Pt reports nausea is getting better and no worse.

## 2021-03-11 NOTE — NURSING NOTE
Pt complains of nausea after the pain pill. Pt reports he needs something for nausea. Pt has reported complaints of spasms and increased bouts of numbness in his left lower extremity after the Myelogram. We had a hard time getting a hold of his spouse to pick him up.

## 2021-03-12 ENCOUNTER — TELEPHONE (OUTPATIENT)
Dept: INTERVENTIONAL RADIOLOGY/VASCULAR | Facility: HOSPITAL | Age: 71
End: 2021-03-12

## 2021-03-12 NOTE — PROGRESS NOTES
"Subjective   Patient ID: Marin Cuenca is a 70 y.o. male is here today for follow-up ith a new Lumbar Myelogram that was ordered on 02.18.2021 for back and leg pain.    Today patient's symptoms are unchanged. Patient is still having back pain/leg pain.    Patient, Provider, and MA re all wearing  Mask in our office.     History of Present Illness     This patient continues with pain in his back with radiation primarily into his left leg.  He says it is more of a numbness and tingling rather than pain.  He did have a previous left L4-5 laminectomy.  He has not had any treatment since this flared up a couple of months ago.    The following portions of the patient's history were reviewed and updated as appropriate: allergies, current medications, past family history, past medical history, past social history, past surgical history and problem list.    Review of Systems   Constitutional: Negative for chills and fever.   HENT: Negative for congestion.    Genitourinary: Negative for difficulty urinating.   Musculoskeletal: Positive for back pain. Negative for neck pain and neck stiffness.   Neurological: Positive for numbness. Negative for weakness.        Intermittent Leg Numbness       I have reviewed the review of systems as documented by my MA.      Objective     Vitals:    03/18/21 0857   BP: 130/70   Cuff Size: Adult   Pulse: 98   Temp: 98 °F (36.7 °C)   Weight: 99.8 kg (220 lb)   Height: 182.9 cm (72\")     Body mass index is 29.84 kg/m².      Physical Exam  Neurological:      Mental Status: He is alert and oriented to person, place, and time.       Neurologic Exam     Mental Status   Oriented to person, place, and time.           Assessment/Plan   Independent Review of Radiographic Studies:      I personally reviewed the images from the following studies.    I reviewed his plain films, myelogram, and CT scan myself.  The plain films show degenerative disease but no evidence of instability at any level.  On the " myelogram itself the supine films do not show much pressure on the nerves.  On the standing films there is some definite pressure in the lateral recess on the right at L4-5.  The left side fills out pretty well however.  L5-S1 looks okay also.  On the post myelographic CT scan there is a little narrowing at L2-3 and L3-4 in the lateral recess but not a lot of pressure on the nerves there.  L4-5 shows the previous surgery on the left side but there is not a lot of pressure on the nerves there on the left.  There is a little lateral recess stenosis on the right.  For some reason they did not do angled images through L5-S1.    Medical Decision Making:      I told the patient there is not a lot of pressure on the nerves on the left side at all.  Consequently I would tend to try some epidural blocks as a next step.  I told him to call me if they are not working and we can discuss more aggressive treatment at that time but I really think surgery should be a very last option particularly based on his current symptoms.    Diagnoses and all orders for this visit:    1. Lumbar radiculopathy (Primary)  -     Epidural Block      Return for Recheck and call after treatment or consultation.

## 2021-03-12 NOTE — TELEPHONE ENCOUNTER
Patient called and stated he was doing fine today.  He is experiencing a little lightheadedness when he first gets up.  I encouraged him to continue to drink plenty of fluids today and sit for a few minutes when getting up so the lightheadedness passes.  He voiced understanding.

## 2021-03-18 ENCOUNTER — OFFICE VISIT (OUTPATIENT)
Dept: NEUROSURGERY | Facility: CLINIC | Age: 71
End: 2021-03-18

## 2021-03-18 VITALS
DIASTOLIC BLOOD PRESSURE: 70 MMHG | SYSTOLIC BLOOD PRESSURE: 130 MMHG | TEMPERATURE: 98 F | HEART RATE: 98 BPM | BODY MASS INDEX: 29.8 KG/M2 | WEIGHT: 220 LBS | HEIGHT: 72 IN

## 2021-03-18 DIAGNOSIS — M54.16 LUMBAR RADICULOPATHY: Primary | ICD-10-CM

## 2021-03-18 PROCEDURE — 99213 OFFICE O/P EST LOW 20 MIN: CPT | Performed by: NEUROLOGICAL SURGERY

## 2021-03-23 ENCOUNTER — OFFICE VISIT (OUTPATIENT)
Dept: FAMILY MEDICINE CLINIC | Facility: CLINIC | Age: 71
End: 2021-03-23

## 2021-03-23 VITALS
TEMPERATURE: 96.8 F | DIASTOLIC BLOOD PRESSURE: 70 MMHG | OXYGEN SATURATION: 98 % | WEIGHT: 233.2 LBS | HEIGHT: 72 IN | HEART RATE: 74 BPM | BODY MASS INDEX: 31.59 KG/M2 | SYSTOLIC BLOOD PRESSURE: 130 MMHG

## 2021-03-23 DIAGNOSIS — E03.9 ACQUIRED HYPOTHYROIDISM: ICD-10-CM

## 2021-03-23 DIAGNOSIS — Z13.6 SCREENING FOR ISCHEMIC HEART DISEASE: ICD-10-CM

## 2021-03-23 DIAGNOSIS — Z00.00 MEDICARE ANNUAL WELLNESS VISIT, INITIAL: Primary | ICD-10-CM

## 2021-03-23 DIAGNOSIS — R73.03 PREDIABETES: ICD-10-CM

## 2021-03-23 DIAGNOSIS — Z12.5 SPECIAL SCREENING FOR MALIGNANT NEOPLASM OF PROSTATE: ICD-10-CM

## 2021-03-23 PROBLEM — A08.4 VIRAL GASTROENTERITIS: Status: RESOLVED | Noted: 2020-11-13 | Resolved: 2021-03-23

## 2021-03-23 PROBLEM — Z20.822 CLOSE EXPOSURE TO COVID-19 VIRUS: Status: RESOLVED | Noted: 2020-11-18 | Resolved: 2021-03-23

## 2021-03-23 PROBLEM — L08.9 SKIN INFECTION: Status: RESOLVED | Noted: 2020-12-04 | Resolved: 2021-03-23

## 2021-03-23 PROCEDURE — G0438 PPPS, INITIAL VISIT: HCPCS | Performed by: NURSE PRACTITIONER

## 2021-03-23 NOTE — PROGRESS NOTES
The ABCs of the Annual Wellness Visit  Initial Medicare Wellness Visit    Chief Complaint   Patient presents with   • Medicare Wellness-Initial Visit     Patient is fasting to have his labs drawn ( wore mask and goggles)        Subjective   History of Present Illness:  aMrin Cuenca is a 70 y.o. male who presents for an Initial Medicare Wellness Visit.    HEALTH RISK ASSESSMENT    Recent Hospitalizations:  No hospitalization(s) within the last year.    Current Medical Providers:  Patient Care Team:  Kash Simpson APRN as PCP - General (Family Medicine)  Bubba Dos Santos Jr., MD as Consulting Physician (Urology)    Smoking Status:  Social History     Tobacco Use   Smoking Status Never Smoker   Smokeless Tobacco Never Used       Alcohol Consumption:  Social History     Substance and Sexual Activity   Alcohol Use No       Depression Screen:   PHQ-2/PHQ-9 Depression Screening 3/23/2021   Little interest or pleasure in doing things 0   Feeling down, depressed, or hopeless 0   Total Score 0       Fall Risk Screen:  STEADI Fall Risk Assessment has not been completed.    Health Habits and Functional and Cognitive Screening:  Functional & Cognitive Status 3/23/2021   Do you have difficulty preparing food and eating? No   Do you have difficulty bathing yourself, getting dressed or grooming yourself? No   Do you have difficulty using the toilet? No   Do you have difficulty moving around from place to place? No   Do you have trouble with steps or getting out of a bed or a chair? No   Current Diet Well Balanced Diet   Dental Exam Up to date   Eye Exam Up to date   Exercise (times per week) 6 times per week   Current Exercise Activities Include Walking   Do you need help using the phone?  No   Are you deaf or do you have serious difficulty hearing?  No   Do you need help with transportation? No   Do you need help shopping? No   Do you need help preparing meals?  No   Do you need help with housework?  No   Do you need help  with laundry? No   Do you need help taking your medications? No   Do you need help managing money? No   Do you ever drive or ride in a car without wearing a seat belt? No         Does the patient have evidence of cognitive impairment? No    Asprin use counseling:Taking ASA appropriately as indicated    Age-appropriate Screening Schedule:  Refer to the list below for future screening recommendations based on patient's age, sex and/or medical conditions. Orders for these recommended tests are listed in the plan section. The patient has been provided with a written plan.    Health Maintenance   Topic Date Due   • DXA SCAN  Never done   • ZOSTER VACCINE (2 of 3) 11/28/2011   • DIABETIC FOOT EXAM  Never done   • URINE MICROALBUMIN  Never done   • DIABETIC EYE EXAM  12/19/2020   • HEMOGLOBIN A1C  03/03/2021   • TDAP/TD VACCINES (2 - Td) 04/18/2026   • COLONOSCOPY  05/24/2029   • INFLUENZA VACCINE  Completed          The following portions of the patient's history were reviewed and updated as appropriate: allergies, current medications, past family history, past medical history, past social history, past surgical history and problem list.    Outpatient Medications Prior to Visit   Medication Sig Dispense Refill   • aspirin 81 MG tablet Take 1 tablet by mouth Daily. (Patient taking differently: Take 81 mg by mouth 3 (Three) Times a Week. Stopped 10/15/19) 100 tablet 3   • Calcium Carb-Cholecalciferol 500-600 MG-UNIT chewable tablet Chew 1 tablet 2 (Two) Times a Day. 180 tablet 3   • esomeprazole (nexIUM) 40 MG capsule Take 1 capsule by mouth 2 (Two) Times a Day. 180 capsule 3   • finasteride (PROSCAR) 5 MG tablet Take 1 tablet by mouth Every Morning. 90 tablet 3   • irbesartan (AVAPRO) 75 MG tablet Take 1 tablet by mouth Daily. 90 tablet 3   • levothyroxine (SYNTHROID, LEVOTHROID) 100 MCG tablet Take 1 tablet by mouth Daily. 90 tablet 3   • Omega-3 Fatty Acids (FISH OIL) 1000 MG capsule capsule Take 1,000 mg by mouth 2  "(Two) Times a Day With Meals. Stopped 10/16/19     • pregabalin (LYRICA) 100 MG capsule Take 1 capsule by mouth 3 (Three) Times a Day. 270 capsule 0     No facility-administered medications prior to visit.       Patient Active Problem List   Diagnosis   • Chronic LBP   • Lumbar radiculopathy   • Failed back syndrome of lumbar spine   • Essential hypertension   • Benign non-nodular prostatic hyperplasia with lower urinary tract symptoms   • Acquired hypothyroidism   • Knee pain, right   • Osteoarthritis due to rotator cuff tear   • Gastroesophageal reflux disease with esophagitis   • Obesity (BMI 30-39.9)   • KARLA on CPAP   • Special screening for malignant neoplasm of prostate   • Partial small bowel obstruction (CMS/HCC)   • Acute kidney injury (CMS/HCC)   • Acute deep vein thrombosis (DVT) of distal vein of right lower extremity (CMS/HCC)   • Sprain of abdominal wall   • Prediabetes       Advanced Care Planning:  ACP discussion was held with the patient during this visit. Patient has an advance directive in EMR which is still valid.     Review of Systems    Compared to one year ago, the patient feels his physical health is the same.  Compared to one year ago, the patient feels his mental health is the same.    Reviewed chart for potential of high risk medication in the elderly: yes  Reviewed chart for potential of harmful drug interactions in the elderly:yes    Objective         Vitals:    03/23/21 0952   BP: 130/70   Pulse: 74   Temp: 96.8 °F (36 °C)   SpO2: 98%   Weight: 106 kg (233 lb 3.2 oz)   Height: 182.9 cm (72.01\")   PainSc: 0-No pain       Body mass index is 31.62 kg/m².  Discussed the patient's BMI with him. The BMI is above average; BMI management plan is completed.    Physical Exam          Assessment/Plan   Medicare Risks and Personalized Health Plan  CMS Preventative Services Quick Reference  Advance Directive Discussion  Cardiovascular risk  Diabetic Lab Screening   Fall " Risk  Inactivity/Sedentary  Prostate Cancer Screening     The above risks/problems have been discussed with the patient.  Pertinent information has been shared with the patient in the After Visit Summary.  Follow up plans and orders are seen below in the Assessment/Plan Section.    Diagnoses and all orders for this visit:    1. Medicare annual wellness visit, initial (Primary)    2. Acquired hypothyroidism  -     TSH    3. Prediabetes  -     Comprehensive Metabolic Panel  -     Hemoglobin A1c    4. Screening for ischemic heart disease  -     Lipid Panel With LDL / HDL Ratio    5. Special screening for malignant neoplasm of prostate  -     PSA Screen      Follow Up:  No follow-ups on file.     An After Visit Summary and PPPS were given to the patient.

## 2021-03-24 ENCOUNTER — TELEPHONE (OUTPATIENT)
Dept: FAMILY MEDICINE CLINIC | Facility: CLINIC | Age: 71
End: 2021-03-24

## 2021-03-24 LAB
ALBUMIN SERPL-MCNC: 4.1 G/DL (ref 3.5–5.2)
ALBUMIN/GLOB SERPL: 1.9 G/DL
ALP SERPL-CCNC: 72 U/L (ref 39–117)
ALT SERPL-CCNC: 12 U/L (ref 1–41)
AST SERPL-CCNC: 13 U/L (ref 1–40)
BILIRUB SERPL-MCNC: 0.4 MG/DL (ref 0–1.2)
BUN SERPL-MCNC: 11 MG/DL (ref 8–23)
BUN/CREAT SERPL: 9 (ref 7–25)
CALCIUM SERPL-MCNC: 9.2 MG/DL (ref 8.6–10.5)
CHLORIDE SERPL-SCNC: 102 MMOL/L (ref 98–107)
CHOLEST SERPL-MCNC: 186 MG/DL (ref 0–200)
CO2 SERPL-SCNC: 28.3 MMOL/L (ref 22–29)
CREAT SERPL-MCNC: 1.22 MG/DL (ref 0.76–1.27)
GLOBULIN SER CALC-MCNC: 2.2 GM/DL
GLUCOSE SERPL-MCNC: 107 MG/DL (ref 65–99)
HBA1C MFR BLD: 5.7 % (ref 4.8–5.6)
HDLC SERPL-MCNC: 58 MG/DL (ref 40–60)
LDLC SERPL CALC-MCNC: 113 MG/DL (ref 0–100)
LDLC/HDLC SERPL: 1.93 {RATIO}
POTASSIUM SERPL-SCNC: 4.4 MMOL/L (ref 3.5–5.2)
PROT SERPL-MCNC: 6.3 G/DL (ref 6–8.5)
PSA SERPL-MCNC: 0.65 NG/ML (ref 0–4)
SODIUM SERPL-SCNC: 140 MMOL/L (ref 136–145)
TRIGL SERPL-MCNC: 81 MG/DL (ref 0–150)
TSH SERPL DL<=0.005 MIU/L-ACNC: 0.82 UIU/ML (ref 0.27–4.2)
VLDLC SERPL CALC-MCNC: 15 MG/DL (ref 5–40)

## 2021-03-25 ENCOUNTER — IMMUNIZATION (OUTPATIENT)
Dept: VACCINE CLINIC | Facility: HOSPITAL | Age: 71
End: 2021-03-25

## 2021-03-25 PROCEDURE — 91301 HC SARSCO02 VAC 100MCG/0.5ML IM: CPT | Performed by: INTERNAL MEDICINE

## 2021-03-25 PROCEDURE — 0012A: CPT | Performed by: INTERNAL MEDICINE

## 2021-05-10 ENCOUNTER — ANESTHESIA EVENT (OUTPATIENT)
Dept: PAIN MEDICINE | Facility: HOSPITAL | Age: 71
End: 2021-05-10

## 2021-05-10 ENCOUNTER — ANESTHESIA (OUTPATIENT)
Dept: PAIN MEDICINE | Facility: HOSPITAL | Age: 71
End: 2021-05-10

## 2021-05-10 ENCOUNTER — HOSPITAL ENCOUNTER (OUTPATIENT)
Dept: GENERAL RADIOLOGY | Facility: HOSPITAL | Age: 71
Discharge: HOME | End: 2021-05-10

## 2021-05-10 ENCOUNTER — HOSPITAL ENCOUNTER (OUTPATIENT)
Dept: PAIN MEDICINE | Facility: HOSPITAL | Age: 71
Discharge: HOME | End: 2021-05-10

## 2021-05-10 VITALS
OXYGEN SATURATION: 98 % | HEART RATE: 56 BPM | WEIGHT: 220 LBS | DIASTOLIC BLOOD PRESSURE: 78 MMHG | HEIGHT: 72 IN | BODY MASS INDEX: 29.8 KG/M2 | SYSTOLIC BLOOD PRESSURE: 129 MMHG | RESPIRATION RATE: 16 BRPM | TEMPERATURE: 97.1 F

## 2021-05-10 DIAGNOSIS — R52 PAIN: ICD-10-CM

## 2021-05-10 DIAGNOSIS — M54.16 LUMBAR RADICULOPATHY: Primary | ICD-10-CM

## 2021-05-10 PROCEDURE — 77003 FLUOROGUIDE FOR SPINE INJECT: CPT

## 2021-05-10 PROCEDURE — 25010000002 ONDANSETRON PER 1 MG: Performed by: ANESTHESIOLOGY

## 2021-05-10 PROCEDURE — 25010000002 MIDAZOLAM PER 1 MG: Performed by: ANESTHESIOLOGY

## 2021-05-10 PROCEDURE — 0 IOPAMIDOL 41 % SOLUTION: Performed by: ANESTHESIOLOGY

## 2021-05-10 PROCEDURE — 25010000002 METHYLPREDNISOLONE PER 80 MG: Performed by: ANESTHESIOLOGY

## 2021-05-10 RX ORDER — METHYLPREDNISOLONE ACETATE 80 MG/ML
80 INJECTION, SUSPENSION INTRA-ARTICULAR; INTRALESIONAL; INTRAMUSCULAR; SOFT TISSUE ONCE
Status: COMPLETED | OUTPATIENT
Start: 2021-05-10 | End: 2021-05-10

## 2021-05-10 RX ORDER — MIDAZOLAM HYDROCHLORIDE 1 MG/ML
1 INJECTION INTRAMUSCULAR; INTRAVENOUS AS NEEDED
Status: DISCONTINUED | OUTPATIENT
Start: 2021-05-10 | End: 2021-05-11 | Stop reason: HOSPADM

## 2021-05-10 RX ORDER — SODIUM CHLORIDE 0.9 % (FLUSH) 0.9 %
1-10 SYRINGE (ML) INJECTION AS NEEDED
Status: DISCONTINUED | OUTPATIENT
Start: 2021-05-10 | End: 2021-05-11 | Stop reason: HOSPADM

## 2021-05-10 RX ORDER — SODIUM CHLORIDE 0.9 % (FLUSH) 0.9 %
3-10 SYRINGE (ML) INJECTION AS NEEDED
Status: DISCONTINUED | OUTPATIENT
Start: 2021-05-10 | End: 2021-05-11 | Stop reason: HOSPADM

## 2021-05-10 RX ORDER — FENTANYL CITRATE 50 UG/ML
50 INJECTION, SOLUTION INTRAMUSCULAR; INTRAVENOUS AS NEEDED
Status: DISCONTINUED | OUTPATIENT
Start: 2021-05-10 | End: 2021-05-11 | Stop reason: HOSPADM

## 2021-05-10 RX ORDER — ONDANSETRON 2 MG/ML
4 INJECTION INTRAMUSCULAR; INTRAVENOUS ONCE
Status: COMPLETED | OUTPATIENT
Start: 2021-05-10 | End: 2021-05-10

## 2021-05-10 RX ORDER — SODIUM CHLORIDE 0.9 % (FLUSH) 0.9 %
3 SYRINGE (ML) INJECTION EVERY 12 HOURS SCHEDULED
Status: DISCONTINUED | OUTPATIENT
Start: 2021-05-10 | End: 2021-05-11 | Stop reason: HOSPADM

## 2021-05-10 RX ORDER — LIDOCAINE HYDROCHLORIDE 10 MG/ML
1 INJECTION, SOLUTION INFILTRATION; PERINEURAL ONCE AS NEEDED
Status: DISCONTINUED | OUTPATIENT
Start: 2021-05-10 | End: 2021-05-11 | Stop reason: HOSPADM

## 2021-05-10 RX ADMIN — MIDAZOLAM 1 MG: 1 INJECTION INTRAMUSCULAR; INTRAVENOUS at 08:34

## 2021-05-10 RX ADMIN — ONDANSETRON 4 MG: 2 INJECTION INTRAMUSCULAR; INTRAVENOUS at 08:30

## 2021-05-10 RX ADMIN — IOPAMIDOL 10 ML: 408 INJECTION, SOLUTION INTRATHECAL at 08:40

## 2021-05-10 RX ADMIN — METHYLPREDNISOLONE ACETATE 80 MG: 80 INJECTION, SUSPENSION INTRA-ARTICULAR; INTRALESIONAL; INTRAMUSCULAR; SOFT TISSUE at 08:40

## 2021-05-10 NOTE — ANESTHESIA PROCEDURE NOTES
PAIN Epidural block    Pre-sedation assessment completed: 5/10/2021 8:30 AM    Patient reassessed immediately prior to procedure    Patient location during procedure: pain clinic  Start Time: 5/10/2021 8:30 AM  Stop Time: 5/10/2021 8:42 AM  Indication:at surgeon's request and procedure for pain  Performed By  Anesthesiologist: Mike Regalado MD  Preanesthetic Checklist  Completed: patient identified, risks and benefits discussed, surgical consent, monitors and equipment checked, pre-op evaluation and timeout performed  Additional Notes  Post-Op Diagnosis Codes:     * Lumbar neuritis (M54.16)     * Degeneration of lumbar intervertebral disc (M51.36)    Prep:  Pt Position:prone  Sterile Tech:sterile barrier, mask and gloves  Prep:chlorhexidine gluconate and isopropyl alcohol  Monitoring:blood pressure monitoring, continuous pulse oximetry and EKG  Procedure:Sedation: yes     Approach:left paramedian  Guidance: fluoroscopy  Location:lumbar  Level:4-5  Needle Gauge:20 G  Aspiration:negative  Test Dose:negative  Medications:  Depomedrol:80 mg  Preservative Free Saline:2mL  Isovue:2mL  Comments:Lumbar epidural steroid injection was performed on the left side at the L4-5 level.  There was a good loss of resistance to injection.  No return of red cells or CSF.  No pain with injection.  The needle was withdrawn.  Post Assessment:  Pt Tolerance:patient tolerated the procedure well with no apparent complications  Complications:no

## 2021-05-10 NOTE — DISCHARGE INSTRUCTIONS
What can I expect after the procedure?  Follow these instructions at home:  Injection site care  1. You may remove the bandage (dressing) after 24 hours.  2. Check your injection site every day for signs of infection. Check for:  ? Redness, swelling, or pain.  ? Fluid or blood.  ? Warmth.  ? Pus or a bad smell.  Managing pain, stiffness, and swelling  1. For 24 hours after the procedure:  ? Avoid using heat on the injection site.  ? Do not take baths, swim, or use a hot tub. You may take a shower.   2. If directed, put ice on the injection site. To do this:     3.    ? Put ice in a plastic bag.  ? Place a towel between your skin and the bag.  ? Leave the ice on for 20 minutes, 2-3 times a day.     Activity  · NO DRIVING FOR THE REST OF THE DAY IF receiving a sedative during your procedure.  · Return to your normal activities the next day as tolerated.  General instructions  · The injection site may feel sore.  · Take over-the-counter medications as directed on packaging and prescription medicines only as told by your health care provider who prescribes these.  · Keep all follow-up visits as told by your health care provider.   Contact a health care provider if:  1. You have any of these signs of infection:  ? Redness, swelling, or pain around your injection site.  ? Fluid or blood coming from your injection site.  ? Warmth coming from your injection site.  ? Pus or a bad smell coming from your injection site.  ? A fever.  2. You continue to have pain and soreness around the injection site, even after taking over-the-counter pain medicine.  3. You have severe, sudden, or lasting nausea or vomiting.  Get help right away if:  · You have severe pain at the injection site that is not relieved by medicines.  · You develop a severe headache or a stiff neck.  · You become sensitive to light.  · You have any new numbness or weakness in your legs or arms.  · You lose control of your bladder or bowel movements.  · You have  trouble breathing.  Summary  · An epidural steroid block is an injection of steroid medication and numbing medicine that is given into the epidural space.  This injection helps relieve pain caused by an irritated or swollen nerve root.It is not known if steroid use around the time of Covid-19 vaccination negatively effects how well the vaccine works.     Today you received a long acting steroid injection.   Following guidelines from the [employer] Society [employer], you should wait 4 weeks before receiving the Covid -19 vaccination.Lumbar Epidural Steroid Injection Instructions  Plan includes:  1.  Lumbar epidural steroid injections, up to 3, spaced 4 weeks apart.  If pain control is acceptable after 1 or 2 injections, it was discussed with the patient that they may return for the subsequent injections if and when their pain returns.  The risks were discussed with the patient including failure of relief, worsening pain, Headache (post dural puncture headache), bleeding (epidural hematoma) and infection (epidural abscess or skin infection).  2.  Physical therapy exercises at home as prescribed by physical therapy or from the pain clinic handout (given to the patient).  Continuation of these exercises every day, or multiple times per week, even when the patient has good pain relief, was stressed to the patient as a preventative measure to decrease the frequency and severity of future pain episodes.  3.  Continue pain medicines as already prescribed.  If patient not currently taking any, it is recommended to begin Acetaminophen 1000 mg po q 8 hours.  If other medicines containing Acetaminophen are currently prescribed, maintain daily dose at 3000 mg.    4.  If they can tolerate NSAIDS, it is recommended to take Ibuprofen 600 mg po q 6 hours for 7 days during pain exacerbations.  Alternatively, they may substitute an NSAID of their choice (e.g. Aleve).  This may be taken at the same time as  Acetaminophen.  5.  Heat and ice to the affected area as tolerated for pain control.  It was discussed that heating pads can cause burns.  6.  Daily low impact exercise such as walking or water exercise was recommended to maintain overall health and aid in weight control.   7.  Follow up as needed for subsequent injections.  8.  Patient was counseled to abstain from tobacco products.  ·

## 2021-05-10 NOTE — H&P
[facility]    History and Physical    Patient Name: Marin Cuenca  :  1950  MRN:  [id number]  Date of Admission: 5/10/2021    Subjective     Patient is a 71 y.o. male presents with chief complaint of chronic low back, hips: left and buttock pain.  Onset of symptoms was gradual starting several years ago.  Symptoms are associated/aggravated by nothing in particular. Symptoms improve with injection    The following portions of the patients history were reviewed and updated as appropriate: current medications, allergies, past medical history, past surgical history, past family history, past social history and problem list    He had a long history of low back pain.  He is happy had epidural steroid injections in the past which are usually helpful.  He has had surgery at the L4-5 level.  He is doing reasonably well until last few months when he states picking up an LP tank causing exacerbation of his symptoms especially down his left leg.  He complains of intermittent numbness and tingling especially in the left lower extremity below the knee.  Today the pain is not too bad but it does wax and wane in nature.  He has had a myelogram done which was interpreted by Dr. Patrick who feels like there is not a lot of stenosis at the L4-5 level.  We have been asked to do epidural steroid injections as the initial treatment of this.            Objective     Past Medical History:   Past Medical History:   Diagnosis Date   • Arthritis    • Ramirez esophagus    • Benign prostatic hypertrophy    • Cataract     saloni   • Ear pain, left     occ   • GERD (gastroesophageal reflux disease)    • History of MRSA infection       blood bhl   • Hypertension    • Hypothyroidism    • KARLA on CPAP    • Osteoporosis      Past Surgical History:   Past Surgical History:   Procedure Laterality Date   • CYSTOSCOPY     • HEMORRHOIDECTOMY     • KNEE SURGERY Left     scoped   • LUMBAR LAMINECTOMY     • LUMBAR LAMINECTOMY DISCECTOMY  "DECOMPRESSION Left 10/18/2019    Procedure: Left L4-5 laminectomy and neural lysis;  Surgeon: Wayne Meneses MD;  Location: [facility];  Service: Neurosurgery   • NISSEN FUNDOPLICATION     • UPPER GASTROINTESTINAL ENDOSCOPY       Family History:   Family History   Problem Relation Age of Onset   • Heart disease Father         cardiovascular disease   • Diabetes Father    • Hypertension Father    • Cancer Paternal Grandmother    • Malig Hyperthermia Neg Hx      Social History:   Social History     Socioeconomic History   • Marital status:      Spouse name: Not on file   • Number of children: Not on file   • Years of education: Not on file   • Highest education level: Not on file   Tobacco Use   • Smoking status: Never Smoker   • Smokeless tobacco: Never Used   Vaping Use   • Vaping Use: Never used   Substance and Sexual Activity   • Alcohol use: No   • Drug use: No     Comment: Consumes 3 caffeinated beverages per day usually coffee.   • Sexual activity: Defer       Vital Signs Range for the last 24 hours  Temperature:     Temp Source:     BP:     Pulse:     Respirations:     SPO2:     O2 Amount (l/min):     O2 Devices     Weight: Weight:  [99.8 kg (220 lb)] 99.8 kg (220 lb)     Flowsheet Rows      First Filed Value   Admission Height  182.9 cm (72\") Documented at 05/10/2021 0807   Admission Weight  99.8 kg (220 lb) Documented at 05/10/2021 0807          --------------------------------------------------------------------------------    Current Outpatient Medications   Medication Sig Dispense Refill   • Calcium Carb-Cholecalciferol 500-600 MG-UNIT chewable tablet Chew 1 tablet 2 (Two) Times a Day. 180 tablet 3   • cefdinir (OMNICEF) 300 MG capsule Take 1 capsule by mouth 2 (Two) Times a Day for 7 days. 14 capsule 0   • esomeprazole (nexIUM) 40 MG capsule Take 1 capsule by mouth 2 (Two) Times a Day. 180 capsule 3   • finasteride (PROSCAR) 5 MG tablet Take 1 tablet by mouth Every Morning. 90 tablet 3   • " irbesartan (AVAPRO) 75 MG tablet Take 1 tablet by mouth Daily. 90 tablet 3   • levothyroxine (SYNTHROID, LEVOTHROID) 100 MCG tablet Take 1 tablet by mouth Daily. 90 tablet 3   • pregabalin (LYRICA) 100 MG capsule Take 1 capsule by mouth 3 (Three) Times a Day. 270 capsule 0   • aspirin 81 MG tablet Take 1 tablet by mouth Daily. (Patient taking differently: Take 81 mg by mouth 3 (Three) Times a Week. Stopped 10/15/19) 100 tablet 3   • Omega-3 Fatty Acids (FISH OIL) 1000 MG capsule capsule Take 1,000 mg by mouth 2 (Two) Times a Day With Meals. Stopped 10/16/19       Current Facility-Administered Medications   Medication Dose Route Frequency Provider Last Rate Last Admin   • fentaNYL citrate (PF) (SUBLIMAZE) injection 50 mcg  50 mcg Intravenous PRN Mike Regalado MD       • iopamidol (ISOVUE-M 200) injection 41%  12 mL Epidural Once in imaging Mike Regalado MD       • lidocaine (XYLOCAINE) 1 % injection 1 mL  1 mL Intradermal Once PRN Mike Regalado MD       • methylPREDNISolone acetate (DEPO-medrol) injection 80 mg  80 mg Intra-articular Once RenderMike MD       • midazolam (VERSED) injection 1 mg  1 mg Intravenous PRN Mike Regalado MD       • sodium chloride 0.9 % flush 1-10 mL  1-10 mL Intravenous PRN Mike Regalado MD       • sodium chloride 0.9 % flush 3 mL  3 mL Intravenous Q12H Mike Regalado MD       • sodium chloride 0.9 % flush 3-10 mL  3-10 mL Intravenous PRN Mike Regalado MD           --------------------------------------------------------------------------------  Assessment/Plan      Anesthesia Evaluation           Pain impairs ability to perform ADLs: Exercise/Activity       Airway   Mallampati: II  TM distance: >3 FB  Neck ROM: full  Dental - normal exam     Pulmonary - normal exam   (+) sleep apnea,   Cardiovascular     Rhythm: regular    (+) hypertension, peripheral edema, DVT,   (-) murmur    PE comment: Lower extremities are warm with bilateral pedal edema.  He  "says this is normal for him.  Dorsal pedal pulses were somewhat difficult to palpate due to his edema    Neuro/Psych  (+) numbness,   left straight leg raise test,  Sensory Deficit,      PE Comment: He has intermittent decrease sensation in his left lower extremity, when it occurs this occurs from the knee down, is not readily demonstrable today.  GI/Hepatic/Renal/Endo    (+)  GERD,  renal disease,     Musculoskeletal         PE comment: No focal weakness noted on lower extremity motor exam  Abdominal  - normal exam   Substance History      OB/GYN          Other                   Diagnosis and Plan    Treatment Plan  ASA 3      Procedures: Lumbar Epidural Steroid Injection(LESI), With fluoroscopy,           Discussed with patient risk and benefits of CHERIE including but not limited to : Bleeding, infection, PDPH, inadvertant spinal anesthetic, worsening pain, hyperglycemia, hypertension, CHF, nerve damage, steroid \"toxicity\" and AVN of hips.  Pt agrees to proceed.    He has requested some antiemesis medication as he is prone to vomiting.    Diagnosis     * Lumbar neuritis [M54.16]     * Degeneration of lumbar intervertebral disc [M51.36]                    "

## 2021-05-17 DIAGNOSIS — M54.16 LUMBAR RADICULOPATHY: ICD-10-CM

## 2021-05-17 RX ORDER — PREGABALIN 100 MG/1
100 CAPSULE ORAL 3 TIMES DAILY
Qty: 270 CAPSULE | Refills: 0 | Status: SHIPPED | OUTPATIENT
Start: 2021-05-17 | End: 2021-08-17 | Stop reason: SDUPTHER

## 2021-05-21 RX ORDER — PROMETHAZINE HYDROCHLORIDE 25 MG/1
25 TABLET ORAL EVERY 6 HOURS PRN
Qty: 20 TABLET | Refills: 0 | Status: SHIPPED | OUTPATIENT
Start: 2021-05-21 | End: 2021-12-30

## 2021-06-14 ENCOUNTER — APPOINTMENT (OUTPATIENT)
Dept: PAIN MEDICINE | Facility: HOSPITAL | Age: 71
End: 2021-06-14

## 2021-06-18 NOTE — TELEPHONE ENCOUNTER
Pt called for a refill of azelastine 1% nasal solution. I did not see this medication on his chart but states that he had it filled last year with addition refills. Please Advise.

## 2021-06-22 RX ORDER — AZELASTINE HYDROCHLORIDE 137 UG/1
1 SPRAY, METERED NASAL 2 TIMES DAILY
Qty: 3 ML | Refills: 0 | Status: SHIPPED | OUTPATIENT
Start: 2021-06-22 | End: 2021-06-24 | Stop reason: SDUPTHER

## 2021-06-22 NOTE — TELEPHONE ENCOUNTER
Pt called again regarding this. Unfortunately, it was sent to Jessa but she is out of the office until next week. He states Kash prescribed this last in 2019, I do see that but his refills are  at this point and he has a sinus infection ([facility] note in chart). He was curious if a different provider would be willing to send this in?    [address]

## 2021-06-25 ENCOUNTER — OFFICE VISIT (OUTPATIENT)
Dept: FAMILY MEDICINE CLINIC | Facility: CLINIC | Age: 71
End: 2021-06-25

## 2021-06-25 VITALS
HEART RATE: 58 BPM | HEIGHT: 72 IN | TEMPERATURE: 97.8 F | DIASTOLIC BLOOD PRESSURE: 82 MMHG | SYSTOLIC BLOOD PRESSURE: 136 MMHG | OXYGEN SATURATION: 100 % | BODY MASS INDEX: 31.42 KG/M2 | WEIGHT: 232 LBS

## 2021-06-25 DIAGNOSIS — R73.03 PREDIABETES: ICD-10-CM

## 2021-06-25 DIAGNOSIS — I10 ESSENTIAL HYPERTENSION: Primary | ICD-10-CM

## 2021-06-25 DIAGNOSIS — K21.00 GASTROESOPHAGEAL REFLUX DISEASE WITH ESOPHAGITIS WITHOUT HEMORRHAGE: ICD-10-CM

## 2021-06-25 DIAGNOSIS — M54.16 LUMBAR RADICULOPATHY: ICD-10-CM

## 2021-06-25 DIAGNOSIS — E03.9 ACQUIRED HYPOTHYROIDISM: ICD-10-CM

## 2021-06-25 PROCEDURE — 99214 OFFICE O/P EST MOD 30 MIN: CPT | Performed by: NURSE PRACTITIONER

## 2021-06-25 RX ORDER — AZELASTINE HYDROCHLORIDE 137 UG/1
1 SPRAY, METERED NASAL 2 TIMES DAILY
Qty: 3 ML | Refills: 0 | Status: SHIPPED | OUTPATIENT
Start: 2021-06-25 | End: 2022-12-28 | Stop reason: SDUPTHER

## 2021-06-25 NOTE — PROGRESS NOTES
"Chief Complaint  Back Pain (3 month  follow up  no complains )    Subjective          Marin Cuenca presents to [facility]  History of Present Illness  #1 hypertension-patient is currently on Avapro 75 mg daily as directed his blood pressure is well controlled today in office.    2.  Lumbar radiculopathy-patient is currently on Lyrica 100 mg 3 times a day as directed without any side effects and working well to control his pain.    3.  Hypothyroidism-patient is currently on levothyroxine 100 mcg daily as directed without any side effects and not experiencing any symptoms of hypothyroidism at this time.  TSH was last checked in March 2021 and was within normal limits.    4.  GERD-patient is currently on Nexium 40 mg daily as directed without any side effects.    5.  Prediabetes-A1c was last checked in March with a result of 5.7.  Patient is trying to work on increasing exercise and eating better to help control this.    Objective   Vital Signs:   /82   Pulse 58   Temp 97.8 °F (36.6 °C)   Ht 182.9 cm (72\")   Wt 105 kg (232 lb)   SpO2 100%   BMI 31.46 kg/m²     Physical Exam  Vitals reviewed.   Constitutional:       General: He is not in acute distress.     Appearance: He is well-developed.   HENT:      Head: Normocephalic.   Cardiovascular:      Rate and Rhythm: Normal rate and regular rhythm.      Heart sounds: Normal heart sounds.   Pulmonary:      Effort: Pulmonary effort is normal.      Breath sounds: Normal breath sounds.   Neurological:      Mental Status: He is alert and oriented to person, place, and time.      Gait: Gait normal.   Psychiatric:         Behavior: Behavior normal.         Thought Content: Thought content normal.         Judgment: Judgment normal.        Result Review :   The following data was reviewed by: SANTA Kramer on 06/25/2021:  CMP    CMP 2/12/21 3/23/21   Glucose  107 (A)   BUN  11   Creatinine 1.10 1.22   eGFR Non African Am  59 (A) "   eGFR African Am  71   Sodium  140   Potassium  4.4   Chloride  102   Calcium  9.2   Total Protein  6.3   Albumin  4.10   Globulin  2.2   Total Bilirubin  0.4   Alkaline Phosphatase  72   AST (SGOT)  13   ALT (SGPT)  12   (A) Abnormal value       Comments are available for some flowsheets but are not being displayed.             Lipid Panel    Lipid Panel 3/23/21   Total Cholesterol 186   Triglycerides 81   HDL Cholesterol 58   VLDL Cholesterol 15   LDL Cholesterol  113 (A)   LDL/HDL Ratio 1.93   (A) Abnormal value       Comments are available for some flowsheets but are not being displayed.           TSH    TSH 3/23/21   TSH 0.825           PSA    PSA 3/23/21   PSA 0.653                     Assessment and Plan    Diagnoses and all orders for this visit:    1. Essential hypertension (Primary)    2. Lumbar radiculopathy    3. Acquired hypothyroidism    4. Gastroesophageal reflux disease with esophagitis without hemorrhage    5. Prediabetes        Follow Up   Return in about 3 months (around 9/25/2021) for Recheck.  Patient was given instructions and counseling regarding his condition or for health maintenance advice. Please see specific information pulled into the AVS if appropriate.     Continue same with all medications.  Return to the office in 3 months for med check.    Mask and googles worn

## 2021-08-02 ENCOUNTER — HOSPITAL ENCOUNTER (OUTPATIENT)
Dept: PAIN MEDICINE | Facility: HOSPITAL | Age: 71
Discharge: HOME | End: 2021-08-02

## 2021-08-02 ENCOUNTER — ANESTHESIA EVENT (OUTPATIENT)
Dept: PAIN MEDICINE | Facility: HOSPITAL | Age: 71
End: 2021-08-02

## 2021-08-02 ENCOUNTER — ANESTHESIA (OUTPATIENT)
Dept: PAIN MEDICINE | Facility: HOSPITAL | Age: 71
End: 2021-08-02

## 2021-08-02 ENCOUNTER — HOSPITAL ENCOUNTER (OUTPATIENT)
Dept: GENERAL RADIOLOGY | Facility: HOSPITAL | Age: 71
Discharge: HOME | End: 2021-08-02

## 2021-08-02 VITALS
DIASTOLIC BLOOD PRESSURE: 71 MMHG | SYSTOLIC BLOOD PRESSURE: 122 MMHG | RESPIRATION RATE: 16 BRPM | HEART RATE: 59 BPM | TEMPERATURE: 97.1 F | OXYGEN SATURATION: 96 %

## 2021-08-02 DIAGNOSIS — M54.16 LUMBAR RADICULOPATHY: ICD-10-CM

## 2021-08-02 DIAGNOSIS — R52 PAIN: ICD-10-CM

## 2021-08-02 PROCEDURE — 77003 FLUOROGUIDE FOR SPINE INJECT: CPT

## 2021-08-02 PROCEDURE — 25010000002 METHYLPREDNISOLONE PER 80 MG: Performed by: ANESTHESIOLOGY

## 2021-08-02 PROCEDURE — 0 IOPAMIDOL 41 % SOLUTION: Performed by: ANESTHESIOLOGY

## 2021-08-02 RX ORDER — FENTANYL CITRATE 50 UG/ML
50 INJECTION, SOLUTION INTRAMUSCULAR; INTRAVENOUS AS NEEDED
Status: DISCONTINUED | OUTPATIENT
Start: 2021-08-02 | End: 2021-08-03 | Stop reason: HOSPADM

## 2021-08-02 RX ORDER — LIDOCAINE HYDROCHLORIDE 10 MG/ML
1 INJECTION, SOLUTION INFILTRATION; PERINEURAL ONCE AS NEEDED
Status: DISCONTINUED | OUTPATIENT
Start: 2021-08-02 | End: 2021-08-03 | Stop reason: HOSPADM

## 2021-08-02 RX ORDER — SODIUM CHLORIDE 0.9 % (FLUSH) 0.9 %
1-10 SYRINGE (ML) INJECTION AS NEEDED
Status: DISCONTINUED | OUTPATIENT
Start: 2021-08-02 | End: 2021-08-03 | Stop reason: HOSPADM

## 2021-08-02 RX ORDER — METHYLPREDNISOLONE ACETATE 80 MG/ML
80 INJECTION, SUSPENSION INTRA-ARTICULAR; INTRALESIONAL; INTRAMUSCULAR; SOFT TISSUE ONCE
Status: COMPLETED | OUTPATIENT
Start: 2021-08-02 | End: 2021-08-02

## 2021-08-02 RX ORDER — MIDAZOLAM HYDROCHLORIDE 1 MG/ML
1 INJECTION INTRAMUSCULAR; INTRAVENOUS AS NEEDED
Status: DISCONTINUED | OUTPATIENT
Start: 2021-08-02 | End: 2021-08-03 | Stop reason: HOSPADM

## 2021-08-02 RX ADMIN — METHYLPREDNISOLONE ACETATE 80 MG: 80 INJECTION, SUSPENSION INTRA-ARTICULAR; INTRALESIONAL; INTRAMUSCULAR; SOFT TISSUE at 10:44

## 2021-08-02 RX ADMIN — IOPAMIDOL 10 ML: 408 INJECTION, SOLUTION INTRATHECAL at 10:43

## 2021-08-02 NOTE — ANESTHESIA PROCEDURE NOTES
PAIN Epidural block    Pre-sedation assessment completed: 8/2/2021 10:38 AM    Patient reassessed immediately prior to procedure    Patient location during procedure: pain clinic  Start Time: 8/2/2021 10:38 AM  Stop Time: 8/2/2021 10:45 AM  Indication:at surgeon's request and procedure for pain  Performed By  Anesthesiologist: Mike Regalado MD  Preanesthetic Checklist  Completed: patient identified, risks and benefits discussed, surgical consent, monitors and equipment checked, pre-op evaluation and timeout performed  Additional Notes  Post-Op Diagnosis Codes:     * Lumbar neuritis (M54.16)     * Degeneration of lumbar intervertebral disc (M51.36)    Prep:  Pt Position:prone  Sterile Tech:sterile barrier, mask and gloves  Prep:chlorhexidine gluconate and isopropyl alcohol  Monitoring:blood pressure monitoring, continuous pulse oximetry and EKG  Procedure:Sedation: no     Approach:left paramedian  Guidance: fluoroscopy  Location:lumbar  Level:L5-S1  Needle Gauge:20 G  Aspiration:negative  Test Dose:negative  Medications:  Depomedrol:80 mg  Preservative Free Saline:2mL  Isovue:2mL    Post Assessment:  Pt Tolerance:patient tolerated the procedure well with no apparent complications  Complications:no

## 2021-08-02 NOTE — H&P
[facility]    History and Physical    Patient Name: Marin Cuenca  :  1950  MRN:  [id number]  Date of Admission: 2021    Subjective     Patient is a 71 y.o. male presents with chief complaint of chronic low back and hips: left pain.  Onset of symptoms was gradual starting several years ago.  Symptoms are associated/aggravated by nothing in particular. Symptoms improve with injection    The following portions of the patients history were reviewed and updated as appropriate: current medications, allergies, past medical history, past surgical history, past family history, past social history and problem list                Objective     Past Medical History:   Past Medical History:   Diagnosis Date   • Arthritis    • Ramirez esophagus    • Benign prostatic hypertrophy    • Cataract     saloni   • Ear pain, left     occ   • GERD (gastroesophageal reflux disease)    • History of MRSA infection     2010  blood bhl   • Hypertension    • Hypothyroidism    • Low back pain    • KARLA on CPAP    • Osteoporosis      Past Surgical History:   Past Surgical History:   Procedure Laterality Date   • CYSTOSCOPY     • EPIDURAL BLOCK     • EYE SURGERY Right    • HEMORRHOIDECTOMY     • KNEE SURGERY Left     scoped   • LUMBAR LAMINECTOMY     • LUMBAR LAMINECTOMY DISCECTOMY DECOMPRESSION Left 10/18/2019    Procedure: Left L4-5 laminectomy and neural lysis;  Surgeon: Wayne Meneses MD;  Location: [facility] OR;  Service: Neurosurgery   • NISSEN FUNDOPLICATION     • UPPER GASTROINTESTINAL ENDOSCOPY       Family History:   Family History   Problem Relation Age of Onset   • Heart disease Father         cardiovascular disease   • Diabetes Father    • Hypertension Father    • Cancer Paternal Grandmother    • Malig Hyperthermia Neg Hx      Social History:   Social History     Socioeconomic History   • Marital status:      Spouse name: Not on file   • Number of children: Not on file   • Years of education: Not on file    • Highest education level: Not on file   Tobacco Use   • Smoking status: Never Smoker   • Smokeless tobacco: Never Used   Vaping Use   • Vaping Use: Never used   Substance and Sexual Activity   • Alcohol use: No   • Drug use: No     Comment: Consumes 3 caffeinated beverages per day usually coffee.   • Sexual activity: Defer       Vital Signs Range for the last 24 hours  Temperature: Temp:  [36.2 °C (97.1 °F)] 36.2 °C (97.1 °F)   Temp Source: Temp src: Infrared   BP: BP: (119)/(82) 119/82   Pulse: Heart Rate:  [59] 59   Respirations: Resp:  [16] 16   SPO2: SpO2:  [97 %] 97 %   O2 Amount (l/min):     O2 Devices Device (Oxygen Therapy): room air   Weight:           --------------------------------------------------------------------------------    Current Outpatient Medications   Medication Sig Dispense Refill   • Calcium Carb-Cholecalciferol 500-600 MG-UNIT chewable tablet Chew 1 tablet 2 (Two) Times a Day. 180 tablet 3   • esomeprazole (nexIUM) 40 MG capsule Take 1 capsule by mouth 2 (Two) Times a Day. 180 capsule 3   • finasteride (PROSCAR) 5 MG tablet Take 1 tablet by mouth Every Morning. 90 tablet 3   • irbesartan (AVAPRO) 75 MG tablet Take 1 tablet by mouth Daily. 90 tablet 3   • levothyroxine (SYNTHROID, LEVOTHROID) 100 MCG tablet Take 1 tablet by mouth Daily. 90 tablet 3   • pregabalin (LYRICA) 100 MG capsule Take 1 capsule by mouth 3 (Three) Times a Day. 270 capsule 0   • aspirin 81 MG tablet Take 1 tablet by mouth Daily. (Patient taking differently: Take 81 mg by mouth 3 (Three) Times a Week. Stopped 10/15/19) 100 tablet 3   • Azelastine HCl 137 MCG/SPRAY solution 1 spray into the nostril(s) as directed by provider 2 (Two) Times a Day. 3 mL 0   • Omega-3 Fatty Acids (FISH OIL) 1000 MG capsule capsule Take 1,000 mg by mouth 2 (Two) Times a Day With Meals. Stopped 10/16/19     • promethazine (PHENERGAN) 25 MG tablet Take 1 tablet by mouth Every 6 (Six) Hours As Needed for Nausea or Vomiting. 20 tablet 0  "    Current Facility-Administered Medications   Medication Dose Route Frequency Provider Last Rate Last Admin   • fentaNYL citrate (PF) (SUBLIMAZE) injection 50 mcg  50 mcg Intravenous PRN Render, Mike Velasquez MD       • iopamidol (ISOVUE-M 200) injection 41%  12 mL Epidural Once in imaging Render, Mike Velasquez MD       • lidocaine (XYLOCAINE) 1 % injection 1 mL  1 mL Intradermal Once PRN Render, Mike Velasquez MD       • methylPREDNISolone acetate (DEPO-medrol) injection 80 mg  80 mg Intra-articular Once Render, Mike Velasquez MD       • midazolam (VERSED) injection 1 mg  1 mg Intravenous PRN Render, Mike Velasquez MD       • sodium chloride 0.9 % flush 1-10 mL  1-10 mL Intravenous PRN RenderMike MD           --------------------------------------------------------------------------------  Assessment/Plan      Anesthesia Evaluation     NPO Solid Status: > 8 hours      Pain impairs ability to perform ADLs: Exercise/Activity       Airway   Mallampati: II  TM distance: >3 FB  Neck ROM: full  No difficulty expected  Dental - normal exam     Pulmonary    (+) sleep apnea,   Cardiovascular     Rhythm: regular    (+) hypertension, DVT,   (-) murmur      Neuro/Psych  (+)   left straight leg raise test,     GI/Hepatic/Renal/Endo    (+)  GERD,  renal disease,     Musculoskeletal     Abdominal  - normal exam   Substance History      OB/GYN          Other                   Diagnosis and Plan    Treatment Plan  ASA 3   Patient has had previous injection/procedure with 50-75% improvement.   Procedures: Lumbar Epidural Steroid Injection(LESI), With fluoroscopy,       Anesthetic plan and risks discussed with patient.      Discussed with patient risk and benefits of CHERIE including but not limited to : Bleeding, infection, PDPH, inadvertant spinal anesthetic, worsening pain, hyperglycemia, hypertension, CHF, nerve damage, steroid \"toxicity\" and AVN of hips.  Pt agrees to proceed.    Diagnosis     * Lumbar neuritis [M54.16]     * Degeneration " of lumbar intervertebral disc [M51.36]

## 2021-08-17 DIAGNOSIS — M54.16 LUMBAR RADICULOPATHY: ICD-10-CM

## 2021-08-17 RX ORDER — PREGABALIN 100 MG/1
100 CAPSULE ORAL 3 TIMES DAILY
Qty: 270 CAPSULE | Refills: 0 | Status: SHIPPED | OUTPATIENT
Start: 2021-08-17 | End: 2021-12-09 | Stop reason: SDUPTHER

## 2021-08-17 NOTE — TELEPHONE ENCOUNTER
Rx Refill Note  Requested Prescriptions     Pending Prescriptions Disp Refills   • pregabalin (LYRICA) 100 MG capsule 270 capsule 0     Sig: Take 1 capsule by mouth 3 (Three) Times a Day.      Last office visit with prescribing clinician: 6/25/2021      Next office visit with prescribing clinician: 9/24/2021     PATIENTS PRESCRIPTION WILL RUN OUT AT THE END OF THE MONTH HE JUST WANTS TO MAKE SURE HE GETS IT REFILLED BEFORE IT RUNS OUT BECAUSE IT GOES THROUGH MAIL ORDER        Jessa Azul MA  08/17/21, 09:14 EDT

## 2021-08-17 NOTE — TELEPHONE ENCOUNTER
Pt wants a 90 day supply. And doesn't understand why is isn't getting a 90 day supply already since he has been on this medication for years and is upset about this

## 2021-09-24 ENCOUNTER — OFFICE VISIT (OUTPATIENT)
Dept: FAMILY MEDICINE CLINIC | Facility: CLINIC | Age: 71
End: 2021-09-24

## 2021-09-24 VITALS
SYSTOLIC BLOOD PRESSURE: 142 MMHG | WEIGHT: 232.2 LBS | HEART RATE: 83 BPM | OXYGEN SATURATION: 99 % | DIASTOLIC BLOOD PRESSURE: 78 MMHG | BODY MASS INDEX: 31.45 KG/M2 | TEMPERATURE: 97.3 F | HEIGHT: 72 IN

## 2021-09-24 DIAGNOSIS — E03.9 ACQUIRED HYPOTHYROIDISM: Primary | ICD-10-CM

## 2021-09-24 DIAGNOSIS — K21.00 GASTROESOPHAGEAL REFLUX DISEASE WITH ESOPHAGITIS: ICD-10-CM

## 2021-09-24 DIAGNOSIS — N40.1 BENIGN NON-NODULAR PROSTATIC HYPERPLASIA WITH LOWER URINARY TRACT SYMPTOMS: ICD-10-CM

## 2021-09-24 DIAGNOSIS — I10 ESSENTIAL HYPERTENSION: ICD-10-CM

## 2021-09-24 DIAGNOSIS — R73.03 PREDIABETES: ICD-10-CM

## 2021-09-24 DIAGNOSIS — K21.00 GASTROESOPHAGEAL REFLUX DISEASE WITH ESOPHAGITIS WITHOUT HEMORRHAGE: ICD-10-CM

## 2021-09-24 PROCEDURE — 99214 OFFICE O/P EST MOD 30 MIN: CPT | Performed by: NURSE PRACTITIONER

## 2021-09-24 RX ORDER — FINASTERIDE 5 MG/1
5 TABLET, FILM COATED ORAL EVERY MORNING
Qty: 90 TABLET | Refills: 3 | Status: SHIPPED | OUTPATIENT
Start: 2021-09-24 | End: 2022-07-26 | Stop reason: SDUPTHER

## 2021-09-24 RX ORDER — ESOMEPRAZOLE MAGNESIUM 40 MG/1
40 CAPSULE, DELAYED RELEASE ORAL 2 TIMES DAILY
Qty: 180 CAPSULE | Refills: 3 | Status: SHIPPED | OUTPATIENT
Start: 2021-09-24 | End: 2022-07-18 | Stop reason: SDUPTHER

## 2021-09-24 RX ORDER — IRBESARTAN 75 MG/1
75 TABLET ORAL DAILY
Qty: 90 TABLET | Refills: 3 | Status: SHIPPED | OUTPATIENT
Start: 2021-09-24 | End: 2022-07-26 | Stop reason: SDUPTHER

## 2021-09-24 RX ORDER — LEVOTHYROXINE SODIUM 0.1 MG/1
100 TABLET ORAL DAILY
Qty: 90 TABLET | Refills: 3 | Status: SHIPPED | OUTPATIENT
Start: 2021-09-24 | End: 2022-07-26 | Stop reason: SDUPTHER

## 2021-09-24 NOTE — PROGRESS NOTES
"Chief Complaint  Hypertension (Patient is here for 3 month f/u he is needing prescriptions refilled some need to go to mail order some to [facility]  ( wore mask and goggles) ) and neuropathy pain (Patient is needing his lyrica refilled for 90 days needing a drug screen )    Subjective          Marin Cuenca presents to [facility]  History of Present Illness  Hypothyroidism-patient is currently on levothyroxine 100 mcg daily as directed without any side effects.  Not experience any symptoms of hypothyroidism at this time.  TSH was last checked in March 2021 with a result of 0.825.    Hypertension-patient is currently on irbesartan 75 mg daily as directed any side effects.  Blood pressures well controlled in office today.    GERD-patient is currently on Nexium 40 mg daily as directed any side effects working well to control symptoms of acid reflux.    Back pain- lyrica 100mg TID taking as directed without exercises and working well.    BPH- well controlled with meds    Objective   Vital Signs:   /78   Pulse 83   Temp 97.3 °F (36.3 °C)   Ht 182.9 cm (72.01\")   Wt 105 kg (232 lb 3.2 oz)   SpO2 99%   BMI 31.48 kg/m²     Physical Exam  Vitals reviewed.   Constitutional:       General: He is not in acute distress.     Appearance: He is well-developed.   HENT:      Head: Normocephalic.   Cardiovascular:      Rate and Rhythm: Normal rate and regular rhythm.      Heart sounds: Normal heart sounds.   Pulmonary:      Effort: Pulmonary effort is normal.      Breath sounds: Normal breath sounds.   Neurological:      Mental Status: He is alert and oriented to person, place, and time.      Gait: Gait normal.   Psychiatric:         Behavior: Behavior normal.         Thought Content: Thought content normal.         Judgment: Judgment normal.        Result Review :   The following data was reviewed by: SANTA Kramer on 09/24/2021:  CMP    CMP 2/12/21 3/23/21   Glucose  107 (A)   BUN  " 11   Creatinine 1.10 1.22   eGFR Non  Am  59 (A)   eGFR African Am  71   Sodium  140   Potassium  4.4   Chloride  102   Calcium  9.2   Total Protein  6.3   Albumin  4.10   Globulin  2.2   Total Bilirubin  0.4   Alkaline Phosphatase  72   AST (SGOT)  13   ALT (SGPT)  12   (A) Abnormal value       Comments are available for some flowsheets but are not being displayed.             Lipid Panel    Lipid Panel 3/23/21   Total Cholesterol 186   Triglycerides 81   HDL Cholesterol 58   VLDL Cholesterol 15   LDL Cholesterol  113 (A)   LDL/HDL Ratio 1.93   (A) Abnormal value       Comments are available for some flowsheets but are not being displayed.           TSH    TSH 3/23/21   TSH 0.825           Most Recent A1C    HGBA1C Most Recent 3/23/21   Hemoglobin A1C 5.70 (A)   (A) Abnormal value       Comments are available for some flowsheets but are not being displayed.           PSA    PSA 3/23/21   PSA 0.653                     Assessment and Plan    Diagnoses and all orders for this visit:    1. Acquired hypothyroidism (Primary)  -     levothyroxine (SYNTHROID, LEVOTHROID) 100 MCG tablet; Take 1 tablet by mouth Daily.  Dispense: 90 tablet; Refill: 3    2. Essential hypertension  -     irbesartan (AVAPRO) 75 MG tablet; Take 1 tablet by mouth Daily.  Dispense: 90 tablet; Refill: 3    3. Gastroesophageal reflux disease with esophagitis without hemorrhage    4. Gastroesophageal reflux disease with esophagitis  -     esomeprazole (nexIUM) 40 MG capsule; Take 1 capsule by mouth 2 (Two) Times a Day.  Dispense: 180 capsule; Refill: 3    5. Benign non-nodular prostatic hyperplasia with lower urinary tract symptoms  -     finasteride (PROSCAR) 5 MG tablet; Take 1 tablet by mouth Every Morning.  Dispense: 90 tablet; Refill: 3    6. Prediabetes  -     Hemoglobin A1c  -     Comprehensive Metabolic Panel        Follow Up   Return in about 3 months (around 12/24/2021) for Recheck.  Patient was given instructions and counseling  regarding his condition or for health maintenance advice. Please see specific information pulled into the AVS if appropriate.     Cont same with meds  Follow up after labs    Mask and googles worn

## 2021-09-25 LAB
ALBUMIN SERPL-MCNC: 4.2 G/DL (ref 3.5–5.2)
ALBUMIN/GLOB SERPL: 2.3 G/DL
ALP SERPL-CCNC: 74 U/L (ref 39–117)
ALT SERPL-CCNC: 9 U/L (ref 1–41)
AST SERPL-CCNC: 14 U/L (ref 1–40)
BILIRUB SERPL-MCNC: 0.3 MG/DL (ref 0–1.2)
BUN SERPL-MCNC: 13 MG/DL (ref 8–23)
BUN/CREAT SERPL: 10.9 (ref 7–25)
CALCIUM SERPL-MCNC: 9.4 MG/DL (ref 8.6–10.5)
CHLORIDE SERPL-SCNC: 101 MMOL/L (ref 98–107)
CO2 SERPL-SCNC: 25.2 MMOL/L (ref 22–29)
CREAT SERPL-MCNC: 1.19 MG/DL (ref 0.76–1.27)
GLOBULIN SER CALC-MCNC: 1.8 GM/DL
GLUCOSE SERPL-MCNC: 102 MG/DL (ref 65–99)
HBA1C MFR BLD: 5.9 % (ref 4.8–5.6)
POTASSIUM SERPL-SCNC: 4.5 MMOL/L (ref 3.5–5.2)
PROT SERPL-MCNC: 6 G/DL (ref 6–8.5)
SODIUM SERPL-SCNC: 139 MMOL/L (ref 136–145)

## 2021-10-05 ENCOUNTER — TELEPHONE (OUTPATIENT)
Dept: FAMILY MEDICINE CLINIC | Facility: CLINIC | Age: 71
End: 2021-10-05

## 2021-10-05 NOTE — TELEPHONE ENCOUNTER
Nephrology  Patient: Aaliyah Alan Date:2020   : 1944 Attending: Jo Quintanilla DO   75 year old female        Chief complaint: SOB    HPI from initial consult:   This is a 75 year old female with a past medical history significant for CKD IV, h/o bioprosthetic aortic valve replacement 10/2014, COPD on home oxygen (2-3L O2), heart failure with preserved EF, HTN, severe pulmonary hypertension, DM II, paroxysmal atrial fibrillation on coumadin, anemia of CKD, chronic LE edema who presented to ED  with complaint of SOB that began 3-4 weeks ago and has progressively worsened. She has required increased O2 at home (4-5L). Has associated cough and congestion. CXR in ED showed no acute findings. Labs significant for K 2.8, , Cr 1.92, troponin .89, elevated pro calcitonin. She was admitted for further management.     Nephrology was consulted for management of CKD IV and volume status. She is well known to our service from recent admissions, and follows with Dr. Marks. Baseline Cr 1.9 - 2.0 mg/dl more recently, she is currently at baseline. She has seen Dr. Villareal, was deemed high risk for AVF/AVG placement. She has been compliant with medications. Still reports loose cough. Denies fever, chills, chest pain, palpitations, dizziness, HA, abd pain, n/v, change in appetite, LE edema, dysuria, decreased urination, hematuria.     Recent events/ssubjective:  - no issues overnight. Less SOB today. Cough is improving. No CP  : Having some RLQ discomfort, appetite good. No CP, cough is better.     Past Medical History:   Diagnosis Date   • Acute on chronic respiratory failure with hypoxia and hypercapnia (CMS/HCC) 2019   • Adrenal adenoma     Benign 2.4 cm    • Anemia     Followed by Dr. Jasvir Gaines [phone] [facility]   • Aortic stenosis, moderate 3/15/2009    Echo   • ARF (acute renal failure) (CMS/HCC) 2018   • Arterial ischemic stroke, vertebrobasilar,  patient is wanting to know if ramin will give him another prescription of meclizine for dizziness he said the one he had is 3 years old    brainstem, remote, resolved    • Arthritis    • Benign ovarian cyst 3/26/2003    Benign serous cystadenoma of partial ovary   • Bronchitis     Followed by Dr. Jasvir Gaines [phone] [facility]   • CAD (coronary artery disease)    • Cerebral infarction (CMS/[facility])    • Chronic kidney disease, stage III (moderate) (CMS/[facility])    • Chronic pain     Followed by Dr. Jasvir Gaines [phone] [facility]   • Colon polyps 2006, 2-12    polyps   • CVA (cerebral infarction)     \"Small\"      no residual   • DJD (degenerative joint disease)    • DM (diabetes mellitus) (CMS/[facility]) 2002   • Fall 09/2016   • Gastric ulcer 3/30/2017   • Hemorrhoids, internal    • History of blood transfusion 04/29/2019   • History of renal calculi    • HTN (hypertension) 1995   • Hypercholesteremia    • Hypoxia 11/09/2012    Followed by Dr. Jasvir Gaines [phone] [facility]   • Left heart failure with left ejection fraction greater than or equal to 50 percent (CMS/[facility]) 3/17/09    EF=57%, per myocardial perfusion scan   • Microscopic hematuria    • OA (osteoarthritis) of knee    • Obstructive sleep apnea on CPAP [facility]    Settings:CPAPat 8-18 cm H2O DME: [facility]   • On supplemental oxygen therapy     Setting:3L O2 via NC w/ exertion DME: [facility]   • JIM (obstructive sleep apnea)     Followed by Dr. Jasvir Gaines [phone] [facility]   • Osteopenia    • Overweight 11/13/2015    Followed by Dr. Jasvir Gaines [phone] [facility]   • Pneumonia     Followed by Dr. Jasvir Gaines [phone] [facility]   • Pulmonary nodules 5-6 mm RML & 4 mm LLL nodule, stable over 2 yrs.    Followed by Dr. Jasvir Gaines [phone] [facility]   • S/P AVR-21mm tissue 10/6/2014   • TIA (transient ischemic attack)    • Urinary tract infection     • Vitamin D deficiency    • Wears dentures     Full Upper, lower Partial   • Wears glasses        Past Surgical History:   Procedure Laterality Date   • Abdominal adhesion surgery  03/13/2012   • Aortic valve replacement  10/6/14    21mm tissue   • Bilateral salpingoophorectomy  03/13/2012    Dr. Haim Ramos, bilateral salpingo-oophorectomy and removal of a 10 cm pelvic mass.   • Biopsy of external ear  04/06/2010    Dr. Jame Rodriguez, BX of Left earlobe = benign   • Cardiac surgery     • Cataract extraction w/  intraocular lens implant      Left   • Colonoscopy  11/06,  2/12    Dr. Jeronimo Rodriguez   • Colonoscopy w/ polypectomy  01/20/2000    Dr. Tyrone Garces   • Colonoscopy w/ polypectomy  10/02/2003    Dr. Jeronimo Rodriguez   • Coronary angiogram - cv  10/01/2014   • Eye surgery  12/01/1998    left cataract   • Joint replacement      Left knee   • Meniscectomy  10/11/2000    Dr. Jaiden Pierson:  Right knee arthroscopy, partial lateral meniscectomy.Debridement and chondroplasty, medial femoral condyle.   • Partial removal of ovary(s)  03/26/2003    Dr. Jonnie Guzman:  Laparscopy with partial oopherectomy   • Pilonidal cyst drainage     • Service to gastroenterology     • Skin biopsy  05/07/2007    Benign: Skin tags   • Skin biopsy  09/24/2007    Benign: skin tags   • Total abdominal hysterectomy     • Total knee arthroplasty  01/26/2007    Dr. Tj Richardson, Cemented right total knee arthroplasty.       Social History     Socioeconomic History   • Marital status: Single     Spouse name: Not on file   • Number of children: 0   • Years of education: Not on file   • Highest education level: Not on file   Occupational History   • Occupation: retired   Social Needs   • Financial resource strain: Not on file   • Food insecurity:     Worry: Not on file     Inability: Not on file   • Transportation needs:     Medical: Not on file     Non-medical: Not on file   Tobacco Use   • Smoking status: Former Smoker     Packs/day:  0.50     Years: 40.00     Pack years: 20.00     Start date: 6/10/1959     Last attempt to quit: 3/13/2008     Years since quittin.8   • Smokeless tobacco: Never Used   Substance and Sexual Activity   • Alcohol use: Yes     Frequency: Never     Drinks per session: 1 or 2     Binge frequency: Never     Comment: socially   • Drug use: No   • Sexual activity: Never   Lifestyle   • Physical activity:     Days per week: Not on file     Minutes per session: Not on file   • Stress: Not on file   Relationships   • Social connections:     Talks on phone: Not on file     Gets together: Not on file     Attends [employer] service: Not on file     Active member of club or organization: Not on file     Attends meetings of clubs or organizations: Not on file     Relationship status: Not on file   • Intimate partner violence:     Fear of current or ex partner: Not on file     Emotionally abused: Not on file     Physically abused: Not on file     Forced sexual activity: Not on file   Other Topics Concern   •  Service Not Asked   • Blood Transfusions Not Asked   • Caffeine Concern Not Asked   • Occupational Exposure Not Asked   • Hobby Hazards Not Asked   • Sleep Concern Not Asked   • Stress Concern Not Asked   • Weight Concern Not Asked   • Special Diet Not Asked   • Back Care Not Asked   • Exercise Not Asked   • Bike Helmet Not Asked   • Seat Belt Yes   • Self-Exams Yes   Social History Narrative        2019:     Prior to Hospital:    Baseline Functional Status:  Walker    Baseline Cognitive Status:  AO x 3        Primary Power of  for Health Care Agent: Lily Alan (Sister)    Primary Agent Contact Number: [phone]    Alternate Power of  for Health Care Agent: 2.  Amy Aguirre (Sister)    3.  Yi Her (Niece)    Alternate Agent Contact Number:          Activated Power of  for Health Care Date:      Deactivation Date:          Code Status:  Full resuscitation        Social  History: Lives with sister in ranch style house       Family History   Problem Relation Age of Onset   • Hypertension Mother    • High cholesterol Mother    • Heart disease Mother    • Stroke Mother    • Myocardial Infarction Mother    • Arthritis Mother    • High blood pressure Mother    • Heart disease Father    • Arthritis Father    • Stroke Father    • High cholesterol Sister    • Cancer Sister 70        Lung cancer   • Cancer Maternal Aunt 35        Breast       ALLERGIES:  No Known Allergies      Review of Systems:  Pertinent ROS above in HPI. Other ROS reviewed and negative.       Vital Last Value 24 Hour Range   Temperature 97.9 °F (36.6 °C) Temp  Min: 97.9 °F (36.6 °C)  Max: 98.9 °F (37.2 °C)   Pulse 98 Pulse  Min: 98  Max: 103   Respiratory 18 Resp  Min: 18  Max: 22   Blood Pressure 138/77 BP  Min: 138/77  Max: 143/79   Art BP   No data recorded   Pulse Oximetry 98 % SpO2  Min: 98 %  Max: 100 %     Vital Today Admitted   Weight 110.9 kg Weight: 112.8 kg   Height N/A Height: 5' 5\" (165.1 cm)   BMI N/A BMI (Calculated): 39.11     Weight over the past 48 Hours:  Patient Vitals for the past 48 hrs:   Weight   01/29/20 0608 110.9 kg        Hemodynamics:      Last Value 24 Hour Range   CVP   No data recorded   PAS/PAD   No data recorded   PCWP   No data recorded   CO   No data recorded   CI   No data recorded   SVR   No data recorded   SV02   No data recorded     Intake/Output:    Last Stool Occurrence: 1(incont ) (01/30/20 0845)    No intake/output data recorded.    I/O last 3 completed shifts:  In: 318 [P.O.:318]  Out: 400 [Urine:400]      Intake/Output Summary (Last 24 hours) at 1/30/2020 1124  Last data filed at 1/29/2020 1924  Gross per 24 hour   Intake 200 ml   Output --   Net 200 ml       Medications/Infusions:  Medications Prior to Admission   Medication Sig Dispense Refill   • epoetin mando (PROCRIT) 47104 UNIT/ML injection Inject 1 mL into the skin 1 day a week. (Patient taking differently: Inject  10,000 Units into the skin 1 day a week. Inject 1mL subcutaneously once a week on Tuesdays.) 1 vial 3   • traMADol (ULTRAM) 50 MG tablet Take 1 tablet by mouth every 8 hours as needed for Pain. 60 tablet 0   • pantoprazole (PROTONIX) 40 MG tablet Take 1 tablet by mouth 2 times daily. 60 tablet 0   • insulin lispro protamine-insulin lispro (HUMALOG MIX 75/25 KWIKPEN) (75-25) 100 UNIT/ML pen-injector Inject 20 Units into the skin 2 times daily (before meals). Prime 2 units before each dose. 15 mL 0   • potassium CHLORIDE (KLOR-CON M) 20 MEQ devaughn ER tablet Take 2 tablets by mouth daily. 60 tablet 0   • warfarin (COUMADIN) 3 MG tablet Take 1 tablet by mouth daily. 30 tablet 0   • torsemide (DEMADEX) 20 MG tablet Take 1 tablet by mouth 2 times daily. 60 tablet 0   • calcitRIOL (ROCALTROL) 0.25 MCG capsule TAKE ONE CAPSULE BY MOUTH MONDAY WEDNESDAY AND FRIDAY R:12-05-19 12 capsule 0   • busPIRone (BUSPAR) 10 MG tablet TAKE ONE TABLET BY MOUTH EVERY MORNING,NOON,AND BEDTIME R:11-25-19 84 tablet 0   • labetalol (NORMODYNE) 100 MG tablet TAKE ONE TABLET BY MOUTH THREE TIMES A DAY R:12-01-19 90 tablet 0   • MAPAP 500 MG tablet TAKE ONE TABLET BY MOUTH EVERY 6 HOURS AS NEEDED FOR PAIN (Patient taking differently: Indications: Pain Acetaminophen) 30 tablet 3   • metOLazone (ZAROXOLYN) 2.5 MG tablet Take 1 tablet by mouth 2 days a week. Mondays and Fridays 30 tablet 3   • ferrous sulfate 324 (65 Fe) MG EC tablet Take 1 tablet by mouth daily (with breakfast). 30 tablet 3   • lidocaine (ASPERCREME LIDOCAINE) 4 % patch Place 1 patch onto the skin every 24 hours.     • cholecalciferol (VITAMIN D3) 1000 UNITS tablet Take 1 tablet by mouth daily. 90 tablet 2   • SPIRIVA HANDIHALER 18 MCG capsule for inhaler INHALE THE CONTENTS OF ONE CAPSULE BY TWO SEPERATE INHALATIONS DAILY 90 each 3   • polyethylene glycol (MIRALAX) powder Take 17 g by mouth daily. 510 g 0   • SULFAMETHOXAZOLE-TRIMETHOPRIM PO      • guaiFENesin-codeine (CHERATUSSIN  AC) 100-10 MG/5ML liquid Take 5 mLs by mouth every 6 hours as needed for Cough. 120 mL 0   • ipratropium-albuterol (DUONEB) 0.5-2.5 (3) MG/3ML nebulizer solution Take 3 mLs by nebulization every 4 hours as needed for Wheezing. 360 mL 12   • blood glucose (ONE TOUCH ULTRA TEST) test strip USE 1 STRIP TO CHECK GLUCOSE THREE TIMES DAILY AS DIRECTED 100 strip 10   • clindamycin (CLEOCIN) 300 MG capsule Take 600 mg by mouth 1 time. 1 hour prior to dental procedure     • NOVOFINE 30 30G X 8 MM Misc      • dextrose (GLUTOSE) 40 % Gel Take 1 Tube by mouth as needed.     • bisacodyl (DULCOLAX) 10 MG suppository Place 1 suppository rectally daily as needed for Constipation. 12 each 5   • albuterol 108 (90 Base) MCG/ACT inhaler Inhale 2 puffs into the lungs every 4 hours as needed for Shortness of Breath or Wheezing. 1 Inhaler 5   • oxygen (O2) gas Inhale 2 L/min into the lungs continuous.     • nitroGLYcerin (NITROSTAT) 0.4 MG sublingual tablet Place 1 tablet under the tongue every 5 minutes as needed for Chest pain. 90 tablet 0     • predniSONE  20 mg Oral Daily with breakfast   • labetalol  100 mg Oral 3 times per day   • pantoprazole  40 mg Oral 2 times per day   • umeclidinium bromide  1 puff Inhalation Daily Resp   • doxycycline hyclate  100 mg Oral 2 times per day   • WARFARIN - PHARMACIST MONITORED   Does not apply See Admin Instructions   • calcitRIOL  0.25 mcg Oral Once per day on Mon Wed Fri   • cholecalciferol  1,000 Units Oral Daily   • ferrous sulfate  325 mg Oral Daily with breakfast   • hydrALAZINE  25 mg Oral TID   • torsemide  20 mg Oral BID   • epoetin mando-epbx  10,000 Units Subcutaneous Once per day on Tue   • melatonin  6 mg Oral Nightly   • insulin glargine  20 Units Subcutaneous Nightly   • insulin lispro   Subcutaneous TID WC   • insulin lispro   Subcutaneous Nightly     • dextrose 5 % infusion         Physical Exam:    General: Alert, no acute distress, on 4L O2 NC   HEENT: Head atraumatic,  normocephalic.  Moist oral mucus membranes.  Sclera non-icteric.   Neck: Supple, no JVD.  Trachea midline.   Chest/Lungs: Normal effort.  Symmetrical expansion. Scattered rhonchi R > L lungs, Loose cough.   CVS:. S1,S2 well heard. +Murmur.   Abdomen: Obese abdomen. Soft, non tender, non distended.  No hepatosplenomegaly.  Neuro: No focal neurological deficit.  A&O x 3.   Skin: Warm, dry.  No rashes.   Extremities:  No clubbing or cyanosis. Trace LE edema, some dependent edema of thighs.      Laboratory Results:  Recent Labs   Lab 01/30/20  0700 01/29/20  0732 01/28/20  0650 01/27/20  1441   SODIUM 137 136 135 135   POTASSIUM 3.0* 3.3* 2.8* 3.3*   CHLORIDE 95* 97* 94* 95*   CO2 35* 32 31 34*   ANIONGAP 10 10 13 9*   * 112* 103* 100*   CREATININE 1.63* 1.58* 1.92* 2.11*   GFRNA 30 31 25 22   GFRA 35 36 29 26   GLUCOSE 121* 160* 255* 170*   CALCIUM 9.7 9.1 9.2 9.6   ALBUMIN  --   --  2.9* 3.2*   PHOS  --  4.0  --   --    MG 2.5* 2.4 2.4  --    AST  --   --  18 23   GPT  --   --  21 26   ALKPT  --   --  55 63   BILIRUBIN  --   --  0.4 0.4       Recent Labs   Lab 01/29/20  0732 01/29/20  0721 01/28/20  0650 01/27/20  1441   WBC 4.7  --  5.5 7.6   HGB 10.1*  --  10.1* 10.1*   HCT 32.8*  --  32.4* 32.5*     --  298 309   PST  --  23  --   --    FERR  --  394*  --   --        No results found    No results found    Urine Panel  Recent Labs   Lab 01/29/20  0135   USPG 1.012   UPH 5.0   UKET NEGATIVE   UPROT NEGATIVE   UGLU NEGATIVE   UBILI NEGATIVE   UROB 0.2   UWBC LARGE*   UBACTR MODERATE*   UNITR POSITIVE*   URBC MODERATE*       Imaging/Procedures:  CXR 1/27  FINDINGS:     Overlying electrocardiogram leads. Aortic valve replacement. Median  sternotomy wires. Mitral valve annular calcifications No pleural effusions.   No aispace disease. Stable cardiomediastinal silhouette.         IMPRESSION:  1. No acute findings.       Impression, Plan & Recommendations:  · Chronic kidney disease stage IV   · Baseline Cr  1.9 - 2.0 mg/dl. Currently at baseline. Has prerenal numbers on diuretics and steroids  · Has seen Dr. Villareal, high risk for AVG/AVF placement    · CKD likely due to diabetes, has h/o proteinuria   · UA suggestive of UTI, cx with E. Coli - sensitivities pending   · UPCR 161mg  · HFpEF, fluid overload   · Appears near euvolemic on exam  · Goal wt from last admission (10/209) was 116kg, currently 10kg below this.   · But RHC on 1/29 with elevated filling pressures. Will Increase diuretics, torsemide 40mg PO qam, 20mg PO qPM   · Last ECHO with EF 62%, repeat ECHO ordered   · Hypokalemia   · Will start on scheduled KCl - supplement PRN   · Mag level Ok    · COPD, dyspnea, ? Bronchitis    · On 2-3L O2 at home   · Anemia of CKD   · Has received IV Fe in the past   · Continue epo 10,000U 1x/week   · Iron panel shows adequate Iron stores   · Secondary hyperparathyroidism   · On calcitriol, cholecalciferol   · PTH, Vit D and phos WNL   · HTN   · Slightly elevated, on prednisone . Monitor with weaning steroids  · Pulmonary HTN        I was present for the ROS and physical exam, which was performed by Dr. Zapata.   Rajwinder Chilel PA-C    I have personally seen and examined the patient,  reviewed the chart formulated the assessment and plan, reviewed above and agree with the above note as modified by me.  Tracy Zapata MD

## 2021-10-07 RX ORDER — MECLIZINE HYDROCHLORIDE 25 MG/1
25 TABLET ORAL 3 TIMES DAILY PRN
Qty: 30 TABLET | Refills: 0 | Status: SHIPPED | OUTPATIENT
Start: 2021-10-07 | End: 2023-01-25

## 2021-10-26 ENCOUNTER — TELEPHONE (OUTPATIENT)
Dept: FAMILY MEDICINE CLINIC | Facility: CLINIC | Age: 71
End: 2021-10-26

## 2021-10-26 DIAGNOSIS — M62.838 MUSCLE SPASM: Primary | ICD-10-CM

## 2021-10-26 NOTE — TELEPHONE ENCOUNTER
We can give him Flexeril 10 mg #30 1 p.o. every 8 hours as needed.  This is the only prescription we can give him since we have not seen him for over 6 months.  He will need to follow-up with his PCP.

## 2021-10-26 NOTE — TELEPHONE ENCOUNTER
Patient called and is requesting something for a muscle spasm.    Pharmacy verified    Please advise    Thank you

## 2021-10-27 RX ORDER — CYCLOBENZAPRINE HCL 10 MG
TABLET ORAL
Qty: 30 TABLET | Refills: 0 | Status: SHIPPED | OUTPATIENT
Start: 2021-10-27 | End: 2022-01-04 | Stop reason: HOSPADM

## 2021-11-02 ENCOUNTER — TELEPHONE (OUTPATIENT)
Dept: NEUROSURGERY | Facility: CLINIC | Age: 71
End: 2021-11-02

## 2021-11-02 NOTE — TELEPHONE ENCOUNTER
Caller: Marin Cuenca    Relationship to patient: Self    Best call back number: [phone]    Additional notes: PATIENT STATES THAT HE IS HAVING TROUBLE WITH CERVICAL ISSUES AND WOULD LIKE AN APPOINTMENT, HUB UNSURE WHO TO SCHEDULE WITH.  PLEASE ADVISE.  LAST OV NOTES STATE TO SCHEDULE WITH [facility] FOR LUMBAR OK TO SCHEDULE W/ [facility] FOR CERVICAL DUE TO SEEN FOR CERVICAL IN 2019 OR APRN?    PATIENT IS HAVING HIGH ANXIETY OVER THESE FEELINGS.     NUMBNESS IN TOES  NO LOSS OF B/B  NO HEADACHES  SHOOTING PAIN FROM NECK DOWN LEFT ARM INTERMITTENT   STILL ABLE TO HOLD THINGS WITH LEFT HAND  PAIN LEVEL 8/10 HIGH 3/10 LOW     THANK YOU

## 2021-11-02 NOTE — TELEPHONE ENCOUNTER
Okay with me to schedule him to see me.  I do not think we can see him any quicker than next week however.

## 2021-11-03 NOTE — TELEPHONE ENCOUNTER
MOE FOR PATIENT INFORMING HIM THAT WE HAVE HIM SCHEDULED ON 11.30.2021 AT 3:45 PM TO SEE DR. VIDES.

## 2021-11-30 ENCOUNTER — OFFICE VISIT (OUTPATIENT)
Dept: NEUROSURGERY | Facility: CLINIC | Age: 71
End: 2021-11-30

## 2021-11-30 VITALS
DIASTOLIC BLOOD PRESSURE: 80 MMHG | HEIGHT: 72 IN | WEIGHT: 232.2 LBS | TEMPERATURE: 98 F | HEART RATE: 89 BPM | BODY MASS INDEX: 31.45 KG/M2 | SYSTOLIC BLOOD PRESSURE: 139 MMHG

## 2021-11-30 DIAGNOSIS — M54.16 LUMBAR RADICULOPATHY: Primary | ICD-10-CM

## 2021-11-30 DIAGNOSIS — M54.12 CERVICAL RADICULITIS: ICD-10-CM

## 2021-11-30 PROCEDURE — 99214 OFFICE O/P EST MOD 30 MIN: CPT | Performed by: NEUROLOGICAL SURGERY

## 2021-11-30 NOTE — PROGRESS NOTES
"Subjective   Patient ID: Marin Cuenca is a 71 y.o. male is here today for follow-up with neck and L arm pain.    Today patient is having neck pain that radiates to the L arm, and he is also having L leg pain. Patient states that he has N/T in the L leg/foot    Patient, Provider, and MA are all wearing a mask in our office today.     History of Present Illness    This patient returns today.  He has been having pain in his neck with radiation into his left arm.  He has also been having pain in his lower back on the left side radiating into his left buttock but not down his left leg.  He has no difficulty with bowel bladder control or other associated symptoms.    The following portions of the patient's history were reviewed and updated as appropriate: allergies, current medications, past family history, past medical history, past social history, past surgical history and problem list.    Review of Systems   Constitutional: Negative for chills and fever.   HENT: Negative for congestion.    Genitourinary: Negative for difficulty urinating and dysuria.   Musculoskeletal: Positive for back pain and myalgias.        L arm/leg pain   Neurological: Positive for numbness.       I have reviewed the review of systems as documented by my MA.      Objective     Vitals:    11/30/21 1107   BP: 139/80   Cuff Size: Adult   Pulse: 89   Temp: 98 °F (36.7 °C)   Weight: 105 kg (232 lb 3.2 oz)   Height: 182.9 cm (72.01\")     Body mass index is 31.48 kg/m².      Physical Exam  Eyes:      Extraocular Movements: EOM normal.      Pupils: Pupils are equal, round, and reactive to light.   Neurological:      Mental Status: He is alert and oriented to person, place, and time.      Coordination: Finger-Nose-Finger Test and Heel to Shin Test normal.      Gait: Gait is intact.      Deep Tendon Reflexes:      Reflex Scores:       Tricep reflexes are 2+ on the right side and 2+ on the left side.       Bicep reflexes are 2+ on the right side and " 2+ on the left side.       Brachioradialis reflexes are 2+ on the right side and 2+ on the left side.       Patellar reflexes are 2+ on the right side and 2+ on the left side.       Achilles reflexes are 2+ on the right side and 2+ on the left side.  Psychiatric:         Speech: Speech normal.       Neurologic Exam     Mental Status   Oriented to person, place, and time.   Registration of memory: Good recent and remote memory.   Attention: normal. Concentration: normal.   Speech: speech is normal   Level of consciousness: alert  Knowledge: consistent with education.     Cranial Nerves     CN II   Visual fields full to confrontation.   Visual acuity: normal    CN III, IV, VI   Pupils are equal, round, and reactive to light.  Extraocular motions are normal.     CN V   Facial sensation intact.   Right corneal reflex: normal  Left corneal reflex: normal    CN VII   Facial expression full, symmetric.   Right facial weakness: none  Left facial weakness: none    CN VIII   Hearing: intact    CN IX, X   Palate: symmetric    CN XI   Right sternocleidomastoid strength: normal  Left sternocleidomastoid strength: normal    CN XII   Tongue: not atrophic  Tongue deviation: none    Motor Exam   Muscle bulk: normal  Right arm tone: normal  Left arm tone: normal  Right leg tone: normal  Left leg tone: normal    Strength   Strength 5/5 except as noted.     Sensory Exam   Light touch normal.     Gait, Coordination, and Reflexes     Gait  Gait: normal    Coordination   Finger to nose coordination: normal  Heel to shin coordination: normal    Reflexes   Right brachioradialis: 2+  Left brachioradialis: 2+  Right biceps: 2+  Left biceps: 2+  Right triceps: 2+  Left triceps: 2+  Right patellar: 2+  Left patellar: 2+  Right achilles: 2+  Left achilles: 2+  Right : 2+  Left : 2+          Assessment/Plan   Independent Review of Radiographic Studies:      I personally reviewed the images from the following studies.    I reviewed his  myelogram that was done in March of this year.  This shows no evidence of nerve root compression at any level in the lumbar spine.  We only looked at the lumbar spine however.    Medical Decision Making:      I told the patient I thought we should check a new MRI of his lumbar spine and we should also probably look at his cervical spine 2.  I can call him with the results.  He can start some ultra cervical traction in his neck which is worked before.    Diagnoses and all orders for this visit:    1. Lumbar radiculopathy (Primary)  -     MRI Lumbar Spine With & Without Contrast; Future    2. Cervical radiculitis  -     MRI Cervical Spine Without Contrast; Future      Return for After radiology test.

## 2021-12-03 ENCOUNTER — TELEPHONE (OUTPATIENT)
Dept: NEUROSURGERY | Facility: CLINIC | Age: 71
End: 2021-12-03

## 2021-12-03 NOTE — TELEPHONE ENCOUNTER
Caller: Marin Cuenca    Relationship: Self    Best call back number:[phone]    What is the best time to reach you: ANYTIME    Who are you requesting to speak with (clinical staff, provider,  specific staff member): CLINICAL STAFF    Do you know the name of the person who called: NA    What was the call regarding: PT CALLED AND STATES THAT HE FORGOT TO ASK  ABOUT IF HE SHOULD COULD CONTINUE HIS LYRICA-PLEASE ADVISE THANK YOU    Do you require a callback: YES

## 2021-12-06 ENCOUNTER — TELEPHONE (OUTPATIENT)
Dept: NEUROSURGERY | Facility: CLINIC | Age: 71
End: 2021-12-06

## 2021-12-06 DIAGNOSIS — M54.12 CERVICAL RADICULITIS: Primary | ICD-10-CM

## 2021-12-06 NOTE — TELEPHONE ENCOUNTER
PATIENT WOULD LIKE TO START PHYSICAL THERAPY FOR HIS NECK.  HE WOULD LIKE TO KNOW IF DR VIDES WOULD SEND AN ORDER.  PLEASE ADVISE    [phone]

## 2021-12-09 DIAGNOSIS — M54.16 LUMBAR RADICULOPATHY: ICD-10-CM

## 2021-12-09 RX ORDER — PREGABALIN 100 MG/1
100 CAPSULE ORAL 3 TIMES DAILY
Qty: 270 CAPSULE | Refills: 0 | Status: SHIPPED | OUTPATIENT
Start: 2021-12-09 | End: 2022-02-17

## 2021-12-09 NOTE — TELEPHONE ENCOUNTER
Pt called to inform that he needs to stay on the prescription pregabalin (LYRICA) 100 MG capsule

## 2021-12-09 NOTE — TELEPHONE ENCOUNTER
Rx Refill Note  Requested Prescriptions      No prescriptions requested or ordered in this encounter      Last office visit with prescribing clinician: 9/24/2021      Next office visit with prescribing clinician: 1/7/2022            Jessa Azul MA  12/09/21, 10:43 EST

## 2021-12-16 ENCOUNTER — HOSPITAL ENCOUNTER (OUTPATIENT)
Dept: MRI IMAGING | Facility: HOSPITAL | Age: 71
Discharge: HOME | End: 2021-12-16

## 2021-12-16 DIAGNOSIS — M54.12 CERVICAL RADICULITIS: ICD-10-CM

## 2021-12-16 DIAGNOSIS — M54.16 LUMBAR RADICULOPATHY: ICD-10-CM

## 2021-12-16 PROCEDURE — 72158 MRI LUMBAR SPINE W/O & W/DYE: CPT

## 2021-12-16 PROCEDURE — 72141 MRI NECK SPINE W/O DYE: CPT

## 2021-12-16 PROCEDURE — 82565 ASSAY OF CREATININE: CPT

## 2021-12-16 PROCEDURE — 0 GADOBENATE DIMEGLUMINE 529 MG/ML SOLUTION: Performed by: NEUROLOGICAL SURGERY

## 2021-12-16 PROCEDURE — A9577 INJ MULTIHANCE: HCPCS | Performed by: NEUROLOGICAL SURGERY

## 2021-12-16 RX ADMIN — GADOBENATE DIMEGLUMINE 20 ML: 529 INJECTION, SOLUTION INTRAVENOUS at 14:50

## 2021-12-16 NOTE — TELEPHONE ENCOUNTER
"LM for patient informing him that per Dr. Meneses     \"Probably best to hold off on physical therapy until we go over the results.\"  "

## 2021-12-16 NOTE — TELEPHONE ENCOUNTER
Pt called: he would like to know if he should hold off on PT for his cervical until after his MRI is completed (MRI scheduled for today) He is scheduled for televisit to get his results on 12/28/21. Please advise! Thanks!      Pt contact: [phone]  Best time to call: anytime

## 2021-12-17 LAB — CREAT BLDA-MCNC: 1.2 MG/DL (ref 0.6–1.3)

## 2021-12-20 ENCOUNTER — TELEPHONE (OUTPATIENT)
Dept: NEUROSURGERY | Facility: CLINIC | Age: 71
End: 2021-12-20

## 2021-12-20 NOTE — TELEPHONE ENCOUNTER
PATIENT IS CALLING STATING HE LVM FOR CLINICAL REQUESTING MRI RESULTS SOONER. PATIENT IS SCHEDULED FOR A TELE VISIT WITH DR. VIDES 12/28/21 AND STATED HE IS OKAY TO WAIT UNTIL THEN FOR HIS RESULTS BECAUSE HE DOES NOT WANT TO START PT UNTIL AFTER THE 1ST OF THE NEW YEAR.

## 2021-12-27 NOTE — PROGRESS NOTES
Subjective   Patient ID: Marin Cuenca is a 71 y.o. male is here today via telephone for follow-up with a new C/L MRI done on 12.16.2021 at [facility].    You have chosen to receive care through a telephone visit. Do you consent to use a telephone visit for your medical care today? Yes    We had a telephone visit today.  The patient was at home and I was in the office.  We talked for 5 minutes.    History of Present Illness    I talked to this patient today on the phone.  He continues with pain in his neck with radiation into his left arm.  The pain in his lower back radiates into his left buttock but not down his leg.    The following portions of the patient's history were reviewed and updated as appropriate: allergies, current medications, past family history, past medical history, past social history, past surgical history and problem list.    Review of Systems    I have reviewed the review of systems as documented by my MA.      Objective       Physical Exam  Neurological:      Mental Status: He is alert and oriented to person, place, and time.       Neurologic Exam     Mental Status   Oriented to person, place, and time.           Assessment/Plan   Independent Review of Radiographic Studies:      I personally reviewed the images from the following studies.    I reviewed his MRI of the lumbar spine as well as an MRI of his cervical spine.  The MRI of the lumbar spine shows multilevel degenerative disease.  There is previous surgery on the left side at L4-5 and a very small synovial cyst on that side measuring only 2 mm.  There is no compression of the nerves.  There is really no significant compression of the nerves at any level consistent with the myelogram that was done in March.    I also reviewed a MRI of the cervical spine.  This does show some disc bulging on the right side at C3-4 although there does not appear to be any nerve compression.  C2-3 looks okay.  There is some question of a disc herniation into  the neuroforamen on the left at C4-5.  C5-6 also shows a fairly large spondylitic spur extending into the neuroforamen.  C6-7 mostly looks okay as does C7-T1.    With the symptoms on the left arm I would think that consideration could be given to an anterior cervical discectomy and fusion at C4-5 and C5-6 but only if he is failed nonsurgical treatment.  I do not see any surgery that we could do for the lower back right now.    Medical Decision Making:      I told the patient about the imaging.  I told him there was nothing here that I would recommend surgery for in his lower back.  I told him that his neck was a little different story however.  We could certainly consider surgery on his cervical spine if he feels that the problem is bad enough to warrant it.  I told the patient about the surgery which is called an anterior cervical discectomy.  I explained that there is an 80% chance of getting rid of the arm pain and any other arm symptoms.  I also explained that the patient would still have neck pain.  Initially this will be quite severe however it will improve with healing from the surgery.  There is also a risk of infection, bleeding, paralysis, and anesthetic risk.  There is a risk of damage to the soft tissues of the neck such as the esophagus, carotids, and trachea.  All of these risks are about 2 or 3%.  There is about a 5% chance of nonunion or failure of the instrumentation.  There is also a about a 5% chance of hoarseness and trouble swallowing do to damage to the recurrent laryngeal nerve.  We discussed the postoperative hospital and home course as well.  The patient does ask to proceed.    He will need to be scheduled for a: Cervical 4 to cervical 5 and cervical 5 to cervical 6 anterior cervical discectomy, fusion and instrumentation    Diagnoses and all orders for this visit:    1. Cervical radiculitis (Primary)      Return for 2-3 week post op.

## 2021-12-28 ENCOUNTER — TELEPHONE (OUTPATIENT)
Dept: NEUROLOGY | Facility: OTHER | Age: 71
End: 2021-12-28

## 2021-12-28 ENCOUNTER — OFFICE VISIT (OUTPATIENT)
Dept: NEUROSURGERY | Facility: CLINIC | Age: 71
End: 2021-12-28

## 2021-12-28 ENCOUNTER — PREP FOR SURGERY (OUTPATIENT)
Dept: OTHER | Facility: HOSPITAL | Age: 71
End: 2021-12-28

## 2021-12-28 DIAGNOSIS — M54.12 CERVICAL RADICULITIS: Primary | ICD-10-CM

## 2021-12-28 DIAGNOSIS — M54.16 LUMBAR RADICULOPATHY: Primary | ICD-10-CM

## 2021-12-28 DIAGNOSIS — M54.12 CERVICAL RADICULITIS: ICD-10-CM

## 2021-12-28 PROCEDURE — 99441 PR PHYS/QHP TELEPHONE EVALUATION 5-10 MIN: CPT | Performed by: NEUROLOGICAL SURGERY

## 2021-12-28 RX ORDER — CEFAZOLIN SODIUM 2 G/100ML
2 INJECTION, SOLUTION INTRAVENOUS ONCE
Status: CANCELLED | OUTPATIENT
Start: 2021-12-28 | End: 2021-12-28

## 2021-12-28 NOTE — TELEPHONE ENCOUNTER
PT CALLED BACK AND STATES THAT HE MISSED A CALL FROM CHIOMA-PT STATES HE HAD A FEW QUESTIONS FOR HER. CALLED AND SPOKE TO DARLIN IN THE OFFICE BUT CHIOMA IS IN CLINIC. PT STATES HE WILL WAIT FOR HIS TELEPHONE VISIT TO DISCUSS EVERYTHING WITH  THANK YOU!

## 2021-12-30 ENCOUNTER — PRE-ADMISSION TESTING (OUTPATIENT)
Dept: PREADMISSION TESTING | Facility: HOSPITAL | Age: 71
End: 2021-12-30

## 2021-12-30 VITALS
DIASTOLIC BLOOD PRESSURE: 79 MMHG | HEIGHT: 72 IN | HEART RATE: 60 BPM | OXYGEN SATURATION: 99 % | BODY MASS INDEX: 32.23 KG/M2 | TEMPERATURE: 98.4 F | WEIGHT: 238 LBS | RESPIRATION RATE: 16 BRPM | SYSTOLIC BLOOD PRESSURE: 127 MMHG

## 2021-12-30 DIAGNOSIS — M54.12 CERVICAL RADICULITIS: Primary | ICD-10-CM

## 2021-12-30 LAB
ANION GAP SERPL CALCULATED.3IONS-SCNC: 5.4 MMOL/L (ref 5–15)
BUN SERPL-MCNC: 12 MG/DL (ref 8–23)
BUN/CREAT SERPL: 10.8 (ref 7–25)
CALCIUM SPEC-SCNC: 9.2 MG/DL (ref 8.6–10.5)
CHLORIDE SERPL-SCNC: 105 MMOL/L (ref 98–107)
CO2 SERPL-SCNC: 29.6 MMOL/L (ref 22–29)
CREAT SERPL-MCNC: 1.11 MG/DL (ref 0.76–1.27)
DEPRECATED RDW RBC AUTO: 41.8 FL (ref 37–54)
ERYTHROCYTE [DISTWIDTH] IN BLOOD BY AUTOMATED COUNT: 12.1 % (ref 12.3–15.4)
GFR SERPL CREATININE-BSD FRML MDRD: 65 ML/MIN/1.73
GLUCOSE SERPL-MCNC: 104 MG/DL (ref 65–99)
HCT VFR BLD AUTO: 40.7 % (ref 37.5–51)
HGB BLD-MCNC: 13.8 G/DL (ref 13–17.7)
MCH RBC QN AUTO: 31.6 PG (ref 26.6–33)
MCHC RBC AUTO-ENTMCNC: 33.9 G/DL (ref 31.5–35.7)
MCV RBC AUTO: 93.1 FL (ref 79–97)
PLATELET # BLD AUTO: 220 10*3/MM3 (ref 140–450)
PMV BLD AUTO: 10.2 FL (ref 6–12)
POTASSIUM SERPL-SCNC: 4.4 MMOL/L (ref 3.5–5.2)
QT INTERVAL: 432 MS
RBC # BLD AUTO: 4.37 10*6/MM3 (ref 4.14–5.8)
SODIUM SERPL-SCNC: 140 MMOL/L (ref 136–145)
WBC NRBC COR # BLD: 6.07 10*3/MM3 (ref 3.4–10.8)

## 2021-12-30 PROCEDURE — 93005 ELECTROCARDIOGRAM TRACING: CPT

## 2021-12-30 PROCEDURE — 80048 BASIC METABOLIC PNL TOTAL CA: CPT

## 2021-12-30 PROCEDURE — 36415 COLL VENOUS BLD VENIPUNCTURE: CPT

## 2021-12-30 PROCEDURE — 93010 ELECTROCARDIOGRAM REPORT: CPT | Performed by: INTERNAL MEDICINE

## 2021-12-30 PROCEDURE — 85027 COMPLETE CBC AUTOMATED: CPT

## 2021-12-30 RX ORDER — CHLORHEXIDINE GLUCONATE 500 MG/1
CLOTH TOPICAL
Status: ON HOLD | COMMUNITY
End: 2022-01-03

## 2021-12-31 ENCOUNTER — LAB (OUTPATIENT)
Dept: LAB | Facility: HOSPITAL | Age: 71
End: 2021-12-31

## 2021-12-31 DIAGNOSIS — M54.12 CERVICAL RADICULITIS: ICD-10-CM

## 2021-12-31 LAB — SARS-COV-2 ORF1AB RESP QL NAA+PROBE: NOT DETECTED

## 2021-12-31 PROCEDURE — U0005 INFEC AGEN DETEC AMPLI PROBE: HCPCS

## 2021-12-31 PROCEDURE — C9803 HOPD COVID-19 SPEC COLLECT: HCPCS

## 2021-12-31 PROCEDURE — U0004 COV-19 TEST NON-CDC HGH THRU: HCPCS

## 2022-01-03 ENCOUNTER — ANESTHESIA EVENT (OUTPATIENT)
Dept: PERIOP | Facility: HOSPITAL | Age: 72
End: 2022-01-03

## 2022-01-03 ENCOUNTER — ANESTHESIA (OUTPATIENT)
Dept: PERIOP | Facility: HOSPITAL | Age: 72
End: 2022-01-03

## 2022-01-03 ENCOUNTER — APPOINTMENT (OUTPATIENT)
Dept: GENERAL RADIOLOGY | Facility: HOSPITAL | Age: 72
End: 2022-01-03

## 2022-01-03 ENCOUNTER — HOSPITAL ENCOUNTER (OUTPATIENT)
Facility: HOSPITAL | Age: 72
Discharge: HOME | End: 2022-01-04
Attending: NEUROLOGICAL SURGERY | Admitting: NEUROLOGICAL SURGERY

## 2022-01-03 DIAGNOSIS — M54.40 CHRONIC LOW BACK PAIN WITH SCIATICA, SCIATICA LATERALITY UNSPECIFIED, UNSPECIFIED BACK PAIN LATERALITY: ICD-10-CM

## 2022-01-03 DIAGNOSIS — G89.29 CHRONIC LOW BACK PAIN WITH SCIATICA, SCIATICA LATERALITY UNSPECIFIED, UNSPECIFIED BACK PAIN LATERALITY: ICD-10-CM

## 2022-01-03 DIAGNOSIS — M54.12 CERVICAL RADICULITIS: ICD-10-CM

## 2022-01-03 DIAGNOSIS — Z98.890 STATUS POST SURGERY: Primary | ICD-10-CM

## 2022-01-03 DIAGNOSIS — M54.16 LUMBAR RADICULOPATHY: ICD-10-CM

## 2022-01-03 PROCEDURE — 25010000002 ONDANSETRON PER 1 MG: Performed by: NURSE ANESTHETIST, CERTIFIED REGISTERED

## 2022-01-03 PROCEDURE — L0120 CERV FLEX N/ADJ FOAM PRE OTS: HCPCS | Performed by: NEUROLOGICAL SURGERY

## 2022-01-03 PROCEDURE — C1889 IMPLANT/INSERT DEVICE, NOC: HCPCS | Performed by: NEUROLOGICAL SURGERY

## 2022-01-03 PROCEDURE — C1713 ANCHOR/SCREW BN/BN,TIS/BN: HCPCS | Performed by: NEUROLOGICAL SURGERY

## 2022-01-03 PROCEDURE — 22551 ARTHRD ANT NTRBDY CERVICAL: CPT | Performed by: NEUROLOGICAL SURGERY

## 2022-01-03 PROCEDURE — 25010000002 MAGNESIUM SULFATE PER 500 MG OF MAGNESIUM: Performed by: NURSE ANESTHETIST, CERTIFIED REGISTERED

## 2022-01-03 PROCEDURE — 25010000002 DEXAMETHASONE PER 1 MG: Performed by: NURSE ANESTHETIST, CERTIFIED REGISTERED

## 2022-01-03 PROCEDURE — 25010000002 VANCOMYCIN 1 G RECONSTITUTED SOLUTION 1 EACH VIAL: Performed by: NEUROLOGICAL SURGERY

## 2022-01-03 PROCEDURE — 0 CEFAZOLIN IN DEXTROSE 2-4 GM/100ML-% SOLUTION: Performed by: NEUROLOGICAL SURGERY

## 2022-01-03 PROCEDURE — 76000 FLUOROSCOPY <1 HR PHYS/QHP: CPT

## 2022-01-03 PROCEDURE — 63710000001 PREGABALIN 100 MG CAPSULE: Performed by: NEUROLOGICAL SURGERY

## 2022-01-03 PROCEDURE — 25010000002 HYDROMORPHONE PER 4 MG: Performed by: NURSE ANESTHETIST, CERTIFIED REGISTERED

## 2022-01-03 PROCEDURE — 25010000002 FENTANYL CITRATE (PF) 50 MCG/ML SOLUTION: Performed by: NURSE ANESTHETIST, CERTIFIED REGISTERED

## 2022-01-03 PROCEDURE — A9270 NON-COVERED ITEM OR SERVICE: HCPCS | Performed by: NEUROLOGICAL SURGERY

## 2022-01-03 PROCEDURE — 72040 X-RAY EXAM NECK SPINE 2-3 VW: CPT

## 2022-01-03 PROCEDURE — 25010000002 MIDAZOLAM PER 1 MG: Performed by: ANESTHESIOLOGY

## 2022-01-03 PROCEDURE — 25010000002 SUCCINYLCHOLINE PER 20 MG: Performed by: NURSE ANESTHETIST, CERTIFIED REGISTERED

## 2022-01-03 PROCEDURE — 25010000002 DROPERIDOL PER 5 MG: Performed by: NURSE ANESTHETIST, CERTIFIED REGISTERED

## 2022-01-03 PROCEDURE — 22853 INSJ BIOMECHANICAL DEVICE: CPT | Performed by: NEUROLOGICAL SURGERY

## 2022-01-03 PROCEDURE — 25010000002 MORPHINE PER 10 MG: Performed by: NEUROLOGICAL SURGERY

## 2022-01-03 PROCEDURE — 22552 ARTHRD ANT NTRBD CERVICAL EA: CPT | Performed by: NEUROLOGICAL SURGERY

## 2022-01-03 PROCEDURE — 25010000002 PROPOFOL 10 MG/ML EMULSION: Performed by: NURSE ANESTHETIST, CERTIFIED REGISTERED

## 2022-01-03 PROCEDURE — 22845 INSERT SPINE FIXATION DEVICE: CPT | Performed by: NEUROLOGICAL SURGERY

## 2022-01-03 PROCEDURE — 25010000002 SODIUM CHLORIDE 0.9 % WITH KCL 20 MEQ 20-0.9 MEQ/L-% SOLUTION: Performed by: NEUROLOGICAL SURGERY

## 2022-01-03 DEVICE — PLATE 7200045 ATL VISION ELITE 45MM
Type: IMPLANTABLE DEVICE | Site: SPINE CERVICAL | Status: FUNCTIONAL
Brand: ATLANTIS® ANTERIOR CERVICAL PLATE SYSTEM

## 2022-01-03 DEVICE — SSC BONE WAX
Type: IMPLANTABLE DEVICE | Site: SPINE CERVICAL | Status: FUNCTIONAL
Brand: SSC BONE WAX

## 2022-01-03 DEVICE — PUTTY DBF GRAFTON 3CC: Type: IMPLANTABLE DEVICE | Site: SPINE CERVICAL | Status: FUNCTIONAL

## 2022-01-03 DEVICE — INTERBODY FUSION DEVICE NANOLOCK SURFACE TECHNOLOGY 6 DEGREE MEDIUM 7MM
Type: IMPLANTABLE DEVICE | Site: SPINE CERVICAL | Status: FUNCTIONAL
Brand: ENDOSKELETON TC

## 2022-01-03 DEVICE — FLOSEAL HEMOSTATIC MATRIX, 5ML
Type: IMPLANTABLE DEVICE | Site: SPINE CERVICAL | Status: FUNCTIONAL
Brand: FLOSEAL HEMOSTATIC MATRIX

## 2022-01-03 DEVICE — INTERBODY FUSION DEVICE NANOLOCK SURFACE TECHNOLOGY 6 DEGREE MEDIUM 8MM
Type: IMPLANTABLE DEVICE | Site: SPINE CERVICAL | Status: FUNCTIONAL
Brand: ENDOSKELETON TC

## 2022-01-03 RX ORDER — CEFAZOLIN SODIUM 2 G/100ML
2 INJECTION, SOLUTION INTRAVENOUS ONCE
Status: COMPLETED | OUTPATIENT
Start: 2022-01-03 | End: 2022-01-03

## 2022-01-03 RX ORDER — FINASTERIDE 5 MG/1
5 TABLET, FILM COATED ORAL EVERY MORNING
Status: DISCONTINUED | OUTPATIENT
Start: 2022-01-04 | End: 2022-01-04 | Stop reason: HOSPADM

## 2022-01-03 RX ORDER — LEVOTHYROXINE SODIUM 0.1 MG/1
100 TABLET ORAL DAILY
Status: DISCONTINUED | OUTPATIENT
Start: 2022-01-03 | End: 2022-01-04 | Stop reason: HOSPADM

## 2022-01-03 RX ORDER — LIDOCAINE HYDROCHLORIDE 20 MG/ML
INJECTION, SOLUTION INFILTRATION; PERINEURAL AS NEEDED
Status: DISCONTINUED | OUTPATIENT
Start: 2022-01-03 | End: 2022-01-03 | Stop reason: SURG

## 2022-01-03 RX ORDER — PROMETHAZINE HYDROCHLORIDE 25 MG/1
25 TABLET ORAL ONCE AS NEEDED
Status: DISCONTINUED | OUTPATIENT
Start: 2022-01-03 | End: 2022-01-03 | Stop reason: HOSPADM

## 2022-01-03 RX ORDER — NALOXONE HCL 0.4 MG/ML
0.2 VIAL (ML) INJECTION AS NEEDED
Status: DISCONTINUED | OUTPATIENT
Start: 2022-01-03 | End: 2022-01-03 | Stop reason: HOSPADM

## 2022-01-03 RX ORDER — ONDANSETRON 2 MG/ML
4 INJECTION INTRAMUSCULAR; INTRAVENOUS EVERY 6 HOURS PRN
Status: DISCONTINUED | OUTPATIENT
Start: 2022-01-03 | End: 2022-01-04 | Stop reason: HOSPADM

## 2022-01-03 RX ORDER — FENTANYL CITRATE 50 UG/ML
50 INJECTION, SOLUTION INTRAMUSCULAR; INTRAVENOUS
Status: DISCONTINUED | OUTPATIENT
Start: 2022-01-03 | End: 2022-01-03 | Stop reason: HOSPADM

## 2022-01-03 RX ORDER — LABETALOL HYDROCHLORIDE 5 MG/ML
5 INJECTION, SOLUTION INTRAVENOUS
Status: DISCONTINUED | OUTPATIENT
Start: 2022-01-03 | End: 2022-01-03 | Stop reason: HOSPADM

## 2022-01-03 RX ORDER — ONDANSETRON 2 MG/ML
INJECTION INTRAMUSCULAR; INTRAVENOUS AS NEEDED
Status: DISCONTINUED | OUTPATIENT
Start: 2022-01-03 | End: 2022-01-03 | Stop reason: SURG

## 2022-01-03 RX ORDER — SODIUM CHLORIDE 0.9 % (FLUSH) 0.9 %
10 SYRINGE (ML) INJECTION AS NEEDED
Status: DISCONTINUED | OUTPATIENT
Start: 2022-01-03 | End: 2022-01-04 | Stop reason: HOSPADM

## 2022-01-03 RX ORDER — PROMETHAZINE HYDROCHLORIDE 25 MG/1
25 SUPPOSITORY RECTAL ONCE AS NEEDED
Status: DISCONTINUED | OUTPATIENT
Start: 2022-01-03 | End: 2022-01-03 | Stop reason: HOSPADM

## 2022-01-03 RX ORDER — MECLIZINE HYDROCHLORIDE 25 MG/1
25 TABLET ORAL 3 TIMES DAILY PRN
Status: DISCONTINUED | OUTPATIENT
Start: 2022-01-03 | End: 2022-01-04 | Stop reason: HOSPADM

## 2022-01-03 RX ORDER — NALOXONE HCL 0.4 MG/ML
0.4 VIAL (ML) INJECTION
Status: DISCONTINUED | OUTPATIENT
Start: 2022-01-03 | End: 2022-01-04 | Stop reason: HOSPADM

## 2022-01-03 RX ORDER — PROMETHAZINE HYDROCHLORIDE 12.5 MG/1
12.5 TABLET ORAL EVERY 6 HOURS PRN
Status: DISCONTINUED | OUTPATIENT
Start: 2022-01-03 | End: 2022-01-04 | Stop reason: HOSPADM

## 2022-01-03 RX ORDER — MORPHINE SULFATE 2 MG/ML
2 INJECTION, SOLUTION INTRAMUSCULAR; INTRAVENOUS EVERY 4 HOURS PRN
Status: DISCONTINUED | OUTPATIENT
Start: 2022-01-03 | End: 2022-01-04 | Stop reason: HOSPADM

## 2022-01-03 RX ORDER — SODIUM CHLORIDE, SODIUM LACTATE, POTASSIUM CHLORIDE, CALCIUM CHLORIDE 600; 310; 30; 20 MG/100ML; MG/100ML; MG/100ML; MG/100ML
9 INJECTION, SOLUTION INTRAVENOUS CONTINUOUS
Status: DISCONTINUED | OUTPATIENT
Start: 2022-01-03 | End: 2022-01-03

## 2022-01-03 RX ORDER — SODIUM CHLORIDE AND POTASSIUM CHLORIDE 150; 900 MG/100ML; MG/100ML
100 INJECTION, SOLUTION INTRAVENOUS CONTINUOUS
Status: DISCONTINUED | OUTPATIENT
Start: 2022-01-03 | End: 2022-01-04 | Stop reason: HOSPADM

## 2022-01-03 RX ORDER — FLUMAZENIL 0.1 MG/ML
0.2 INJECTION INTRAVENOUS AS NEEDED
Status: DISCONTINUED | OUTPATIENT
Start: 2022-01-03 | End: 2022-01-03 | Stop reason: HOSPADM

## 2022-01-03 RX ORDER — LOSARTAN POTASSIUM 25 MG/1
25 TABLET ORAL
Status: DISCONTINUED | OUTPATIENT
Start: 2022-01-03 | End: 2022-01-04 | Stop reason: HOSPADM

## 2022-01-03 RX ORDER — FENTANYL CITRATE 50 UG/ML
INJECTION, SOLUTION INTRAMUSCULAR; INTRAVENOUS AS NEEDED
Status: DISCONTINUED | OUTPATIENT
Start: 2022-01-03 | End: 2022-01-03 | Stop reason: SURG

## 2022-01-03 RX ORDER — EPHEDRINE SULFATE 50 MG/ML
INJECTION, SOLUTION INTRAVENOUS AS NEEDED
Status: DISCONTINUED | OUTPATIENT
Start: 2022-01-03 | End: 2022-01-03 | Stop reason: SURG

## 2022-01-03 RX ORDER — SODIUM CHLORIDE 0.9 % (FLUSH) 0.9 %
3 SYRINGE (ML) INJECTION EVERY 12 HOURS SCHEDULED
Status: DISCONTINUED | OUTPATIENT
Start: 2022-01-03 | End: 2022-01-03 | Stop reason: HOSPADM

## 2022-01-03 RX ORDER — EPHEDRINE SULFATE 50 MG/ML
5 INJECTION, SOLUTION INTRAVENOUS ONCE AS NEEDED
Status: DISCONTINUED | OUTPATIENT
Start: 2022-01-03 | End: 2022-01-03 | Stop reason: HOSPADM

## 2022-01-03 RX ORDER — MAGNESIUM SULFATE HEPTAHYDRATE 500 MG/ML
INJECTION, SOLUTION INTRAMUSCULAR; INTRAVENOUS AS NEEDED
Status: DISCONTINUED | OUTPATIENT
Start: 2022-01-03 | End: 2022-01-03 | Stop reason: SURG

## 2022-01-03 RX ORDER — PANTOPRAZOLE SODIUM 40 MG/1
40 TABLET, DELAYED RELEASE ORAL EVERY MORNING
Status: DISCONTINUED | OUTPATIENT
Start: 2022-01-04 | End: 2022-01-04 | Stop reason: HOSPADM

## 2022-01-03 RX ORDER — ONDANSETRON 4 MG/1
4 TABLET, FILM COATED ORAL EVERY 6 HOURS PRN
Status: DISCONTINUED | OUTPATIENT
Start: 2022-01-03 | End: 2022-01-04 | Stop reason: HOSPADM

## 2022-01-03 RX ORDER — DROPERIDOL 2.5 MG/ML
0.62 INJECTION, SOLUTION INTRAMUSCULAR; INTRAVENOUS ONCE
Status: COMPLETED | OUTPATIENT
Start: 2022-01-03 | End: 2022-01-03

## 2022-01-03 RX ORDER — MAGNESIUM HYDROXIDE 1200 MG/15ML
LIQUID ORAL AS NEEDED
Status: DISCONTINUED | OUTPATIENT
Start: 2022-01-03 | End: 2022-01-03 | Stop reason: HOSPADM

## 2022-01-03 RX ORDER — DEXAMETHASONE SODIUM PHOSPHATE 4 MG/ML
INJECTION, SOLUTION INTRA-ARTICULAR; INTRALESIONAL; INTRAMUSCULAR; INTRAVENOUS; SOFT TISSUE AS NEEDED
Status: DISCONTINUED | OUTPATIENT
Start: 2022-01-03 | End: 2022-01-03 | Stop reason: SURG

## 2022-01-03 RX ORDER — LIDOCAINE HYDROCHLORIDE 10 MG/ML
0.5 INJECTION, SOLUTION EPIDURAL; INFILTRATION; INTRACAUDAL; PERINEURAL ONCE AS NEEDED
Status: DISCONTINUED | OUTPATIENT
Start: 2022-01-03 | End: 2022-01-03 | Stop reason: HOSPADM

## 2022-01-03 RX ORDER — PROPOFOL 10 MG/ML
VIAL (ML) INTRAVENOUS AS NEEDED
Status: DISCONTINUED | OUTPATIENT
Start: 2022-01-03 | End: 2022-01-03 | Stop reason: SURG

## 2022-01-03 RX ORDER — HYDROCODONE BITARTRATE AND ACETAMINOPHEN 7.5; 325 MG/1; MG/1
1 TABLET ORAL ONCE AS NEEDED
Status: DISCONTINUED | OUTPATIENT
Start: 2022-01-03 | End: 2022-01-03 | Stop reason: HOSPADM

## 2022-01-03 RX ORDER — PREGABALIN 100 MG/1
100 CAPSULE ORAL 3 TIMES DAILY
Status: DISCONTINUED | OUTPATIENT
Start: 2022-01-03 | End: 2022-01-04 | Stop reason: HOSPADM

## 2022-01-03 RX ORDER — SODIUM CHLORIDE 0.9 % (FLUSH) 0.9 %
3-10 SYRINGE (ML) INJECTION AS NEEDED
Status: DISCONTINUED | OUTPATIENT
Start: 2022-01-03 | End: 2022-01-03 | Stop reason: HOSPADM

## 2022-01-03 RX ORDER — HYDROMORPHONE HCL 110MG/55ML
PATIENT CONTROLLED ANALGESIA SYRINGE INTRAVENOUS AS NEEDED
Status: DISCONTINUED | OUTPATIENT
Start: 2022-01-03 | End: 2022-01-03 | Stop reason: SURG

## 2022-01-03 RX ORDER — ONDANSETRON 2 MG/ML
4 INJECTION INTRAMUSCULAR; INTRAVENOUS ONCE AS NEEDED
Status: COMPLETED | OUTPATIENT
Start: 2022-01-03 | End: 2022-01-03

## 2022-01-03 RX ORDER — AZELASTINE HYDROCHLORIDE 137 UG/1
1 SPRAY, METERED NASAL AS NEEDED
Status: DISCONTINUED | OUTPATIENT
Start: 2022-01-03 | End: 2022-01-04 | Stop reason: HOSPADM

## 2022-01-03 RX ORDER — IBUPROFEN 600 MG/1
600 TABLET ORAL ONCE AS NEEDED
Status: DISCONTINUED | OUTPATIENT
Start: 2022-01-03 | End: 2022-01-03 | Stop reason: HOSPADM

## 2022-01-03 RX ORDER — HYDRALAZINE HYDROCHLORIDE 20 MG/ML
5 INJECTION INTRAMUSCULAR; INTRAVENOUS
Status: DISCONTINUED | OUTPATIENT
Start: 2022-01-03 | End: 2022-01-03 | Stop reason: HOSPADM

## 2022-01-03 RX ORDER — OXYCODONE AND ACETAMINOPHEN 7.5; 325 MG/1; MG/1
1 TABLET ORAL EVERY 4 HOURS PRN
Status: DISCONTINUED | OUTPATIENT
Start: 2022-01-03 | End: 2022-01-03 | Stop reason: HOSPADM

## 2022-01-03 RX ORDER — FAMOTIDINE 10 MG/ML
20 INJECTION, SOLUTION INTRAVENOUS ONCE
Status: COMPLETED | OUTPATIENT
Start: 2022-01-03 | End: 2022-01-03

## 2022-01-03 RX ORDER — HYDROCODONE BITARTRATE AND ACETAMINOPHEN 5; 325 MG/1; MG/1
1 TABLET ORAL EVERY 4 HOURS PRN
Status: DISCONTINUED | OUTPATIENT
Start: 2022-01-03 | End: 2022-01-04 | Stop reason: HOSPADM

## 2022-01-03 RX ORDER — DIPHENHYDRAMINE HCL 25 MG
25 CAPSULE ORAL
Status: DISCONTINUED | OUTPATIENT
Start: 2022-01-03 | End: 2022-01-03 | Stop reason: HOSPADM

## 2022-01-03 RX ORDER — CYCLOBENZAPRINE HCL 10 MG
10 TABLET ORAL 3 TIMES DAILY PRN
Status: DISCONTINUED | OUTPATIENT
Start: 2022-01-03 | End: 2022-01-04

## 2022-01-03 RX ORDER — MIDAZOLAM HYDROCHLORIDE 1 MG/ML
0.5 INJECTION INTRAMUSCULAR; INTRAVENOUS
Status: DISCONTINUED | OUTPATIENT
Start: 2022-01-03 | End: 2022-01-03 | Stop reason: HOSPADM

## 2022-01-03 RX ORDER — HYDROMORPHONE HYDROCHLORIDE 1 MG/ML
0.5 INJECTION, SOLUTION INTRAMUSCULAR; INTRAVENOUS; SUBCUTANEOUS
Status: DISCONTINUED | OUTPATIENT
Start: 2022-01-03 | End: 2022-01-03 | Stop reason: HOSPADM

## 2022-01-03 RX ORDER — SUCCINYLCHOLINE CHLORIDE 20 MG/ML
INJECTION INTRAMUSCULAR; INTRAVENOUS AS NEEDED
Status: DISCONTINUED | OUTPATIENT
Start: 2022-01-03 | End: 2022-01-03 | Stop reason: SURG

## 2022-01-03 RX ORDER — SODIUM CHLORIDE 0.9 % (FLUSH) 0.9 %
10 SYRINGE (ML) INJECTION EVERY 12 HOURS SCHEDULED
Status: DISCONTINUED | OUTPATIENT
Start: 2022-01-03 | End: 2022-01-04 | Stop reason: HOSPADM

## 2022-01-03 RX ORDER — DIPHENHYDRAMINE HYDROCHLORIDE 50 MG/ML
12.5 INJECTION INTRAMUSCULAR; INTRAVENOUS
Status: DISCONTINUED | OUTPATIENT
Start: 2022-01-03 | End: 2022-01-03 | Stop reason: HOSPADM

## 2022-01-03 RX ADMIN — EPHEDRINE SULFATE 10 MG: 50 INJECTION INTRAVENOUS at 09:19

## 2022-01-03 RX ADMIN — POTASSIUM CHLORIDE AND SODIUM CHLORIDE 100 ML/HR: 900; 150 INJECTION, SOLUTION INTRAVENOUS at 17:36

## 2022-01-03 RX ADMIN — CEFAZOLIN SODIUM 2 G: 2 INJECTION, SOLUTION INTRAVENOUS at 07:21

## 2022-01-03 RX ADMIN — FENTANYL CITRATE 50 MCG: 50 INJECTION INTRAMUSCULAR; INTRAVENOUS at 10:07

## 2022-01-03 RX ADMIN — MIDAZOLAM 0.5 MG: 1 INJECTION INTRAMUSCULAR; INTRAVENOUS at 06:57

## 2022-01-03 RX ADMIN — HYDROMORPHONE HYDROCHLORIDE 0.5 MG: 2 INJECTION, SOLUTION INTRAMUSCULAR; INTRAVENOUS; SUBCUTANEOUS at 09:48

## 2022-01-03 RX ADMIN — DROPERIDOL 0.62 MG: 2.5 INJECTION, SOLUTION INTRAMUSCULAR; INTRAVENOUS at 11:51

## 2022-01-03 RX ADMIN — SODIUM CHLORIDE, PRESERVATIVE FREE 10 ML: 5 INJECTION INTRAVENOUS at 17:36

## 2022-01-03 RX ADMIN — HYDROMORPHONE HYDROCHLORIDE 0.5 MG: 1 INJECTION, SOLUTION INTRAMUSCULAR; INTRAVENOUS; SUBCUTANEOUS at 10:08

## 2022-01-03 RX ADMIN — PREGABALIN 100 MG: 100 CAPSULE ORAL at 17:34

## 2022-01-03 RX ADMIN — ONDANSETRON 4 MG: 2 INJECTION INTRAMUSCULAR; INTRAVENOUS at 11:51

## 2022-01-03 RX ADMIN — SODIUM CHLORIDE, POTASSIUM CHLORIDE, SODIUM LACTATE AND CALCIUM CHLORIDE: 600; 310; 30; 20 INJECTION, SOLUTION INTRAVENOUS at 09:21

## 2022-01-03 RX ADMIN — MORPHINE SULFATE 2 MG: 2 INJECTION, SOLUTION INTRAMUSCULAR; INTRAVENOUS at 19:58

## 2022-01-03 RX ADMIN — PREGABALIN 100 MG: 100 CAPSULE ORAL at 20:00

## 2022-01-03 RX ADMIN — ONDANSETRON 4 MG: 2 INJECTION INTRAMUSCULAR; INTRAVENOUS at 09:36

## 2022-01-03 RX ADMIN — FENTANYL CITRATE 100 MCG: 0.05 INJECTION, SOLUTION INTRAMUSCULAR; INTRAVENOUS at 07:33

## 2022-01-03 RX ADMIN — LIDOCAINE HYDROCHLORIDE 100 MG: 20 INJECTION, SOLUTION INFILTRATION; PERINEURAL at 07:39

## 2022-01-03 RX ADMIN — PROPOFOL 150 MG: 10 INJECTION, EMULSION INTRAVENOUS at 07:39

## 2022-01-03 RX ADMIN — MAGNESIUM SULFATE HEPTAHYDRATE 2 G: 500 INJECTION, SOLUTION INTRAMUSCULAR; INTRAVENOUS at 08:28

## 2022-01-03 RX ADMIN — HYDROMORPHONE HYDROCHLORIDE 0.5 MG: 1 INJECTION, SOLUTION INTRAMUSCULAR; INTRAVENOUS; SUBCUTANEOUS at 10:17

## 2022-01-03 RX ADMIN — PROPOFOL 50 MG: 10 INJECTION, EMULSION INTRAVENOUS at 08:07

## 2022-01-03 RX ADMIN — EPHEDRINE SULFATE 10 MG: 50 INJECTION INTRAVENOUS at 09:13

## 2022-01-03 RX ADMIN — FAMOTIDINE 20 MG: 10 INJECTION INTRAVENOUS at 06:27

## 2022-01-03 RX ADMIN — SODIUM CHLORIDE, POTASSIUM CHLORIDE, SODIUM LACTATE AND CALCIUM CHLORIDE 9 ML/HR: 600; 310; 30; 20 INJECTION, SOLUTION INTRAVENOUS at 06:07

## 2022-01-03 RX ADMIN — FENTANYL CITRATE 50 MCG: 50 INJECTION INTRAMUSCULAR; INTRAVENOUS at 10:17

## 2022-01-03 RX ADMIN — FENTANYL CITRATE 100 MCG: 0.05 INJECTION, SOLUTION INTRAMUSCULAR; INTRAVENOUS at 08:07

## 2022-01-03 RX ADMIN — DEXAMETHASONE SODIUM PHOSPHATE 8 MG: 4 INJECTION, SOLUTION INTRAMUSCULAR; INTRAVENOUS at 08:24

## 2022-01-03 RX ADMIN — SODIUM CHLORIDE, PRESERVATIVE FREE 10 ML: 5 INJECTION INTRAVENOUS at 20:00

## 2022-01-03 RX ADMIN — SUCCINYLCHOLINE CHLORIDE 200 MG: 20 INJECTION, SOLUTION INTRAMUSCULAR; INTRAVENOUS; PARENTERAL at 07:40

## 2022-01-03 NOTE — OP NOTE
Preoperative diagnosis: Cervical stenosis with cervical radiculopathy C4-5 and C5-6    Postoperative diagnosis: Same    Procedure performed: Anterior cervical discectomy C4-5 and C5-6 with anterior cervical fusion and instrumentation    Surgeon: Wayne Meneses M.D.    Assistant: Rosemary Moss CFA who was instrumental in helping with hemostasis, visualization of neural structures, and retraction of neural structures.  Her skilled assistance was necessary for the success of this case    Indications for the procedure: This patient was having severe symptoms in the neck and arms.   Imaging showed significant neural compression in the cervical spine at C4-5 and C5-6.    Operative summary: After induction of general anesthesia with intravenous agents patient was intubated and placed on the operating table in the supine position.  The head was hyperextended on donut roll and a roll of sheets was placed under the shoulders.  The shoulders were taped down being careful not to stretch the brachial plexus too much.  All peripheral points of entrapment and pressure were padded and secured.   The neck was prepped with Chloraprep.    An incision was made in the right lower portion of the neck.  This was carried down to the platysma.  The platysma was opened in the direction of its fibers.  Dissection was carried down through the middle cervical fascia to the anterior aspect of the cervical spine.  A needle was placed in the first available disc space.  This did prove to be C4-5.  Attention was turned to that level in the next 1 down.  The longus colli muscles were elevated off both sides of the anterior cervical spine and then the Cloward retractors were locked under the longus colli muscles and used to hold the esophagus, trachea, and recurrent laryngeal bundle medially and the carotid bundle and its contents laterally.  The disc space at the C5-6 level was incised and disc material was removed with the 0 and 3-0 up-biting and  straight curettes. The pituitary was also used to remove disc material.  Following this the posterior lip of the vertebral body was drilled off in combination with the uncovertebral joints on each side.    The posterior longitudinal ligament was then opened and removed from foramen to foramen completely decompressing the spinal cord and the nerve roots.  Once the nerve roots were visualized in each neural foramen and found to be completely decompressed bleeding was controlled with a combination of the bipolar cautery, FloSeal, and thrombin and Gelfoam.  Once the bleeding was stopped the disc space was sized and a 7 mm Titan Abida cage was placed into the disc space.  It was filled first with Ho putty. The compression at this level was primarily due to stenosis.    Following this attention was turned to the C4-5 level.  At that level the same thing was done.  The disc material was removed, the posterior lip of the vertebral body was removed as well as the uncovertebral joints.  The posterior longitudinal ligament was then opened and removed. Bleeding was again controlled with the bipolar cautery FloSeal, and thrombin and Gelfoam.  Finally another titan Abida cage was placed at this level along with Blackford putty.  This cage measured 8 mm.  At this level the compression was mostly due to stenosis.    A 45 mm Atlantis plate was then attached to the front of the cervical spine using 6 14 mm screws.    The retractors were removed and final x-rays taken. Bleeding was controlled with the bipolar cautery and a drain was placed in the anterior cervical space and tunneled subcutaneously. It was then sewn into place and the incision was closed in layers, dressed and the patient was taken to the recovery room in stable condition.  Sponge instrument and needle counts were correct at the end of the procedure.

## 2022-01-03 NOTE — ANESTHESIA POSTPROCEDURE EVALUATION
"Patient: Marin Cuenca    Procedure Summary     Date: 01/03/22 Room / Location: [facility] OR [address] [place] [address] MAIN OR    Anesthesia Start: 0726 Anesthesia Stop: 1005    Procedure: Cervical 4 to cervical 5 and cervical 5 to cervical 6 anterior cervical discectomy, fusion and instrumentation (N/A Spine Cervical) Diagnosis:       Cervical radiculitis      (Cervical radiculitis [M54.12])    Surgeons: Wayne Meneses MD Provider: Luis Aguila MD    Anesthesia Type: general ASA Status: 3          Anesthesia Type: general    Vitals  Vitals Value Taken Time   /89 01/03/22 1046   Temp 36.6 °C (97.9 °F) 01/03/22 1000   Pulse 77 01/03/22 1100   Resp 14 01/03/22 1100   SpO2 95 % 01/03/22 1100           Post Anesthesia Care and Evaluation    Patient location during evaluation: bedside  Patient participation: complete - patient participated  Level of consciousness: awake and alert  Pain management: adequate  Airway patency: patent  Anesthetic complications: No anesthetic complications  PONV Status: none  Cardiovascular status: acceptable and hemodynamically stable  Respiratory status: acceptable and spontaneous ventilation  Hydration status: acceptable    Comments: /89   Pulse 77   Temp 36.6 °C (97.9 °F) (Oral)   Resp 14   Ht 182.9 cm (72\")   Wt 106 kg (233 lb 4.8 oz)   SpO2 95%   BMI 31.64 kg/m²         "

## 2022-01-03 NOTE — BRIEF OP NOTE
CERVICAL DISCECTOMY ANTERIOR FUSION WITH INSTRUMENTATION  Progress Note    Marin Cuenca  1/3/2022    Pre-op Diagnosis:   Cervical radiculitis [M54.12]       Post-Op Diagnosis Codes:     * Cervical radiculitis [M54.12]    Procedure/CPT® Codes:        Procedure(s):  Cervical 4 to cervical 5 and cervical 5 to cervical 6 anterior cervical discectomy, fusion and instrumentation    Surgeon(s):  Wayne Meneses MD    Anesthesia: General    Staff:   Circulator: Socorro Mcpherson RN; Isidra Estrada RN  Radiology Technologist: Melanie Padilla Michael  Scrub Person: Rosa Ovalle PCT; Hays, Emily  Vendor Representative: Richard Givens  Assistant: Rosemary Moss CSA  Assistant: Rosemary Moss CSA      Estimated Blood Loss: 100ml    Urine Voided: * No values recorded between 1/3/2022  7:26 AM and 1/3/2022  9:49 AM *    Specimens:                None          Drains:   Closed/Suction Drain 1 Anterior Neck Bulb 10 Fr. (Active)       Findings: Severe stenosis    Complications: None      Wayne Meneses MD     Date: 1/3/2022  Time: 09:49 EST

## 2022-01-03 NOTE — ANESTHESIA PROCEDURE NOTES
Airway  Urgency: elective    Date/Time: 1/3/2022 7:42 AM  Difficult airway    General Information and Staff    Patient location during procedure: OR  Anesthesiologist: Luis Aguila MD  CRNA: Randi Valdivia CRNA    Indications and Patient Condition  Indications for airway management: airway protection    Preoxygenated: yes  Mask difficulty assessment: 2 - vent by mask + OA or adjuvant +/- NMBA    Final Airway Details  Final airway type: endotracheal airway      Successful airway: ETT  Cuffed: yes   Successful intubation technique: direct laryngoscopy  Facilitating devices/methods: intubating stylet  Blade: CMAC  Blade size: D  ETT size (mm): 7.5  Cormack-Lehane Classification: grade I - full view of glottis  Placement verified by: chest auscultation   Cuff volume (mL): 7  Measured from: lips  Number of attempts at approach: 1  Assessment: lips, teeth, and gum same as pre-op    Additional Comments  CMAC used because of previous difficult intubation.  EBBS: ETCO2+: Atraumatic intubation

## 2022-01-03 NOTE — ANESTHESIA PREPROCEDURE EVALUATION
Anesthesia Evaluation     Patient summary reviewed   history of anesthetic complications: difficult airway  NPO Solid Status: > 8 hours  NPO Liquid Status: > 2 hours           Airway   Mallampati: II  TM distance: >3 FB  Neck ROM: full  Difficult intubation highly probable  Comment: Hx of difficult mask and difficult airway  Dental      Pulmonary     breath sounds clear to auscultation  (+) sleep apnea on CPAP,   (-) shortness of breath, not a smoker  Cardiovascular   Exercise tolerance: good (4-7 METS)    Rhythm: regular  Rate: normal    (+) hypertension, DVT (RLE) resolved,   (-) angina, BEGUM      Neuro/Psych  (+) numbness,     GI/Hepatic/Renal/Endo    (+) obesity,  GERD,  renal disease, thyroid problem hypothyroidism    Musculoskeletal     (+) neck pain, radiculopathy Left upper extremity  Abdominal    Substance History      OB/GYN          Other   arthritis,                      Anesthesia Plan    ASA 3     general   (Hx of difficult mask and difficult airway. Please have CMAC available)  intravenous induction     Anesthetic plan, all risks, benefits, and alternatives have been provided, discussed and informed consent has been obtained with: patient and spouse/significant other.

## 2022-01-04 ENCOUNTER — APPOINTMENT (OUTPATIENT)
Dept: GENERAL RADIOLOGY | Facility: HOSPITAL | Age: 72
End: 2022-01-04

## 2022-01-04 VITALS
SYSTOLIC BLOOD PRESSURE: 130 MMHG | BODY MASS INDEX: 31.6 KG/M2 | OXYGEN SATURATION: 96 % | RESPIRATION RATE: 16 BRPM | TEMPERATURE: 97.3 F | HEART RATE: 92 BPM | WEIGHT: 233.3 LBS | HEIGHT: 72 IN | DIASTOLIC BLOOD PRESSURE: 76 MMHG

## 2022-01-04 LAB
BASOPHILS # BLD AUTO: 0.03 10*3/MM3 (ref 0–0.2)
BASOPHILS NFR BLD AUTO: 0.3 % (ref 0–1.5)
DEPRECATED RDW RBC AUTO: 43.3 FL (ref 37–54)
EOSINOPHIL # BLD AUTO: 0.01 10*3/MM3 (ref 0–0.4)
EOSINOPHIL NFR BLD AUTO: 0.1 % (ref 0.3–6.2)
ERYTHROCYTE [DISTWIDTH] IN BLOOD BY AUTOMATED COUNT: 12 % (ref 12.3–15.4)
HCT VFR BLD AUTO: 42.2 % (ref 37.5–51)
HGB BLD-MCNC: 13.8 G/DL (ref 13–17.7)
IMM GRANULOCYTES # BLD AUTO: 0.04 10*3/MM3 (ref 0–0.05)
IMM GRANULOCYTES NFR BLD AUTO: 0.3 % (ref 0–0.5)
LYMPHOCYTES # BLD AUTO: 1.24 10*3/MM3 (ref 0.7–3.1)
LYMPHOCYTES NFR BLD AUTO: 10.7 % (ref 19.6–45.3)
MCH RBC QN AUTO: 31.7 PG (ref 26.6–33)
MCHC RBC AUTO-ENTMCNC: 32.7 G/DL (ref 31.5–35.7)
MCV RBC AUTO: 97 FL (ref 79–97)
MONOCYTES # BLD AUTO: 1.07 10*3/MM3 (ref 0.1–0.9)
MONOCYTES NFR BLD AUTO: 9.2 % (ref 5–12)
NEUTROPHILS NFR BLD AUTO: 79.4 % (ref 42.7–76)
NEUTROPHILS NFR BLD AUTO: 9.24 10*3/MM3 (ref 1.7–7)
NRBC BLD AUTO-RTO: 0 /100 WBC (ref 0–0.2)
PLATELET # BLD AUTO: 216 10*3/MM3 (ref 140–450)
PMV BLD AUTO: 10.4 FL (ref 6–12)
RBC # BLD AUTO: 4.35 10*6/MM3 (ref 4.14–5.8)
WBC NRBC COR # BLD: 11.63 10*3/MM3 (ref 3.4–10.8)

## 2022-01-04 PROCEDURE — 63710000001 LEVOTHYROXINE 100 MCG TABLET: Performed by: NEUROLOGICAL SURGERY

## 2022-01-04 PROCEDURE — 25010000002 MORPHINE PER 10 MG: Performed by: NEUROLOGICAL SURGERY

## 2022-01-04 PROCEDURE — 63710000001 FINASTERIDE 5 MG TABLET: Performed by: NEUROLOGICAL SURGERY

## 2022-01-04 PROCEDURE — A9270 NON-COVERED ITEM OR SERVICE: HCPCS | Performed by: NEUROLOGICAL SURGERY

## 2022-01-04 PROCEDURE — 63710000001 PREGABALIN 100 MG CAPSULE: Performed by: NEUROLOGICAL SURGERY

## 2022-01-04 PROCEDURE — 63710000001 ACETAMINOPHEN 325 MG TABLET: Performed by: NEUROLOGICAL SURGERY

## 2022-01-04 PROCEDURE — 63710000001 PANTOPRAZOLE 40 MG TABLET DELAYED-RELEASE: Performed by: NEUROLOGICAL SURGERY

## 2022-01-04 PROCEDURE — 72040 X-RAY EXAM NECK SPINE 2-3 VW: CPT

## 2022-01-04 PROCEDURE — 85025 COMPLETE CBC W/AUTO DIFF WBC: CPT | Performed by: NEUROLOGICAL SURGERY

## 2022-01-04 PROCEDURE — 63710000001 LOSARTAN 25 MG TABLET: Performed by: NEUROLOGICAL SURGERY

## 2022-01-04 PROCEDURE — 25010000002 ONDANSETRON PER 1 MG: Performed by: NEUROLOGICAL SURGERY

## 2022-01-04 PROCEDURE — 63710000001 HYDROCODONE-ACETAMINOPHEN 5-325 MG TABLET: Performed by: NEUROLOGICAL SURGERY

## 2022-01-04 RX ORDER — ONDANSETRON 4 MG/1
4 TABLET, FILM COATED ORAL EVERY 6 HOURS PRN
Qty: 20 TABLET | Refills: 1 | Status: SHIPPED | OUTPATIENT
Start: 2022-01-04 | End: 2023-01-25

## 2022-01-04 RX ORDER — METHOCARBAMOL 750 MG/1
750 TABLET, FILM COATED ORAL 4 TIMES DAILY
Qty: 40 TABLET | Refills: 1 | Status: SHIPPED | OUTPATIENT
Start: 2022-01-04

## 2022-01-04 RX ORDER — ACETAMINOPHEN 325 MG/1
650 TABLET ORAL EVERY 6 HOURS PRN
Status: DISCONTINUED | OUTPATIENT
Start: 2022-01-04 | End: 2022-01-04 | Stop reason: HOSPADM

## 2022-01-04 RX ORDER — HYDROCODONE BITARTRATE AND ACETAMINOPHEN 5; 325 MG/1; MG/1
1 TABLET ORAL EVERY 4 HOURS PRN
Qty: 35 TABLET | Refills: 0 | Status: SHIPPED | OUTPATIENT
Start: 2022-01-04 | End: 2022-01-10

## 2022-01-04 RX ORDER — METHOCARBAMOL 750 MG/1
750 TABLET, FILM COATED ORAL 4 TIMES DAILY
Status: DISCONTINUED | OUTPATIENT
Start: 2022-01-04 | End: 2022-01-04 | Stop reason: HOSPADM

## 2022-01-04 RX ADMIN — PANTOPRAZOLE SODIUM 40 MG: 40 TABLET, DELAYED RELEASE ORAL at 06:12

## 2022-01-04 RX ADMIN — HYDROCODONE BITARTRATE AND ACETAMINOPHEN 1 TABLET: 5; 325 TABLET ORAL at 08:25

## 2022-01-04 RX ADMIN — ONDANSETRON 4 MG: 2 INJECTION INTRAMUSCULAR; INTRAVENOUS at 08:25

## 2022-01-04 RX ADMIN — PREGABALIN 100 MG: 100 CAPSULE ORAL at 08:25

## 2022-01-04 RX ADMIN — LEVOTHYROXINE SODIUM 100 MCG: 0.1 TABLET ORAL at 08:25

## 2022-01-04 RX ADMIN — SODIUM CHLORIDE, PRESERVATIVE FREE 10 ML: 5 INJECTION INTRAVENOUS at 08:25

## 2022-01-04 RX ADMIN — LOSARTAN POTASSIUM 25 MG: 25 TABLET, FILM COATED ORAL at 08:25

## 2022-01-04 RX ADMIN — ACETAMINOPHEN 650 MG: 325 TABLET, FILM COATED ORAL at 13:11

## 2022-01-04 RX ADMIN — FINASTERIDE 5 MG: 5 TABLET, FILM COATED ORAL at 06:12

## 2022-01-04 RX ADMIN — MORPHINE SULFATE 2 MG: 2 INJECTION, SOLUTION INTRAMUSCULAR; INTRAVENOUS at 02:32

## 2022-01-04 RX ADMIN — HYDROCODONE BITARTRATE AND ACETAMINOPHEN 1 TABLET: 5; 325 TABLET ORAL at 13:16

## 2022-01-04 NOTE — DISCHARGE SUMMARY
Marin Cuenca  1950    Patient Care Team:  Kash Simpson APRN as PCP - General (Family Medicine)  Bubba Dos Santos Jr., MD as Consulting Physician (Urology)    Date of Admit: 1/3/2022    Date of Discharge:  1/4/2022    Discharge Diagnosis:  Cervical radiculitis      Procedures Performed  Procedure(s):  Cervical 4 to cervical 5 and cervical 5 to cervical 6 anterior cervical discectomy, fusion and instrumentation       Complications: None    Consultants:   Consults     No orders found for last 30 day(s).          Condition on Discharge: stable    Discharge disposition: home    Pertinent Test Results: None    Brief HPI: Patient evaluated in office for complaints of neck pain with radiation into his left arm. Imaging revealed significant neuroforaminal compression in the cervical spine at C4-5 and C5-6. RBAs of treatment were discussed including the above procedure. Patient consented to above procedure.    Hospital Course: Patient admitted for above procedure. The procedure itself was without complication. The patient was transferred to [facility] following recovery.  Today, the patient complains of a generalized headache, minimal neck discomfort along with some discomfort with swallowing, but is able to swallow without difficulty.  He reports improvement in the left arm pain.  The anterior cervical incision is well approximated with Steri-Strips in place.  There is no evidence of redness, swelling, or drainage.  There is a NEGRITA drain in place with minimal cherry colored output and will be removed at this time.  The patient is eating, drinking, voiding, and ambulating without difficulty.  The patient is ready for discharge at this time.    Discharge Physical Exam:    Temp:  [97.1 °F (36.2 °C)-98.1 °F (36.7 °C)] 97.3 °F (36.3 °C)  Heart Rate:  [] 92  Resp:  [16-18] 16  BP: (117-148)/(57-82) 130/76    Current labs:  Lab Results (last 24 hours)     Procedure Component Value Units Date/Time    CBC &  Differential [[id number]]  (Abnormal) Collected: 01/04/22 0747    Specimen: Blood Updated: 01/04/22 0825    Narrative:      The following orders were created for panel order CBC & Differential.  Procedure                               Abnormality         Status                     ---------                               -----------         ------                     CBC Auto Differential[[id number]]        Abnormal            Final result                 Please view results for these tests on the individual orders.    CBC Auto Differential [[id number]]  (Abnormal) Collected: 01/04/22 0747    Specimen: Blood Updated: 01/04/22 0825     WBC 11.63 10*3/mm3      RBC 4.35 10*6/mm3      Hemoglobin 13.8 g/dL      Hematocrit 42.2 %      MCV 97.0 fL      MCH 31.7 pg      MCHC 32.7 g/dL      RDW 12.0 %      RDW-SD 43.3 fl      MPV 10.4 fL      Platelets 216 10*3/mm3      Neutrophil % 79.4 %      Lymphocyte % 10.7 %      Monocyte % 9.2 %      Eosinophil % 0.1 %      Basophil % 0.3 %      Immature Grans % 0.3 %      Neutrophils, Absolute 9.24 10*3/mm3      Lymphocytes, Absolute 1.24 10*3/mm3      Monocytes, Absolute 1.07 10*3/mm3      Eosinophils, Absolute 0.01 10*3/mm3      Basophils, Absolute 0.03 10*3/mm3      Immature Grans, Absolute 0.04 10*3/mm3      nRBC 0.0 /100 WBC             General Appearance No acute distress   HEENT NC/AT    Neurological Awake, Alert, and oriented x 3   Cranial nerves CN II-XII intact   Motor Strength and tone intact and equal bilaterally throughout.   Sensory Intact to light touch bilaterally throughout.   Gait and station Ambulating with minimal assist.   Neck Supple, trachea midline, no carotid bruit.  Anterior cervical incision is noted to be well approximated with Steri-Strips in place.  There is no evidence of redness, swelling, or drainage.  NEGRITA drain in place with minimal cherry colored output.  Will be removed at this time.   Extremities Moves all extremities well, no edema, no cyanosis, no  redness         Discharge Medications  Niraj has been reviewed and narcotic consent is on file in the patient's chart.     Your medication list      START taking these medications      Instructions Last Dose Given Next Dose Due   HYDROcodone-acetaminophen 5-325 MG per tablet  Commonly known as: NORCO      Take 1 tablet by mouth Every 4 (Four) Hours As Needed for Moderate Pain  for up to 6 days.       methocarbamol 750 MG tablet  Commonly known as: ROBAXIN      Take 1 tablet by mouth 4 (Four) Times a Day.       ondansetron 4 MG tablet  Commonly known as: ZOFRAN      Take 1 tablet by mouth Every 6 (Six) Hours As Needed for Nausea or Vomiting.          CHANGE how you take these medications      Instructions Last Dose Given Next Dose Due   aspirin 81 MG tablet  What changed:   · when to take this  · additional instructions      Take 1 tablet by mouth Daily.       Azelastine HCl 137 MCG/SPRAY solution  What changed:   · when to take this  · reasons to take this      1 spray into the nostril(s) as directed by provider 2 (Two) Times a Day.          CONTINUE taking these medications      Instructions Last Dose Given Next Dose Due   Calcium Carb-Cholecalciferol 500-600 MG-UNIT chewable tablet      Chew 1 tablet 2 (Two) Times a Day.       esomeprazole 40 MG capsule  Commonly known as: nexIUM      Take 1 capsule by mouth 2 (Two) Times a Day.       finasteride 5 MG tablet  Commonly known as: PROSCAR      Take 1 tablet by mouth Every Morning.       fish oil 1000 MG capsule capsule      Take 1,000 mg by mouth 2 (Two) Times a Day With Meals. HOLD PRIOR TO OR       irbesartan 75 MG tablet  Commonly known as: AVAPRO      Take 1 tablet by mouth Daily.       levothyroxine 100 MCG tablet  Commonly known as: SYNTHROID, LEVOTHROID      Take 1 tablet by mouth Daily.       meclizine 25 MG tablet  Commonly known as: ANTIVERT      Take 1 tablet by mouth 3 (Three) Times a Day As Needed for Dizziness.       pregabalin 100 MG capsule  Commonly  known as: LYRICA      Take 1 capsule by mouth 3 (Three) Times a Day.          STOP taking these medications    cyclobenzaprine 10 MG tablet  Commonly known as: FLEXERIL              Where to Get Your Medications      These medications were sent to [facility] [place] [address] [address] [facility] [address]    Hours: 7:00 AM-6:00 PM Mon-Fri, 8:00 AM-4:30 PM Sat-Sun (Closed 12-12:30PM) Phone: [phone]   · HYDROcodone-acetaminophen 5-325 MG per tablet  · methocarbamol 750 MG tablet  · ondansetron 4 MG tablet         Discharge Diet:     Diet Order   Procedures   • Diet Regular       Activity at Discharge:       INSTRUCTION & CARE AFTER YOUR CERVICAL FUSION    1.  Wear your collar at all times except when using the ice collar, showering or shaving, being careful not to bend your head forward, backward, or to the side while the collar is off.    2. No lifting anything heavier than a coffee cup or paperback book.    3. No driving at least until your post-op appointment. The ability to drive will be assessed at that time. We recommend that you limit riding in a car because of the risk of an accident. If you are a passenger, wear your seatbelt.    4. You may bend over or reach over your head as long as you don’t lift anything heavy.    5. Walk as much as possible.    6. Remove the bandage on the second day after surgery and leave the incision open to air. If you notice any redness, swelling or drainage, call the office. There will be glue and steri strips over your incision. These will peel off on their own. Don’t pull on it.    7. You may shower five days after surgery. Don’t let the water beat directly on the incision. Gently pat the incision dry.    8. Call as soon as possible after leaving the hospital to schedule an appointment with the Physician Assistant or Nurse Practitioner if any appointment has not already been made. Physical Therapy may be arranged when you return for this visit. You may get rid of  "your collar after this visit.    9. Your prescription for pain medication may be refilled for only half the original amount prior to your return office visit. In order to have this medication refilled you must contact the office four days prior to the due date.    10. Don’t be alarmed if you experience some of your pre-operative symptoms after going home. This is not uncommon and normally goes away in a few days but may last longer. Pain, aching and stiffness in the neck, across the shoulders and between the shoulder blades are very common. If you have any questions or concerns don’t hesitate to call the office.    **The patient is advised to apply ice or cold packs intermittently as needed to relieve pain.       Call for: Any questions or concerns      Follow-up Appointments  Future Appointments   Date Time Provider Department Center   1/17/2022  2:45 PM Shilpa Yadav APRN [facility]   2/11/2022 10:00 AM Kash Simpson APRN [facility]      Follow-up Information     Kash Simpson APRN .    Specialties: Family Medicine, Urgent Care  Contact information:  [address] [address]  [phone]                           Test Results Pending at Discharge     None    I discussed the discharge instructions with patient, nursing staff and SANTA Neilson  01/04/22  13:18 EST    25 min spent in reviewing records, discussion and examination of the patient and discussion with other members of the patient's medical team.    \"Dictated utilizing Dragon dictation\".    "

## 2022-01-04 NOTE — PLAN OF CARE
Goal Outcome Evaluation:  Plan of Care Reviewed With: patient        Progress: improving  Outcome Summary: 72 yo male POD 1 C4-6 Disectomy fusion. vss. nvi. AxO x4. voiding well. pain managed with prn po and iv pain meds and ice. educated pt on safety. plans for d/c home when stable. will continue to monitor,

## 2022-01-04 NOTE — PLAN OF CARE
Goal Outcome Evaluation:PT POD0 anterior cervical discectomy with fusion, VSS, afebrile, voiding well, brought up in a bed therefore did not ambulate upon arrival. Educated on KARLA and use of cpap.

## 2022-01-04 NOTE — DISCHARGE INSTRUCTIONS
[facility]    [address], [address]    [place] [address]    Phone: [phone]    Fax: [phone]    Wayne Meneses M.D., F.A.C.S.            INSTRUCTION & CARE AFTER YOUR CERVICAL FUSION    1.  Wear your collar at all times except when using the ice collar, showering or shaving, being careful not to bend your head forward, backward, or to the side while the collar is off.    2. No lifting anything heavier than a coffee cup or paperback book.    3. No driving at least until your post-op appointment. The ability to drive will be assessed at that time. We recommend that you limit riding in a car because of the risk of an accident. If you are a passenger, wear your seatbelt.    4. You may bend over or reach over your head as long as you don’t lift anything heavy.    5. Walk as much as possible.    6. Remove the bandage on the second day after surgery and leave the incision open to air. If you notice any redness, swelling or drainage, call the office. There will be glue and steri strips over your incision. These will peel off on their own. Don’t pull on it.    7. You may shower five days after surgery. Don’t let the water beat directly on the incision. Gently pat the incision dry.    8. Call as soon as possible after leaving the hospital to schedule an appointment with the Physician Assistant or Nurse Practitioner if any appointment has not already been made. Physical Therapy may be arranged when you return for this visit. You may get rid of your collar after this visit.    9. Your prescription for pain medication may be refilled for only half the original amount prior to your return office visit. In order to have this medication refilled you must contact the office four days prior to the due date.    10. Don’t be alarmed if you experience some of your pre-operative symptoms after going home. This is not uncommon and normally goes away in a few days but may last longer. Pain, aching and  stiffness in the neck, across the shoulders and between the shoulder blades are very common. If you have any questions or concerns don’t hesitate to call the office.

## 2022-01-05 NOTE — CASE MANAGEMENT/SOCIAL WORK
Case Management Discharge Note      Final Note: Home.         Selected Continued Care - Discharged on 1/4/2022 Admission date: 1/3/2022 - Discharge disposition: Home or Self Care    Destination    No services have been selected for the patient.              Durable Medical Equipment    No services have been selected for the patient.              Dialysis/Infusion    No services have been selected for the patient.              Home Medical Care    No services have been selected for the patient.              Therapy    No services have been selected for the patient.              Community Resources    No services have been selected for the patient.              Community & DME    No services have been selected for the patient.                       Final Discharge Disposition Code: 01 - home or self-care

## 2022-01-06 ENCOUNTER — TELEPHONE (OUTPATIENT)
Dept: NEUROSURGERY | Facility: CLINIC | Age: 72
End: 2022-01-06

## 2022-01-06 NOTE — TELEPHONE ENCOUNTER
Caller: Marin Cuenca    Relationship to patient: Self      Patient is needing: PATIENT CALLED WORRIED ABOUT HIS BANDAGING, PATIENT WANTED TO TALK WITH NURSE REGARDING WHAT TO DO NEXT.  ZHEN MG TOOK THE CALL

## 2022-01-06 NOTE — TELEPHONE ENCOUNTER
Pt had a C4-6 ACDF on 1/3/22.  When d/c from hospital a bandage was put on the drainage site, but the instructions say to keep it uncovered.  [phone] (Mobile)

## 2022-01-06 NOTE — TELEPHONE ENCOUNTER
Patient called back and just wanted to ask about his dressing.  He stated that he took his big bandage off but wanted to know if he can take the bandaid off.  I told him that steri strips need to be left on they will fall off on their own.  I also told him that the bandage over his marlee drain was just there to help with any drainage he understood.  I told him that he can remove the bandaid but leave the steri strips alone.  He voice understanding.

## 2022-01-11 ENCOUNTER — TELEPHONE (OUTPATIENT)
Dept: NEUROSURGERY | Facility: CLINIC | Age: 72
End: 2022-01-11

## 2022-01-11 NOTE — TELEPHONE ENCOUNTER
How are you feeling? sore    Are you having any pain? Where? Back of neck pain and across the shoulder    Rate pain from 1-10 - from 3 to a 9/10    Are you taking the pain RX? Taking muscles relaxers but not pain RX    Do you think it's helping? yes    Do you feel better than before surgery? yes    Was your dressing clean and dry? See notes    Is you incision red, swollen or bleeding? None, no fever    Are you having any trouble with nausea or constipation? no    Were your discharge instructions easy to understand? yes    Any other questions?    Confirm post op appt - yes

## 2022-01-14 NOTE — PROGRESS NOTES
" HPI:   Marin Cuenca is a 71 y.o. male for follow-up of cervical radiculitis. He is status post C4-C5 and C5-C6 ACDF and instrumentation on January 3, 2022 with Dr. Meneses.  He was discharged to home.     He presents accompanied by his spouse.     Today Mr. Cuenca reports things are going well as his left arm pain has resolved.  He is not taking any pain medications and rarely uses muscle relaxer although he has some discomfort in the back of his neck and upper shoulders.  He has some swelling on the right side of his neck that is hard to the touch. He reports that this appeared on Wednesday following his surgery. He reports it creates a pressure and creates some trouble with swallowing. He denies any redness or drainage. He denies any fever or chills. He reports his neck pain overall has improved, he reports some shoulder soreness.  He has received his bone growth stimulator and is wearing it 4 hours daily.    Review of Systems   Constitutional: Negative for chills and fever.   HENT: Positive for trouble swallowing.    Cardiovascular: Negative for chest pain.   Musculoskeletal: Negative for neck pain.   Skin: Negative for wound.   Neurological: Negative for numbness.        /92   Pulse 88   Temp 97.7 °F (36.5 °C)   Ht 182.9 cm (72\")   Wt 106 kg (233 lb 11 oz)   SpO2 99%   BMI 31.69 kg/m²     Physical Exam  Vitals reviewed.   Constitutional:       Appearance: Normal appearance.   Neck:      Comments: Right anterior incision well approximated with no redness or drainage.  There are some thickening underlying the incision.  There is a soft, mobile fluid wave in the soft tissues of his anterior neck.  His trachea is midline.  His voice sounds normal.  Pulmonary:      Effort: Pulmonary effort is normal.   Musculoskeletal:      Cervical back: Neck supple.   Skin:     General: Skin is warm and dry.   Neurological:      General: No focal deficit present.      Mental Status: He is alert.      Gait: Gait is " intact.   Psychiatric:         Mood and Affect: Mood normal.         Speech: Speech normal.         Thought Content: Thought content normal.       Neurologic Exam     Mental Status   Speech: speech is normal   Level of consciousness: alert  Knowledge: good.   Normal comprehension.     Motor Exam   Muscle bulk: normal  Overall muscle tone: normal  5/5 bilateral upper extremities     Sensory Exam   Right arm light touch: normal  Left arm light touch: normal    Gait, Coordination, and Reflexes     Gait  Gait: normal    Reflexes   Right Roman: absent  Left Roman: absent      Findings/Results:  No new imaging    Assessment/Plan:  Diagnoses and all orders for this visit:    1. Postop check (Primary)  -     CT soft tissue neck w contrast; Future  -     XR Spine Cervical 2 or 3 View; Future    2. Neck swelling  -     CT soft tissue neck w contrast; Future      Discussion/Summary  Patient presents for follow-up status post two-level ACDF.  His left arm pain is resolved.  He is overall doing well but has some typical mild swallow difficulties.  However, he does report some swelling of his neck that began the day after his discharge.  No associated fever or chills.  On exam, he has a soft anterior swelling with fluid wave.  His incision itself looks okay.  No red flags on exam.  Trachea feels to be at midline.  Will obtain CT soft tissues to be done today to evaluate the area of swelling although I think this is very superficial and soft tissue related.  They will hold and call us with results.  I will discuss initiation of physical therapy and any need for steroid with Dr. Meneses tomorrow and contact the patient.  At this time we will plan follow-up in 1 month with cervical AP/lateral x-rays.  We will adjust appointment schedule accordingly based on the CT results.  Patient has been given both verbal and written postoperative instructions today.    Plan: Return in about 1 month (around 2/17/2022) for Follow-up with   "Gideon, call patient with xray result.         Patient Care Team    Patient Care Team:  Kash Simpson, APRN as PCP - General (Family Medicine)  Bubba Dos Santos Jr., MD as Consulting Physician (Urology)    Shilpa Yadav, APRN  1/17/2022    \"Dictated utilizing Dragon dictation\".    "

## 2022-01-17 ENCOUNTER — HOSPITAL ENCOUNTER (OUTPATIENT)
Dept: CT IMAGING | Facility: HOSPITAL | Age: 72
Discharge: HOME | End: 2022-01-17
Admitting: NURSE PRACTITIONER

## 2022-01-17 ENCOUNTER — TELEPHONE (OUTPATIENT)
Dept: NEUROSURGERY | Facility: CLINIC | Age: 72
End: 2022-01-17

## 2022-01-17 ENCOUNTER — OFFICE VISIT (OUTPATIENT)
Dept: NEUROSURGERY | Facility: CLINIC | Age: 72
End: 2022-01-17

## 2022-01-17 VITALS
WEIGHT: 233.69 LBS | HEART RATE: 88 BPM | TEMPERATURE: 97.7 F | SYSTOLIC BLOOD PRESSURE: 138 MMHG | HEIGHT: 72 IN | BODY MASS INDEX: 31.65 KG/M2 | DIASTOLIC BLOOD PRESSURE: 92 MMHG | OXYGEN SATURATION: 99 %

## 2022-01-17 DIAGNOSIS — Z09 POSTOP CHECK: ICD-10-CM

## 2022-01-17 DIAGNOSIS — R22.1 NECK SWELLING: ICD-10-CM

## 2022-01-17 DIAGNOSIS — Z09 POSTOP CHECK: Primary | ICD-10-CM

## 2022-01-17 LAB — CREAT BLDA-MCNC: 1.3 MG/DL (ref 0.6–1.3)

## 2022-01-17 PROCEDURE — 99024 POSTOP FOLLOW-UP VISIT: CPT | Performed by: NURSE PRACTITIONER

## 2022-01-17 PROCEDURE — 70491 CT SOFT TISSUE NECK W/DYE: CPT

## 2022-01-17 PROCEDURE — 25010000002 IOPAMIDOL 61 % SOLUTION: Performed by: NURSE PRACTITIONER

## 2022-01-17 PROCEDURE — 82565 ASSAY OF CREATININE: CPT

## 2022-01-17 RX ADMIN — IOPAMIDOL 85 ML: 612 INJECTION, SOLUTION INTRAVENOUS at 16:10

## 2022-01-17 NOTE — TELEPHONE ENCOUNTER
Caller: Marin Cuenca    Relationship to patient: Self    Best call back number: [phone]  Patient is needing:  PATIENT STATES HE HAS A KNOT ON INCISION SITE AND WANTS TO COME IN EARLY.  WARM TRANSFERRED AND THERE IS NO AVAILABILITY EARLIER TODAY/    INFORMED PATIENT

## 2022-01-17 NOTE — NURSING NOTE
Patient arrived at [facility] xray triage at 16:12 for stat hold and call post CT of the neck. Dr. Love Called at 17:11 and stated patient may be released home. Patient notified. Patient is instructed to follow up with Dr. Yadav's office regarding detailed report, and recommendations. Patient verbalized understanding. Ambulatory to entrance A. Protective goggles and mask in place with all patient interactions today.

## 2022-01-18 ENCOUNTER — TELEPHONE (OUTPATIENT)
Dept: NEUROSURGERY | Facility: CLINIC | Age: 72
End: 2022-01-18

## 2022-01-18 NOTE — TELEPHONE ENCOUNTER
----- Message from SANTA Ribera sent at 1/17/2022  3:24 PM EST -----  Regarding: CT and PT  CT soft tissues for neck swelling.  Should we do begin PT?  I have arranged follow-up for 1 month-should he be seen sooner? Order cervical xrays for visit

## 2022-01-18 NOTE — TELEPHONE ENCOUNTER
CT results called yesterday evening by radiologist to on call provider.  I reviewed the results with Dr. Meneses this morning.  This patient is afebrile and not having any significant swallowing issues or any redness of the incision.  Nothing further to do at this time with regard to antibiotics or steroids.  Please advise patient to notify us if his swallow is worsening, developing any redness increasing swelling, increasing pain in the neck, or fevers or wound drainage.  Dr. Meneses would like to have him follow-up in about 10 to 14 days.  We will discuss rehab at that visit.  You can cancel the 1 month follow-up and have him follow-up in 10 to 14 days.  No need to get the x-ray for that visit that was ordered yesterday. Please advise patient.

## 2022-01-24 NOTE — TELEPHONE ENCOUNTER
PATIENT IS RETURNING [facility] CALL, ATTEMPTED TO WARM TRANSFER SHE WAS UNAVAILABLE. PLEASE ADVISE    BEST CALL BACK NUMBER: [phone]

## 2022-01-25 NOTE — TELEPHONE ENCOUNTER
S/W patient and he states that he is not having the issues like he was having previously. I advised patient to call if he has any other issues

## 2022-02-16 DIAGNOSIS — M54.16 LUMBAR RADICULOPATHY: ICD-10-CM

## 2022-02-16 NOTE — TELEPHONE ENCOUNTER
Rx Refill Note  Requested Prescriptions     Pending Prescriptions Disp Refills   • pregabalin (LYRICA) 100 MG capsule [Pharmacy Med Name: PREGABALIN CAP 100MG] 270 capsule 0     Sig: TAKE 1 CAPSULE 3 TIMES A   DAY      Last office visit with prescribing clinician: 9/24/2021      Next office visit with prescribing clinician: 2/17/2022            Craig Ferrer MA  02/16/22, 09:11 EST

## 2022-02-17 ENCOUNTER — OFFICE VISIT (OUTPATIENT)
Dept: FAMILY MEDICINE CLINIC | Facility: CLINIC | Age: 72
End: 2022-02-17

## 2022-02-17 VITALS
HEIGHT: 72 IN | HEART RATE: 100 BPM | DIASTOLIC BLOOD PRESSURE: 82 MMHG | SYSTOLIC BLOOD PRESSURE: 140 MMHG | BODY MASS INDEX: 30.61 KG/M2 | WEIGHT: 226 LBS | OXYGEN SATURATION: 98 % | TEMPERATURE: 97.7 F

## 2022-02-17 DIAGNOSIS — E03.9 ACQUIRED HYPOTHYROIDISM: ICD-10-CM

## 2022-02-17 DIAGNOSIS — Z13.0 SCREENING FOR IRON DEFICIENCY ANEMIA: ICD-10-CM

## 2022-02-17 DIAGNOSIS — R73.03 PREDIABETES: ICD-10-CM

## 2022-02-17 DIAGNOSIS — Z13.6 SCREENING FOR ISCHEMIC HEART DISEASE: ICD-10-CM

## 2022-02-17 DIAGNOSIS — I10 ESSENTIAL HYPERTENSION: Primary | ICD-10-CM

## 2022-02-17 DIAGNOSIS — K21.00 GASTROESOPHAGEAL REFLUX DISEASE WITH ESOPHAGITIS WITHOUT HEMORRHAGE: ICD-10-CM

## 2022-02-17 PROBLEM — N17.9 ACUTE KIDNEY INJURY: Status: RESOLVED | Noted: 2019-05-17 | Resolved: 2022-02-17

## 2022-02-17 PROBLEM — K56.600 PARTIAL SMALL BOWEL OBSTRUCTION: Status: RESOLVED | Noted: 2019-05-17 | Resolved: 2022-02-17

## 2022-02-17 PROBLEM — S39.011A SPRAIN OF ABDOMINAL WALL: Status: RESOLVED | Noted: 2020-06-01 | Resolved: 2022-02-17

## 2022-02-17 PROCEDURE — 99214 OFFICE O/P EST MOD 30 MIN: CPT | Performed by: NURSE PRACTITIONER

## 2022-02-17 RX ORDER — PREGABALIN 100 MG/1
CAPSULE ORAL
Qty: 270 CAPSULE | Refills: 0 | Status: SHIPPED | OUTPATIENT
Start: 2022-02-17 | End: 2022-05-20 | Stop reason: SDUPTHER

## 2022-02-17 NOTE — PROGRESS NOTES
"Chief Complaint  Hypothyroidism (3 months check up) and Hypertension    Subjective          Marin Cuenca presents to [facility]  History of Present Illness  Hypothyroidism-patient is currently on levothyroxine 100 mcg daily as directed side effects not currently experiencing any symptoms of upper thyroidism at this time.    Hypertension-patient is currently on irbesartan 75 mg daily as directed without any side effects.  Blood pressures well controlled in office today.    GERD-patient is on Nexium 40 mg daily as directed by side effects.  Not experiencing symptoms of GERD at this time.    Prediabetes-not currently on any medication for this.  A1c was last checked in September 2021 with result of 5.9.    Chronic low back pain- on Lyrica 100mg TID working well without side effects.    Objective   Vital Signs:   /82   Pulse 100   Temp 97.7 °F (36.5 °C)   Ht 182.9 cm (72\")   Wt 103 kg (226 lb)   SpO2 98%   BMI 30.65 kg/m²     Physical Exam  Vitals reviewed.   Constitutional:       General: He is not in acute distress.     Appearance: He is well-developed.   HENT:      Head: Normocephalic.   Cardiovascular:      Rate and Rhythm: Normal rate and regular rhythm.      Heart sounds: Normal heart sounds.   Pulmonary:      Effort: Pulmonary effort is normal.      Breath sounds: Normal breath sounds.   Neurological:      Mental Status: He is alert and oriented to person, place, and time.      Gait: Gait normal.   Psychiatric:         Behavior: Behavior normal.         Thought Content: Thought content normal.         Judgment: Judgment normal.        Result Review :   The following data was reviewed by: SANTA Kramer on 02/17/2022:  CMP    CMP 12/16/21 12/30/21 1/17/22   Glucose  104 (A)    BUN  12    Creatinine 1.20 1.11 1.30   eGFR Non African Am  65    Sodium  140    Potassium  4.4    Chloride  105    Calcium  9.2    (A) Abnormal value       Comments are available for some " flowsheets but are not being displayed.           CBC w/diff    CBC w/Diff 12/30/21 1/4/22   WBC 6.07 11.63 (A)   RBC 4.37 4.35   Hemoglobin 13.8 13.8   Hematocrit 40.7 42.2   MCV 93.1 97.0   MCH 31.6 31.7   MCHC 33.9 32.7   RDW 12.1 (A) 12.0 (A)   Platelets 220 216   Neutrophil Rel %  79.4 (A)   Immature Granulocyte Rel %  0.3   Lymphocyte Rel %  10.7 (A)   Monocyte Rel %  9.2   Eosinophil Rel %  0.1 (A)   Basophil Rel %  0.3   (A) Abnormal value            Lipid Panel    Lipid Panel 3/23/21   Total Cholesterol 186   Triglycerides 81   HDL Cholesterol 58   VLDL Cholesterol 15   LDL Cholesterol  113 (A)   LDL/HDL Ratio 1.93   (A) Abnormal value       Comments are available for some flowsheets but are not being displayed.           TSH    TSH 3/23/21   TSH 0.825           Most Recent A1C    HGBA1C Most Recent 9/24/21   Hemoglobin A1C 5.90 (A)   (A) Abnormal value       Comments are available for some flowsheets but are not being displayed.           PSA    PSA 3/23/21   PSA 0.653                     Assessment and Plan    Diagnoses and all orders for this visit:    1. Essential hypertension (Primary)    2. Prediabetes  -     Comprehensive Metabolic Panel  -     Hemoglobin A1c    3. Gastroesophageal reflux disease with esophagitis without hemorrhage    4. Acquired hypothyroidism  -     TSH    5. Screening for iron deficiency anemia  -     CBC & Differential    6. Screening for ischemic heart disease  -     Lipid Panel With LDL / HDL Ratio        Follow Up   Return in about 3 months (around 5/17/2022) for Recheck.  Patient was given instructions and counseling regarding his condition or for health maintenance advice. Please see specific information pulled into the AVS if appropriate.     Follow up after labs   rto in 3 mons   Cont with meds    Mask and googles worn

## 2022-02-18 LAB
ALBUMIN SERPL-MCNC: 4.3 G/DL (ref 3.7–4.7)
ALBUMIN/GLOB SERPL: 1.8 {RATIO} (ref 1.2–2.2)
ALP SERPL-CCNC: 83 IU/L (ref 44–121)
ALT SERPL-CCNC: 11 IU/L (ref 0–44)
AST SERPL-CCNC: 17 IU/L (ref 0–40)
BASOPHILS # BLD AUTO: 0.1 X10E3/UL (ref 0–0.2)
BASOPHILS NFR BLD AUTO: 1 %
BILIRUB SERPL-MCNC: 0.6 MG/DL (ref 0–1.2)
BUN SERPL-MCNC: 12 MG/DL (ref 8–27)
BUN/CREAT SERPL: 9 (ref 10–24)
CALCIUM SERPL-MCNC: 9.5 MG/DL (ref 8.6–10.2)
CHLORIDE SERPL-SCNC: 100 MMOL/L (ref 96–106)
CHOLEST SERPL-MCNC: 214 MG/DL (ref 100–199)
CO2 SERPL-SCNC: 24 MMOL/L (ref 20–29)
CREAT SERPL-MCNC: 1.32 MG/DL (ref 0.76–1.27)
EOSINOPHIL # BLD AUTO: 0.1 X10E3/UL (ref 0–0.4)
EOSINOPHIL NFR BLD AUTO: 1 %
ERYTHROCYTE [DISTWIDTH] IN BLOOD BY AUTOMATED COUNT: 12.5 % (ref 11.6–15.4)
GLOBULIN SER CALC-MCNC: 2.4 G/DL (ref 1.5–4.5)
GLUCOSE SERPL-MCNC: 114 MG/DL (ref 65–99)
HBA1C MFR BLD: 6 % (ref 4.8–5.6)
HCT VFR BLD AUTO: 45.8 % (ref 37.5–51)
HDLC SERPL-MCNC: 60 MG/DL
HGB BLD-MCNC: 15.2 G/DL (ref 13–17.7)
IMM GRANULOCYTES # BLD AUTO: 0 X10E3/UL (ref 0–0.1)
IMM GRANULOCYTES NFR BLD AUTO: 0 %
LDLC SERPL CALC-MCNC: 138 MG/DL (ref 0–99)
LDLC/HDLC SERPL: 2.3 RATIO (ref 0–3.6)
LYMPHOCYTES # BLD AUTO: 1.3 X10E3/UL (ref 0.7–3.1)
LYMPHOCYTES NFR BLD AUTO: 20 %
MCH RBC QN AUTO: 31 PG (ref 26.6–33)
MCHC RBC AUTO-ENTMCNC: 33.2 G/DL (ref 31.5–35.7)
MCV RBC AUTO: 93 FL (ref 79–97)
MONOCYTES # BLD AUTO: 0.5 X10E3/UL (ref 0.1–0.9)
MONOCYTES NFR BLD AUTO: 8 %
NEUTROPHILS # BLD AUTO: 4.5 X10E3/UL (ref 1.4–7)
NEUTROPHILS NFR BLD AUTO: 70 %
PLATELET # BLD AUTO: 259 X10E3/UL (ref 150–450)
POTASSIUM SERPL-SCNC: 4.4 MMOL/L (ref 3.5–5.2)
PROT SERPL-MCNC: 6.7 G/DL (ref 6–8.5)
RBC # BLD AUTO: 4.91 X10E6/UL (ref 4.14–5.8)
SODIUM SERPL-SCNC: 137 MMOL/L (ref 134–144)
TRIGL SERPL-MCNC: 89 MG/DL (ref 0–149)
TSH SERPL DL<=0.005 MIU/L-ACNC: 0.72 UIU/ML (ref 0.45–4.5)
VLDLC SERPL CALC-MCNC: 16 MG/DL (ref 5–40)
WBC # BLD AUTO: 6.4 X10E3/UL (ref 3.4–10.8)

## 2022-02-21 ENCOUNTER — TELEPHONE (OUTPATIENT)
Dept: FAMILY MEDICINE CLINIC | Facility: CLINIC | Age: 72
End: 2022-02-21

## 2022-02-21 RX ORDER — ROSUVASTATIN CALCIUM 20 MG/1
20 TABLET, COATED ORAL DAILY
Qty: 90 TABLET | Refills: 2 | Status: SHIPPED | OUTPATIENT
Start: 2022-02-21 | End: 2022-05-06 | Stop reason: DRUGHIGH

## 2022-02-21 NOTE — PROGRESS NOTES
"Subjective   Patient ID: Marin Cuenca is a 71 y.o. male is here today for 1 month PO follow-up. Patient had a C4-C5 and C5-C6 ACDF and instrumentation on 01.03.2022.     Today patient states that he has mild L arm pain. Patient states that he has neck stiffness. Patient states that he has numbness in his chin, and lower jaw line.     Patient, Provider, and MA are all wearing a mask in our office today.     History of Present Illness    This patient returns today.  He is doing well.  He has almost no pain at all.  His incision has healed quite well.    The following portions of the patient's history were reviewed and updated as appropriate: allergies, current medications, past family history, past medical history, past social history, past surgical history and problem list.    Review of Systems   Constitutional: Negative for chills and fever.   HENT: Negative for congestion.    Genitourinary: Negative for difficulty urinating and dysuria.   Musculoskeletal: Positive for neck stiffness. Negative for neck pain.   Neurological: Positive for numbness. Negative for weakness.       I have reviewed the review of systems as documented by my MA.      Objective     Vitals:    02/22/22 1139   BP: 135/80   Cuff Size: Adult   Pulse: 90   Temp: 98 °F (36.7 °C)   Weight: 103 kg (226 lb)   Height: 182.9 cm (72\")     Body mass index is 30.65 kg/m².      Physical Exam  Neurological:      Mental Status: He is alert and oriented to person, place, and time.       Neurologic Exam     Mental Status   Oriented to person, place, and time.           Assessment/Plan   Independent Review of Radiographic Studies:      I personally reviewed the images from the following studies.    I reviewed his x-rays done today.  This shows good alignment of the construct that C4-5 and C5-6.    Medical Decision Making:      I told the patient he can gradually return to normal activities.  He is to call if any problems develop.  He should avoid " strenuously heavy lifting.    Diagnoses and all orders for this visit:    1. Follow-up examination following surgery (Primary)      Return if symptoms worsen or fail to improve.

## 2022-02-22 ENCOUNTER — HOSPITAL ENCOUNTER (OUTPATIENT)
Dept: GENERAL RADIOLOGY | Facility: HOSPITAL | Age: 72
Discharge: HOME | End: 2022-02-22
Admitting: NURSE PRACTITIONER

## 2022-02-22 ENCOUNTER — OFFICE VISIT (OUTPATIENT)
Dept: NEUROSURGERY | Facility: CLINIC | Age: 72
End: 2022-02-22

## 2022-02-22 VITALS
HEART RATE: 90 BPM | TEMPERATURE: 98 F | HEIGHT: 72 IN | DIASTOLIC BLOOD PRESSURE: 80 MMHG | BODY MASS INDEX: 30.61 KG/M2 | WEIGHT: 226 LBS | SYSTOLIC BLOOD PRESSURE: 135 MMHG

## 2022-02-22 DIAGNOSIS — Z09 POSTOP CHECK: ICD-10-CM

## 2022-02-22 DIAGNOSIS — Z09 FOLLOW-UP EXAMINATION FOLLOWING SURGERY: Primary | ICD-10-CM

## 2022-02-22 PROCEDURE — 72040 X-RAY EXAM NECK SPINE 2-3 VW: CPT

## 2022-02-22 PROCEDURE — 99024 POSTOP FOLLOW-UP VISIT: CPT | Performed by: NEUROLOGICAL SURGERY

## 2022-02-28 ENCOUNTER — OFFICE VISIT (OUTPATIENT)
Dept: SLEEP MEDICINE | Facility: HOSPITAL | Age: 72
End: 2022-02-28

## 2022-02-28 VITALS
WEIGHT: 232 LBS | SYSTOLIC BLOOD PRESSURE: 145 MMHG | BODY MASS INDEX: 31.42 KG/M2 | OXYGEN SATURATION: 100 % | HEART RATE: 71 BPM | HEIGHT: 72 IN | DIASTOLIC BLOOD PRESSURE: 79 MMHG

## 2022-02-28 DIAGNOSIS — G47.33 OBSTRUCTIVE SLEEP APNEA, ADULT: Primary | ICD-10-CM

## 2022-02-28 PROCEDURE — G0463 HOSPITAL OUTPT CLINIC VISIT: HCPCS

## 2022-02-28 NOTE — PROGRESS NOTES
[facility]                          Chief Complaint:   Follow up for obstructive sleep apnea and HTN    History of present illness:   Subjective   KARLA:  Patient was previously seen by Dr. Duarte.   PSG 10/9/2011 (232 LB) AHI = 11/H; RDI = 18/H.  Optimal CPAP 10.    He is currently using his CPAP regularly.  His machine was issued in .  He is concerned about the recall on [employer].    Sleep schedule:  -Bedtime: 9 PM  -Sleep latency: Few min  -Wake up time: 7:30 AM , does feel refreshed  -Nocturnal awakenin     Mask: Pillows which does fit well.  No significant air leak or dry mouth  DME: [facility]    ESS: Total score: 3     Comorbid conditions:  He has HTN and takes his medications regularly.  He reported no side effects.    Labs: A1c and LDL on 2022: 6 and 138    REVIEW OF SYSTEMS:   HEENT: No nasal congestion or postnasal drip   CARDIOVASCULAR: No chest pain, chest pressure or chest discomfort. No palpitations or edema.   RESPIRATORY: No shortness of breath, cough or sputum.   GASTROINTESTINAL: No abdominal bloating or reflux   NEUROLOGICAL/PSYCHOATRY: No depression nor anxiety    Past Medical History:  Past Medical History:   Diagnosis Date   • Arthritis    • Ramirez esophagus    • Benign prostatic hypertrophy    • Cataract     saloni   • Ear pain, left     occ   • GERD (gastroesophageal reflux disease)    • History of MRSA infection     2010  blood bhl   • Hypertension    • Hypothyroidism    • Low back pain    • Neck pain 2021    CERVIVAL 4-6   • KARLA on CPAP     CPAP   • Osteoporosis    ,   Past Surgical History:   Procedure Laterality Date   • ANTERIOR CERVICAL DISCECTOMY W/ FUSION N/A 1/3/2022    Procedure: Cervical 4 to cervical 5 and cervical 5 to cervical 6 anterior cervical discectomy, fusion and instrumentation;  Surgeon: Wayne Meneses MD;  Location: [facility] OR;  Service: Neurosurgery;  Laterality: N/A;   • CYSTOSCOPY     • EPIDURAL BLOCK     •  EYE SURGERY Right    • HEMORRHOIDECTOMY     • KNEE SURGERY Left     scoped   • LUMBAR LAMINECTOMY     • LUMBAR LAMINECTOMY DISCECTOMY DECOMPRESSION Left 10/18/2019    Procedure: Left L4-5 laminectomy and neural lysis;  Surgeon: Wayne Meneses MD;  Location: [facility] OR;  Service: Neurosurgery   • NISSEN FUNDOPLICATION     • UPPER GASTROINTESTINAL ENDOSCOPY     ,   Social History     Socioeconomic History   • Marital status:    Tobacco Use   • Smoking status: Never Smoker   • Smokeless tobacco: Never Used   Vaping Use   • Vaping Use: Never used   Substance and Sexual Activity   • Alcohol use: Yes     Comment: OCCAS   • Drug use: No     Comment: Consumes 3 caffeinated beverages per day usually coffee.   • Sexual activity: Defer     E-cigarette/Vaping   • E-cigarette/Vaping Use Never User      E-cigarette/Vaping Substances     E-cigarette/Vaping Devices        and Allergies:  Tetracyclines & related    Medication Review:     Current Outpatient Medications:   •  aspirin 81 MG tablet, Take 1 tablet by mouth Daily. (Patient taking differently: Take 81 mg by mouth 3 (Three) Times a Week. Stopped 12/27/2021), Disp: 100 tablet, Rfl: 3  •  Azelastine HCl 137 MCG/SPRAY solution, 1 spray into the nostril(s) as directed by provider 2 (Two) Times a Day. (Patient taking differently: 1 spray into the nostril(s) as directed by provider As Needed.), Disp: 3 mL, Rfl: 0  •  Calcium Carb-Cholecalciferol 500-600 MG-UNIT chewable tablet, Chew 1 tablet 2 (Two) Times a Day., Disp: 180 tablet, Rfl: 3  •  esomeprazole (nexIUM) 40 MG capsule, Take 1 capsule by mouth 2 (Two) Times a Day., Disp: 180 capsule, Rfl: 3  •  finasteride (PROSCAR) 5 MG tablet, Take 1 tablet by mouth Every Morning., Disp: 90 tablet, Rfl: 3  •  irbesartan (AVAPRO) 75 MG tablet, Take 1 tablet by mouth Daily., Disp: 90 tablet, Rfl: 3  •  levothyroxine (SYNTHROID, LEVOTHROID) 100 MCG tablet, Take 1 tablet by mouth Daily., Disp: 90 tablet, Rfl: 3  •  meclizine  "(ANTIVERT) 25 MG tablet, Take 1 tablet by mouth 3 (Three) Times a Day As Needed for Dizziness., Disp: 30 tablet, Rfl: 0  •  methocarbamol (ROBAXIN) 750 MG tablet, Take 1 tablet by mouth 4 (Four) Times a Day., Disp: 40 tablet, Rfl: 1  •  Omega-3 Fatty Acids (FISH OIL) 1000 MG capsule capsule, Take 1,000 mg by mouth 2 (Two) Times a Day With Meals. HOLD PRIOR TO OR, Disp: , Rfl:   •  ondansetron (ZOFRAN) 4 MG tablet, Take 1 tablet by mouth Every 6 (Six) Hours As Needed for Nausea or Vomiting., Disp: 20 tablet, Rfl: 1  •  pregabalin (LYRICA) 100 MG capsule, TAKE 1 CAPSULE 3 TIMES A   DAY, Disp: 270 capsule, Rfl: 0  •  rosuvastatin (Crestor) 20 MG tablet, Take 1 tablet by mouth Daily., Disp: 90 tablet, Rfl: 2      Objective   Vital Signs:  Vitals:    02/28/22 0829   BP: 145/79   Pulse: 71   SpO2: 100%   Weight: 105 kg (232 lb)   Height: 182.9 cm (72\")     Body mass index is 31.46 kg/m².          Physical Exam:   General Appearance:    Alert, cooperative, in no acute distress   ENMT:  Freidman score 3, narrow distance in between the posterior pharyngeal pillars   Neck:  Large. Trachea midline. No thyromegaly.   Lungs:     Clear to auscultation,respirations regular, even and                  unlabored    Heart:    Regular rhythm and normal rate, normal S1 and S2, no            Murmur.   Abdomen:     Obese.  Soft.  No tenderness.  No HSM    Neuro:   Conscious, alert, oriented x3. Appropriate mood and affect.    Extremities:   Moves all extremities well, no edema, no cyanosis, no             Redness              Diagnostic data:    CPAP download showed:  Date: Last 30 days  Usage (days): 100%  Days used>4h: 100%  AHI: 0.6/h  Leak: 0  Usage: 10 h and 9 min  Auto CPAP: 10 cm H2O    Assessment   1. KARLA, on CPAP  2. HTN  3. Obesity, BMI 31        PLAN:    Discussed the result of the download.   Patient is compliant with therapy and clinically benefit from treatment.  Patient was encouraged to continue using his CPAP but I asked " him to use his CPAP without the humidifier for now until he gets a new one.  His machine is old and not working properly and therefore will order him an new machine.    Discussed the association between obstructive sleep apnea and cardiovascular disease including HTN and the beneficial effect of Pap therapy in reducing the risk of major cardiovascular events.    Counseled for weight loss.  Encouraged to exercise regularly and cut down on carbohydrates.  Discussed that losing weight may decrease the severity of sleep apnea and obviate the need of CPAP therapy.                    This note was dictated utilizing Dragon dictation

## 2022-03-01 ENCOUNTER — TELEPHONE (OUTPATIENT)
Dept: FAMILY MEDICINE CLINIC | Facility: CLINIC | Age: 72
End: 2022-03-01

## 2022-03-01 NOTE — TELEPHONE ENCOUNTER
Pt just started Crestor and he is having muscle weakness and nausea because if it and would like to know if he can be put on a different medication

## 2022-03-03 NOTE — TELEPHONE ENCOUNTER
FYI patient just wanted to call and let ramin know he cut the medication and half and it seems to be helping

## 2022-05-06 DIAGNOSIS — E03.9 ACQUIRED HYPOTHYROIDISM: Primary | ICD-10-CM

## 2022-05-06 RX ORDER — ROSUVASTATIN CALCIUM 10 MG/1
10 TABLET, COATED ORAL DAILY
Qty: 90 TABLET | Refills: 1 | Status: SHIPPED | OUTPATIENT
Start: 2022-05-06 | End: 2022-07-26 | Stop reason: SDUPTHER

## 2022-05-17 ENCOUNTER — TELEPHONE (OUTPATIENT)
Dept: FAMILY MEDICINE CLINIC | Facility: CLINIC | Age: 72
End: 2022-05-17

## 2022-05-20 ENCOUNTER — OFFICE VISIT (OUTPATIENT)
Dept: FAMILY MEDICINE CLINIC | Facility: CLINIC | Age: 72
End: 2022-05-20

## 2022-05-20 VITALS
WEIGHT: 233 LBS | HEART RATE: 113 BPM | OXYGEN SATURATION: 99 % | DIASTOLIC BLOOD PRESSURE: 78 MMHG | BODY MASS INDEX: 31.56 KG/M2 | SYSTOLIC BLOOD PRESSURE: 116 MMHG | TEMPERATURE: 97.6 F | HEIGHT: 72 IN

## 2022-05-20 DIAGNOSIS — M54.16 LUMBAR RADICULOPATHY: ICD-10-CM

## 2022-05-20 DIAGNOSIS — Z12.5 SPECIAL SCREENING FOR MALIGNANT NEOPLASM OF PROSTATE: ICD-10-CM

## 2022-05-20 DIAGNOSIS — I10 ESSENTIAL HYPERTENSION: Primary | ICD-10-CM

## 2022-05-20 DIAGNOSIS — R73.03 PREDIABETES: ICD-10-CM

## 2022-05-20 DIAGNOSIS — E78.5 HYPERLIPIDEMIA, UNSPECIFIED HYPERLIPIDEMIA TYPE: ICD-10-CM

## 2022-05-20 DIAGNOSIS — E03.9 ACQUIRED HYPOTHYROIDISM: ICD-10-CM

## 2022-05-20 PROCEDURE — 99214 OFFICE O/P EST MOD 30 MIN: CPT | Performed by: NURSE PRACTITIONER

## 2022-05-20 RX ORDER — PREGABALIN 100 MG/1
100 CAPSULE ORAL 3 TIMES DAILY
Qty: 270 CAPSULE | Refills: 0 | Status: SHIPPED | OUTPATIENT
Start: 2022-05-20 | End: 2022-05-23 | Stop reason: SDUPTHER

## 2022-05-20 NOTE — PROGRESS NOTES
"Chief Complaint  Prediabetes (A1c check) and Med Refill (Med check for pregablin and pt says he needs a new script for all of his meds )    Subjective          Marin Cuenca presents to [facility]  History of Present Illness  Prediabetes- patient not on any medications for this Transacryl control with diet and increasing activity.  A1c was last checked in February 2020 with result of 6.0.    Hyperlipidemia-patient currently on Crestor 10 mg daily as directed and side effects.  Cholesterol was last checked in February with increased levels and that when the medication was started.    Hypothyroidism-patient is currently on levothyroxine 100 mcg daily as directed and side effects.  TSH was last checked in February with result of 0.717.    Lumbar radiculitis and back pain-patient currently well controlled on Lyrica 100 mg 3 times a day as directed  Objective   Vital Signs:  /78   Pulse 113   Temp 97.6 °F (36.4 °C)   Ht 182.9 cm (72\")   Wt 106 kg (233 lb)   SpO2 99%   BMI 31.60 kg/m²         Physical Exam  Vitals reviewed.   Constitutional:       General: He is not in acute distress.     Appearance: He is well-developed.   HENT:      Head: Normocephalic.   Cardiovascular:      Rate and Rhythm: Normal rate and regular rhythm.      Heart sounds: Normal heart sounds.   Pulmonary:      Effort: Pulmonary effort is normal.      Breath sounds: Normal breath sounds.   Neurological:      Mental Status: He is alert and oriented to person, place, and time.      Gait: Gait normal.   Psychiatric:         Behavior: Behavior normal.         Thought Content: Thought content normal.         Judgment: Judgment normal.        Result Review :   The following data was reviewed by: SANTA Kramer on 05/20/2022:  CMP    CMP 12/30/21 1/17/22 2/17/22   Glucose 104 (A)  114 (A)   BUN 12  12   Creatinine 1.11 1.30 1.32 (A)   eGFR Non  Am 65  54 (A)   eGFR  Am   62   Sodium 140  137 "   Potassium 4.4  4.4   Chloride 105  100   Calcium 9.2  9.5   Total Protein   6.7   Albumin   4.3   Globulin   2.4   Total Bilirubin   0.6   Alkaline Phosphatase   83   AST (SGOT)   17   ALT (SGPT)   11   (A) Abnormal value       Comments are available for some flowsheets but are not being displayed.           CBC w/diff    CBC w/Diff 12/30/21 1/4/22 2/17/22   WBC 6.07 11.63 (A) 6.4   RBC 4.37 4.35 4.91   Hemoglobin 13.8 13.8 15.2   Hematocrit 40.7 42.2 45.8   MCV 93.1 97.0 93   MCH 31.6 31.7 31.0   MCHC 33.9 32.7 33.2   RDW 12.1 (A) 12.0 (A) 12.5   Platelets 220 216 259   Neutrophil Rel %  79.4 (A) 70   Immature Granulocyte Rel %  0.3    Lymphocyte Rel %  10.7 (A) 20   Monocyte Rel %  9.2 8   Eosinophil Rel %  0.1 (A) 1   Basophil Rel %  0.3 1   (A) Abnormal value            Lipid Panel    Lipid Panel 2/17/22   Total Cholesterol 214 (A)   Triglycerides 89   HDL Cholesterol 60   VLDL Cholesterol 16   LDL Cholesterol  138 (A)   LDL/HDL Ratio 2.3   (A) Abnormal value       Comments are available for some flowsheets but are not being displayed.           TSH    TSH 2/17/22   TSH 0.717           Most Recent A1C    HGBA1C Most Recent 2/17/22   Hemoglobin A1C 6.0 (A)   (A) Abnormal value       Comments are available for some flowsheets but are not being displayed.                         Assessment and Plan    Diagnoses and all orders for this visit:    1. Essential hypertension (Primary)    2. Lumbar radiculopathy  -     pregabalin (LYRICA) 100 MG capsule; Take 1 capsule by mouth 3 (Three) Times a Day.  Dispense: 270 capsule; Refill: 0    3. Acquired hypothyroidism    4. Prediabetes  -     Hemoglobin A1c    5. Hyperlipidemia, unspecified hyperlipidemia type  -     Lipid Panel With LDL / HDL Ratio    6. Special screening for malignant neoplasm of prostate  -     PSA Screen             Follow Up   No follow-ups on file.  Patient was given instructions and counseling regarding his condition or for health maintenance advice.  Please see specific information pulled into the AVS if appropriate.     Cont with meds  rto in 3 mons  Follow up after labs     Mask and googles worn

## 2022-05-21 LAB
CHOLEST SERPL-MCNC: 144 MG/DL (ref 100–199)
HBA1C MFR BLD: 6.1 % (ref 4.8–5.6)
HDLC SERPL-MCNC: 53 MG/DL
LDLC SERPL CALC-MCNC: 72 MG/DL (ref 0–99)
LDLC/HDLC SERPL: 1.4 RATIO (ref 0–3.6)
PSA SERPL-MCNC: 0.4 NG/ML (ref 0–4)
TRIGL SERPL-MCNC: 102 MG/DL (ref 0–149)
VLDLC SERPL CALC-MCNC: 19 MG/DL (ref 5–40)

## 2022-05-23 DIAGNOSIS — M54.16 LUMBAR RADICULOPATHY: ICD-10-CM

## 2022-05-23 RX ORDER — PREGABALIN 100 MG/1
100 CAPSULE ORAL 3 TIMES DAILY
Qty: 270 CAPSULE | Refills: 0 | Status: SHIPPED | OUTPATIENT
Start: 2022-05-23 | End: 2022-07-26 | Stop reason: SDUPTHER

## 2022-05-23 NOTE — TELEPHONE ENCOUNTER
Pt called because SANTA Simpson sent in the pregablin to [facility] instead of mail order [facility] and he needs this switched ASAP.

## 2022-07-08 ENCOUNTER — TELEPHONE (OUTPATIENT)
Dept: FAMILY MEDICINE CLINIC | Facility: CLINIC | Age: 72
End: 2022-07-08

## 2022-07-08 NOTE — TELEPHONE ENCOUNTER
Pt informed. Pt has not been checking glucose, pt is asymptomatic. Pt is agreeable to continuing rousuvastatin

## 2022-07-08 NOTE — TELEPHONE ENCOUNTER
Pt has been started rosuvastatin (Crestor) 10 MG tablet in May but he thinks that this is making his A1C lab go up and he would like to start trying the pravastatin 10MG instead     Please advise    Has new patient appointment with Arabella on 8/4/22

## 2022-07-08 NOTE — TELEPHONE ENCOUNTER
There is a chance of rosuvastatin can increase glucose levels.  However, patient's hemoglobin A1c looked stable when last evaluated.  Has patient had elevated fasting glucose levels?  We can switch to pravastatin, however, pravastatin may not be as effective at keeping cholesterol levels at goal compared to rosuvastatin.  If the patient has not had elevated fasting glucose levels or been symptomatic for hyperglycemia, I encourage we wait to make changes until we check hemoglobin A1c at his appointment.

## 2022-07-15 ENCOUNTER — TELEPHONE (OUTPATIENT)
Dept: FAMILY MEDICINE CLINIC | Facility: CLINIC | Age: 72
End: 2022-07-15

## 2022-07-15 NOTE — TELEPHONE ENCOUNTER
Pt called to get refills request to [facility] order    pregabalin (LYRICA) 100 MG capsule  Omeprazole.       LOV:5/20/22  Upcoming Teresa-08/04/22

## 2022-07-15 NOTE — TELEPHONE ENCOUNTER
Niraj reviewed.  Patient not due for pregabalin refill yet.  Refill was placed on 5/24/2022 for 90-day supply.

## 2022-07-18 DIAGNOSIS — K21.00 GASTROESOPHAGEAL REFLUX DISEASE WITH ESOPHAGITIS: ICD-10-CM

## 2022-07-18 DIAGNOSIS — M54.16 LUMBAR RADICULOPATHY: ICD-10-CM

## 2022-07-18 RX ORDER — PREGABALIN 100 MG/1
100 CAPSULE ORAL 3 TIMES DAILY
Qty: 270 CAPSULE | Refills: 0 | OUTPATIENT
Start: 2022-07-18

## 2022-07-18 RX ORDER — ESOMEPRAZOLE MAGNESIUM 40 MG/1
40 CAPSULE, DELAYED RELEASE ORAL
Qty: 90 CAPSULE | Refills: 0 | Status: SHIPPED | OUTPATIENT
Start: 2022-07-18 | End: 2022-07-26 | Stop reason: SDUPTHER

## 2022-07-18 NOTE — TELEPHONE ENCOUNTER
Rx Refill Note  Requested Prescriptions      No prescriptions requested or ordered in this encounter      Last office visit with prescribing clinician: Visit date not found      Next office visit with prescribing clinician: 8/4/2022            Jessa Azul MA  07/18/22, 10:55 EDT     HAS AN APPT WITH YANNICK ON 8/4/22

## 2022-07-19 ENCOUNTER — TELEPHONE (OUTPATIENT)
Dept: NEUROSURGERY | Facility: CLINIC | Age: 72
End: 2022-07-19

## 2022-07-19 NOTE — TELEPHONE ENCOUNTER
Provider: MAHOGANY  Caller: DON  Relationship to Patient: SELF    Phone Number: [phone]  Reason for Call: Question  When was the patient last seen: 02/22/2022  Surgery: 1/03/2022 Cervical 4 to cervical 5 and cervical 5 to cervical 6 anterior cervical discectomy, fusion and instrumentation    PT WANTS TO KNOW IF HE IS ABLE TO PUSH A SMALL . PLEASE CALL PT AND ADVISE     THANK YOU

## 2022-07-20 DIAGNOSIS — K21.00 GASTROESOPHAGEAL REFLUX DISEASE WITH ESOPHAGITIS: ICD-10-CM

## 2022-07-20 DIAGNOSIS — M54.16 LUMBAR RADICULOPATHY: ICD-10-CM

## 2022-07-20 RX ORDER — PREGABALIN 100 MG/1
CAPSULE ORAL
Qty: 270 CAPSULE | Refills: 0 | OUTPATIENT
Start: 2022-07-20

## 2022-07-20 RX ORDER — ESOMEPRAZOLE MAGNESIUM 40 MG/1
CAPSULE, DELAYED RELEASE ORAL
Qty: 180 CAPSULE | Refills: 3 | OUTPATIENT
Start: 2022-07-20

## 2022-07-20 NOTE — TELEPHONE ENCOUNTER
It should be okay for him to increase his activities including pushing a lawn more.  He just needs to take it slow.

## 2022-07-20 NOTE — TELEPHONE ENCOUNTER
Patient has called back requesting to speak with SHELLY Huang MA. Advised she was not at her desk. Patient is requesting a return call.

## 2022-07-20 NOTE — TELEPHONE ENCOUNTER
Attempted to reach pt. Left non detailed vm requesting pt call pt.     When pt returns call will inform him that we will change the rx - must confirm if pt has already filled rx or if he has not pu.  If pu pt will take 2T before as originally prescribed by former pcp. If pt has not pu will resend updated rx

## 2022-07-20 NOTE — TELEPHONE ENCOUNTER
Rx Refill Note  Requested Prescriptions     Pending Prescriptions Disp Refills   • pregabalin (LYRICA) 100 MG capsule [Pharmacy Med Name: PREGABALIN CAP 100MG] 270 capsule 0     Sig: TAKE 1 CAPSULE 3 TIMES A   DAY      Last office visit with prescribing clinician: 5/20/2022      Next office visit with prescribing clinician: Visit date not found            Craig Ferrer MA  07/20/22, 09:28 EDT

## 2022-07-20 NOTE — TELEPHONE ENCOUNTER
Pt stated that this medication is supposed to be 2 a day. And wants to know why it was changed to one a day and wants it switched ASAP

## 2022-07-20 NOTE — TELEPHONE ENCOUNTER
Medication hx esomeprazole 40 mg sig BID, med pended and sent as QD okay to update sig and resend?    Likely a documentation error when pended for refill

## 2022-07-21 NOTE — TELEPHONE ENCOUNTER
PT CALLED BACK AND NOW WANTS TO KNOW HOW MUCH WEIGHT IS HE ABLE TO LIFT. HE FORGOT TO ASK THAT QUESTION EARLIER.     THANK YOU

## 2022-07-21 NOTE — TELEPHONE ENCOUNTER
"S/w patient and informed him that per Dr. Meneses     \"It should be okay for him to increase his activities including pushing a lawn more.  He just needs to take it slow.\"    Patient expressed understanding     "

## 2022-07-22 NOTE — TELEPHONE ENCOUNTER
"LM for patient informing him that per Dr. Meneses     \"He can gradually start lifting more than 10 pounds but I would not lift more than 25 at any time.\"  "

## 2022-07-26 ENCOUNTER — OFFICE VISIT (OUTPATIENT)
Dept: INTERNAL MEDICINE | Facility: CLINIC | Age: 72
End: 2022-07-26

## 2022-07-26 VITALS
TEMPERATURE: 97.8 F | WEIGHT: 231.2 LBS | HEART RATE: 75 BPM | DIASTOLIC BLOOD PRESSURE: 81 MMHG | SYSTOLIC BLOOD PRESSURE: 120 MMHG | OXYGEN SATURATION: 100 % | BODY MASS INDEX: 31.31 KG/M2 | HEIGHT: 72 IN

## 2022-07-26 DIAGNOSIS — G47.33 OSA ON CPAP: ICD-10-CM

## 2022-07-26 DIAGNOSIS — I10 ESSENTIAL HYPERTENSION: ICD-10-CM

## 2022-07-26 DIAGNOSIS — R73.03 PREDIABETES: Primary | Chronic | ICD-10-CM

## 2022-07-26 DIAGNOSIS — G89.29 CHRONIC LOW BACK PAIN WITH SCIATICA, SCIATICA LATERALITY UNSPECIFIED, UNSPECIFIED BACK PAIN LATERALITY: ICD-10-CM

## 2022-07-26 DIAGNOSIS — Z99.89 OSA ON CPAP: ICD-10-CM

## 2022-07-26 DIAGNOSIS — N40.1 BENIGN NON-NODULAR PROSTATIC HYPERPLASIA WITH LOWER URINARY TRACT SYMPTOMS: ICD-10-CM

## 2022-07-26 DIAGNOSIS — Z87.19 HISTORY OF BARRETT'S ESOPHAGUS: ICD-10-CM

## 2022-07-26 DIAGNOSIS — I83.93 ASYMPTOMATIC VARICOSE VEINS OF BOTH LOWER EXTREMITIES: ICD-10-CM

## 2022-07-26 DIAGNOSIS — E03.9 ACQUIRED HYPOTHYROIDISM: ICD-10-CM

## 2022-07-26 DIAGNOSIS — K21.00 GASTROESOPHAGEAL REFLUX DISEASE WITH ESOPHAGITIS: ICD-10-CM

## 2022-07-26 DIAGNOSIS — M54.40 CHRONIC LOW BACK PAIN WITH SCIATICA, SCIATICA LATERALITY UNSPECIFIED, UNSPECIFIED BACK PAIN LATERALITY: ICD-10-CM

## 2022-07-26 DIAGNOSIS — M54.16 LUMBAR RADICULOPATHY: ICD-10-CM

## 2022-07-26 PROBLEM — I82.4Z1 ACUTE DEEP VEIN THROMBOSIS (DVT) OF DISTAL VEIN OF RIGHT LOWER EXTREMITY: Status: RESOLVED | Noted: 2019-11-11 | Resolved: 2022-07-26

## 2022-07-26 PROBLEM — M81.0 OSTEOPOROSIS: Status: ACTIVE | Noted: 2022-07-26

## 2022-07-26 PROBLEM — Z79.899 CONTROLLED SUBSTANCE AGREEMENT SIGNED: Status: ACTIVE | Noted: 2022-07-26

## 2022-07-26 PROBLEM — G62.9 NEUROPATHY: Status: ACTIVE | Noted: 2022-07-26

## 2022-07-26 PROBLEM — K21.9 GASTROESOPHAGEAL REFLUX DISEASE: Status: ACTIVE | Noted: 2022-07-26

## 2022-07-26 PROBLEM — I82.409 DEEP VEIN THROMBOSIS (DVT): Status: ACTIVE | Noted: 2022-07-26

## 2022-07-26 PROBLEM — M54.9 BACK PAIN: Status: ACTIVE | Noted: 2022-07-26

## 2022-07-26 PROBLEM — I82.409 DEEP VEIN THROMBOSIS (DVT) (HCC): Status: RESOLVED | Noted: 2022-07-26 | Resolved: 2022-07-26

## 2022-07-26 PROCEDURE — 99214 OFFICE O/P EST MOD 30 MIN: CPT | Performed by: NURSE PRACTITIONER

## 2022-07-26 RX ORDER — ROSUVASTATIN CALCIUM 10 MG/1
10 TABLET, COATED ORAL DAILY
Qty: 90 TABLET | Refills: 1 | Status: SHIPPED | OUTPATIENT
Start: 2022-07-26 | End: 2022-08-17 | Stop reason: SINTOL

## 2022-07-26 RX ORDER — TAMSULOSIN HYDROCHLORIDE 0.4 MG/1
1 CAPSULE ORAL DAILY
Qty: 90 CAPSULE | Refills: 1 | Status: SHIPPED | OUTPATIENT
Start: 2022-07-26

## 2022-07-26 RX ORDER — PREGABALIN 100 MG/1
100 CAPSULE ORAL 3 TIMES DAILY
Qty: 270 CAPSULE | Refills: 1 | Status: SHIPPED | OUTPATIENT
Start: 2022-07-26 | End: 2022-10-07 | Stop reason: SDUPTHER

## 2022-07-26 RX ORDER — FINASTERIDE 5 MG/1
5 TABLET, FILM COATED ORAL EVERY MORNING
Qty: 90 TABLET | Refills: 1 | Status: SHIPPED | OUTPATIENT
Start: 2022-07-26

## 2022-07-26 RX ORDER — IRBESARTAN 75 MG/1
75 TABLET ORAL DAILY
Qty: 90 TABLET | Refills: 1 | Status: SHIPPED | OUTPATIENT
Start: 2022-07-26 | End: 2023-03-27

## 2022-07-26 RX ORDER — ESOMEPRAZOLE MAGNESIUM 40 MG/1
40 CAPSULE, DELAYED RELEASE ORAL
Qty: 90 CAPSULE | Refills: 1 | Status: SHIPPED | OUTPATIENT
Start: 2022-07-26 | End: 2022-10-07 | Stop reason: SDUPTHER

## 2022-07-26 RX ORDER — LEVOTHYROXINE SODIUM 0.1 MG/1
100 TABLET ORAL DAILY
Qty: 90 TABLET | Refills: 1 | Status: SHIPPED | OUTPATIENT
Start: 2022-07-26

## 2022-07-26 RX ORDER — KETOROLAC TROMETHAMINE 5 MG/ML
SOLUTION OPHTHALMIC
COMMUNITY
Start: 2022-06-16 | End: 2023-01-25

## 2022-07-26 NOTE — PATIENT INSTRUCTIONS
Summer Health Information:     Stay hydrated when outside  If your urine starts to get darker, you are not drinking enough     Protect yourself from ticks and mosquitos  Here are the best ingredients to look for:  DEET (N,N-diethyl-m-toluamide or N,N-diethyl-3-methyl-benzamide)  Picaridin  Oil of lemon eucalyptus (p-menthane-3,8-diol or PMD)  Wear light-colored pants so you can spot ticks easier  If you use a permethrin product, ONLY apply to clothing    Practice Safe Sun!    Use sun screen SPF >50 daily, reapply regularly per directions on package  See dermatologist for skin check regularly  Protect your eyes with sunglasses with UV protection    Poison Ivy  If you are going into areas that may have poison ivy, prepare with a product like IvyX or other ivy blocker.  Wash your clothes and pets after being in an area with ivy when returning home. If you come in contact with poison ivy, try a product like Technu     Other things you should incorporate all year...     Diet:    Eat vegetables, fruits, whole grain, low-fat dairy, poultry, fish, beans, nontropical vegetable oils, and nuts, but avoid red meat (i.e., Mediterranean-style diet, DASH [Dietary Approaches to Stop Hypertension] diet).  Limit sugary drinks and sweets.  Limit saturated and trans fat to 5% to 6% of calories.  Limit sodium intake to 2,400 mg daily (about one teaspoon table salt [kosher/sea salt have less sodium per teaspoon]).  https://www.eatright.org/    Weight loss / Calorie Counting Apps:    Lose It!   MyFit[employer] Outdoors Pal   Works great when you try it with a partner/ friend. It takes about 15 minutes a day but studies show that this simple method of monitoring your intake can help you achieve goals as it keeps you accountable.  I often will ask patients to try these apps just to get an idea of how much sodium and how many carbohydrates you are taking in.   Exercise:   Engage in moderate-to-vigorous aerobic activity for at least 40 minutes (on  average) three to four times each week.  Wearables:   Activity tracker   Step tracker: getting 7,500 steps daily can cut your cardiac risks by 44%   Bone Health:   Https://www.nof.org/patients/treatment/nutrition/  Routine weight bearing exercise

## 2022-07-26 NOTE — PROGRESS NOTES
Chief Complaint  Hypertension (Establish care new patient )     Subjective:      History of Present Illness {CC  Problem List  Visit  Diagnosis   Encounters  Notes  Medications  Labs  Result Review Imaging  Media :23}     Marin Cuenca presents to [facility] for:      He is new to me and here to establish care.   Last PCP: Kash Simpson.   His wife is a patient here.     He chronically has prediabetes, hyperlipidemia (treated for one year) , hypothyroid (since 2000 Dr Soto initially), hypertension, bph (followed by Dr Dos Santos), KARLA (CPAP - Dr Rosales), CKD (last cr 1.32) , GERD (hiatal hernia)   He sees Dr Meneses - he had  C4-C5 and C5-C6 ACDF and instrumentation on 01.03.2022.   He continues lyrica. (will sign new contract with this office today)   He had DVT in 2019 after prior surgery.   Varicose veins - sees Dr Sims and wears compression socks.     Hx Ramirez's esophagus 2016   EGD/Colonoscopy: 5/24/2019: [facility]   Dx: diverticulosis entire colon - further colonoscopy not indicated per note.   Esophagus was normal    Walks 1 mile a day.   Retired    I have reviewed patient's medical history, any new submitted information provided by patient or medical assistant and updated medical record.      Objective:      Physical Exam  Vitals reviewed.   Constitutional:       Appearance: Normal appearance. He is well-developed. He is obese.      Comments: Appears euthyroid    HENT:      Head:      Comments: Wearing mask due to COVID   Neck:      Thyroid: No thyromegaly.      Comments: Thick neck   Cardiovascular:      Rate and Rhythm: Normal rate and regular rhythm.      Pulses: Normal pulses.      Heart sounds: Normal heart sounds.   Pulmonary:      Effort: Pulmonary effort is normal.      Breath sounds: Normal breath sounds.      Comments: E/U   Abdominal:      General: Bowel sounds are normal.   Musculoskeletal:         General: Normal range of motion.      Cervical  "back: Normal range of motion and neck supple.   Lymphadenopathy:      Cervical: No cervical adenopathy.   Skin:     General: Skin is warm and dry.      Capillary Refill: Capillary refill takes 2 to 3 seconds.   Neurological:      Mental Status: He is alert and oriented to person, place, and time.   Psychiatric:         Mood and Affect: Mood normal.         Behavior: Behavior normal. Behavior is cooperative.         Thought Content: Thought content normal.         Judgment: Judgment normal.        Result Review  Data Reviewed:{ Labs  Result Review  Imaging  Med Tab  Media :23}     The following data was reviewed by: Cara Zavaleta III, NP-C on 07/26/2022  Common labs    Common Labsle 1/17/22 2/17/22 2/17/22 2/17/22 2/17/22 5/20/22 5/20/22 5/20/22     0911 0911 0911 0911 1002 1002 1002   Glucose   114 (A)        BUN   12        Creatinine 1.30  1.32 (A)        eGFR Non  Am   54 (A)        eGFR African Am   62        Sodium   137        Potassium   4.4        Chloride   100        Calcium   9.5        Total Protein   6.7        Albumin   4.3        Total Bilirubin   0.6        Alkaline Phosphatase   83        AST (SGOT)   17        ALT (SGPT)   11        WBC  6.4         Hemoglobin  15.2         Hematocrit  45.8         Platelets  259         Total Cholesterol     214 (A)  144    Triglycerides     89  102    HDL Cholesterol     60  53    LDL Cholesterol      138 (A)  72    Hemoglobin A1C    6.0 (A)  6.1 (A)     PSA        0.4   (A) Abnormal value       Comments are available for some flowsheets but are not being displayed.                  Vital Signs:   /81 (BP Location: Left arm, Patient Position: Sitting, Cuff Size: Adult)   Pulse 75   Temp 97.8 °F (36.6 °C) (Temporal)   Ht 182.9 cm (72\")   Wt 105 kg (231 lb 3.2 oz)   SpO2 100%   BMI 31.36 kg/m²         Requested Prescriptions     Signed Prescriptions Disp Refills   • finasteride (PROSCAR) 5 MG tablet 90 tablet 1     Sig: Take 1 " tablet by mouth Every Morning.   • irbesartan (AVAPRO) 75 MG tablet 90 tablet 1     Sig: Take 1 tablet by mouth Daily.   • levothyroxine (SYNTHROID, LEVOTHROID) 100 MCG tablet 90 tablet 1     Sig: Take 1 tablet by mouth Daily.   • rosuvastatin (Crestor) 10 MG tablet 90 tablet 1     Sig: Take 1 tablet by mouth Daily.   • tamsulosin (FLOMAX) 0.4 MG capsule 24 hr capsule 90 capsule 1     Sig: Take 1 capsule by mouth Daily.   • esomeprazole (nexIUM) 40 MG capsule 90 capsule 1     Sig: Take 1 capsule by mouth Every Morning Before Breakfast.   • pregabalin (LYRICA) 100 MG capsule 270 capsule 1     Sig: Take 1 capsule by mouth 3 (Three) Times a Day.       Routine medications provided by this office will also be refilled via pharmacy request.       Current Outpatient Medications:   •  aspirin 81 MG tablet, Take 1 tablet by mouth Daily. (Patient taking differently: Take 81 mg by mouth 3 (Three) Times a Week. Stopped 12/27/2021  Indications: Ok to restart on Friday 1/7/22), Disp: 100 tablet, Rfl: 3  •  Azelastine HCl 137 MCG/SPRAY solution, 1 spray into the nostril(s) as directed by provider 2 (Two) Times a Day. (Patient taking differently: 1 spray into the nostril(s) as directed by provider As Needed.), Disp: 3 mL, Rfl: 0  •  Calcium Carb-Cholecalciferol 500-600 MG-UNIT chewable tablet, Chew 1 tablet 2 (Two) Times a Day., Disp: 180 tablet, Rfl: 3  •  esomeprazole (nexIUM) 40 MG capsule, Take 1 capsule by mouth Every Morning Before Breakfast., Disp: 90 capsule, Rfl: 1  •  finasteride (PROSCAR) 5 MG tablet, Take 1 tablet by mouth Every Morning., Disp: 90 tablet, Rfl: 1  •  irbesartan (AVAPRO) 75 MG tablet, Take 1 tablet by mouth Daily., Disp: 90 tablet, Rfl: 1  •  ketorolac (ACULAR) 0.5 % ophthalmic solution, , Disp: , Rfl:   •  levothyroxine (SYNTHROID, LEVOTHROID) 100 MCG tablet, Take 1 tablet by mouth Daily., Disp: 90 tablet, Rfl: 1  •  meclizine (ANTIVERT) 25 MG tablet, Take 1 tablet by mouth 3 (Three) Times a Day As  Needed for Dizziness., Disp: 30 tablet, Rfl: 0  •  methocarbamol (ROBAXIN) 750 MG tablet, Take 1 tablet by mouth 4 (Four) Times a Day., Disp: 40 tablet, Rfl: 1  •  Omega-3 Fatty Acids (FISH OIL) 1000 MG capsule capsule, Take 1,000 mg by mouth 2 (Two) Times a Day With Meals. HOLD PRIOR TO OR, Disp: , Rfl:   •  ondansetron (ZOFRAN) 4 MG tablet, Take 1 tablet by mouth Every 6 (Six) Hours As Needed for Nausea or Vomiting., Disp: 20 tablet, Rfl: 1  •  pregabalin (LYRICA) 100 MG capsule, Take 1 capsule by mouth 3 (Three) Times a Day., Disp: 270 capsule, Rfl: 1  •  rosuvastatin (Crestor) 10 MG tablet, Take 1 tablet by mouth Daily., Disp: 90 tablet, Rfl: 1  •  tamsulosin (FLOMAX) 0.4 MG capsule 24 hr capsule, Take 1 capsule by mouth Daily., Disp: 90 capsule, Rfl: 1     Assessment and Plan:      Assessment and Plan {CC Problem List  Visit Diagnosis  ROS  Review (Popup)  Health Maintenance  Quality  BestPractice  Medications  SmartSets  SnapShot Encounters  Media :23}     Problem List Items Addressed This Visit        Cardiac and Vasculature    Asymptomatic varicose veins of both lower extremities (Chronic)    Overview     Evaluated by Dr Sims            Current Assessment & Plan     Continue compression socks            Essential hypertension (Chronic)    Current Assessment & Plan     Hypertension is improving with treatment.  Continue current treatment regimen.  Dietary sodium restriction.  Weight loss.  Blood pressure will be reassessed at the next regular appointment.           Relevant Medications    irbesartan (AVAPRO) 75 MG tablet    Other Relevant Orders    Basic Metabolic Panel       Endocrine and Metabolic    Acquired hypothyroidism (Chronic)    Current Assessment & Plan     Stable   Check lab for ongoing therapeutic drug monitoring            Relevant Medications    levothyroxine (SYNTHROID, LEVOTHROID) 100 MCG tablet    rosuvastatin (Crestor) 10 MG tablet    Other Relevant Orders    TSH    T4, free     Prediabetes - Primary (Chronic)    Current Assessment & Plan     Diet controlled - work on diet, exercise.                 Gastrointestinal Abdominal     History of Ramirez's esophagus (Chronic)    Overview     2016  Repeat 5/24/2019: normal ([facility])            Gastroesophageal reflux disease with esophagitis    Relevant Medications    esomeprazole (nexIUM) 40 MG capsule       Genitourinary and Reproductive     Benign non-nodular prostatic hyperplasia with lower urinary tract symptoms    Relevant Medications    finasteride (PROSCAR) 5 MG tablet       Musculoskeletal and Injuries    Chronic LBP (Chronic)       Neuro    Lumbar radiculopathy (Chronic)    Current Assessment & Plan     Improved with lyrica  Will refill today.   Contract signed.    Continue daily exercises.            Relevant Medications    pregabalin (LYRICA) 100 MG capsule       Sleep    KARLA on CPAP (Chronic)          Follow Up {Instructions Charge Capture  Follow-up Communications :23}     Return in about 6 months (around 1/26/2023) for Annual physical.      Patient was given instructions and counseling regarding his condition or for health maintenance advice. Please see specific information pulled into the AVS if appropriate.    Dragon disclaimer:   Much of this encounter note is an electronic transcription/translation of spoken language to printed text. The electronic translation of spoken language may permit erroneous, or at times, nonsensical words or phrases to be inadvertently transcribed; Although I have reviewed the note for such errors, some may still exist.     Additional Patient Counseling:       Patient Instructions     Summer Health Information:     Stay hydrated when outside  If your urine starts to get darker, you are not drinking enough     Protect yourself from ticks and mosquitos  Here are the best ingredients to look for:  · DEET (N,N-diethyl-m-toluamide or N,N-diethyl-3-methyl-benzamide)  · Picaridin  · Oil of lemon  eucalyptus (p-menthane-3,8-diol or PMD)  Wear light-colored pants so you can spot ticks easier  If you use a permethrin product, ONLY apply to clothing    Practice Safe Sun!    · Use sun screen SPF >50 daily, reapply regularly per directions on package  · See dermatologist for skin check regularly  · Protect your eyes with sunglasses with UV protection    Poison Ivy  If you are going into areas that may have poison ivy, prepare with a product like IvyX or other ivy blocker.  Wash your clothes and pets after being in an area with ivy when returning home. If you come in contact with poison ivy, try a product like Technu     Other things you should incorporate all year...     Diet:    • Eat vegetables, fruits, whole grain, low-fat dairy, poultry, fish, beans, nontropical vegetable oils, and nuts, but avoid red meat (i.e., Mediterranean-style diet, DASH [Dietary Approaches to Stop Hypertension] diet).  • Limit sugary drinks and sweets.  • Limit saturated and trans fat to 5% to 6% of calories.  • Limit sodium intake to 2,400 mg daily (about one teaspoon table salt [kosher/sea salt have less sodium per teaspoon]).  • https://www.eatright.org/    Weight loss / Calorie Counting Apps:    • Lose It!   • MyFitness Pal   • Works great when you try it with a partner/ friend. It takes about 15 minutes a day but studies show that this simple method of monitoring your intake can help you achieve goals as it keeps you accountable.  I often will ask patients to try these apps just to get an idea of how much sodium and how many carbohydrates you are taking in.   Exercise:   • Engage in moderate-to-vigorous aerobic activity for at least 40 minutes (on average) three to four times each week.  Wearables:   • Activity tracker   • Step tracker: getting 7,500 steps daily can cut your cardiac risks by 44%   Bone Health:   • Https://www.nof.org/patients/treatment/nutrition/  • Routine weight bearing exercise

## 2022-07-27 LAB
BUN SERPL-MCNC: 11 MG/DL (ref 8–27)
BUN/CREAT SERPL: 9 (ref 10–24)
CALCIUM SERPL-MCNC: 9.7 MG/DL (ref 8.6–10.2)
CHLORIDE SERPL-SCNC: 103 MMOL/L (ref 96–106)
CO2 SERPL-SCNC: 24 MMOL/L (ref 20–29)
CREAT SERPL-MCNC: 1.26 MG/DL (ref 0.76–1.27)
EGFRCR SERPLBLD CKD-EPI 2021: 60 ML/MIN/1.73
GLUCOSE SERPL-MCNC: 105 MG/DL (ref 65–99)
POTASSIUM SERPL-SCNC: 4.9 MMOL/L (ref 3.5–5.2)
SODIUM SERPL-SCNC: 140 MMOL/L (ref 134–144)
T4 FREE SERPL-MCNC: 1.53 NG/DL (ref 0.82–1.77)
TSH SERPL DL<=0.005 MIU/L-ACNC: 0.8 UIU/ML (ref 0.45–4.5)

## 2022-08-09 ENCOUNTER — TELEPHONE (OUTPATIENT)
Dept: INTERNAL MEDICINE | Facility: CLINIC | Age: 72
End: 2022-08-09

## 2022-08-16 ENCOUNTER — TELEPHONE (OUTPATIENT)
Dept: INTERNAL MEDICINE | Facility: CLINIC | Age: 72
End: 2022-08-16

## 2022-08-16 DIAGNOSIS — E78.2 MIXED HYPERLIPIDEMIA: Primary | ICD-10-CM

## 2022-08-16 NOTE — TELEPHONE ENCOUNTER
Left  Pt a voicemail  to see how long this has been going on for    Pt didn't answer.     Thank you    Soni

## 2022-08-16 NOTE — TELEPHONE ENCOUNTER
Caller: Marin Cuenca    Relationship: Self    Best call back number: [phone]    What medication are you requesting: LIPITOR    If a prescription is needed, what is your preferred pharmacy and phone number: [facility] [address] AT [facility] [place]. - [phone]  - [phone] FX     Additional notes:PATIENT CURRENTLY TAKES rosuvastatin (Crestor) 10 MG tablet AND IT MAKES HIM LIGHTHEADED AND DIZZY AND HAS SOME NAUSEA.  HE WOULD LIKE TO SWITCH TO MAYBE LIPITOR INSTEAD?

## 2022-08-17 RX ORDER — ATORVASTATIN CALCIUM 20 MG/1
20 TABLET, FILM COATED ORAL DAILY
Qty: 90 TABLET | Refills: 1 | Status: SHIPPED | OUTPATIENT
Start: 2022-08-17 | End: 2023-02-14

## 2022-08-17 NOTE — TELEPHONE ENCOUNTER
Hub staff attempted to follow warm transfer process and was unsuccessful     Caller: Marin Cuenca    Relationship to patient: Self    Best call back number: [phone]     Patient is needing:     PATIENT RETURNED KALIN'S CALL.  NO ANSWER AT THE OFFICE.  THIS WAS IN REGARDS TO SOME MEDICATION CHANGES.

## 2022-08-17 NOTE — TELEPHONE ENCOUNTER
Please call and let him know to stop crestor - I have sent lipitor 20 mg to his pharmacy.     CODIN

## 2022-09-08 ENCOUNTER — TELEPHONE (OUTPATIENT)
Dept: SLEEP MEDICINE | Facility: HOSPITAL | Age: 72
End: 2022-09-08

## 2022-09-08 NOTE — TELEPHONE ENCOUNTER
Received a call from [employer] @ [facility]. Pt is not eligible yet for a new machine. His old machine was on recall and [employer] gave pt that info to contact [employer].

## 2022-10-04 ENCOUNTER — TELEPHONE (OUTPATIENT)
Dept: INTERNAL MEDICINE | Facility: CLINIC | Age: 72
End: 2022-10-04

## 2022-10-04 DIAGNOSIS — M54.16 LUMBAR RADICULOPATHY: ICD-10-CM

## 2022-10-04 DIAGNOSIS — K21.00 GASTROESOPHAGEAL REFLUX DISEASE WITH ESOPHAGITIS: ICD-10-CM

## 2022-10-04 NOTE — TELEPHONE ENCOUNTER
Caller: Marin Cuenca    Relationship: Self    Best call back number: [phone]    What medications are you currently taking:   Current Outpatient Medications on File Prior to Visit   Medication Sig Dispense Refill   • aspirin 81 MG tablet Take 1 tablet by mouth Daily. (Patient taking differently: Take 81 mg by mouth 3 (Three) Times a Week. Stopped 12/27/2021  Indications: Ok to restart on Friday 1/7/22) 100 tablet 3   • atorvastatin (Lipitor) 20 MG tablet Take 1 tablet by mouth Daily. 90 tablet 1   • Azelastine HCl 137 MCG/SPRAY solution 1 spray into the nostril(s) as directed by provider 2 (Two) Times a Day. (Patient taking differently: 1 spray into the nostril(s) as directed by provider As Needed.) 3 mL 0   • Calcium Carb-Cholecalciferol 500-600 MG-UNIT chewable tablet Chew 1 tablet 2 (Two) Times a Day. 180 tablet 3   • esomeprazole (nexIUM) 40 MG capsule Take 1 capsule by mouth Every Morning Before Breakfast. 90 capsule 1   • finasteride (PROSCAR) 5 MG tablet Take 1 tablet by mouth Every Morning. 90 tablet 1   • irbesartan (AVAPRO) 75 MG tablet Take 1 tablet by mouth Daily. 90 tablet 1   • ketorolac (ACULAR) 0.5 % ophthalmic solution      • levothyroxine (SYNTHROID, LEVOTHROID) 100 MCG tablet Take 1 tablet by mouth Daily. 90 tablet 1   • meclizine (ANTIVERT) 25 MG tablet Take 1 tablet by mouth 3 (Three) Times a Day As Needed for Dizziness. 30 tablet 0   • methocarbamol (ROBAXIN) 750 MG tablet Take 1 tablet by mouth 4 (Four) Times a Day. 40 tablet 1   • Omega-3 Fatty Acids (FISH OIL) 1000 MG capsule capsule Take 1,000 mg by mouth 2 (Two) Times a Day With Meals. HOLD PRIOR TO OR     • ondansetron (ZOFRAN) 4 MG tablet Take 1 tablet by mouth Every 6 (Six) Hours As Needed for Nausea or Vomiting. 20 tablet 1   • pregabalin (LYRICA) 100 MG capsule Take 1 capsule by mouth 3 (Three) Times a Day. 270 capsule 1   • tamsulosin (FLOMAX) 0.4 MG capsule 24 hr capsule Take 1 capsule by mouth Daily. 90 capsule 1      No current facility-administered medications on file prior to visit.        Which medication are you concerned about: esomeprazole (nexIUM) 40 MG capsule    Who prescribed you this medication: JAMIE NORRIS    What are your concerns: PATIENT STATES HE IS SUPPOSED TO BE ON 2 PILLS A DAY NOT ONE. MAIL ORDER PHARMACY HAS PRESCRIPTION ON HOLD UNTIL THE PATIENT GETS THIS CLEARED. PLEASE ADVISE.

## 2022-10-07 DIAGNOSIS — K21.00 GASTROESOPHAGEAL REFLUX DISEASE WITH ESOPHAGITIS: ICD-10-CM

## 2022-10-07 DIAGNOSIS — M54.16 LUMBAR RADICULOPATHY: ICD-10-CM

## 2022-10-07 RX ORDER — ESOMEPRAZOLE MAGNESIUM 40 MG/1
40 CAPSULE, DELAYED RELEASE ORAL 2 TIMES DAILY
Qty: 180 CAPSULE | Refills: 1 | Status: SHIPPED | OUTPATIENT
Start: 2022-10-07 | End: 2022-10-12 | Stop reason: SDUPTHER

## 2022-10-07 RX ORDER — PREGABALIN 100 MG/1
100 CAPSULE ORAL 3 TIMES DAILY
Qty: 270 CAPSULE | Refills: 1 | Status: SHIPPED | OUTPATIENT
Start: 2022-10-07 | End: 2022-11-02 | Stop reason: SDUPTHER

## 2022-10-07 RX ORDER — ESOMEPRAZOLE MAGNESIUM 40 MG/1
40 CAPSULE, DELAYED RELEASE ORAL
Qty: 90 CAPSULE | Refills: 1 | Status: CANCELLED | OUTPATIENT
Start: 2022-10-07

## 2022-10-07 RX ORDER — PREGABALIN 100 MG/1
100 CAPSULE ORAL 3 TIMES DAILY
Qty: 270 CAPSULE | Refills: 1 | Status: CANCELLED | OUTPATIENT
Start: 2022-10-07

## 2022-10-07 NOTE — TELEPHONE ENCOUNTER
Patient called back and he says that he takes 2 nexium instead of one. Also needs a refill on his Lyrica 75 mg

## 2022-10-07 NOTE — TELEPHONE ENCOUNTER
Caller: Marin Cuenca    Relationship: Self    Best call back number: [phone]    Requested Prescriptions:   Requested Prescriptions     Pending Prescriptions Disp Refills   • esomeprazole (nexIUM) 40 MG capsule 90 capsule 1     Sig: Take 1 capsule by mouth Every Morning Before Breakfast.     PATIENT STATES THAT HE IS TAKING 2 A DAY     Pharmacy where request should be sent: [facility] [place], [place] - [address] AT PORTAL TO [facility] - [phone] [facility] [phone] FX     Additional details provided by patient: PATIENT REQUESTING NEW PRESCRIPTION TO BE SENT REFLECTING CORRECT DOSAGE.      Does the patient have less than a 3 day supply:  [x] Yes  [] No    Jing Mcdaniel Rep   10/07/22 11:39 EDT

## 2022-10-12 ENCOUNTER — TELEPHONE (OUTPATIENT)
Dept: INTERNAL MEDICINE | Facility: CLINIC | Age: 72
End: 2022-10-12

## 2022-10-12 DIAGNOSIS — K21.00 GASTROESOPHAGEAL REFLUX DISEASE WITH ESOPHAGITIS: ICD-10-CM

## 2022-10-12 RX ORDER — ESOMEPRAZOLE MAGNESIUM 40 MG/1
40 CAPSULE, DELAYED RELEASE ORAL 2 TIMES DAILY
Qty: 180 CAPSULE | Refills: 1 | Status: SHIPPED | OUTPATIENT
Start: 2022-10-12 | End: 2023-01-25 | Stop reason: SDUPTHER

## 2022-10-13 ENCOUNTER — TELEPHONE (OUTPATIENT)
Dept: INTERNAL MEDICINE | Facility: CLINIC | Age: 72
End: 2022-10-13

## 2022-10-13 NOTE — TELEPHONE ENCOUNTER
Hub staff attempted to follow warm transfer process and was unsuccessful     Caller: Marin Cuenca    Relationship to patient: Self    Best call back number: [phone]    Patient is needing: PATIENT IS NEEDING TO SCHEDULE LABS A WEEK BEFORE HIS PHYSICAL APPOINTMENT ON 1/25/23

## 2022-11-02 DIAGNOSIS — M54.16 LUMBAR RADICULOPATHY: ICD-10-CM

## 2022-11-02 RX ORDER — PREGABALIN 100 MG/1
100 CAPSULE ORAL 3 TIMES DAILY
Qty: 270 CAPSULE | Refills: 1 | Status: SHIPPED | OUTPATIENT
Start: 2022-11-02

## 2022-11-02 NOTE — TELEPHONE ENCOUNTER
Caller: Bang Marinkareen Seo    Relationship: Self    Best call back number: [phone]    Requested Prescriptions:   Requested Prescriptions     Pending Prescriptions Disp Refills   • pregabalin (LYRICA) 100 MG capsule 270 capsule 1     Sig: Take 1 capsule by mouth 3 (Three) Times a Day.        Pharmacy where request should be sent: [facility] - [place] - [facility] AT [address]AL TO [facility] - [phone] [facility] [phone] FX     Additional details provided by patient:     Does the patient have less than a 3 day supply:  [] Yes  [x] No    Jing Joshua Rep   11/02/22 08:53 EDT

## 2022-11-02 NOTE — TELEPHONE ENCOUNTER
Last visit 7/26/22  Next visit 1/25/23  Last fill 10/7/22- needs to go to mail order please  Contract 8/4/22

## 2022-12-02 ENCOUNTER — TELEPHONE (OUTPATIENT)
Dept: INTERNAL MEDICINE | Facility: CLINIC | Age: 72
End: 2022-12-02

## 2022-12-02 NOTE — TELEPHONE ENCOUNTER
Provider: FERN NORRIS     Caller: JEYSON ESCOBEDO    Relationship to Patient: SELF    Phone Number: [phone]    Reason for Call: PATIENT WANTED TO LET KALIN KNOW NOT TO WORRY ABOUT HIS PRIOR AUTHORIZATION. PATIENT STATES HE WILL BRING THE LETTER TO HIS NEXT APPOINTMENT WITH HIS PCP.

## 2022-12-28 NOTE — TELEPHONE ENCOUNTER
Rx Refill Note  Requested Prescriptions     Pending Prescriptions Disp Refills   • Azelastine HCl 137 MCG/SPRAY solution 3 mL 0     Si spray into the nostril(s) as directed by provider 2 (Two) Times a Day.      Last office visit with prescribing clinician: 2022   Last telemedicine visit with prescribing clinician: 2023   Next office visit with prescribing clinician: 2023

## 2022-12-28 NOTE — TELEPHONE ENCOUNTER
Caller: Bang Marin Seo    Relationship: Self    Best call back number: [phone]    Requested Prescriptions:   Requested Prescriptions     Pending Prescriptions Disp Refills   • Azelastine HCl 137 MCG/SPRAY solution 3 mL 0     Si spray into the nostril(s) as directed by provider 2 (Two) Times a Day.        Pharmacy where request should be sent: [facility] [id number] [address] [facility] [phone] [facility] [phone] FX     Does the patient have less than a 3 day supply:  [] Yes  [x] No    Would you like a call back once the refill request has been completed: [x] Yes [] No    If the office needs to give you a call back, can they leave a voicemail: [x] Yes [] No    Jing Scott Rep   22 09:19 EST

## 2022-12-29 RX ORDER — AZELASTINE HYDROCHLORIDE 137 UG/1
1 SPRAY, METERED NASAL 2 TIMES DAILY PRN
Qty: 30 ML | Refills: 1 | Status: SHIPPED | OUTPATIENT
Start: 2022-12-29 | End: 2023-01-25

## 2023-01-17 DIAGNOSIS — E03.9 ACQUIRED HYPOTHYROIDISM: ICD-10-CM

## 2023-01-17 DIAGNOSIS — I10 ESSENTIAL HYPERTENSION: ICD-10-CM

## 2023-01-17 DIAGNOSIS — E78.2 MIXED HYPERLIPIDEMIA: Primary | ICD-10-CM

## 2023-01-17 DIAGNOSIS — E78.2 MIXED HYPERLIPIDEMIA: ICD-10-CM

## 2023-01-18 LAB
ALBUMIN SERPL-MCNC: 4.4 G/DL (ref 3.5–5.2)
ALBUMIN/GLOB SERPL: 2.8 G/DL
ALP SERPL-CCNC: 92 U/L (ref 39–117)
ALT SERPL-CCNC: 13 U/L (ref 1–41)
AST SERPL-CCNC: 16 U/L (ref 1–40)
BILIRUB SERPL-MCNC: 0.5 MG/DL (ref 0–1.2)
BUN SERPL-MCNC: 10 MG/DL (ref 8–23)
BUN/CREAT SERPL: 7.5 (ref 7–25)
CALCIUM SERPL-MCNC: 9.6 MG/DL (ref 8.6–10.5)
CHLORIDE SERPL-SCNC: 103 MMOL/L (ref 98–107)
CHOLEST SERPL-MCNC: 133 MG/DL (ref 0–200)
CO2 SERPL-SCNC: 28.4 MMOL/L (ref 22–29)
CREAT SERPL-MCNC: 1.33 MG/DL (ref 0.76–1.27)
EGFRCR SERPLBLD CKD-EPI 2021: 56.4 ML/MIN/1.73
ERYTHROCYTE [DISTWIDTH] IN BLOOD BY AUTOMATED COUNT: 11.9 % (ref 12.3–15.4)
GLOBULIN SER CALC-MCNC: 1.6 GM/DL
GLUCOSE SERPL-MCNC: 106 MG/DL (ref 65–99)
HCT VFR BLD AUTO: 44 % (ref 37.5–51)
HDLC SERPL-MCNC: 54 MG/DL (ref 40–60)
HGB BLD-MCNC: 14.5 G/DL (ref 13–17.7)
LDLC SERPL CALC-MCNC: 63 MG/DL (ref 0–100)
LDLC/HDLC SERPL: 1.15 {RATIO}
MCH RBC QN AUTO: 30.3 PG (ref 26.6–33)
MCHC RBC AUTO-ENTMCNC: 33 G/DL (ref 31.5–35.7)
MCV RBC AUTO: 92.1 FL (ref 79–97)
PLATELET # BLD AUTO: 257 10*3/MM3 (ref 140–450)
POTASSIUM SERPL-SCNC: 4.9 MMOL/L (ref 3.5–5.2)
PROT SERPL-MCNC: 6 G/DL (ref 6–8.5)
RBC # BLD AUTO: 4.78 10*6/MM3 (ref 4.14–5.8)
SODIUM SERPL-SCNC: 138 MMOL/L (ref 136–145)
T4 FREE SERPL-MCNC: 1.48 NG/DL (ref 0.93–1.7)
TRIGL SERPL-MCNC: 85 MG/DL (ref 0–150)
TSH SERPL DL<=0.005 MIU/L-ACNC: 0.79 UIU/ML (ref 0.27–4.2)
VLDLC SERPL CALC-MCNC: 16 MG/DL (ref 5–40)
WBC # BLD AUTO: 6.5 10*3/MM3 (ref 3.4–10.8)

## 2023-01-25 ENCOUNTER — OFFICE VISIT (OUTPATIENT)
Dept: INTERNAL MEDICINE | Facility: CLINIC | Age: 73
End: 2023-01-25
Payer: MEDICARE

## 2023-01-25 VITALS
BODY MASS INDEX: 30.64 KG/M2 | HEIGHT: 72 IN | HEART RATE: 110 BPM | TEMPERATURE: 96.4 F | OXYGEN SATURATION: 98 % | SYSTOLIC BLOOD PRESSURE: 132 MMHG | DIASTOLIC BLOOD PRESSURE: 82 MMHG | WEIGHT: 226.2 LBS

## 2023-01-25 DIAGNOSIS — R73.03 PREDIABETES: Chronic | ICD-10-CM

## 2023-01-25 DIAGNOSIS — Z87.19 HISTORY OF BARRETT'S ESOPHAGUS: Primary | Chronic | ICD-10-CM

## 2023-01-25 DIAGNOSIS — I10 ESSENTIAL HYPERTENSION: Chronic | ICD-10-CM

## 2023-01-25 DIAGNOSIS — E78.2 MIXED HYPERLIPIDEMIA: ICD-10-CM

## 2023-01-25 DIAGNOSIS — E03.9 ACQUIRED HYPOTHYROIDISM: Chronic | ICD-10-CM

## 2023-01-25 DIAGNOSIS — M54.16 LUMBAR RADICULOPATHY: Chronic | ICD-10-CM

## 2023-01-25 DIAGNOSIS — Z00.00 ANNUAL PHYSICAL EXAM: ICD-10-CM

## 2023-01-25 DIAGNOSIS — K21.00 GASTROESOPHAGEAL REFLUX DISEASE WITH ESOPHAGITIS: ICD-10-CM

## 2023-01-25 DIAGNOSIS — G47.33 OSA ON CPAP: Chronic | ICD-10-CM

## 2023-01-25 DIAGNOSIS — Z99.89 OSA ON CPAP: Chronic | ICD-10-CM

## 2023-01-25 DIAGNOSIS — N40.1 BENIGN NON-NODULAR PROSTATIC HYPERPLASIA WITH LOWER URINARY TRACT SYMPTOMS: ICD-10-CM

## 2023-01-25 PROCEDURE — 99397 PER PM REEVAL EST PAT 65+ YR: CPT | Performed by: NURSE PRACTITIONER

## 2023-01-25 RX ORDER — ESOMEPRAZOLE MAGNESIUM 40 MG/1
40 CAPSULE, DELAYED RELEASE ORAL 2 TIMES DAILY
Qty: 180 CAPSULE | Refills: 1 | Status: SHIPPED | OUTPATIENT
Start: 2023-01-25 | End: 2023-03-08 | Stop reason: SDUPTHER

## 2023-01-25 RX ORDER — ROSUVASTATIN CALCIUM 10 MG/1
10 TABLET, COATED ORAL DAILY
COMMUNITY

## 2023-01-25 NOTE — ASSESSMENT & PLAN NOTE
Lab Results   Component Value Date    HGBA1C 6.1 (H) 05/20/2022       Continue low sugar/ low carb diet

## 2023-01-25 NOTE — PROGRESS NOTES
"        Chief Complaint  Annual Exam     Subjective:      History of Present Illness {CC  Problem List  Visit  Diagnosis   Encounters  Notes  Medications  Labs  Result Review Imaging  Media :23}     Marin Cuenca presents to [facility] for:  Annual exam    He chronically has prediabetes, hyperlipidemia (treated for one year) , hypothyroid (since 2000 Dr Soto initially), hypertension, bph (followed by Dr Dos Santos), KARLA (CPAP - Dr Rosales), CKD (last cr 1.33) , GERD (hiatal hernia)   He sees Dr Meneses - he had  C4-C5 and C5-C6 ACDF and instrumentation on 01.03.2022.   He continues lyrica.   He had DVT in 2019 after prior surgery.   Varicose veins - sees Dr Sims and wears compression socks.      Hx Ramirez's esophagus 2016   EGD/Colonoscopy: 5/24/2019: Bettina: next scope 5 years: due 2024: will want to see Dwight.     Dx: diverticulosis entire colon - further colonoscopy not indicated per note.   Esophagus was normal    Recent URI - UC and treated, improving.   He had been using Afrin and advised to stop.    Use no more than 3 days.       Marin is here for coordination of medical care, to discuss health maintenance, disease prevention as well as to follow up on medical problems.     Patient Care Team:  Cara Zavaleta III, NP-SEKOU as PCP - General (Internal Medicine)  Bubba Dos Santos Jr., MD as Consulting Physician (Urology)  Wayne Meneses MD as Surgeon (Neurosurgery)  Marco Dunbar MD as Consulting Physician (Gastroenterology)  Deedee Rosales MD as Consulting Physician (Pulmonary Disease)      He states that his activity level is minimal.   His diet is in general, a \"healthy\" diet  .   Feels well with few complaints.     Health and Weight:   Weight trend is   Wt Readings from Last 4 Encounters:   01/25/23 103 kg (226 lb 3.2 oz)   07/26/22 105 kg (231 lb 3.2 oz)   05/20/22 106 kg (233 lb)   05/06/22 99.8 kg (220 lb)         Blood Pressure:   BP " Readings from Last 3 Encounters:   01/25/23 132/82   01/20/23 133/79   07/26/22 120/81        Cholesterol Screen:   Most men start screen at 35, if you have family history or comorbidities it may be screen earlier.     Risk Evaluation:  1. Cardiovascular risk factors: hypertension, hyperlipidemia, obesity, male gender.  2. Diabetes risk factors: prediabetes.   3. Cancer risk factors: Ramirez's esophagus.    Prevention:   Cholesterol and glucose screen: utd     Colon Cancer Screen:   Hx Ramirez's esophagus 2016   EGD/Colonoscopy: 5/24/2019: Bettina:   next EDG 5 years: due 2024: will want to see Quintanilla.   Family history: [x] None    [] Yes:     Genital/ Urinary Health:   · He voids without difficulty.    Testicular cancer Screen:   Testicular self exams recommended once a month.   Advised that any firm testicular nodules to be reported immediately.    Prostate cancer screening:   PSA was discussed and ordered He has no increased risk of prostate cancer.   Lab Results   Component Value Date    PSA 0.4 05/20/2022    PSA 0.653 03/23/2021    PSA 0.637 02/11/2020     Family history: [x] None    [] Yes:     Lung Cancer Screen:   [x] Nonsmoker  [] History of smoking  []     Last eye exam:  Advise yearly   Always where sunglass when outside with UV protection.     Skin Cancer:   Regular Sunsceen: Advised  Routine self assessment of your skin, report any changes.       Patient has been erroneously marked as diabetic. Based on the available clinical information, he does not have diabetes and should therefore be excluded from diabetic health maintenance and quality measures for the remainder of the reporting period.  Patient is prediabetic and being monitored.     I have reviewed patient's medical history, any new submitted information provided by patient or medical assistant and updated medical record.      Objective:      Physical Exam  Vitals reviewed.   Constitutional:       Appearance: He is well-developed.   HENT:       Head: Normocephalic and atraumatic.      Right Ear: External ear normal.      Left Ear: External ear normal.      Nose: Nose normal.   Eyes:      Conjunctiva/sclera: Conjunctivae normal.      Pupils: Pupils are equal, round, and reactive to light.   Neck:      Thyroid: No thyromegaly.      Vascular: No JVD.   Cardiovascular:      Rate and Rhythm: Normal rate and regular rhythm.      Pulses: Normal pulses.           Radial pulses are 2+ on the right side and 2+ on the left side.      Heart sounds: Normal heart sounds, S1 normal and S2 normal. No murmur heard.    No friction rub. No gallop.   Pulmonary:      Effort: Pulmonary effort is normal.      Breath sounds: Normal breath sounds.   Chest:      Chest wall: No deformity.   Abdominal:      General: Bowel sounds are normal.      Palpations: Abdomen is soft.      Tenderness: There is no abdominal tenderness. Negative signs include Ewing's sign.      Hernia: No hernia is present. There is no hernia in the left inguinal area or right inguinal area.   Genitourinary:     Penis: Normal and circumcised.       Testes: Normal. Cremasteric reflex is present.   Musculoskeletal:      Cervical back: Normal range of motion.      Right lower leg: No edema.      Left lower leg: No edema.   Lymphadenopathy:      Cervical: No cervical adenopathy.   Skin:     General: Skin is warm and dry.      Capillary Refill: Capillary refill takes 2 to 3 seconds.      Nails: There is no clubbing.   Neurological:      Mental Status: He is alert and oriented to person, place, and time.      Cranial Nerves: No cranial nerve deficit.      Sensory: No sensory deficit.      Motor: Motor function is intact.   Psychiatric:         Mood and Affect: Mood normal.         Speech: Speech normal.         Behavior: Behavior normal. Behavior is cooperative.         Thought Content: Thought content normal.         Judgment: Judgment normal.        Result Review  Data Reviewed:{ Labs  Result Review  Imaging  Med  "Tab  Media :23}     The following data was reviewed by: Cara Zavaleta III, NP-C on 01/25/2023  Common labs    Common Labs 5/20/22 5/20/22 5/20/22 7/26/22 1/18/23 1/18/23 1/18/23    1002 1002 1002  0853 0853 0853   Glucose    105 (A)   106 (A)   BUN    11   10   Creatinine    1.26   1.33 (A)   Sodium    140   138   Potassium    4.9   4.9   Chloride    103   103   Calcium    9.7   9.6   Total Protein       6.0   Albumin       4.4   Total Bilirubin       0.5   Alkaline Phosphatase       92   AST (SGOT)       16   ALT (SGPT)       13   WBC     6.50     Hemoglobin     14.5     Hematocrit     44.0     Platelets     257     Total Cholesterol  144    133    Triglycerides  102    85    HDL Cholesterol  53    54    LDL Cholesterol   72    63    Hemoglobin A1C 6.1 (A)         PSA   0.4       (A) Abnormal value       Comments are available for some flowsheets but are not being displayed.                  Vital Signs:   /82 (BP Location: Left arm, Patient Position: Sitting, Cuff Size: Adult)   Pulse 110   Temp 96.4 °F (35.8 °C) (Temporal)   Ht 182.9 cm (72\")   Wt 103 kg (226 lb 3.2 oz)   SpO2 98%   BMI 30.68 kg/m²         Requested Prescriptions     Signed Prescriptions Disp Refills   • esomeprazole (nexIUM) 40 MG capsule 180 capsule 1     Sig: Take 1 capsule by mouth 2 (Two) Times a Day.       Routine medications provided by this office will also be refilled via pharmacy request.       Current Outpatient Medications:   •  aspirin 81 MG tablet, Take 1 tablet by mouth Daily. (Patient taking differently: Take 81 mg by mouth 3 (Three) Times a Week. Stopped 12/27/2021  Indications: Ok to restart on Friday 1/7/22), Disp: 100 tablet, Rfl: 3  •  Calcium Carb-Cholecalciferol 500-600 MG-UNIT chewable tablet, Chew 1 tablet 2 (Two) Times a Day., Disp: 180 tablet, Rfl: 3  •  cefdinir (OMNICEF) 300 MG capsule, 2 capsules (at the same time) once a day, Disp: 28 capsule, Rfl: 0  •  esomeprazole (nexIUM) 40 MG " capsule, Take 1 capsule by mouth 2 (Two) Times a Day., Disp: 180 capsule, Rfl: 1  •  fexofenadine (ALLEGRA) 180 MG tablet, Take 180 mg by mouth Daily., Disp: , Rfl:   •  finasteride (PROSCAR) 5 MG tablet, Take 1 tablet by mouth Every Morning., Disp: 90 tablet, Rfl: 1  •  irbesartan (AVAPRO) 75 MG tablet, Take 1 tablet by mouth Daily., Disp: 90 tablet, Rfl: 1  •  levothyroxine (SYNTHROID, LEVOTHROID) 100 MCG tablet, Take 1 tablet by mouth Daily., Disp: 90 tablet, Rfl: 1  •  methocarbamol (ROBAXIN) 750 MG tablet, Take 1 tablet by mouth 4 (Four) Times a Day., Disp: 40 tablet, Rfl: 1  •  Omega-3 Fatty Acids (FISH OIL) 1000 MG capsule capsule, Take 1,000 mg by mouth 2 (Two) Times a Day With Meals. HOLD PRIOR TO OR, Disp: , Rfl:   •  pregabalin (LYRICA) 100 MG capsule, Take 1 capsule by mouth 3 (Three) Times a Day., Disp: 270 capsule, Rfl: 1  •  rosuvastatin (CRESTOR) 10 MG tablet, Take 10 mg by mouth Daily. Take One tablet daily once a day, Disp: , Rfl:   •  tamsulosin (FLOMAX) 0.4 MG capsule 24 hr capsule, Take 1 capsule by mouth Daily., Disp: 90 capsule, Rfl: 1  •  atorvastatin (Lipitor) 20 MG tablet, Take 1 tablet by mouth Daily., Disp: 90 tablet, Rfl: 1     Assessment and Plan:      Assessment and Plan {CC Problem List  Visit Diagnosis  ROS  Review (Popup)  Health Maintenance  Quality  BestPractice  Medications  SmartSets  SnapShot Encounters  Media :23}     Problem List Items Addressed This Visit        Cardiac and Vasculature    Essential hypertension (Chronic)    Current Assessment & Plan     Hypertension is improving with treatment.  Continue current treatment regimen.  Dietary sodium restriction.  Regular aerobic exercise.  Blood pressure will be reassessed at the next regular appointment.         Relevant Orders    Comprehensive Metabolic Panel    CBC (No Diff)    Mixed hyperlipidemia (Chronic)    Current Assessment & Plan     Lipid abnormalities are improving with treatment.  Pharmacotherapy as  ordered.  Lipids will be reassessed in 6 months.    Lab Results   Component Value Date    CHLPL 133 01/18/2023    TRIG 85 01/18/2023    HDL 54 01/18/2023    LDL 63 01/18/2023            Relevant Medications    rosuvastatin (CRESTOR) 10 MG tablet    Other Relevant Orders    Comprehensive Metabolic Panel    Lipid Panel With LDL / HDL Ratio       Endocrine and Metabolic    Acquired hypothyroidism (Chronic)    Current Assessment & Plan     Stable   Check lab for ongoing therapeutic drug monitoring          Relevant Orders    TSH    T4, free    Prediabetes (Chronic)    Current Assessment & Plan     Lab Results   Component Value Date    HGBA1C 6.1 (H) 05/20/2022       Continue low sugar/ low carb diet          Relevant Orders    Hemoglobin A1c       Gastrointestinal Abdominal     History of Ramirez's esophagus - Primary (Chronic)    Overview     2016  Repeat 5/24/2019: normal (Bettina)          Current Assessment & Plan     Due for EGD in 2024: continues nexium.  No symptoms.          Relevant Medications    esomeprazole (nexIUM) 40 MG capsule    Gastroesophageal reflux disease with esophagitis    Relevant Medications    esomeprazole (nexIUM) 40 MG capsule       Genitourinary and Reproductive     Benign non-nodular prostatic hyperplasia with lower urinary tract symptoms (Chronic)       Neuro    Lumbar radiculopathy (Chronic)    Current Assessment & Plan     Stable on lyrica             Sleep    KARLA on CPAP (Chronic)   Other Visit Diagnoses     Annual physical exam        Relevant Orders    Comprehensive Metabolic Panel    Lipid Panel With LDL / HDL Ratio    CBC (No Diff)    TSH    T4, free          Follow Up {Instructions Charge Capture  Follow-up Communications :23}     Return in about 6 months (around 7/25/2023).      Patient was given instructions and counseling regarding his condition or for health maintenance advice. Please see specific information pulled into the AVS if appropriate.    Mabel disclaimer:   Much  of this encounter note is an electronic transcription/translation of spoken language to printed text. The electronic translation of spoken language may permit erroneous, or at times, nonsensical words or phrases to be inadvertently transcribed; Although I have reviewed the note for such errors, some may still exist.     Additional Patient Counseling:       Patient Instructions   Diet:    • Eat vegetables, fruits, whole grain, low-fat dairy, poultry, fish, beans, nontropical vegetable oils, and nuts, but avoid red meat (i.e., Mediterranean-style diet, DASH [Dietary Approaches to Stop Hypertension] diet).  • Limit sugary drinks and sweets.  • Limit saturated and trans fat to 5% to 6% of calories.  • Limit sodium intake to 2,400 mg daily (about one teaspoon table salt [kosher/sea salt have less sodium per teaspoon]).    Weight loss / Calorie Counting Apps:    • Lose It!   • MyFitness Pal     Exercise:   • Engage in moderate-to-vigorous aerobic activity for at least 40 minutes (on average) three to four times each week.    Wearables:   • Activity tracker   • Step tracker     Skin Care:   • Use sun screen SPF >30 daily  • Dermatologist for skin check regularly    Bone Health:   • [email or web address]    Mental Health:       CDC recommends Flu vaccines for everyone 6 months and older EVERY season with rare exceptions.

## 2023-01-25 NOTE — PATIENT INSTRUCTIONS
Diet:    Eat vegetables, fruits, whole grain, low-fat dairy, poultry, fish, beans, nontropical vegetable oils, and nuts, but avoid red meat (i.e., Mediterranean-style diet, DASH [Dietary Approaches to Stop Hypertension] diet).  Limit sugary drinks and sweets.  Limit saturated and trans fat to 5% to 6% of calories.  Limit sodium intake to 2,400 mg daily (about one teaspoon table salt [kosher/sea salt have less sodium per teaspoon]).    Weight loss / Calorie Counting Apps:    Lose It!   MyPLDT Pal     Exercise:   Engage in moderate-to-vigorous aerobic activity for at least 40 minutes (on average) three to four times each week.    Wearables:   Activity tracker   Step tracker     Skin Care:   Use sun screen SPF >30 daily  Dermatologist for skin check regularly    Bone Health:   [email or web address]    Mental Health:       CDC recommends Flu vaccines for everyone 6 months and older EVERY season with rare exceptions.

## 2023-01-25 NOTE — ASSESSMENT & PLAN NOTE
Lipid abnormalities are improving with treatment.  Pharmacotherapy as ordered.  Lipids will be reassessed in 6 months.    Lab Results   Component Value Date    CHLPL 133 01/18/2023    TRIG 85 01/18/2023    HDL 54 01/18/2023    LDL 63 01/18/2023

## 2023-02-06 ENCOUNTER — TELEPHONE (OUTPATIENT)
Dept: INTERNAL MEDICINE | Facility: CLINIC | Age: 73
End: 2023-02-06
Payer: MEDICARE

## 2023-02-06 NOTE — TELEPHONE ENCOUNTER
PATIENT HAD LABS 01/18 THEN PHYSICAL ON 01/25 WITH JR, THEY DISCUSSED LAB RESULTS BRIEFLY AT THE [facility] BUT ON HIS AFTER SUMMARY HE SAW HIS A1C WASN'T CHECKED AND IT USUALLY IS, WAS IT CHECKED IF SO WHAT WAS THE RESULT, IF NOT IS IT OKAY TO JUST WAIT TILL July WHEN HE HAS LABS AGAIN.       PATIENT>[phone] LVM IF NO ANSWER

## 2023-02-14 DIAGNOSIS — E78.2 MIXED HYPERLIPIDEMIA: ICD-10-CM

## 2023-02-14 RX ORDER — ATORVASTATIN CALCIUM 20 MG/1
TABLET, FILM COATED ORAL
Qty: 90 TABLET | Refills: 1 | Status: SHIPPED | OUTPATIENT
Start: 2023-02-14

## 2023-02-20 ENCOUNTER — TELEPHONE (OUTPATIENT)
Dept: NEUROSURGERY | Facility: CLINIC | Age: 73
End: 2023-02-20
Payer: MEDICARE

## 2023-02-20 DIAGNOSIS — M54.16 LUMBAR RADICULOPATHY: Primary | ICD-10-CM

## 2023-02-20 NOTE — TELEPHONE ENCOUNTER
"S/w patient and informed per Dr. Meneses       \"Lets just refer him to a pain management doctor so he will not require referrals for epidurals from us.  His last MRI imaging of the lumbar spine did not show a surgical problem.  Unless his symptoms have changed dramatically he does not need to be seen in the office. \"    Patient expressed understanding   "

## 2023-02-20 NOTE — TELEPHONE ENCOUNTER
"  Caller: Marin Cuenca    Relationship to patient: Self    Best call back number: [phone]     Patient is needing:       PATIENT IS CALLING D/T  HAVING THE FOLLOWING S/S'S:      PAIN IN BACK THAT IS GOING DOWN INTO THE LEGS AND DOWN INTO FOOT  NUMBNESS IN TOES  SITTING RELIEVES THE PAIN AND NUMBNESS    PATIENT TRIED TO GET IN TOUCH WITH PAIN MGMT TO GET A 'SHOT' WHICH AFTER CLARIFYING PATIENT WAS REFERRING TO AN EPIDURAL.    PAIN MGMT TOLD THE PATIENT HE WOULD NEED A NEW REFERRAL TO THEM BECAUSE IT HAD BEEN MORE THAN 3 YEARS SINCE HE'D BEEN SEEN IN THEIR OFFICE.    ASKING FOR A PAIN MGMT REFERRAL AND EPIDURAL ORDER IF DR VIDES FEELS IS APPROPRIATE.    \"IT'S SCIATICA IS WHAT IT IS\"    PLEASE CALL TO ADVISE IF NEEDING TO BE SEEN, OR IF PAIN MGMT REFERRAL CAN BE MADE, AND/OR IF EPIDURAL IS DEEMED APPROPRIATE        DELETE AFTER READING TO PATIENT: “I was unable to reach my scheduling contact. I will send a message to the scheduling team. Please allow 48 hours for the  staff to follow up on this request.”    "

## 2023-02-22 NOTE — TELEPHONE ENCOUNTER
Caller: Marin Cuenca    Relationship: Self    Best call back number:[phone]    What orders are you requesting (i.e. lab or imaging):PAIN MANAGEMENT    In what timeframe would the patient need to come in:ASAP    Where will you receive your lab/imaging services:[facility]    Additional notes:PT CALLED AND STATES THAT HE RECEIVED A CALL YESTERDAY FROM [facility] PT STATES THAT HE DOES NOT WANT TO GO THERE AND WOULD LIKE TO ONLY GO TO [facility]-PT STATES THAT HE DID NOT WANT TO BE SEEN BY 'S OFFICE-PT WANTED TO NOTIFY THE OFFICE THAT PAIN MANAGEMENT WILL BE CALLING TODAY THANK YOU!

## 2023-03-07 ENCOUNTER — OFFICE VISIT (OUTPATIENT)
Dept: INTERNAL MEDICINE | Facility: CLINIC | Age: 73
End: 2023-03-07
Payer: MEDICARE

## 2023-03-07 VITALS
DIASTOLIC BLOOD PRESSURE: 70 MMHG | BODY MASS INDEX: 30.48 KG/M2 | HEIGHT: 72 IN | HEART RATE: 84 BPM | OXYGEN SATURATION: 97 % | WEIGHT: 225 LBS | SYSTOLIC BLOOD PRESSURE: 142 MMHG

## 2023-03-07 DIAGNOSIS — R19.7 DIARRHEA OF PRESUMED INFECTIOUS ORIGIN: Primary | ICD-10-CM

## 2023-03-07 PROCEDURE — 99213 OFFICE O/P EST LOW 20 MIN: CPT

## 2023-03-07 NOTE — PATIENT INSTRUCTIONS
Recommend bland diet (bananas, rice, applesauce and toast). Avoid acidic and fried foods (tomato sauces, citrus, potato chips and fried chicken). Stay hydrated with water. Use Imodium only when leaving the home as needed.

## 2023-03-07 NOTE — PROGRESS NOTES
"Chief Complaint  Diarrhea, Nausea (Nausea and passing out feeling), and Fatigue    Subjective        Marin Cuenca presents to [facility]  History of Present Illness  72-year-old male presenting with diarrhea and dizziness.  Patient of DANNY Benedict.  For symptoms started about a week or so ago including watery stools.  Only 1 watery stool a day and then patient started taking Imodium.  Stop taking Imodium and ate at a [employer].  Had another watery stool episode.  Symptoms seem to be improving overall.  Has had an occasional dizzy spell recently after eating a donut.  Denies ear pain, fever, changes in appetite, constipation, abdominal pain, vomiting, bloody stools, chest pain.    Diarrhea     Nausea  Associated symptoms include fatigue and nausea.   Fatigue  Associated symptoms include fatigue and nausea.       Objective   Vital Signs:  /70   Pulse 84   Ht 182.9 cm (72\")   Wt 102 kg (225 lb)   SpO2 97%   BMI 30.52 kg/m²   Estimated body mass index is 30.52 kg/m² as calculated from the following:    Height as of this encounter: 182.9 cm (72\").    Weight as of this encounter: 102 kg (225 lb).             Physical Exam  Vitals reviewed.   Constitutional:       Appearance: Normal appearance.   HENT:      Right Ear: Tympanic membrane normal.      Left Ear: Tympanic membrane normal.   Musculoskeletal:         General: Normal range of motion.      Cervical back: Normal range of motion.   Skin:     General: Skin is warm and dry.      Capillary Refill: Capillary refill takes less than 2 seconds.   Neurological:      General: No focal deficit present.      Mental Status: He is alert and oriented to person, place, and time.   Psychiatric:         Mood and Affect: Mood normal.         Behavior: Behavior normal.         Thought Content: Thought content normal.         Judgment: Judgment normal.        Result Review :    Common labs    Common Labs 5/20/22 5/20/22 5/20/22 " 7/26/22 1/18/23 1/18/23 1/18/23    1002 1002 1002  0853 0853 0853   Glucose    105 (A)   106 (A)   BUN    11   10   Creatinine    1.26   1.33 (A)   Sodium    140   138   Potassium    4.9   4.9   Chloride    103   103   Calcium    9.7   9.6   Total Protein       6.0   Albumin       4.4   Total Bilirubin       0.5   Alkaline Phosphatase       92   AST (SGOT)       16   ALT (SGPT)       13   WBC     6.50     Hemoglobin     14.5     Hematocrit     44.0     Platelets     257     Total Cholesterol  144    133    Triglycerides  102    85    HDL Cholesterol  53    54    LDL Cholesterol   72    63    Hemoglobin A1C 6.1 (A)         PSA   0.4       (A) Abnormal value       Comments are available for some flowsheets but are not being displayed.           Current Outpatient Medications on File Prior to Visit   Medication Sig Dispense Refill   • aspirin 81 MG tablet Take 1 tablet by mouth Daily. (Patient taking differently: Take 1 tablet by mouth 3 (Three) Times a Week. Stopped 12/27/2021  Indications: Ok to restart on Friday 1/7/22) 100 tablet 3   • atorvastatin (LIPITOR) 20 MG tablet TAKE ONE TABLET BY MOUTH DAILY 90 tablet 1   • Calcium Carb-Cholecalciferol 500-600 MG-UNIT chewable tablet Chew 1 tablet 2 (Two) Times a Day. 180 tablet 3   • esomeprazole (nexIUM) 40 MG capsule Take 1 capsule by mouth 2 (Two) Times a Day. 180 capsule 1   • fexofenadine (ALLEGRA) 180 MG tablet Take 1 tablet by mouth Daily.     • finasteride (PROSCAR) 5 MG tablet Take 1 tablet by mouth Every Morning. 90 tablet 1   • irbesartan (AVAPRO) 75 MG tablet Take 1 tablet by mouth Daily. 90 tablet 1   • levothyroxine (SYNTHROID, LEVOTHROID) 100 MCG tablet Take 1 tablet by mouth Daily. 90 tablet 1   • methocarbamol (ROBAXIN) 750 MG tablet Take 1 tablet by mouth 4 (Four) Times a Day. 40 tablet 1   • Omega-3 Fatty Acids (FISH OIL) 1000 MG capsule capsule Take 1 capsule by mouth 2 (Two) Times a Day With Meals. HOLD PRIOR TO OR     • pregabalin (LYRICA) 100 MG  capsule Take 1 capsule by mouth 3 (Three) Times a Day. 270 capsule 1   • rosuvastatin (CRESTOR) 10 MG tablet Take 1 tablet by mouth Daily. Take One tablet daily once a day     • tamsulosin (FLOMAX) 0.4 MG capsule 24 hr capsule Take 1 capsule by mouth Daily. 90 capsule 1   • cefdinir (OMNICEF) 300 MG capsule 2 capsules (at the same time) once a day 28 capsule 0     No current facility-administered medications on file prior to visit.                    Assessment and Plan   Diagnoses and all orders for this visit:    1. Diarrhea of presumed infectious origin (Primary)      Patient Instructions   Recommend bland diet (bananas, rice, applesauce and toast). Avoid acidic and fried foods (tomato sauces, citrus, potato chips and fried chicken). Stay hydrated with water. Use Imodium only when leaving the home as needed.            Follow Up   Return for Next scheduled follow up.  Patient was given instructions and counseling regarding his condition or for health maintenance advice. Please see specific information pulled into the AVS if appropriate.

## 2023-03-08 DIAGNOSIS — Z87.19 HISTORY OF BARRETT'S ESOPHAGUS: Chronic | ICD-10-CM

## 2023-03-08 DIAGNOSIS — K21.00 GASTROESOPHAGEAL REFLUX DISEASE WITH ESOPHAGITIS: ICD-10-CM

## 2023-03-08 RX ORDER — ESOMEPRAZOLE MAGNESIUM 40 MG/1
40 CAPSULE, DELAYED RELEASE ORAL 2 TIMES DAILY
Qty: 180 CAPSULE | Refills: 1 | Status: SHIPPED | OUTPATIENT
Start: 2023-03-08

## 2023-03-08 NOTE — TELEPHONE ENCOUNTER
Rx Refill Note  Requested Prescriptions     Pending Prescriptions Disp Refills   • esomeprazole (nexIUM) 40 MG capsule 180 capsule 1     Sig: Take 1 capsule by mouth 2 (Two) Times a Day.      Last office visit with prescribing clinician: 1/25/2023   Last telemedicine visit with prescribing clinician: 7/20/2023   Next office visit with prescribing clinician: 7/27/2023         Soni Benavides MA  03/08/23, 12:10 EST

## 2023-03-08 NOTE — TELEPHONE ENCOUNTER
Caller: Marin Cuenca    Relationship: Self    Best call back number: [phone]     Requested Prescriptions:   Requested Prescriptions     Pending Prescriptions Disp Refills   • esomeprazole (nexIUM) 40 MG capsule 180 capsule 1     Sig: Take 1 capsule by mouth 2 (Two) Times a Day.        [facility] - [address] AT [address]AL TO [facility] - [phone] [facility] [phone] FX     Does the patient have less than a 3 day supply:  [x] Yes  [] No    Would you like a call back once the refill request has been completed: [] Yes [x] No    If the office needs to give you a call back, can they leave a voicemail: [] Yes [x] No    Jing Durand Rep   03/08/23 11:29 EST

## 2023-03-09 ENCOUNTER — HOSPITAL ENCOUNTER (OUTPATIENT)
Dept: GENERAL RADIOLOGY | Facility: HOSPITAL | Age: 73
Discharge: HOME | End: 2023-03-09
Payer: MEDICARE

## 2023-03-09 ENCOUNTER — ANESTHESIA EVENT (OUTPATIENT)
Dept: PAIN MEDICINE | Facility: HOSPITAL | Age: 73
End: 2023-03-09
Payer: MEDICARE

## 2023-03-09 ENCOUNTER — HOSPITAL ENCOUNTER (OUTPATIENT)
Dept: PAIN MEDICINE | Facility: HOSPITAL | Age: 73
Discharge: HOME | End: 2023-03-09
Payer: MEDICARE

## 2023-03-09 ENCOUNTER — ANESTHESIA (OUTPATIENT)
Dept: PAIN MEDICINE | Facility: HOSPITAL | Age: 73
End: 2023-03-09
Payer: MEDICARE

## 2023-03-09 VITALS
BODY MASS INDEX: 29.66 KG/M2 | OXYGEN SATURATION: 98 % | HEIGHT: 72 IN | RESPIRATION RATE: 14 BRPM | DIASTOLIC BLOOD PRESSURE: 82 MMHG | WEIGHT: 219 LBS | SYSTOLIC BLOOD PRESSURE: 128 MMHG | TEMPERATURE: 97.5 F | HEART RATE: 60 BPM

## 2023-03-09 DIAGNOSIS — R52 PAIN: ICD-10-CM

## 2023-03-09 DIAGNOSIS — M54.16 LUMBAR RADICULOPATHY: Primary | Chronic | ICD-10-CM

## 2023-03-09 PROCEDURE — 25510000001 IOPAMIDOL 41 % SOLUTION

## 2023-03-09 PROCEDURE — 77003 FLUOROGUIDE FOR SPINE INJECT: CPT

## 2023-03-09 PROCEDURE — 25010000002 METHYLPREDNISOLONE PER 80 MG

## 2023-03-09 RX ORDER — LIDOCAINE HYDROCHLORIDE 10 MG/ML
1 INJECTION, SOLUTION INFILTRATION; PERINEURAL ONCE
Status: DISCONTINUED | OUTPATIENT
Start: 2023-03-09 | End: 2023-03-10 | Stop reason: HOSPADM

## 2023-03-09 RX ORDER — MIDAZOLAM HYDROCHLORIDE 1 MG/ML
1 INJECTION INTRAMUSCULAR; INTRAVENOUS ONCE AS NEEDED
Status: DISCONTINUED | OUTPATIENT
Start: 2023-03-09 | End: 2023-03-10 | Stop reason: HOSPADM

## 2023-03-09 RX ORDER — FENTANYL CITRATE 50 UG/ML
50 INJECTION, SOLUTION INTRAMUSCULAR; INTRAVENOUS ONCE
Status: DISCONTINUED | OUTPATIENT
Start: 2023-03-09 | End: 2023-03-10 | Stop reason: HOSPADM

## 2023-03-09 RX ORDER — METHYLPREDNISOLONE ACETATE 80 MG/ML
80 INJECTION, SUSPENSION INTRA-ARTICULAR; INTRALESIONAL; INTRAMUSCULAR; SOFT TISSUE ONCE
Status: COMPLETED | OUTPATIENT
Start: 2023-03-09 | End: 2023-03-09

## 2023-03-09 RX ORDER — METHYLPREDNISOLONE ACETATE 80 MG/ML
INJECTION, SUSPENSION INTRA-ARTICULAR; INTRALESIONAL; INTRAMUSCULAR; SOFT TISSUE
Status: DISPENSED
Start: 2023-03-09 | End: 2023-03-09

## 2023-03-09 RX ORDER — SODIUM CHLORIDE 0.9 % (FLUSH) 0.9 %
10 SYRINGE (ML) INJECTION AS NEEDED
Status: DISCONTINUED | OUTPATIENT
Start: 2023-03-09 | End: 2023-03-10 | Stop reason: HOSPADM

## 2023-03-09 RX ORDER — METHYLPREDNISOLONE ACETATE 80 MG/ML
INJECTION, SUSPENSION INTRA-ARTICULAR; INTRALESIONAL; INTRAMUSCULAR; SOFT TISSUE
Status: COMPLETED
Start: 2023-03-09 | End: 2023-03-09

## 2023-03-09 RX ORDER — SODIUM CHLORIDE 0.9 % (FLUSH) 0.9 %
3 SYRINGE (ML) INJECTION EVERY 12 HOURS SCHEDULED
Status: DISCONTINUED | OUTPATIENT
Start: 2023-03-09 | End: 2023-03-10 | Stop reason: HOSPADM

## 2023-03-09 RX ADMIN — METHYLPREDNISOLONE ACETATE 80 MG: 80 INJECTION, SUSPENSION INTRA-ARTICULAR; INTRALESIONAL; INTRAMUSCULAR; SOFT TISSUE at 09:45

## 2023-03-09 RX ADMIN — IOPAMIDOL 10 ML: 408 INJECTION, SOLUTION INTRATHECAL at 09:44

## 2023-03-09 NOTE — ANESTHESIA PROCEDURE NOTES
PAIN Epidural block    Pre-sedation assessment completed: 3/9/2023 9:36 AM    Patient reassessed immediately prior to procedure    Patient location during procedure: pain clinic  Start Time: 3/9/2023 9:36 AM  Stop Time: 3/9/2023 9:47 AM  Indication:procedure for pain  Performed By  Anesthesiologist: Socorro Solano MD  Preanesthetic Checklist  Completed: patient identified, IV checked, site marked, risks and benefits discussed, surgical consent, monitors and equipment checked, pre-op evaluation and timeout performed  Additional Notes  Fluoro used.    Dx: lumbar radiculopathy.    Prep:  Pt Position:prone  Sterile Tech:cap, gloves, mask and sterile barrier  Prep:chlorhexidine gluconate and isopropyl alcohol  Monitoring:blood pressure monitoring, continuous pulse oximetry and EKG  Procedure:Sedation: no     Approach:left paramedian  Guidance: fluoroscopy  Location:lumbar  Level:4-5  Needle Type:Tuohy  Needle Gauge:20  Aspiration:negative  Medications:  Preservative Free Saline:3mL  Isovue:2mL  Comments:Added 1 cc 1% lidocaine to epidural space prior to w/drawing needle.    B/l spread of dye c/w epidurogramDepomedrol:80  Post Assessment:  Dressing:occlusive dressing applied  Pt Tolerance:patient tolerated the procedure well with no apparent complications  Complications:no

## 2023-03-09 NOTE — H&P
[facility]    History and Physical    Patient Name: Marin Cuenca  :  1950  MRN:  [id number]  Date of Admission: 3/9/2023    Subjective     Patient is a 72 y.o. male presents with chief complaint of chronic, moderate low back and leg: left pain.  Onset of symptoms was gradual starting a few years ago.  Symptoms are associated/aggravated by nothing in particular or activity. Symptoms improve with injection - received more than 50% relief w/ prior lesi.  Presents for lesi today.      The following portions of the patients history were reviewed and updated as appropriate: current medications, allergies, past medical history, past surgical history, past family history, past social history and problem list                Objective     Past Medical History:   Past Medical History:   Diagnosis Date   • Acute deep vein thrombosis (DVT) of distal vein of right lower extremity ([facility]) 2019   • Arthritis    • Ramirez esophagus    • Benign prostatic hypertrophy    • Cataract     saloni   • Deep vein thrombosis (DVT) ([facility]) 2022    rt leg 2019   • Ear pain, left     occ   • GERD (gastroesophageal reflux disease)    • History of MRSA infection     2010  blood bhl   • Hypertension    • Hypothyroidism    • Low back pain    • Neck pain 2021    CERVIVAL 4-6   • KARLA on CPAP     CPAP   • Osteoporosis      Past Surgical History:   Past Surgical History:   Procedure Laterality Date   • ANTERIOR CERVICAL DISCECTOMY W/ FUSION N/A 1/3/2022    Procedure: Cervical 4 to cervical 5 and cervical 5 to cervical 6 anterior cervical discectomy, fusion and instrumentation;  Surgeon: Wayne Meneses MD;  Location: [facility];  Service: Neurosurgery;  Laterality: N/A;   • CYSTOSCOPY     • EPIDURAL BLOCK     • EYE SURGERY Right    • HEMORRHOIDECTOMY     • KNEE SURGERY Left     scoped   • LUMBAR LAMINECTOMY     • LUMBAR LAMINECTOMY DISCECTOMY DECOMPRESSION Left 10/18/2019    Procedure: Left L4-5 laminectomy and neural  lysis;  Surgeon: Wayne Meneses MD;  Location: [facility] OR;  Service: Neurosurgery   • NISSEN FUNDOPLICATION     • UPPER GASTROINTESTINAL ENDOSCOPY       Family History:   Family History   Problem Relation Age of Onset   • Heart disease Father         cardiovascular disease   • Diabetes Father    • Hypertension Father    • Cancer Paternal Grandmother    • Malig Hyperthermia Neg Hx      Social History:   Social History     Socioeconomic History   • Marital status:    Tobacco Use   • Smoking status: Never   • Smokeless tobacco: Never   Vaping Use   • Vaping Use: Never used   Substance and Sexual Activity   • Alcohol use: Yes     Comment: OCCAS   • Drug use: No     Comment: Consumes 3 caffeinated beverages per day usually coffee.   • Sexual activity: Defer       Vital Signs Range for the last 24 hours  Temperature: Temp:  [36.4 °C (97.5 °F)] 36.4 °C (97.5 °F)   Temp Source: Temp src: Infrared   BP:     Pulse:     Respirations:     SPO2:     O2 Amount (l/min):     O2 Devices     Weight:           --------------------------------------------------------------------------------    Current Outpatient Medications   Medication Sig Dispense Refill   • aspirin 81 MG tablet Take 1 tablet by mouth Daily. (Patient taking differently: Take 1 tablet by mouth 3 (Three) Times a Week. Stopped 12/27/2021  Indications: Ok to restart on Friday 1/7/22) 100 tablet 3   • atorvastatin (LIPITOR) 20 MG tablet TAKE ONE TABLET BY MOUTH DAILY 90 tablet 1   • Calcium Carb-Cholecalciferol 500-600 MG-UNIT chewable tablet Chew 1 tablet 2 (Two) Times a Day. 180 tablet 3   • esomeprazole (nexIUM) 40 MG capsule Take 1 capsule by mouth 2 (Two) Times a Day. 180 capsule 1   • finasteride (PROSCAR) 5 MG tablet Take 1 tablet by mouth Every Morning. 90 tablet 1   • irbesartan (AVAPRO) 75 MG tablet Take 1 tablet by mouth Daily. 90 tablet 1   • levothyroxine (SYNTHROID, LEVOTHROID) 100 MCG tablet Take 1 tablet by mouth Daily. 90 tablet 1   •  pregabalin (LYRICA) 100 MG capsule Take 1 capsule by mouth 3 (Three) Times a Day. 270 capsule 1   • rosuvastatin (CRESTOR) 10 MG tablet Take 1 tablet by mouth Daily. Take One tablet daily once a day     • tamsulosin (FLOMAX) 0.4 MG capsule 24 hr capsule Take 1 capsule by mouth Daily. 90 capsule 1   • fexofenadine (ALLEGRA) 180 MG tablet Take 1 tablet by mouth Daily.     • methocarbamol (ROBAXIN) 750 MG tablet Take 1 tablet by mouth 4 (Four) Times a Day. 40 tablet 1   • Omega-3 Fatty Acids (FISH OIL) 1000 MG capsule capsule Take 1 capsule by mouth 2 (Two) Times a Day With Meals. HOLD PRIOR TO OR       Current Facility-Administered Medications   Medication Dose Route Frequency Provider Last Rate Last Admin   • iopamidol (ISOVUE-M 200) 41 % injection  - ADS Override Pull            • methylPREDNISolone acetate (DEPO-medrol) 80 MG/ML injection  - ADS Override Pull            • fentaNYL citrate (PF) (SUBLIMAZE) injection 50 mcg  50 mcg Intravenous Once Socorro Solano MD       • iopamidol (ISOVUE-M 200) injection 41%  10 mL Epidural Once in imaging Socorro Solano MD       • lidocaine (XYLOCAINE) 1 % injection 1 mL  1 mL Intradermal Once Socorro Solano MD       • methylPREDNISolone acetate (DEPO-medrol) injection 80 mg  80 mg Epidural Once Socorro Solano MD       • midazolam (VERSED) injection 1 mg  1 mg Intravenous Once PRN Socorro Solano MD       • sodium chloride 0.9 % flush 3 mL  3 mL Intravenous Q12H Socorro Solano MD        And   • sodium chloride 0.9 % flush 10 mL  10 mL Intravenous PRN Socorro Solano MD           --------------------------------------------------------------------------------  Assessment & Plan      Anesthesia Evaluation     Patient summary reviewed and Nursing notes reviewed   NPO Solid Status: > 8 hours             Airway   Dental      Pulmonary    (+) sleep apnea on CPAP,   Cardiovascular     (+)  hypertension, DVT, hyperlipidemia,       Neuro/Psych  (+) numbness,    GI/Hepatic/Renal/Endo    (+) obesity,  GERD,  thyroid problem hypothyroidism    Musculoskeletal     (+) back pain, chronic pain, neck pain, radiculopathy  Abdominal    Substance History - negative use     OB/GYN negative ob/gyn ROS         Other   arthritis,                 Diagnosis and Plan    Treatment Plan  ASA 3   Patient has had previous injection/procedure with 50-75% improvement.   Procedures: Lumbar Epidural Steroid Injection(LESI), With fluoroscopy,       Anesthetic plan and risks discussed with patient.          Diagnosis     * Lumbar radiculopathy [M54.16]

## 2023-03-09 NOTE — DISCHARGE INSTRUCTIONS

## 2023-03-20 ENCOUNTER — OFFICE VISIT (OUTPATIENT)
Dept: INTERNAL MEDICINE | Facility: CLINIC | Age: 73
End: 2023-03-20
Payer: MEDICARE

## 2023-03-20 VITALS
TEMPERATURE: 97.8 F | WEIGHT: 217.4 LBS | BODY MASS INDEX: 29.45 KG/M2 | SYSTOLIC BLOOD PRESSURE: 112 MMHG | DIASTOLIC BLOOD PRESSURE: 74 MMHG | HEIGHT: 72 IN

## 2023-03-20 DIAGNOSIS — R42 DIZZINESS: Primary | ICD-10-CM

## 2023-03-20 PROCEDURE — 1160F RVW MEDS BY RX/DR IN RCRD: CPT | Performed by: NURSE PRACTITIONER

## 2023-03-20 PROCEDURE — 3078F DIAST BP <80 MM HG: CPT | Performed by: NURSE PRACTITIONER

## 2023-03-20 PROCEDURE — 3074F SYST BP LT 130 MM HG: CPT | Performed by: NURSE PRACTITIONER

## 2023-03-20 PROCEDURE — 99213 OFFICE O/P EST LOW 20 MIN: CPT | Performed by: NURSE PRACTITIONER

## 2023-03-20 PROCEDURE — 1159F MED LIST DOCD IN RCRD: CPT | Performed by: NURSE PRACTITIONER

## 2023-03-20 NOTE — PROGRESS NOTES
Chief Complaint  Dizziness (2 1/2 weeks )     Subjective:      History of Present Illness {CC  Problem List  Visit  Diagnosis   Encounters  Notes  Medications  Labs  Result Review Imaging  Media :23}     Marin Cuenca presents to [facility] for:      3/7/23: seen in office with other provider for diarrhea and dizziness that had been on going for one week. Imodium was reported as making it better.     He states he didn't remember that dizziness was occurring then.   States no recent change in medication.   After discussion - he then did recall he had been to ENT and he was put on a steroid and plans to have CT of sinuses.  He stopped using CPAP in January due to congestion.      He states diarrhea resolved.     Dizziness  This is a new problem. The current episode started more than 1 month ago. The problem has been waxing and waning. Pertinent negatives include no chest pain, coughing, diaphoresis, fatigue, myalgias, neck pain, sore throat, urinary symptoms, vertigo, visual change, vomiting or weakness. Exacerbated by: exercising in am. He has tried rest for the symptoms.     He states he has been getting up in am and going to the gym to work out and also does cardio.  When he gets done, he will feel dizzy for brief moment after he stands up.   He has not fallen. No ear pain.     It has been better the last 2 days - today almost gone.        I have reviewed patient's medical history, any new submitted information provided by patient or medical assistant and updated medical record.      Objective:      Physical Exam  Constitutional:       Appearance: Normal appearance.   HENT:      Right Ear: Tympanic membrane normal.      Left Ear: Tympanic membrane normal.      Mouth/Throat:      Pharynx: No oropharyngeal exudate or posterior oropharyngeal erythema.   Eyes:      Extraocular Movements:      Right eye: No nystagmus.      Left eye: No nystagmus.       "Conjunctiva/sclera: Conjunctivae normal.   Cardiovascular:      Rate and Rhythm: Normal rate.      Pulses: Normal pulses.      Heart sounds: Normal heart sounds.   Pulmonary:      Effort: Pulmonary effort is normal.      Breath sounds: Normal breath sounds.   Neurological:      Mental Status: He is alert and oriented to person, place, and time.      Motor: No weakness.      Coordination: Romberg sign negative. Coordination normal.      Gait: Gait normal.      Comments: No gross deficits    Psychiatric:         Mood and Affect: Mood normal.         Behavior: Behavior normal.         Thought Content: Thought content normal.         Judgment: Judgment normal.        Result Review  Data Reviewed:{ Labs  Result Review  Imaging  Med Tab  Media :23}                Vital Signs:   /74 (Patient Position: Standing)   Temp 97.8 °F (36.6 °C) (Temporal)   Ht 182.9 cm (72\")   Wt 98.6 kg (217 lb 6.4 oz)   BMI 29.48 kg/m²         Requested Prescriptions      No prescriptions requested or ordered in this encounter       Routine medications provided by this office will also be refilled via pharmacy request.       Current Outpatient Medications:   •  aspirin 81 MG tablet, Take 1 tablet by mouth Daily. (Patient taking differently: Take 1 tablet by mouth 3 (Three) Times a Week. Stopped 12/27/2021  Indications: Ok to restart on Friday 1/7/22), Disp: 100 tablet, Rfl: 3  •  atorvastatin (LIPITOR) 20 MG tablet, TAKE ONE TABLET BY MOUTH DAILY, Disp: 90 tablet, Rfl: 1  •  Calcium Carb-Cholecalciferol 500-600 MG-UNIT chewable tablet, Chew 1 tablet 2 (Two) Times a Day., Disp: 180 tablet, Rfl: 3  •  esomeprazole (nexIUM) 40 MG capsule, Take 1 capsule by mouth 2 (Two) Times a Day., Disp: 180 capsule, Rfl: 1  •  fexofenadine (ALLEGRA) 180 MG tablet, Take 1 tablet by mouth Daily., Disp: , Rfl:   •  finasteride (PROSCAR) 5 MG tablet, Take 1 tablet by mouth Every Morning., Disp: 90 tablet, Rfl: 1  •  irbesartan (AVAPRO) 75 MG tablet, " Take 1 tablet by mouth Daily., Disp: 90 tablet, Rfl: 1  •  levothyroxine (SYNTHROID, LEVOTHROID) 100 MCG tablet, Take 1 tablet by mouth Daily., Disp: 90 tablet, Rfl: 1  •  methocarbamol (ROBAXIN) 750 MG tablet, Take 1 tablet by mouth 4 (Four) Times a Day., Disp: 40 tablet, Rfl: 1  •  pregabalin (LYRICA) 100 MG capsule, Take 1 capsule by mouth 3 (Three) Times a Day., Disp: 270 capsule, Rfl: 1  •  rosuvastatin (CRESTOR) 10 MG tablet, Take 1 tablet by mouth Daily. Take One tablet daily once a day, Disp: , Rfl:   •  tamsulosin (FLOMAX) 0.4 MG capsule 24 hr capsule, Take 1 capsule by mouth Daily., Disp: 90 capsule, Rfl: 1     Assessment and Plan:      Assessment and Plan {CC Problem List  Visit Diagnosis  ROS  Review (Popup)  Health Maintenance  Quality  BestPractice  Medications  SmartSets  SnapShot Encounters  Media :23}     Problem List Items Addressed This Visit    None  Visit Diagnoses     Dizziness    -  Primary    Relevant Orders    Basic Metabolic Panel    CBC (No Diff)        Symptoms started after stopping CPAP and then had congestion.  He has had a steroid pack and states after, congestion worsened some.  Now improved.      ? Viral     Hold flomax for one week.   Resume CPAP and discuss with ENT.     Increase fluid intake before exercise in am as he has not been drinking fluids before exercising.     Follow Up {Instructions Charge Capture  Follow-up Communications :23}     Return if symptoms worsen or fail to improve.      Patient was given instructions and counseling regarding his condition or for health maintenance advice. Please see specific information pulled into the AVS if appropriate.    Mabel disclaimer:   Much of this encounter note is an electronic transcription/translation of spoken language to printed text. The electronic translation of spoken language may permit erroneous, or at times, nonsensical words or phrases to be inadvertently transcribed; Although I have reviewed the note  for such errors, some may still exist.     Additional Patient Counseling:       There are no Patient Instructions on file for this visit.

## 2023-03-21 LAB
BUN SERPL-MCNC: 12 MG/DL (ref 8–23)
BUN/CREAT SERPL: 9.8 (ref 7–25)
CALCIUM SERPL-MCNC: 9.4 MG/DL (ref 8.6–10.5)
CHLORIDE SERPL-SCNC: 103 MMOL/L (ref 98–107)
CO2 SERPL-SCNC: 26.1 MMOL/L (ref 22–29)
CREAT SERPL-MCNC: 1.22 MG/DL (ref 0.76–1.27)
EGFRCR SERPLBLD CKD-EPI 2021: 62.6 ML/MIN/1.73
ERYTHROCYTE [DISTWIDTH] IN BLOOD BY AUTOMATED COUNT: 12.3 % (ref 12.3–15.4)
GLUCOSE SERPL-MCNC: 100 MG/DL (ref 65–99)
HCT VFR BLD AUTO: 42.3 % (ref 37.5–51)
HGB BLD-MCNC: 14.6 G/DL (ref 13–17.7)
MCH RBC QN AUTO: 31.5 PG (ref 26.6–33)
MCHC RBC AUTO-ENTMCNC: 34.5 G/DL (ref 31.5–35.7)
MCV RBC AUTO: 91.2 FL (ref 79–97)
PLATELET # BLD AUTO: 267 10*3/MM3 (ref 140–450)
POTASSIUM SERPL-SCNC: 4.6 MMOL/L (ref 3.5–5.2)
RBC # BLD AUTO: 4.64 10*6/MM3 (ref 4.14–5.8)
SODIUM SERPL-SCNC: 141 MMOL/L (ref 136–145)
WBC # BLD AUTO: 8.78 10*3/MM3 (ref 3.4–10.8)

## 2023-03-27 DIAGNOSIS — I10 ESSENTIAL HYPERTENSION: ICD-10-CM

## 2023-03-27 RX ORDER — IRBESARTAN 75 MG/1
TABLET ORAL
Qty: 90 TABLET | Refills: 1 | Status: SHIPPED | OUTPATIENT
Start: 2023-03-27

## 2023-04-03 ENCOUNTER — TELEPHONE (OUTPATIENT)
Dept: INTERNAL MEDICINE | Facility: CLINIC | Age: 73
End: 2023-04-03
Payer: MEDICARE

## 2023-04-27 ENCOUNTER — TELEPHONE (OUTPATIENT)
Dept: INTERNAL MEDICINE | Facility: CLINIC | Age: 73
End: 2023-04-27

## 2023-04-27 NOTE — TELEPHONE ENCOUNTER
Patient felt better when he was off of medication, got back on medication symptoms came back lightheaded and dizzy.

## 2023-04-27 NOTE — TELEPHONE ENCOUNTER
Patient is followed by urology for BPH    When calling him back, ask him that if his symptoms improve when I did asked him to hold for 1 week.    Medication changes needed to go through his urologist.    Bubba Dos Santos Jr.  Rendering Provider  Urology  NPI: [id number]

## 2023-04-27 NOTE — TELEPHONE ENCOUNTER
Caller: Marin Cuenca    Relationship: Self    Best call back number: [phone]    What medication are you requesting: SOMETHING THAT HAS LESS SIDE EFFECTS     What are your current symptoms: DIZZINESS, LIGHTHEADED,    How long have you been experiencing symptoms: ONGOING    Have you had these symptoms before:    [] Yes  [x] No    Have you been treated for these symptoms before:   [] Yes  [x] No    If a prescription is needed, what is your preferred pharmacy and phone number: [facility] [id number] - [place] [id number] [facility]. - [phone] [facility] [phone] FX     Additional notes:  PATIENT STATES THAT HE IS ON tamsulosin (FLOMAX) 0.4 MG capsule 24 hr capsule AND IS HAVING DIZZINESS AND LIGHTHEADED FROM THE MEDICATION. HE WOULD LIKE FOR JAMIE NORRIS TO PRESCRIBE SOMETHING THAT HAS LESS SIDE EFFECTS IF POSSIBLE AS HE CANNOT DEAL WITH THE ONES CAUSED BY THIS MEDICATION. HE NEEDS TO STAY ON THIS TYPE OF MEDICATION JUST NOT THE tamsulosin (FLOMAX) 0.4 MG capsule 24 hr capsule.    PLEASE CALL PATIENT TO DISCUSS AND ADVISE.

## 2023-05-14 DIAGNOSIS — N40.1 BENIGN NON-NODULAR PROSTATIC HYPERPLASIA WITH LOWER URINARY TRACT SYMPTOMS: ICD-10-CM

## 2023-05-15 RX ORDER — FINASTERIDE 5 MG/1
TABLET, FILM COATED ORAL
Qty: 90 TABLET | Refills: 1 | Status: SHIPPED | OUTPATIENT
Start: 2023-05-15

## 2023-05-16 ENCOUNTER — OFFICE VISIT (OUTPATIENT)
Dept: INTERNAL MEDICINE | Facility: CLINIC | Age: 73
End: 2023-05-16
Payer: MEDICARE

## 2023-05-16 VITALS
HEIGHT: 72 IN | OXYGEN SATURATION: 98 % | SYSTOLIC BLOOD PRESSURE: 138 MMHG | HEART RATE: 81 BPM | DIASTOLIC BLOOD PRESSURE: 64 MMHG | BODY MASS INDEX: 29.45 KG/M2 | WEIGHT: 217.4 LBS

## 2023-05-16 DIAGNOSIS — G89.29 CHRONIC LEFT SHOULDER PAIN: ICD-10-CM

## 2023-05-16 DIAGNOSIS — L03.115 CELLULITIS OF RIGHT LOWER EXTREMITY: Primary | ICD-10-CM

## 2023-05-16 DIAGNOSIS — M25.512 CHRONIC LEFT SHOULDER PAIN: ICD-10-CM

## 2023-05-16 DIAGNOSIS — Z91.09 ENVIRONMENTAL ALLERGIES: ICD-10-CM

## 2023-05-16 PROCEDURE — 3078F DIAST BP <80 MM HG: CPT | Performed by: NURSE PRACTITIONER

## 2023-05-16 PROCEDURE — 1159F MED LIST DOCD IN RCRD: CPT | Performed by: NURSE PRACTITIONER

## 2023-05-16 PROCEDURE — 1160F RVW MEDS BY RX/DR IN RCRD: CPT | Performed by: NURSE PRACTITIONER

## 2023-05-16 PROCEDURE — 99213 OFFICE O/P EST LOW 20 MIN: CPT | Performed by: NURSE PRACTITIONER

## 2023-05-16 PROCEDURE — 3075F SYST BP GE 130 - 139MM HG: CPT | Performed by: NURSE PRACTITIONER

## 2023-05-16 RX ORDER — CEPHALEXIN 500 MG/1
500 CAPSULE ORAL 2 TIMES DAILY
Qty: 14 CAPSULE | Refills: 0 | Status: SHIPPED | OUTPATIENT
Start: 2023-05-16 | End: 2023-05-23

## 2023-05-16 NOTE — ASSESSMENT & PLAN NOTE
Advised to take allergra daily for next few weeks while pollen is higher.   Can take over-the-counter flonase if not improving.

## 2023-05-16 NOTE — PROGRESS NOTES
"        Chief Complaint  Cyst (Knot on back of leg above knee 5 weeks ago. ) and Allergies     Subjective:      History of Present Illness {CC  Problem List  Visit  Diagnosis   Encounters  Notes  Medications  Labs  Result Review Imaging  Media :23}     Marin Cuenca presents to [facility] for:      Right posterior thigh:  Cyst on back of leg for a few weeks.  States there is no pain.  No fever or chills.       Left shoulder: pain anterior shoulder and 'catches'.     He does have some radiculopathy down left arm from prior cervical surgery but pain in shoulder joint has been on going, would like to see ortho.      Allergies: worse recently but has only taken allegra once in past week.   No cough.  +nasal drainage, scratchy throat.     I have reviewed patient's medical history, any new submitted information provided by patient or medical assistant and updated medical record.      Objective:      Physical Exam  HENT:      Nose: Rhinorrhea present.      Comments: Turbinates pale, boggy      Mouth/Throat:      Pharynx: No oropharyngeal exudate or posterior oropharyngeal erythema.   Cardiovascular:      Rate and Rhythm: Normal rate.      Pulses: Normal pulses.   Pulmonary:      Effort: Pulmonary effort is normal.      Breath sounds: Normal breath sounds.   Musculoskeletal:      Left shoulder: No swelling or deformity. Decreased range of motion. Normal strength.   Lymphadenopathy:      Cervical: No cervical adenopathy.   Skin:         Neurological:      Mental Status: He is oriented to person, place, and time.        Result Review  Data Reviewed:{ Labs  Result Review  Imaging  Med Tab  Media :23}                Vital Signs:   /64 (BP Location: Left arm, Patient Position: Sitting, Cuff Size: Adult)   Pulse 81   Ht 182.9 cm (72\")   Wt 98.6 kg (217 lb 6.4 oz)   SpO2 98%   BMI 29.48 kg/m²         Requested Prescriptions     Signed Prescriptions Disp Refills   • " cephalexin (Keflex) 500 MG capsule 14 capsule 0     Sig: Take 1 capsule by mouth 2 (Two) Times a Day for 7 days.       Routine medications provided by this office will also be refilled via pharmacy request.       Current Outpatient Medications:   •  aspirin 81 MG tablet, Take 1 tablet by mouth Daily. (Patient taking differently: Take 1 tablet by mouth 3 (Three) Times a Week. Stopped 12/27/2021  Indications: Ok to restart on Friday 1/7/22), Disp: 100 tablet, Rfl: 3  •  atorvastatin (LIPITOR) 20 MG tablet, TAKE ONE TABLET BY MOUTH DAILY, Disp: 90 tablet, Rfl: 1  •  Calcium Carb-Cholecalciferol 500-600 MG-UNIT chewable tablet, Chew 1 tablet 2 (Two) Times a Day., Disp: 180 tablet, Rfl: 3  •  esomeprazole (nexIUM) 40 MG capsule, Take 1 capsule by mouth 2 (Two) Times a Day., Disp: 180 capsule, Rfl: 1  •  fexofenadine (ALLEGRA) 180 MG tablet, Take 1 tablet by mouth Daily., Disp: , Rfl:   •  finasteride (PROSCAR) 5 MG tablet, TAKE ONE TABLET BY MOUTH EVERY MORNING, Disp: 90 tablet, Rfl: 1  •  irbesartan (AVAPRO) 75 MG tablet, TAKE ONE TABLET BY MOUTH DAILY, Disp: 90 tablet, Rfl: 1  •  levothyroxine (SYNTHROID, LEVOTHROID) 100 MCG tablet, Take 1 tablet by mouth Daily., Disp: 90 tablet, Rfl: 1  •  methocarbamol (ROBAXIN) 750 MG tablet, Take 1 tablet by mouth 4 (Four) Times a Day., Disp: 40 tablet, Rfl: 1  •  pregabalin (LYRICA) 100 MG capsule, Take 1 capsule by mouth 3 (Three) Times a Day., Disp: 270 capsule, Rfl: 1  •  rosuvastatin (CRESTOR) 10 MG tablet, Take 1 tablet by mouth Daily. Take One tablet daily once a day, Disp: , Rfl:   •  tamsulosin (FLOMAX) 0.4 MG capsule 24 hr capsule, Take 1 capsule by mouth Daily., Disp: 90 capsule, Rfl: 1  •  cephalexin (Keflex) 500 MG capsule, Take 1 capsule by mouth 2 (Two) Times a Day for 7 days., Disp: 14 capsule, Rfl: 0     Assessment and Plan:      Assessment and Plan {CC Problem List  Visit Diagnosis  ROS  Review (Popup)  Health Maintenance  Quality  BestPractice   Medications  SmartSets  SnapShot Encounters  Media :23}     Problem List Items Addressed This Visit        Allergies and Adverse Reactions    Environmental allergies    Current Assessment & Plan     Advised to take allergra daily for next few weeks while pollen is higher.   Can take over-the-counter flonase if not improving.         Other Visit Diagnoses     Cellulitis of right lower extremity    -  Primary    Relevant Medications    cephalexin (Keflex) 500 MG capsule    Chronic left shoulder pain        Relevant Orders    Ambulatory Referral to Orthopedic Surgery        Will treat with keflex.  Advised warm compresses.   If not improve or worsens - may need to see GS to remove.     Follow Up {Instructions Charge Capture  Follow-up Communications :23}     Return if symptoms worsen or fail to improve.      Patient was given instructions and counseling regarding his condition or for health maintenance advice. Please see specific information pulled into the AVS if appropriate.    Dragon disclaimer:   Much of this encounter note is an electronic transcription/translation of spoken language to printed text. The electronic translation of spoken language may permit erroneous, or at times, nonsensical words or phrases to be inadvertently transcribed; Although I have reviewed the note for such errors, some may still exist.     Additional Patient Counseling:       There are no Patient Instructions on file for this visit.

## 2023-05-17 ENCOUNTER — TELEPHONE (OUTPATIENT)
Dept: INTERNAL MEDICINE | Facility: CLINIC | Age: 73
End: 2023-05-17
Payer: MEDICARE

## 2023-05-17 RX ORDER — VALACYCLOVIR HYDROCHLORIDE 500 MG/1
500 TABLET, FILM COATED ORAL 2 TIMES DAILY
Qty: 8 TABLET | Refills: 0 | Status: SHIPPED | OUTPATIENT
Start: 2023-05-17 | End: 2023-05-21

## 2023-05-17 NOTE — TELEPHONE ENCOUNTER
Caller: Marin Cuenca    Relationship: Self    Best call back number: [phone]    What medication are you requesting:     What are your current symptoms: FEVER BLISTERS    How long have you been experiencing symptoms:     Have you had these symptoms before:    [x] Yes  [] No    Have you been treated for these symptoms before:   [x] Yes  [] No    If a prescription is needed, what is your preferred pharmacy and phone number:  [facility] [address] [facility]  [phone]    Additional notes: PATIENT IS CALLING IN TO SEE IF DR NORRIS COULD CALL HIM IN SOME MEDICATION FOR FEVER BLISTERS ON HIS LIP.  HE SAID HE HAS HAD THEM BEFORE AND ALWAYS GET MEDICATION OVER THE COUNTER BUT WANTS TO KNOW IF SOMETHING CAN BE PRESCRIBED FOR HIM.

## 2023-05-19 DIAGNOSIS — M54.16 LUMBAR RADICULOPATHY: ICD-10-CM

## 2023-05-19 RX ORDER — PREGABALIN 100 MG/1
CAPSULE ORAL
Qty: 270 CAPSULE | Refills: 1 | Status: SHIPPED | OUTPATIENT
Start: 2023-05-19

## 2023-05-19 NOTE — TELEPHONE ENCOUNTER
Rx Refill Note  Requested Prescriptions     Pending Prescriptions Disp Refills   • pregabalin (LYRICA) 100 MG capsule [Pharmacy Med Name: PREGABALIN CAP 100MG] 270 capsule 1     Sig: TAKE 1 CAPSULE 3 TIMES A   DAY      Last office visit with prescribing clinician: 5/16/2023   Last telemedicine visit with prescribing clinician: 5/17/2023   Next office visit with prescribing clinician: 7/27/2023         Soni Benavides MA  05/19/23, 08:44 EDT

## 2023-05-22 NOTE — PROGRESS NOTES
[facility]  New Problem      Patient Name: Marin Cuenca  : 1950  Primary Care Physician: Cara Zavaleta III NP-C        Chief Complaint:  Left shoulder pain    HPI:   Marin Cuenca is a 73 y.o. year old who presents today for evaluation of left shoulder pain. Patient reports his symptoms started at some point after a cervical spine surgery over a year ago. He has noticed discomfort of the L shoulder with activities, some stiffness, no night pain. Pain is mostly anterior. Did have a visit in prior years with PCP where L shoulder pain was documented back in 2017. Symptoms are mild to moderate, intermittent.      Past Medical/Surgical, Social and Family History:  I have reviewed and/or updated pertinent history as noted in the medical record including:  Past Medical History:   Diagnosis Date   • Acute deep vein thrombosis (DVT) of distal vein of right lower extremity 2019   • Arthritis    • Ramirez esophagus    • Benign prostatic hypertrophy    • Cataract     saloni   • Deep vein thrombosis (DVT) 2022    rt leg 2019   • Ear pain, left     occ   • GERD (gastroesophageal reflux disease)    • History of MRSA infection     2010  blood bhl   • Hypertension    • Hypothyroidism    • Low back pain    • Neck pain 2021    CERVIVAL 4-6   • KARLA on CPAP     CPAP   • Osteoporosis      Past Surgical History:   Procedure Laterality Date   • ANTERIOR CERVICAL DISCECTOMY W/ FUSION N/A 1/3/2022    Procedure: Cervical 4 to cervical 5 and cervical 5 to cervical 6 anterior cervical discectomy, fusion and instrumentation;  Surgeon: Wayne Meneses MD;  Location: [facility];  Service: Neurosurgery;  Laterality: N/A;   • CYSTOSCOPY     • EPIDURAL BLOCK     • EYE SURGERY Right    • HEMORRHOIDECTOMY     • KNEE SURGERY Left     scoped   • LUMBAR LAMINECTOMY     • LUMBAR LAMINECTOMY DISCECTOMY DECOMPRESSION Left 10/18/2019    Procedure: Left L4-5 laminectomy and neural lysis;  Surgeon: Gideon  Wayne MARTINEZ MD;  Location: [facility] OR;  Service: Neurosurgery   • NISSEN FUNDOPLICATION     • UPPER GASTROINTESTINAL ENDOSCOPY       Social History     Occupational History   • Occupation: retired   Tobacco Use   • Smoking status: Never   • Smokeless tobacco: Never   Vaping Use   • Vaping Use: Never used   Substance and Sexual Activity   • Alcohol use: Yes     Comment: OCCAS   • Drug use: No     Comment: Consumes 3 caffeinated beverages per day usually coffee.   • Sexual activity: Defer          Allergies:   Allergies   Allergen Reactions   • Tetracyclines & Related Rash       Medications:   Home Medications:  Current Outpatient Medications on File Prior to Visit   Medication Sig   • aspirin 81 MG tablet Take 1 tablet by mouth Daily. (Patient taking differently: Take 1 tablet by mouth 3 (Three) Times a Week. Stopped 12/27/2021  Indications: Ok to restart on Friday 1/7/22)   • atorvastatin (LIPITOR) 20 MG tablet TAKE ONE TABLET BY MOUTH DAILY   • Calcium Carb-Cholecalciferol 500-600 MG-UNIT chewable tablet Chew 1 tablet 2 (Two) Times a Day.   • esomeprazole (nexIUM) 40 MG capsule Take 1 capsule by mouth 2 (Two) Times a Day.   • fexofenadine (ALLEGRA) 180 MG tablet Take 1 tablet by mouth Daily.   • finasteride (PROSCAR) 5 MG tablet TAKE ONE TABLET BY MOUTH EVERY MORNING   • irbesartan (AVAPRO) 75 MG tablet TAKE ONE TABLET BY MOUTH DAILY   • levothyroxine (SYNTHROID, LEVOTHROID) 100 MCG tablet Take 1 tablet by mouth Daily.   • pregabalin (LYRICA) 100 MG capsule TAKE 1 CAPSULE 3 TIMES A   DAY   • rosuvastatin (CRESTOR) 10 MG tablet Take 1 tablet by mouth Daily. Take One tablet daily once a day   • tamsulosin (FLOMAX) 0.4 MG capsule 24 hr capsule Take 1 capsule by mouth Daily.   • cephalexin (Keflex) 500 MG capsule Take 1 capsule by mouth 2 (Two) Times a Day for 7 days. (Patient not taking: Reported on 5/23/2023)   • methocarbamol (ROBAXIN) 750 MG tablet Take 1 tablet by mouth 4 (Four) Times a Day. (Patient not  taking: Reported on 5/23/2023)     No current facility-administered medications on file prior to visit.         ROS:  14 point review of systems was negative except as listed in the HPI.    Physical Exam:   73 y.o. male  Body mass index is 29.84 kg/m²., 99.8 kg (220 lb)  Vitals:    05/23/23 0836   Temp: 97.3 °F (36.3 °C)     General: Alert, cooperative, appears well and in no observable distress. Appears stated age and BMI as listed above.  HEENT: Normocephalic, atraumatic on external visual inspection.  CV: No significant peripheral edema.  Respiratory: Normal respiratory effort.  Skin: Warm & well perfused; appropriate skin turgor.  Psych: Appropriate mood & affect.  Neuro: Gross sensation and motor intact in affected extremity/extremities.  Vascular: Peripheral pulses palpable in affected extremity/extremities.     MSK Exam:    Left Shoulder  No obvious deformities or wounds.   No redness or significant swelling.  ROM is mildly limited with a soft endpoint.  Flexion 170  ER 50  IR lumbar spine  4+ to 5/5 strength with isometric testing of the rotator cuff.    Right Shoulder  No deformity or wounds.  No redness or swelling.  No tenderness to palpation.  Full, painless AROM.  Cuff Strength 4+ to 5/5 with isometric testing of the rotator cuff.  Negative provocative testing.    Brief examination of the cervical spine did not reproduce any specific radicular pattern or symptoms.   Strength is reasonable and symmetric in the upper extremities.        Radiology:    The following X-rays were ordered/reviewed today to evaluate the patient's symptoms: Shoulder: AP, Scapular Y and Axillary Lateral of left shoulder(s) show degenerative changes of the AC joint which are moderately advanced, some GH changes noted as well with preserved joint space. He has some chronic changes at the GT of the cuff as well. No other acute pathology noted otherwise.. There are no prior films available for direct comparison.    Procedure:    N/A    Misc. Data/Labs: N/A    Assessment & Plan:    ICD-10-CM ICD-9-CM   1. Osteoarthritis of left shoulder, unspecified osteoarthritis type  M19.012 715.91   2. Chronic left shoulder pain  M25.512 719.41    G89.29 338.29     No orders of the defined types were placed in this encounter.    Orders Placed This Encounter   Procedures   • XR Shoulder 2+ View Left   • Ambulatory Referral to Physical Therapy Evaluate and treat     This is a 72 y/o male with L shoulder pain, chronic. I suspect this is GH arthritis +/- some RTC dysfunction. Overall symptoms are mild, I think an effort with PT would be a great starting point. Continue activity mods, conservative management with tylenol, etc. I think his risk for capsulitis is higher so I would keep the shoulder moving. If his symptoms worsen we can consider injections, MRI if he fails conservative care.    Return in about 6 weeks (around 7/4/2023) for Recheck.    Patient encouraged to call with questions or concerns prior to follow up.  Recommend ICE and/or HEAT PRN as discussed.  Will discuss with attending physician as needed.  Consider additional referrals, work up and/or advanced imaging as indicated or if patient fails to respond to conservative care.        Milan Simpson, APRN

## 2023-05-23 ENCOUNTER — OFFICE VISIT (OUTPATIENT)
Dept: ORTHOPEDIC SURGERY | Facility: CLINIC | Age: 73
End: 2023-05-23
Payer: MEDICARE

## 2023-05-23 VITALS — BODY MASS INDEX: 29.8 KG/M2 | HEIGHT: 72 IN | WEIGHT: 220 LBS | TEMPERATURE: 97.3 F

## 2023-05-23 DIAGNOSIS — G89.29 CHRONIC LEFT SHOULDER PAIN: ICD-10-CM

## 2023-05-23 DIAGNOSIS — M25.512 CHRONIC LEFT SHOULDER PAIN: ICD-10-CM

## 2023-05-23 DIAGNOSIS — M19.012 OSTEOARTHRITIS OF LEFT SHOULDER, UNSPECIFIED OSTEOARTHRITIS TYPE: Primary | ICD-10-CM

## 2023-05-25 ENCOUNTER — PATIENT ROUNDING (BHMG ONLY) (OUTPATIENT)
Dept: ORTHOPEDIC SURGERY | Facility: CLINIC | Age: 73
End: 2023-05-25
Payer: MEDICARE

## 2023-05-25 NOTE — PROGRESS NOTES
A [employer] Message has been sent to the patient for [place] [facility]

## 2023-06-05 DIAGNOSIS — E03.9 ACQUIRED HYPOTHYROIDISM: ICD-10-CM

## 2023-06-05 RX ORDER — LEVOTHYROXINE SODIUM 0.1 MG/1
TABLET ORAL
Qty: 90 TABLET | Refills: 1 | Status: SHIPPED | OUTPATIENT
Start: 2023-06-05

## 2023-07-19 ENCOUNTER — TELEPHONE (OUTPATIENT)
Dept: INTERNAL MEDICINE | Facility: CLINIC | Age: 73
End: 2023-07-19
Payer: MEDICARE

## 2023-07-19 NOTE — TELEPHONE ENCOUNTER
Left Voice Mail for pt     HUB OKAY TO READ     Patient to make an appointment to be seen for the cyst on leg.

## 2023-07-27 ENCOUNTER — OFFICE VISIT (OUTPATIENT)
Dept: INTERNAL MEDICINE | Facility: CLINIC | Age: 73
End: 2023-07-27
Payer: MEDICARE

## 2023-07-27 VITALS
BODY MASS INDEX: 30.13 KG/M2 | HEART RATE: 76 BPM | DIASTOLIC BLOOD PRESSURE: 80 MMHG | OXYGEN SATURATION: 98 % | WEIGHT: 215.2 LBS | SYSTOLIC BLOOD PRESSURE: 128 MMHG | HEIGHT: 71 IN

## 2023-07-27 DIAGNOSIS — Z99.89 OSA ON CPAP: Chronic | ICD-10-CM

## 2023-07-27 DIAGNOSIS — E78.2 MIXED HYPERLIPIDEMIA: Chronic | ICD-10-CM

## 2023-07-27 DIAGNOSIS — G47.33 OSA ON CPAP: Chronic | ICD-10-CM

## 2023-07-27 DIAGNOSIS — I10 ESSENTIAL HYPERTENSION: Primary | Chronic | ICD-10-CM

## 2023-07-27 DIAGNOSIS — E03.9 ACQUIRED HYPOTHYROIDISM: Chronic | ICD-10-CM

## 2023-07-27 DIAGNOSIS — N40.1 BENIGN NON-NODULAR PROSTATIC HYPERPLASIA WITH LOWER URINARY TRACT SYMPTOMS: Chronic | ICD-10-CM

## 2023-07-27 DIAGNOSIS — R73.03 PREDIABETES: Chronic | ICD-10-CM

## 2023-07-27 DIAGNOSIS — L98.9 LEG SKIN LESION, RIGHT: ICD-10-CM

## 2023-07-27 PROCEDURE — 1159F MED LIST DOCD IN RCRD: CPT | Performed by: NURSE PRACTITIONER

## 2023-07-27 PROCEDURE — 3079F DIAST BP 80-89 MM HG: CPT | Performed by: NURSE PRACTITIONER

## 2023-07-27 PROCEDURE — 99214 OFFICE O/P EST MOD 30 MIN: CPT | Performed by: NURSE PRACTITIONER

## 2023-07-27 PROCEDURE — 3074F SYST BP LT 130 MM HG: CPT | Performed by: NURSE PRACTITIONER

## 2023-07-27 PROCEDURE — 1160F RVW MEDS BY RX/DR IN RCRD: CPT | Performed by: NURSE PRACTITIONER

## 2023-07-27 NOTE — PROGRESS NOTES
Chief Complaint  Hypertension     Subjective:      History of Present Illness {CC  Problem List  Visit  Diagnosis   Encounters  Notes  Medications  Labs  Result Review Imaging  Media :23}     Marin Cuenca presents to [facility] for:      He chronically has prediabetes, hyperlipidemia (treated for one year) , hypothyroid (since 2000 Dr Soto initially), hypertension, bph (followed by Dr Dos Santos), KARLA (CPAP - Dr Rosales), CKD (last cr 1.33) , GERD (hiatal hernia), diverticulosis, Ramirez's esophagus   He sees Dr Meneses - he had  C4-C5 and C5-C6 ACDF and instrumentation on 01.03.2022.   He continues lyrica.   He had DVT in 2019 after prior surgery.   Varicose veins - sees Dr Sims and wears compression socks.     Since last visit: wife has had appendicitis and found to have neuroendocrine tumor.     Cyst to right leg: states he had got better but now back.         I have reviewed patient's medical history, any new submitted information provided by patient or medical assistant and updated medical record.      Objective:      Physical Exam  Vitals reviewed.   Constitutional:       Appearance: Normal appearance. He is well-developed.   Neck:      Thyroid: No thyromegaly.   Cardiovascular:      Rate and Rhythm: Normal rate and regular rhythm.      Pulses: Normal pulses.      Heart sounds: Normal heart sounds.   Pulmonary:      Effort: Pulmonary effort is normal.      Breath sounds: Normal breath sounds.      Comments: E/U   Musculoskeletal:      Cervical back: Normal range of motion and neck supple.   Lymphadenopathy:      Cervical: No cervical adenopathy.   Skin:     General: Skin is warm and dry.          Neurological:      Mental Status: He is alert and oriented to person, place, and time.   Psychiatric:         Mood and Affect: Mood normal.         Behavior: Behavior normal. Behavior is cooperative.         Thought Content: Thought content normal.         Judgment:  "Judgment normal.      Result Review  Data Reviewed:{ Labs  Result Review  Imaging  Med Tab  Media :23}     The following data was reviewed by: Cara Zavaleta III, NP-C on 07/27/2023  Common labs          1/18/2023    08:53 3/20/2023    15:03 7/20/2023    08:13   Common Labs   Glucose 106  100  105    BUN 10  12  9    Creatinine 1.33  1.22  1.33    Sodium 138  141  141    Potassium 4.9  4.6  4.6    Chloride 103  103  105    Calcium 9.6  9.4  9.7    Total Protein 6.0   6.0    Albumin 4.4   4.2    Total Bilirubin 0.5   0.5    Alkaline Phosphatase 92   91    AST (SGOT) 16   13    ALT (SGPT) 13   11    WBC 6.50  8.78  6.54    Hemoglobin 14.5  14.6  14.3    Hematocrit 44.0  42.3  42.7    Platelets 257  267  238    Total Cholesterol 133   129    Triglycerides 85   107    HDL Cholesterol 54   56    LDL Cholesterol  63   54    Hemoglobin A1C   5.90      Reviewed labs with patient in exam room.            Vital Signs:   /80 (BP Location: Left arm, Patient Position: Sitting, Cuff Size: Adult)   Pulse 76   Ht 180.3 cm (71\")   Wt 97.6 kg (215 lb 3.2 oz)   SpO2 98%   BMI 30.01 kg/m²         Requested Prescriptions      No prescriptions requested or ordered in this encounter       Routine medications provided by this office will also be refilled via pharmacy request.       Current Outpatient Medications:     aspirin 81 MG tablet, Take 1 tablet by mouth Daily. (Patient taking differently: Take 1 tablet by mouth 3 (Three) Times a Week. Stopped 12/27/2021  Indications: Ok to restart on Friday 1/7/22), Disp: 100 tablet, Rfl: 3    atorvastatin (LIPITOR) 20 MG tablet, TAKE ONE TABLET BY MOUTH DAILY, Disp: 90 tablet, Rfl: 1    Calcium Carb-Cholecalciferol 500-600 MG-UNIT chewable tablet, Chew 1 tablet 2 (Two) Times a Day., Disp: 180 tablet, Rfl: 3    esomeprazole (nexIUM) 40 MG capsule, Take 1 capsule by mouth 2 (Two) Times a Day., Disp: 180 capsule, Rfl: 1    fexofenadine (ALLEGRA) 180 MG tablet, Take 1 " tablet by mouth Daily., Disp: , Rfl:     finasteride (PROSCAR) 5 MG tablet, TAKE ONE TABLET BY MOUTH EVERY MORNING, Disp: 90 tablet, Rfl: 1    irbesartan (AVAPRO) 75 MG tablet, TAKE ONE TABLET BY MOUTH DAILY, Disp: 90 tablet, Rfl: 1    levothyroxine (SYNTHROID, LEVOTHROID) 100 MCG tablet, TAKE ONE TABLET BY MOUTH DAILY, Disp: 90 tablet, Rfl: 1    methocarbamol (ROBAXIN) 750 MG tablet, Take 1 tablet by mouth 4 (Four) Times a Day., Disp: 40 tablet, Rfl: 1    pregabalin (LYRICA) 100 MG capsule, TAKE 1 CAPSULE 3 TIMES A   DAY, Disp: 270 capsule, Rfl: 1    tamsulosin (FLOMAX) 0.4 MG capsule 24 hr capsule, Take 1 capsule by mouth Daily., Disp: 90 capsule, Rfl: 1     Assessment and Plan:      Assessment and Plan {CC Problem List  Visit Diagnosis  ROS  Review (Popup)  Health Maintenance  Quality  BestPractice  Medications  SmartSets  SnapShot Encounters  Media :23}     Problem List Items Addressed This Visit          Cardiac and Vasculature    Mixed hyperlipidemia (Chronic)    Relevant Orders    Comprehensive Metabolic Panel    Lipid Panel With LDL / HDL Ratio    CBC (No Diff)    Essential hypertension - Primary (Chronic)    Current Assessment & Plan     Hypertension is improving with treatment.  Continue current treatment regimen.  Dietary sodium restriction.  Weight loss.  Continue current medications.  Blood pressure will be reassessed at the next regular appointment.         Relevant Orders    Comprehensive Metabolic Panel    CBC (No Diff)       Endocrine and Metabolic    Acquired hypothyroidism (Chronic)    Relevant Orders    TSH Rfx On Abnormal To Free T4    Prediabetes (Chronic)    Current Assessment & Plan     Lab Results   Component Value Date    HGBA1C 5.90 (H) 07/20/2023    Continue low carb, low sugar diet.          Relevant Orders    Hemoglobin A1c    Microalbumin / Creatinine Urine Ratio - Urine, Clean Catch       Genitourinary and Reproductive     Benign non-nodular prostatic hyperplasia with  lower urinary tract symptoms (Chronic)       Sleep    KARLA on CPAP (Chronic)     Other Visit Diagnoses       Leg skin lesion, right        Relevant Orders    Ambulatory Referral to General Surgery          Continue current treatment.   Crestor removed from list.  Patient currently taking lipitor and tolerating.     Follow Up {Instructions Charge Capture  Follow-up Communications :23}     Return in about 6 months (around 1/27/2024) for Medicare Wellness.      Patient was given instructions and counseling regarding his condition or for health maintenance advice. Please see specific information pulled into the AVS if appropriate.    Dragon disclaimer:   Much of this encounter note is an electronic transcription/translation of spoken language to printed text. The electronic translation of spoken language may permit erroneous, or at times, nonsensical words or phrases to be inadvertently transcribed; Although I have reviewed the note for such errors, some may still exist.     Additional Patient Counseling:       There are no Patient Instructions on file for this visit.

## 2023-07-27 NOTE — ASSESSMENT & PLAN NOTE
Lab Results   Component Value Date    HGBA1C 5.90 (H) 07/20/2023    Continue low carb, low sugar diet.

## 2023-07-28 ENCOUNTER — HOSPITAL ENCOUNTER (OUTPATIENT)
Dept: PHYSICAL THERAPY | Facility: HOSPITAL | Age: 73
Setting detail: THERAPIES SERIES
Discharge: HOME | End: 2023-07-28
Payer: MEDICARE

## 2023-07-28 DIAGNOSIS — G89.29 CHRONIC LEFT SHOULDER PAIN: Primary | ICD-10-CM

## 2023-07-28 DIAGNOSIS — M25.512 CHRONIC LEFT SHOULDER PAIN: Primary | ICD-10-CM

## 2023-07-28 PROCEDURE — 97161 PT EVAL LOW COMPLEX 20 MIN: CPT

## 2023-07-28 PROCEDURE — 97110 THERAPEUTIC EXERCISES: CPT

## 2023-07-28 NOTE — THERAPY EVALUATION
Outpatient Physical Therapy Ortho Initial Evaluation  [facility]     Patient Name: Marin Cuenca  : 1950  MRN: [id number]  Today's Date: 2023      Visit Date: 2023    Patient Active Problem List   Diagnosis    Chronic LBP    Lumbar radiculopathy    Failed back syndrome of lumbar spine    Essential hypertension    Benign non-nodular prostatic hyperplasia with lower urinary tract symptoms    Acquired hypothyroidism    Knee pain, right    Osteoarthritis due to rotator cuff tear    Gastroesophageal reflux disease with esophagitis    Obesity (BMI 30-39.9)    KARLA on CPAP    Special screening for malignant neoplasm of prostate    Cervical radiculitis    Prediabetes    Osteoporosis    Neuropathy    History of Ramirez's esophagus    Gastroesophageal reflux disease    Back pain    Asymptomatic varicose veins of both lower extremities    Controlled substance agreement signed    Mixed hyperlipidemia    Environmental allergies        Past Medical History:   Diagnosis Date    Acute deep vein thrombosis (DVT) of distal vein of right lower extremity 2019    Arthritis     Ramirez esophagus     Benign prostatic hypertrophy     Cataract     saloni    Deep vein thrombosis (DVT) 2022    rt leg 2019    Ear pain, left     occ    GERD (gastroesophageal reflux disease)     History of MRSA infection       blood bhl    Hypertension     Hypothyroidism     Low back pain     Neck pain 2021    CERVIVAL 4-6    KARLA on CPAP     CPAP    Osteoporosis         Past Surgical History:   Procedure Laterality Date    ANTERIOR CERVICAL DISCECTOMY W/ FUSION N/A 1/3/2022    Procedure: Cervical 4 to cervical 5 and cervical 5 to cervical 6 anterior cervical discectomy, fusion and instrumentation;  Surgeon: Wayne Meneses MD;  Location: [facility];  Service: Neurosurgery;  Laterality: N/A;    CYSTOSCOPY      EPIDURAL BLOCK      EYE SURGERY Right     HEMORRHOIDECTOMY      KNEE SURGERY Left     scoped    LUMBAR  LAMINECTOMY      LUMBAR LAMINECTOMY DISCECTOMY DECOMPRESSION Left 10/18/2019    Procedure: Left L4-5 laminectomy and neural lysis;  Surgeon: Wayne Meneses MD;  Location: [facility]  Service: Neurosurgery    NISSEN FUNDOPLICATION      UPPER GASTROINTESTINAL ENDOSCOPY         Visit Dx:     ICD-10-CM ICD-9-CM   1. Chronic left shoulder pain  M25.512 719.41    G89.29 338.29          Patient History       Row Name 07/28/23 1000             History    Chief Complaint Pain  -LB      Type of Pain Shoulder pain  -LB      Brief Description of Current Complaint Pt reports intermittent L shoulder pain/catching that occurs with reaching behind your back and up behind head. He reports a popping and catching deep in his shoulder joint. He has hx of 2 cervical surgeries, anterior fusion.  -LB      Patient/Caregiver Goals Know what to do to help the symptoms;Relieve pain  -LB      Hand Dominance right-handed  -LB      Occupation/sports/leisure activities retired, enjoys reading  -LB      Patient seeing anyone else for problem(s)? yes  -LB      How has patient tried to help current problem? limited  -LB      History of Previous Related Injuries hx of 2 cervical surgeries including ACDF; 2 lumbar sx including discectomy  -LB         Pain     Pain Location Shoulder  -LB      Pain at Present 0  -LB      Pain at Best 0  -LB      Pain at Worst 7  -LB      Pain Frequency Intermittent  -LB      Pain Description Sharp;Other (Comment)  catching/popping  -LB      What Performance Factors Make the Current Problem(s) WORSE? FIR/JONES  -LB      What Performance Factors Make the Current Problem(s) BETTER? avoiding awkward positions  -LB      Pain Comments I just feel it when I reach certain ways, it kind of grabs me and catches.  -LB      Is your sleep disturbed? No  -LB      What position do you sleep in? Supine  -LB      Difficulties at work? Pt does not work.  -LB      Difficulties with ADL's? No issues.  -LB      Difficulties with  recreational activities? No issues. Limited recreational activities.  -LB         Fall Risk Assessment    Any falls in the past year: No  -LB         Services    Prior Rehab/Home Health Experiences No  -LB      Are you currently receiving Home Health services No  -LB      Do you plan to receive Home Health services in the near future No  -LB         Daily Activities    Primary Language English  -LB      Pt Participated in POC and Goals Yes  -LB         Safety    Are you being hurt, hit, or frightened by anyone at home or in your life? No  -LB      Are you being neglected by a caregiver No  -LB      Have you had any of the following issues with N/A  -LB                User Key  (r) = Recorded By, (t) = Taken By, (c) = Cosigned By      Initials Name Provider Type    LB Camelia Nolen, PT Physical Therapist                     PT Ortho       Row Name 07/28/23 1200       Subjective Comments    Subjective Comments I am doing ok. I don't feel it just sitting here, I feel it when I move in certain directions. You will have to bear with me today. I am going through alot. My wife just had surgery and we found out the mass they removed is malignant. This is her 3rd CA.  -LB       Posture/Observations    Posture/Observations Comments forward head, dec lumbar lordosis, forward shoulders  -LB       Myotomal Screen- Upper Quarter Clearing    Shoulder flexion (C5) Bilateral:;5 (Normal)  -LB    Elbow flexion/wrist extension (C6) Bilateral:;5 (Normal)  -LB    Elbow extension/wrist flexion (C7) Bilateral:;5 (Normal)  -LB     WNL  -LB       Shoulder Impingement/Rotator Cuff Special Tests    Onofre-Samuel Test (RC Lesion vs. Bursitis) Left:;Positive  -LB    Neer Impingement Test (RC Lesion vs. Bursitis) Left:;Positive  -LB    Full Can Test (RC Lesion) Negative  -LB    Empty Can Test (RC Lesion) Negative  -LB    Drop Arm Test (Full Thickness RC Lesion) Negative  -LB    Lift-Off Test (Subscapularis Lesion) Negative  -LB        Biceps/Labral Special Tests    Speed's Test (Biceps Tendinopathy vs. Labral Tear) Left:;Positive  pain deep in shoulder joint  -LB       Left Upper Ext    Lt Shoulder Abduction AROM 150 deg  -LB    Lt Shoulder Abduction PROM 160 deg  -LB    Lt Shoulder Flexion AROM 160 deg  -LB    Lt Shoulder Flexion PROM 160 deg  -LB    Lt Shoulder External Rotation AROM JONES to C7  -LB    Lt Shoulder External Rotation PROM 86 deg at 90 deg abd  -LB    Lt Shoulder Internal Rotation AROM FIR to T10  -LB    Lt Shoulder Internal Rotation PROM 40 deg at 90 deg abd  -LB       MMT Left Upper Ext    Lt Shoulder Internal Rotation MMT, Gross Movement (5/5) normal  -LB    Lt Shoulder External Rotation MMT, Gross Movement (4/5) good  -LB       Sensation    Sensation WNL? WNL  -LB       Upper Extremity Flexibility    Upper Trapezius Bilateral:;Moderately limited  -LB    Levator Scapula Bilateral:;Moderately limited  -LB    Pect Minor Bilateral:;Moderately limited  -LB    Pect Major Bilateral:;Moderately limited  -LB       Gait/Stairs (Locomotion)    [place] Level (Gait) independent  -LB              User Key  (r) = Recorded By, (t) = Taken By, (c) = Cosigned By      Initials Name Provider Type    Camelia Lin, PT Physical Therapist                                Therapy Education  Education Details: issued [employer] HEP: [id number]; discussed posture, HEP performance as able  Given: HEP, Mobility training, Symptoms/condition management, Pain management  Program: New  How Provided: Verbal, Demonstration, Written  Provided to: Patient  Level of Understanding: Teach back education performed, Demonstrated, Verbalized      PT OP Goals       Row Name 07/28/23 1200          PT Short Term Goals    STG Date to Achieve 08/11/23  -LB     STG 1 Pt will demonstrate understanding and compliance with initial HEP.  -LB     STG 1 Progress New  -LB     STG 2 Pt will demonstrate improved posture in seated position without cuing during PT visit.  -LB      STG 2 Progress New  -LB        Long Term Goals    LTG Date to Achieve 08/27/23  -LB     LTG 1 Pt will demonstrate AROM flexion/abduction without end range pain.  -LB     LTG 1 Progress New  -LB     LTG 2 Pt will verbalize 50% reduced symptoms in L shoulder.  -LB     LTG 2 Progress New  -LB     LTG 3 Pt will demonstrate understanding of advanced HEP to allow continued management of condition.  -LB     LTG 3 Progress New  -LB        Time Calculation    PT Goal Re-Cert Due Date 10/26/23  -LB               User Key  (r) = Recorded By, (t) = Taken By, (c) = Cosigned By      Initials Name Provider Type    LB Camelia Nolen, PT Physical Therapist                     PT Assessment/Plan       Row Name 07/28/23 4000          PT Assessment    Functional Limitations Limitations in functional capacity and performance;Limitation in home management  -LB     Impairments Impaired flexibility;Joint mobility;Pain;Poor body mechanics;Posture;Range of motion  -LB     Assessment Comments Pt is 73 y.o. male referred to outpatient physical therapy for evaluation and treatment of  evolving  left shoulder pain felt with FIR and reaching away from body.  Patient presents with end range of motion discomfort, positive Hawkin's Samuel, Neer's, Speed's special testing, occasional popping/catching of L shoulder joint. PMHx consistent with chronic L shoulder pain but no previous injury to shoulder, cervical and lumbar surgery x 2. Personal factors affecting his care include inc stress due to wife's CA diagnosis, posture. Pt demonstrates signs and symptoms  consistent with referring diagnosis.  Pt scored 32% disability on the QuickDASH. Pt is limited in their ability to participate in reaching motions, dressing. He will benefit from continued skilled PT services to address functional deficits. Thank you for this referral.  -LB     Rehab Potential Good  -LB     Patient/caregiver participated in establishment of treatment plan and goals Yes  -LB      Patient would benefit from skilled therapy intervention Yes  -LB        PT Plan    PT Frequency 1x/week  -LB     Predicted Duration of Therapy Intervention (PT) 4-6 visits  -LB     Planned CPT's? PT EVAL LOW COMPLEXITY: 97061;PT RE-EVAL: 38357;PT THER ACT EA 15 MIN: 84654;PT THER PROC EA 15 MIN: 65049;PT MANUAL THERAPY EA 15 MIN: 57224;PT SELF CARE/HOME MGMT/TRAIN EA 15: 80896  -LB     PT Plan Comments review HEP, consider SAP, D2 flexion, SL abduction/ER, lateral walk tband, IR walkout  -LB               User Key  (r) = Recorded By, (t) = Taken By, (c) = Cosigned By      Initials Name Provider Type    Camelia Lin, PT Physical Therapist                       OP Exercises       Row Name 07/28/23 1200             Subjective Comments    Subjective Comments I am doing ok. I don't feel it just sitting here, I feel it when I move in certain directions. You will have to bear with me today. I am going through alot. My wife just had surgery and we found out the mass they removed is malignant. This is her 3rd CA.  -LB         Subjective Pain    Able to rate subjective pain? yes  -LB      Pre-Treatment Pain Level 0  -LB         Total Minutes    74038 - PT Therapeutic Exercise Minutes 15  -LB         Exercise 1    Exercise Name 1 supine dowel flexion  -LB      Reps 1 10  -LB         Exercise 2    Exercise Name 2 supine HA  -LB      Sets 2 2  -LB      Reps 2 10  -LB      Time 2 RTB  -LB         Exercise 3    Exercise Name 3 supine B ER  -LB      Sets 3 2  -LB      Reps 3 10  -LB      Time 3 RTB  -LB         Exercise 4    Exercise Name 4 attempted SAP but poor understanding  -LB         Exercise 5    Exercise Name 5 shoulder rolls  -LB      Sets 5 2  -LB      Reps 5 10  -LB         Exercise 6    Exercise Name 6 scap retraction  -LB      Sets 6 2  -LB      Reps 6 10  -LB      Time 6 5  -LB                User Key  (r) = Recorded By, (t) = Taken By, (c) = Cosigned By      Initials Name Provider Type    Camelia Lin, PT  Physical Therapist                                  Outcome Measure Options: Quick DASH  Quick DASH  Open a tight or new jar.: Mild Difficulty  Do heavy household chores (e.g., wash walls, wash floors): Moderate Difficulty  Carry a shopping bag or briefcase: No Difficulty  Wash your back: No Difficulty  Use a knife to cut food: Mild Difficulty  Recreational activities in which you take some force or impact through your arm, should or hand (e.g. golf, hammering, tennis, etc.): Mild Difficulty  During the past week, to what extent has your arm, shoulder, or hand problem interfered with your normal social activites with family, friends, neighbors or groups?: Moderately  During the past week, were you limited in your work or other regular daily activities as a result of your arm, shoulder or hand problem?: Moderately Limited  Arm, Shoulder, or hand pain: Moderate  Tingling (pins and needles) in your arm, shoulder, or hand: Moderate  During the past week, how much difficulty have you had sleeping because of the pain in your arm, shoulder or hand?: Mild Difficulty  Number of Questions Answered: 11  Quick DASH Score: 31.82         Time Calculation:     Start Time: 1000  Stop Time: 1045  Time Calculation (min): 45 min  Total Timed Code Minutes- PT: 15 minute(s)  Timed Charges  36819 - PT Therapeutic Exercise Minutes: 15  Total Minutes  Timed Charges Total Minutes: 15   Total Minutes: 15     Therapy Charges for Today       Code Description Service Date Service Provider Modifiers Qty    76393517729 HC PT THER PROC EA 15 MIN 7/28/2023 Camelia Nolen, PT GP 1    65509282696 HC PT EVAL LOW COMPLEXITY 2 7/28/2023 Camelia Nolen, PT GP 1            PT G-Codes  Outcome Measure Options: Quick DASH  Quick DASH Score: 31.82         Camelia Nolen PT  7/28/2023

## 2023-08-10 DIAGNOSIS — E78.2 MIXED HYPERLIPIDEMIA: ICD-10-CM

## 2023-08-10 RX ORDER — ATORVASTATIN CALCIUM 20 MG/1
TABLET, FILM COATED ORAL
Qty: 90 TABLET | Refills: 1 | Status: SHIPPED | OUTPATIENT
Start: 2023-08-10

## 2023-08-15 ENCOUNTER — OFFICE VISIT (OUTPATIENT)
Dept: SURGERY | Facility: CLINIC | Age: 73
End: 2023-08-15
Payer: MEDICARE

## 2023-08-15 VITALS
SYSTOLIC BLOOD PRESSURE: 122 MMHG | HEIGHT: 71 IN | DIASTOLIC BLOOD PRESSURE: 72 MMHG | WEIGHT: 217.6 LBS | BODY MASS INDEX: 30.46 KG/M2

## 2023-08-15 DIAGNOSIS — D21.21 BENIGN NEOPLASM OF SOFT TISSUES OF RIGHT LOWER EXTREMITY: Primary | ICD-10-CM

## 2023-08-15 PROCEDURE — 99203 OFFICE O/P NEW LOW 30 MIN: CPT | Performed by: SURGERY

## 2023-08-15 PROCEDURE — 3074F SYST BP LT 130 MM HG: CPT | Performed by: SURGERY

## 2023-08-15 PROCEDURE — 3078F DIAST BP <80 MM HG: CPT | Performed by: SURGERY

## 2023-08-15 PROCEDURE — 1159F MED LIST DOCD IN RCRD: CPT | Performed by: SURGERY

## 2023-08-15 PROCEDURE — 1160F RVW MEDS BY RX/DR IN RCRD: CPT | Performed by: SURGERY

## 2023-08-15 NOTE — PROGRESS NOTES
General Surgery Initial Office Visit    Referring Provider: Cara Agustin*    Chief Complaint:    Right posterior thigh draining cyst    History of Present Illness:    Marin Cuenca is a 73 y.o. male who presents with a soft tissue mass on the posterior aspect of his right thigh.  He first noticed it about 2 and half months ago.  Reports that became swollen, enlarged and then started draining.  It drained yellow/white material and has continued to have small amount of drainage since then.    Past Medical History:   History of DVT right lower extremity  Arthritis  GERD, Ramirez esophagus  BPH  Bilateral cataracts  Hypertension  Hypothyroidism  Low back pain  KARLA, uses CPAP  Osteoporosis  History of MRSA bacteremia    Past Surgical History:    Anterior cervical discectomy with fusion  Cystoscopy  Hemorrhoidectomy  Knee surgery  Lumbar laminectomy  Nissen fundoplication  Upper endoscopy    Family History:    Father-heart disease, diabetes, hypertension  Paternal grandmother-cancer    Social History:      Denies tobacco use  Occasional alcohol use    Allergies:   Allergies   Allergen Reactions    Tetracyclines & Related Rash       Medications:     Current Outpatient Medications:     aspirin 81 MG tablet, Take 1 tablet by mouth Daily. (Patient taking differently: Take 1 tablet by mouth 3 (Three) Times a Week. Stopped 12/27/2021  Indications: Ok to restart on Friday 1/7/22), Disp: 100 tablet, Rfl: 3    atorvastatin (LIPITOR) 20 MG tablet, TAKE ONE TABLET BY MOUTH DAILY, Disp: 90 tablet, Rfl: 1    Calcium Carb-Cholecalciferol 500-600 MG-UNIT chewable tablet, Chew 1 tablet 2 (Two) Times a Day., Disp: 180 tablet, Rfl: 3    esomeprazole (nexIUM) 40 MG capsule, Take 1 capsule by mouth 2 (Two) Times a Day., Disp: 180 capsule, Rfl: 1    fexofenadine (ALLEGRA) 180 MG tablet, Take 1 tablet by mouth Daily., Disp: , Rfl:     finasteride (PROSCAR) 5 MG tablet, TAKE ONE TABLET BY MOUTH EVERY MORNING, Disp: 90  tablet, Rfl: 1    irbesartan (AVAPRO) 75 MG tablet, TAKE ONE TABLET BY MOUTH DAILY, Disp: 90 tablet, Rfl: 1    levothyroxine (SYNTHROID, LEVOTHROID) 100 MCG tablet, TAKE ONE TABLET BY MOUTH DAILY, Disp: 90 tablet, Rfl: 1    pregabalin (LYRICA) 100 MG capsule, TAKE 1 CAPSULE 3 TIMES A   DAY, Disp: 270 capsule, Rfl: 1    tamsulosin (FLOMAX) 0.4 MG capsule 24 hr capsule, Take 1 capsule by mouth Daily., Disp: 90 capsule, Rfl: 1    Radiology/Endoscopy:    No pertinent radiology to review    Labs:    Labs from 7/20/2023 reviewed.  CMP largely within normal limits with the exception of slightly elevated creatinine of 1.3.  Hemoglobin A1c 5.9.  CBC within normal limits.    Review of Systems:   Influenza-like illness: no fever, no  cough, no  sore throat, no  body aches, no loss of sense of taste or smell, no known exposure to person with Covid-19.  Constitutional: Negative for fevers or chills  HENT: Negative for hearing loss or runny nose  Eyes: Negative for vision changes or scleral icterus  Respiratory: Negative for cough or shortness of breath  Cardiovascular: Negative for chest pain or heart palpitations  Gastrointestinal:  Negative for abdominal pain, nausea, vomiting, constipation, melena, or hematochezia  Genitourinary: Negative for hematuria or dysuria  Musculoskeletal: Negative for joint swelling or gait instability  Neurologic: Negative for tremors or seizures  Psychiatric: Negative for suicidal ideations or depression  All other systems reviewed and negative    Physical Exam:   Body mass index is 30.36 kg/mý.  Constitutional: Well-developed well-nourished, no acute distress  Eyes: Conjunctiva normal, sclera nonicteric  ENMT: Hearing grossly normal, oral mucosa moist  Neck: Supple, trachea midline  Respiratory: No increased work of breathing, normal inspiratory effort  Cardiovascular: Regular rate, no peripheral edema, no jugular venous distention  Gastrointestinal: Soft, nontender  Skin:  Warm, dry, no rash on  visualized skin surfaces, on the posterior right thigh there is a soft tissue mass consistent with epidermal inclusion cyst that has opened and drained.  There is small amount of exudative drainage.  The skin immediately surrounding the opening is red, but does not appear consistent with cellulitis.  Musculoskeletal: Symmetric strength, normal gait  Psychiatric: Alert and oriented x3, normal affect     Assessment/Plan:  1.  Soft tissue neoplasm of the right lower extremity  I explained that the cyst can rupture and drain if they become inflamed.  I recommended excising the cyst in office.    Return next week for an office procedure.  I instructed him to hold his aspirin at least 3 days prior to this procedure.      NEDRA PAREKH M.D.  General, Robotic, and Endoscopic Surgery  [facility]    [address], [address]  [place], [place], [address]  P: [phone]  F: [phone]

## 2023-08-23 ENCOUNTER — PROCEDURE VISIT (OUTPATIENT)
Dept: SURGERY | Facility: CLINIC | Age: 73
End: 2023-08-23
Payer: MEDICARE

## 2023-08-23 VITALS — BODY MASS INDEX: 29.96 KG/M2 | WEIGHT: 214 LBS | HEIGHT: 71 IN

## 2023-08-23 DIAGNOSIS — D21.21 BENIGN NEOPLASM OF SOFT TISSUES OF RIGHT LOWER EXTREMITY: Primary | ICD-10-CM

## 2023-08-23 PROCEDURE — 88304 TISSUE EXAM BY PATHOLOGIST: CPT | Performed by: SURGERY

## 2023-08-23 NOTE — PROGRESS NOTES
Procedure Note:    Pre-Operative Diagnosis: Right posterior thigh soft tissue neoplasm    Post-Operative Diagnosis: Same    Procedure performed: Excision of right posterior thigh soft tissue neoplasm measuring 2 cm x 1 cm    Surgeon: Madeline Sawant MD    Assistant: None    Anesthesia: Local (1% Lidocaine with epinephrine) 10 mL    Complications: None    EBL: Minimal    Specimens: Right thigh soft tissue neoplasm    Description of Procedure:     Written informed consent was obtained from the patient.  The area was prepped and draped sterilely using Hibiclens and blue towels.  Local anesthetic was injected.  An elliptical incision was made around the lesion with a 15 blade scalpel.  The area was excised and measured 2 cm x 1 cm.  Hemostasis was achieved.  The wound was closed with 3-0 Vicryl in the deep dermal layer and 4-0 Monocryl to reapproximate the skin.  Steri-Strips and a Band-Aid were applied.  The patient tolerated the procedure well.    Madeline Sawant MD  General, Robotic and Endoscopic Surgery  [facility]     [address], [address]  [place], [place], [address]  P: [phone]  F: [phone]

## 2023-08-25 ENCOUNTER — HOSPITAL ENCOUNTER (OUTPATIENT)
Dept: PHYSICAL THERAPY | Facility: HOSPITAL | Age: 73
Setting detail: THERAPIES SERIES
Discharge: HOME | End: 2023-08-25
Payer: MEDICARE

## 2023-08-25 DIAGNOSIS — G89.29 CHRONIC LEFT SHOULDER PAIN: Primary | ICD-10-CM

## 2023-08-25 DIAGNOSIS — M25.512 CHRONIC LEFT SHOULDER PAIN: Primary | ICD-10-CM

## 2023-08-25 LAB
LAB AP CASE REPORT: NORMAL
PATH REPORT.FINAL DX SPEC: NORMAL
PATH REPORT.GROSS SPEC: NORMAL

## 2023-08-25 PROCEDURE — 97110 THERAPEUTIC EXERCISES: CPT | Performed by: PHYSICAL THERAPIST

## 2023-08-25 NOTE — THERAPY DISCHARGE NOTE
Outpatient Physical Therapy Ortho Treatment Note/Discharge Summary  [facility]     Patient Name: Marin Cuenca  : 1950  MRN: [id number]  Today's Date: 2023      Visit Date: 2023    Visit Dx:    ICD-10-CM ICD-9-CM   1. Chronic left shoulder pain  M25.512 719.41    G89.29 338.29       Patient Active Problem List   Diagnosis    Chronic LBP    Lumbar radiculopathy    Failed back syndrome of lumbar spine    Essential hypertension    Benign non-nodular prostatic hyperplasia with lower urinary tract symptoms    Acquired hypothyroidism    Knee pain, right    Osteoarthritis due to rotator cuff tear    Gastroesophageal reflux disease with esophagitis    Obesity (BMI 30-39.9)    KARLA on CPAP    Special screening for malignant neoplasm of prostate    Cervical radiculitis    Prediabetes    Osteoporosis    Neuropathy    History of Ramirez's esophagus    Gastroesophageal reflux disease    Back pain    Asymptomatic varicose veins of both lower extremities    Controlled substance agreement signed    Mixed hyperlipidemia    Environmental allergies        Past Medical History:   Diagnosis Date    Acute deep vein thrombosis (DVT) of distal vein of right lower extremity 2019    Arthritis     Ramirez esophagus     Benign prostatic hypertrophy     Cataract     saloni    Deep vein thrombosis (DVT) 2022    rt leg 2019    Ear pain, left     occ    GERD (gastroesophageal reflux disease)     History of MRSA infection     2010  blood bhl    Hypertension     Hypothyroidism     Low back pain     Neck pain 2021    CERVIVAL 4-6    KARLA on CPAP     CPAP    Osteoporosis         Past Surgical History:   Procedure Laterality Date    ANTERIOR CERVICAL DISCECTOMY W/ FUSION N/A 1/3/2022    Procedure: Cervical 4 to cervical 5 and cervical 5 to cervical 6 anterior cervical discectomy, fusion and instrumentation;  Surgeon: Wayne Meneses MD;  Location: [facility];  Service: Neurosurgery;  Laterality: N/A;     CYSTOSCOPY      EPIDURAL BLOCK      EYE SURGERY Right     HEMORRHOIDECTOMY      KNEE SURGERY Left     scoped    LUMBAR LAMINECTOMY      LUMBAR LAMINECTOMY DISCECTOMY DECOMPRESSION Left 10/18/2019    Procedure: Left L4-5 laminectomy and neural lysis;  Surgeon: Wayne Meneses MD;  Location: [facility];  Service: Neurosurgery    NISSEN FUNDOPLICATION      UPPER GASTROINTESTINAL ENDOSCOPY                          PT Assessment/Plan       Row Name 08/25/23 0900          PT Assessment    Assessment Comments Patient returns for 1st follow up wishing to place hold on PT at this time, but will call back in future if needed. Updated his program with modifications to avoid impingment to wish he was receptive.  -GR        PT Plan    PT Plan Comments Hold on PT/dc at this time. Call to return.  -GR               User Key  (r) = Recorded By, (t) = Taken By, (c) = Cosigned By      Initials Name Provider Type    GR Vidal Randle, PT Physical Therapist                         OP Exercises       Row Name 08/25/23 0900             Subjective Comments    Subjective Comments Patient wanted to touch base today and hold on PT overall.  Had some pain with exercises, talked over the phone with PT earlier this month and tried to make adjustments. He is leary of any surgical intervention due to h/o previous cervical and lumbar procedures.  -GR         Subjective Pain    Able to rate subjective pain? yes  -GR      Pre-Treatment Pain Level 0  -GR         Total Minutes    28902 - PT Therapeutic Exercise Minutes 24  -GR         Exercise 1    Exercise Name 1 Counseled on current status with exercises  -GR         Exercise 2    Exercise Name 2 Reverse shoulder rolls  -GR      Cueing 2 Demo  -GR      Sets 2 1  -GR      Reps 2 10  -GR      Additional Comments reviewed  -GR         Exercise 3    Exercise Name 3 Scapular retraction  -GR      Cueing 3 Demo  -GR      Sets 3 1  -GR      Reps 3 10  -GR      Additional Comments reviewed  -GR          Exercise 4    Exercise Name 4 Supine AAROM flex with dowel  -GR      Cueing 4 Demo  -GR      Sets 4 1  -GR      Reps 4 10  -GR      Time 4 modified to thumb up to avoid impingment  -GR      Additional Comments had stopped at home - will continue now  -GR         Exercise 5    Exercise Name 5 Supine horizontal abduction  -GR      Cueing 5 --  -GR      Sets 5 --  -GR      Reps 5 --  -GR      Time 5 --  -GR      Additional Comments patient stopped at home and not willing to do again bc of fear of injury  -GR         Exercise 6    Exercise Name 6 Wash the wall flexion AAROM for shoulder  -GR      Cueing 6 Demo  -GR      Sets 6 1  -GR      Reps 6 10  -GR      Time 6 10 sec  -GR                User Key  (r) = Recorded By, (t) = Taken By, (c) = Cosigned By      Initials Name Provider Type    Vidal Monahan, PT Physical Therapist                                    PT OP Goals       Row Name 08/25/23 0900          PT Short Term Goals    STG Date to Achieve 08/11/23  -GR     STG 1 Pt will demonstrate understanding and compliance with initial HEP.  -GR     STG 1 Progress Partially Met  -GR     STG 2 Pt will demonstrate improved posture in seated position without cuing during PT visit.  -GR     STG 2 Progress Partially Met  -GR        Long Term Goals    LTG Date to Achieve 08/27/23  -GR     LTG 1 Pt will demonstrate AROM flexion/abduction without end range pain.  -GR     LTG 1 Progress Ongoing  -GR     LTG 2 Pt will verbalize 50% reduced symptoms in L shoulder.  -GR     LTG 2 Progress Ongoing  -GR     LTG 3 Pt will demonstrate understanding of advanced HEP to allow continued management of condition.  -GR     LTG 3 Progress Ongoing  -GR               User Key  (r) = Recorded By, (t) = Taken By, (c) = Cosigned By      Initials Name Provider Type    Vidal Monahan, PT Physical Therapist                                   Time Calculation:   Start Time: 0900  Stop Time: 0924  Time Calculation (min): 24 min  Total  Timed Code Minutes- PT: 24 minute(s)  Timed Charges  53504 - PT Therapeutic Exercise Minutes: 24  Total Minutes  Timed Charges Total Minutes: 24   Total Minutes: 24  Therapy Charges for Today       Code Description Service Date Service Provider Modifiers Qty    05945239406 HC PT THER PROC EA 15 MIN 8/25/2023 Vidal Randle, PT GP 2                  OP PT Discharge Summary  Date of Discharge: 08/25/23  Reason for Discharge: Patient/Caregiver request  Outcomes Achieved: Patient able to partially acheive established goals  Discharge Destination: Home with home program      Vidal Randle, PT  8/25/2023

## 2023-08-29 ENCOUNTER — OFFICE VISIT (OUTPATIENT)
Dept: INTERNAL MEDICINE | Facility: CLINIC | Age: 73
End: 2023-08-29
Payer: MEDICARE

## 2023-08-29 VITALS
DIASTOLIC BLOOD PRESSURE: 70 MMHG | BODY MASS INDEX: 31.35 KG/M2 | HEART RATE: 61 BPM | WEIGHT: 219 LBS | HEIGHT: 70 IN | SYSTOLIC BLOOD PRESSURE: 140 MMHG | OXYGEN SATURATION: 98 %

## 2023-08-29 DIAGNOSIS — M54.12 CERVICAL RADICULITIS: Primary | ICD-10-CM

## 2023-08-29 RX ORDER — METHYLPREDNISOLONE 4 MG/1
TABLET ORAL
Qty: 21 EACH | Refills: 0 | Status: SHIPPED | OUTPATIENT
Start: 2023-08-29

## 2023-08-29 NOTE — PATIENT INSTRUCTIONS
Started him on medrol dose pack.  Reviewed ROM exercises.   Advised to apply heat.     He will call to schedule follow up with Dr Meneses' office.

## 2023-08-29 NOTE — PROGRESS NOTES
"        Chief Complaint  Neck Pain (3 -4 weeks ago ) and nerve pain     Subjective:      History of Present Illness {CC  Problem List  Visit  Diagnosis   Encounters  Notes  Medications  Labs  Result Review Imaging  Media :23}     Marin Cuenca presents to [facility] for:        Cervical radiculiits: he was referred to PT 12/6/21 with Dr Meneses.   Then C4-C5 and C5-C6 ACDF and instrumentation on 01.03.2022  Still have left arm pain at follow up 2/2022.     One month ago, he has developed discomfort in right of neck.  No known injury.  Stays in right neck. States at night will feel a \"burn\".  If he turns over, it will improve.  No weakness or numbness down right arm.     Currently on lyrica 100 mg three times a day.      Walks one mile a day.     States he has finished PT for left shoulder.            I have reviewed patient's medical history, any new submitted information provided by patient or medical assistant and updated medical record.      Objective:      Physical Exam  Eyes:      Conjunctiva/sclera: Conjunctivae normal.   Musculoskeletal:      Cervical back: No crepitus. Decreased range of motion.      Comments: Ues equal flex/extend - no weakness.   Sensation equal.    Lymphadenopathy:      Cervical: No cervical adenopathy.   Neurological:      Mental Status: He is alert and oriented to person, place, and time.      Result Review  Data Reviewed:{ Labs  Result Review  Imaging  Med Tab  Media :23}                Vital Signs:   /70 (BP Location: Left arm, Patient Position: Sitting, Cuff Size: Adult)   Pulse 61   Ht 177.8 cm (70\")   Wt 99.3 kg (219 lb)   SpO2 98%   BMI 31.42 kg/mý         Requested Prescriptions     Signed Prescriptions Disp Refills    methylPREDNISolone (MEDROL) 4 MG dose pack 21 each 0     Sig: Take as directed on package instructions.       Routine medications provided by this office will also be refilled via pharmacy request. "       Current Outpatient Medications:     aspirin 81 MG tablet, Take 1 tablet by mouth Daily. (Patient taking differently: Take 1 tablet by mouth 3 (Three) Times a Week. Stopped 12/27/2021  Indications: Ok to restart on Friday 1/7/22), Disp: 100 tablet, Rfl: 3    atorvastatin (LIPITOR) 20 MG tablet, TAKE ONE TABLET BY MOUTH DAILY, Disp: 90 tablet, Rfl: 1    Calcium Carb-Cholecalciferol 500-600 MG-UNIT chewable tablet, Chew 1 tablet 2 (Two) Times a Day., Disp: 180 tablet, Rfl: 3    esomeprazole (nexIUM) 40 MG capsule, Take 1 capsule by mouth 2 (Two) Times a Day., Disp: 180 capsule, Rfl: 1    fexofenadine (ALLEGRA) 180 MG tablet, Take 1 tablet by mouth Daily., Disp: , Rfl:     finasteride (PROSCAR) 5 MG tablet, TAKE ONE TABLET BY MOUTH EVERY MORNING, Disp: 90 tablet, Rfl: 1    irbesartan (AVAPRO) 75 MG tablet, TAKE ONE TABLET BY MOUTH DAILY, Disp: 90 tablet, Rfl: 1    levothyroxine (SYNTHROID, LEVOTHROID) 100 MCG tablet, TAKE ONE TABLET BY MOUTH DAILY, Disp: 90 tablet, Rfl: 1    pregabalin (LYRICA) 100 MG capsule, TAKE 1 CAPSULE 3 TIMES A   DAY, Disp: 270 capsule, Rfl: 1    tamsulosin (FLOMAX) 0.4 MG capsule 24 hr capsule, Take 1 capsule by mouth Daily., Disp: 90 capsule, Rfl: 1    methylPREDNISolone (MEDROL) 4 MG dose pack, Take as directed on package instructions., Disp: 21 each, Rfl: 0     Assessment and Plan:      Assessment and Plan {CC Problem List  Visit Diagnosis  ROS  Review (Popup)  Health Maintenance  Quality  BestPractice  Medications  SmartSets  SnapShot Encounters  Media :23}     Problem List Items Addressed This Visit          Neuro    Cervical radiculitis - Primary    Relevant Medications    methylPREDNISolone (MEDROL) 4 MG dose pack     Will treat with medrol dose pack. Take with food.   ROM exercises - follow up with Dr Meneses.     Follow Up {Instructions Charge Capture  Follow-up Communications :23}     Return if symptoms worsen or fail to improve.      Patient was given instructions  and counseling regarding his condition or for health maintenance advice. Please see specific information pulled into the AVS if appropriate.    Mabel disclaimer:   Much of this encounter note is an electronic transcription/translation of spoken language to printed text. The electronic translation of spoken language may permit erroneous, or at times, nonsensical words or phrases to be inadvertently transcribed; Although I have reviewed the note for such errors, some may still exist.     Additional Patient Counseling:       Patient Instructions   Started him on medrol dose pack.  Reviewed ROM exercises.   Advised to apply heat.     He will call to schedule follow up with Dr Meneses' office.

## 2023-09-18 ENCOUNTER — TELEPHONE (OUTPATIENT)
Dept: NEUROSURGERY | Facility: CLINIC | Age: 73
End: 2023-09-18
Payer: MEDICARE

## 2023-09-18 NOTE — TELEPHONE ENCOUNTER
PATIENT: PAULINE ESCOBEDO    PROVIDER: GRACE SCHOFIELD    REASON FOR CALL: PRESCRIPTION ORDER / REFILL    CALL BACK # [phone]    CALL BACK ANYTIME    PATIENT CALLED REQUESTING A REILL OF OF MELOXICAM. I DON'T SEE IT IN HIS MED LIST, THOUGH HE SAYS THAT DR. VIDES HAS ORDERED IT FOR HIM BEFORE, OR SOMETHING AKIN TO IT. HE STATES THAT MELOXICAM WORKS WELL. PLEASE ORDER THIS FOR HIM OR OTHERWISE ADVISE THE PATIENT.

## 2023-09-18 NOTE — TELEPHONE ENCOUNTER
"Called to inform patient per Kamila, \"Kamila Bustamante, Colt Juan MA  Caller: Unspecified (Today,  9:32 AM)  Per his PCP, he should no longer take meloxicam due to his compromised kidney function.  It looks like it was previously prescribed by Ortho, not our office.  He needs to talk to his primary care about resuming this medication            Cara Zavaleta III, NP-C  Nurse Practitioner  Specialty: Internal Medicine     Telephone Encounter      Signed     Encounter Date: 7/10/2023    Signed      This was last filled by ortho.     They recently advised him to treat pain with apap.  He should not continue mobic or nsaids due to his kidney function.     WCN             Patient expressed understanding and will reach out to PCP and ortho for further advise.   "

## 2023-09-20 DIAGNOSIS — I10 ESSENTIAL HYPERTENSION: ICD-10-CM

## 2023-09-20 RX ORDER — IRBESARTAN 75 MG/1
TABLET ORAL
Qty: 90 TABLET | Refills: 1 | Status: SHIPPED | OUTPATIENT
Start: 2023-09-20

## 2023-09-22 NOTE — TELEPHONE ENCOUNTER
PT IS NOW ASKING FOR PERCOCET FOR PAIN.     SYMPTOMS: PAIN STARTED ABOUT 1 MONTH AGO. STARTED IN NECK WHEN PT TURNED RIGHT, PAIN STARTS IN NECK INTO SHOULDER. DOES NOT REMEMBER DOING ANY TYPE OF INJURY. PT FEELS LIKE THIS IS SOMETHING TO DO WITH HIS SURGERY HE HAD IN 2022. IF ABLE TO GIVE MEDS PLEASE SEND TO [facility].     PLEASE CALL PT TO DISCUSS/ADVISE    THANK YOU,

## 2023-10-03 ENCOUNTER — HOSPITAL ENCOUNTER (EMERGENCY)
Facility: HOSPITAL | Age: 73
Discharge: HOME | End: 2023-10-03
Attending: EMERGENCY MEDICINE | Admitting: EMERGENCY MEDICINE
Payer: MEDICARE

## 2023-10-03 VITALS
DIASTOLIC BLOOD PRESSURE: 79 MMHG | OXYGEN SATURATION: 99 % | RESPIRATION RATE: 15 BRPM | TEMPERATURE: 98 F | SYSTOLIC BLOOD PRESSURE: 122 MMHG | HEART RATE: 111 BPM

## 2023-10-03 DIAGNOSIS — M54.12 CERVICAL RADICULOPATHY: Primary | ICD-10-CM

## 2023-10-03 PROCEDURE — 25010000002 PROCHLORPERAZINE 10 MG/2ML SOLUTION: Performed by: EMERGENCY MEDICINE

## 2023-10-03 PROCEDURE — 63710000001 ONDANSETRON ODT 4 MG TABLET DISPERSIBLE: Performed by: EMERGENCY MEDICINE

## 2023-10-03 PROCEDURE — 63710000001 PROMETHAZINE PER 25 MG: Performed by: EMERGENCY MEDICINE

## 2023-10-03 PROCEDURE — 25010000002 HYDROMORPHONE 1 MG/ML SOLUTION: Performed by: EMERGENCY MEDICINE

## 2023-10-03 PROCEDURE — 96372 THER/PROPH/DIAG INJ SC/IM: CPT

## 2023-10-03 PROCEDURE — 99283 EMERGENCY DEPT VISIT LOW MDM: CPT

## 2023-10-03 RX ORDER — PROCHLORPERAZINE EDISYLATE 5 MG/ML
10 INJECTION INTRAMUSCULAR; INTRAVENOUS ONCE
Status: COMPLETED | OUTPATIENT
Start: 2023-10-03 | End: 2023-10-03

## 2023-10-03 RX ORDER — PROMETHAZINE HYDROCHLORIDE 25 MG/1
25 TABLET ORAL ONCE
Status: COMPLETED | OUTPATIENT
Start: 2023-10-03 | End: 2023-10-03

## 2023-10-03 RX ORDER — OXYCODONE HYDROCHLORIDE AND ACETAMINOPHEN 5; 325 MG/1; MG/1
1-2 TABLET ORAL EVERY 6 HOURS PRN
Qty: 12 TABLET | Refills: 0 | Status: SHIPPED | OUTPATIENT
Start: 2023-10-03

## 2023-10-03 RX ORDER — ONDANSETRON 4 MG/1
4 TABLET, ORALLY DISINTEGRATING ORAL ONCE
Status: COMPLETED | OUTPATIENT
Start: 2023-10-03 | End: 2023-10-03

## 2023-10-03 RX ORDER — NAPROXEN 250 MG/1
250 TABLET ORAL DAILY PRN
Qty: 14 TABLET | Refills: 0 | Status: SHIPPED | OUTPATIENT
Start: 2023-10-03

## 2023-10-03 RX ORDER — PROMETHAZINE HYDROCHLORIDE 25 MG/1
25 TABLET ORAL EVERY 6 HOURS PRN
Qty: 10 TABLET | Refills: 0 | Status: SHIPPED | OUTPATIENT
Start: 2023-10-03

## 2023-10-03 RX ORDER — METHYLPREDNISOLONE 4 MG/1
TABLET ORAL
Qty: 21 EACH | Refills: 0 | Status: SHIPPED | OUTPATIENT
Start: 2023-10-03

## 2023-10-03 RX ADMIN — PROMETHAZINE HYDROCHLORIDE 25 MG: 25 TABLET ORAL at 16:18

## 2023-10-03 RX ADMIN — ONDANSETRON 4 MG: 4 TABLET, ORALLY DISINTEGRATING ORAL at 15:34

## 2023-10-03 RX ADMIN — PROCHLORPERAZINE EDISYLATE 10 MG: 5 INJECTION INTRAMUSCULAR; INTRAVENOUS at 16:31

## 2023-10-03 RX ADMIN — HYDROMORPHONE HYDROCHLORIDE 1 MG: 1 INJECTION, SOLUTION INTRAMUSCULAR; INTRAVENOUS; SUBCUTANEOUS at 14:32

## 2023-10-03 NOTE — ED PROVIDER NOTES
EMERGENCY DEPARTMENT ENCOUNTER    Room Number:  S02/02  Date seen:  10/3/2023  PCP: Cara Zavaleta III, NP-C  Historian: Patient      HPI:  Chief Complaint: Right shoulder pain  A complete HPI/ROS/PMH/PSH/SH/FH are unobtainable due to:   Context: Marin Cuenca is a 73 y.o. male who presents to the ED c/o right shoulder pain.  Patient said pain rating towards his right shoulder ongoing for about 6 weeks.  Pain comes and goes but is worsened with certain movements.  He was felt to have cervical radiculopathy based on prior MRI and was treated with a Medrol Dosepak in early September which did not help at all.  He is reluctant to take NSAIDs because of renal disease and does take one 200 mg ibuprofen about every 1 or 2 weeks.  He is trying to get into see neurosurgery, Dr. Meneses but has been unable to get an appointment till late October.  He called their office and was told to come to the ER.  Patient denies any recent fever.  No significant weakness or numbness.  No recent injury.      MEDICAL RECORD REVIEW (non ED)  I reviewed prior medical records note the patient has a history of cervical and lumbar radiculopathy.  MRI of C-spine done in 12/21 showed multilevel DJD with foraminal stenosis right greater than left.    PAST MEDICAL HISTORY  Active Ambulatory Problems     Diagnosis Date Noted    Chronic LBP 07/18/2016    Lumbar radiculopathy 07/18/2016    Failed back syndrome of lumbar spine 07/18/2016    Essential hypertension 10/26/2016    Benign non-nodular prostatic hyperplasia with lower urinary tract symptoms 10/26/2016    Acquired hypothyroidism 10/26/2016    Knee pain, right 04/26/2017    Osteoarthritis due to rotator cuff tear 11/01/2017    Gastroesophageal reflux disease with esophagitis 11/01/2017    Obesity (BMI 30-39.9) 04/16/2018    KARLA on CPAP 04/16/2018    Special screening for malignant neoplasm of prostate 11/08/2018    Cervical radiculitis 09/24/2019    Prediabetes 09/03/2020     Osteoporosis 07/26/2022    Neuropathy 07/26/2022    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/02/2019    Gastroesophageal reflux disease 07/26/2022    Back pain 07/26/2022    Asymptomatic varicose veins of both lower extremities 07/26/2022    Controlled substance agreement signed 07/26/2022    Mixed hyperlipidemia 01/25/2023    Environmental allergies 05/16/2023     Resolved Ambulatory Problems     Diagnosis Date Noted    Rotator cuff tear arthropathy, left 11/01/2017    Hypersomnia with sleep apnea 04/16/2018    Diverticulitis of large intestine without bleeding 05/03/2018    Prostate cancer screening 11/08/2018    Hypothyroidism 11/08/2018    Partial small bowel obstruction 05/17/2019    Acute kidney injury 05/17/2019    Acute deep vein thrombosis (DVT) of distal vein of right lower extremity 11/11/2019    Sprain of abdominal wall 06/01/2020    Viral gastroenteritis 11/13/2020    Close exposure to COVID-19 virus 11/18/2020    Skin infection 12/04/2020    Deep vein thrombosis (DVT) 07/26/2022     Past Medical History:   Diagnosis Date    Arthritis     Ramirez esophagus     Benign prostatic hypertrophy     Cataract     Ear pain, left     GERD (gastroesophageal reflux disease)     History of MRSA infection     Hypertension     Low back pain     Neck pain 12/2021         PAST SURGICAL HISTORY  Past Surgical History:   Procedure Laterality Date    ANTERIOR CERVICAL DISCECTOMY W/ FUSION N/A 1/3/2022    Procedure: Cervical 4 to cervical 5 and cervical 5 to cervical 6 anterior cervical discectomy, fusion and instrumentation;  Surgeon: Wayne Meneses MD;  Location: [facility] OR;  Service: Neurosurgery;  Laterality: N/A;    CYSTOSCOPY      EPIDURAL BLOCK      EYE SURGERY Right     HEMORRHOIDECTOMY      KNEE SURGERY Left     scoped    LUMBAR LAMINECTOMY      LUMBAR LAMINECTOMY DISCECTOMY DECOMPRESSION Left 10/18/2019    Procedure: Left L4-5 laminectomy and neural lysis;  Surgeon: Wayne Meneses MD;  Location: [facility] OR;   Service: Neurosurgery    NISSEN FUNDOPLICATION      UPPER GASTROINTESTINAL ENDOSCOPY           FAMILY HISTORY  Family History   Problem Relation Age of Onset    Heart disease Father         cardiovascular disease    Diabetes Father     Hypertension Father     Cancer Paternal Grandmother     Malig Hyperthermia Neg Hx          SOCIAL HISTORY  Social History     Socioeconomic History    Marital status:    Tobacco Use    Smoking status: Never    Smokeless tobacco: Never   Vaping Use    Vaping Use: Never used   Substance and Sexual Activity    Alcohol use: Yes     Comment: OCCAS    Drug use: No     Comment: Consumes 3 caffeinated beverages per day usually coffee.    Sexual activity: Defer         ALLERGIES  Hydromorphone hcl and Tetracyclines & related        REVIEW OF SYSTEMS  Review of Systems   Constitutional:  Negative for fever.   Respiratory:  Negative for shortness of breath.    Cardiovascular:  Negative for chest pain.   Musculoskeletal:         Right shoulder pain as per HPI   All other systems reviewed and are negative.         PHYSICAL EXAM  ED Triage Vitals   Temp Heart Rate Resp BP SpO2   10/03/23 1330 10/03/23 1330 10/03/23 1330 10/03/23 1331 10/03/23 1330   98 °F (36.7 °C) 111 15 122/79 99 %      Temp src Heart Rate Source Patient Position BP Location FiO2 (%)   -- -- -- -- --              Physical Exam    GENERAL: Alert male in no obvious distress.  Triage vitals reviewed and are relatively benign.  Initial pulse reported 111.  HENT: nares patent  EYES: no scleral icterus  CV: regular rhythm, regular rate-no murmur  RESPIRATORY: normal effort  ABDOMEN: soft  MUSCULOSKELETAL: Spine-mild right paracervical tenderness, no noted bony deformity.  Upper extremities-good range of motion without noted bony deformity  Lower extremities-good range of motion without noted bony deformity  NEURO: Strength sensation and coordination are grossly intact.  Speech and mentation are unremarkable  SKIN: warm,  dry      Vital signs and nursing notes reviewed.          LAB RESULTS  No results found for this or any previous visit (from the past 24 hour(s)).    Ordered the above labs and independently interpreted results. My findings will be discussed in the medical decision making section below        RADIOLOGY  No Radiology Exams Resulted Within Past 24 Hours            PROCEDURES  Procedures          MEDICATIONS GIVEN IN ER  Medications   HYDROmorphone (DILAUDID) injection 1 mg (1 mg Intramuscular Given 10/3/23 1432)   ondansetron ODT (ZOFRAN-ODT) disintegrating tablet 4 mg (4 mg Oral Given 10/3/23 1534)   promethazine (PHENERGAN) tablet 25 mg (25 mg Oral Given 10/3/23 1618)   prochlorperazine (COMPAZINE) injection 10 mg (10 mg Intramuscular Given 10/3/23 1631)               MEDICAL DECISION MAKING, PROGRESS, and CONSULTS    All labs have been independently reviewed by me.  All radiology studies have been reviewed by me and I have also reviewed the radiology report.   EKG's independently viewed and interpreted by me.  Discussion below represents my analysis of pertinent findings related to patient's condition, differential diagnosis, treatment plan and final disposition.      Additional sources:  - Discussed/ obtained information from independent historians: Niraj report    - External (non-ED) record review: Please see documented above, Niraj report reviewed    - Chronic or social conditions impacting care: Cervical DJD, history of renal disease, age 73    - Shared decision making: I discussed ED evaluation and treatment plan with patient who is in agreement.  See ED course    Treated with IM Dilaudid for presumed cervical radiculopathy.  We will try short course of low-dose NSAIDs as well as some as needed Percocet.      Orders placed during this visit:  No orders of the defined types were placed in this encounter.          Differential diagnosis:    Please see as documented below in ED course      Independent  "interpretation of labs, radiology studies, and discussions with consultants:  ED Course as of 10/03/23 1713   Tue Oct 03, 2023   [id number]old male with history of cervical foraminal stenosis presents with right shoulder pain ongoing for several weeks.  Pain is improved when he elevates his right arm over the head and behind the left ear.    On exam he has intact strength and sensation throughout both extremities.  He is afebrile and well-appearing.    Assessment-suspect were dealing with exacerbation of cervical radiculopathy.  Already treated once with a Medrol pack without any significant improvement.  We will go ahead and give some IM Dilaudid to help with pain here.    Unfortunately he has some degree of renal disease with a creatinine of 1.3 at baseline.  He really did not get any benefit from recent steroid pack.  Would consider a short course of NSAIDs at low strength as well as as needed Percocet to help with pain control.    Ultimately patient may need epidural injection and will defer decision to neurosurgery who should be seeing him later this month.  I do not see \"red flags\" to suggest need for acute surgical intervention at this time [DB]   1512       Patient reports significant improvement after IM injection.  After some deliberation discussion with patient we will give a short course of NSAIDs to be taken once daily.  Understand that he does have history of renal disease but feel the benefits may outweigh the risks.  We will also repeat Medrol Dosepak and give short course of Percocet to help with pain until he can get into see neurosurgery [DB]   6360 Unfortunately after placing patient up for discharge he developed nausea presumably related to the IM Dilaudid injection.    We tried initially Zofran oral does have an tablet and that did not really help.  We then tried Phenergan tablet but he vomited that up and was unable to keep it down.    Ultimately he was given Compazine IM about 25 " minutes ago.  Will reassess and see how the nausea is doing in the next several minutes [DB]   1712 I went back to reassess patient after Compazine.  He is still nauseated but a little bit better.  I given the option to get some IV fluids and lay down on the bed for a while he would rather just go home and rest in his own bed.  We will give a prescription for some oral Phenergan to take as needed. [DB]      ED Course User Index  [DB] Pankaj Jackson MD               DIAGNOSIS  Final diagnoses:   Cervical radiculopathy         DISPOSITION  DISCHARGE    Patient discharged in stable condition.    Reviewed implications of results, diagnosis, meds, responsibility to follow up, warning signs and symptoms of possible worsening, potential complications and reasons to return to ER, including increased pain, numbness/weakness or as needed.    Patient/Family voiced understanding of above instructions.    Discussed plan for discharge, as there is no emergent indication for admission. Patient referred to primary care provider for BP management due to today's BP. Pt/family is agreeable and understands need for follow up and repeat testing.  Pt is aware that discharge does not mean that nothing is wrong but it indicates no emergency is present that requires admission and they must continue care with follow-up as given below or physician of their choice.     FOLLOW-UP  Wayne Meneses MD  [address] [address]  [phone]      Call for Appointment         Medication List        New Prescriptions      naproxen 250 MG tablet  Commonly known as: NAPROSYN  Take 1 tablet by mouth Daily As Needed (Neck Pain).     oxyCODONE-acetaminophen 5-325 MG per tablet  Commonly known as: PERCOCET  Take 1-2 tablets by mouth Every 6 (Six) Hours As Needed (pain).     promethazine 25 MG tablet  Commonly known as: PHENERGAN  Take 1 tablet by mouth Every 6 (Six) Hours As Needed for Nausea or Vomiting.            Changed       aspirin 81 MG tablet  Take 1 tablet by mouth Daily.  What changed:   when to take this  additional instructions               Where to Get Your Medications        These medications were sent to [facility] [id number] - [place], [place] - [address] [place] [address] RD AT [facility]. - [phone]  - [phone] FX  [address] [facility] [place] [address]      Phone: [phone]   methylPREDNISolone 4 MG dose pack  naproxen 250 MG tablet  oxyCODONE-acetaminophen 5-325 MG per tablet  promethazine 25 MG tablet                   Latest Documented Vital Signs:  As of 17:13 EDT  BP- 122/79 HR- 111 Temp- 98 °F (36.7 °C) O2 sat- 99%      TUCKER reviewed by Pankaj Jackson MD       --    Please note that portions of this were completed with a voice recognition program.       Note Disclaimer: At [facility], we believe that sharing information builds trust and better relationships. You are receiving this note because you are receiving care at [facility] or recently visited. It is possible you will see health information before a provider has talked with you about it. This kind of information can be easy to misunderstand. To help you fully understand what it means for your health, we urge you to discuss this note with your provider.             Pankaj Jackson MD  10/03/23 3243       Pankaj Jackson MD  10/03/23 1390

## 2023-10-03 NOTE — ED TRIAGE NOTES
Patient to ED via PV from home. Patient c/o right shoulder pain since mid August. Patient reports Dr. Meneses' office sent him to ED today.

## 2023-10-03 NOTE — DISCHARGE INSTRUCTIONS
I suspect her symptoms are related to pinched nerve in the cervical spine.  Anti-inflammatory medicine is generally helpful but your renal disease makes it slightly more risky.  I feel it would be okay to use a short course of anti-inflammatory medicine to help with ongoing neck pain but do not take for more than 2 weeks and do not take more than the recommended dose.  I have also given a short course of Percocet to help as a narcotic pain reliever.

## 2023-10-11 ENCOUNTER — PATIENT OUTREACH (OUTPATIENT)
Dept: CASE MANAGEMENT | Facility: OTHER | Age: 73
End: 2023-10-11
Payer: MEDICARE

## 2023-10-11 NOTE — OUTREACH NOTE
AMBULATORY CASE MANAGEMENT NOTE    Name and Relationship of Patient/Support Person: Marin Cuenca - Self    Patient Outreach    Spoke with patient at this time regarding recent ER visit, identified self and role.  Patient states he was having severe nerve pain in his shoulder and was advised to go to the ER.  He has a f/u appt with Neurology at the end of this month.  He reports this pain has been occurring for the past 2 months and he was told it was an issue with the cervical nerve root.  Offered ER f/u appt with PCP, patient declines at this time.  Provided patient with direct contact information and advised him to contact CM if he has any further needs, or to contact PCP office.  Patient denies any other needs, will close program.      Pricila JONES  Ambulatory Case Management    10/11/2023, 10:54 EDT

## 2023-10-12 ENCOUNTER — TELEPHONE (OUTPATIENT)
Dept: INTERNAL MEDICINE | Facility: CLINIC | Age: 73
End: 2023-10-12

## 2023-10-12 ENCOUNTER — TELEPHONE (OUTPATIENT)
Dept: NEUROSURGERY | Facility: CLINIC | Age: 73
End: 2023-10-12
Payer: MEDICARE

## 2023-10-12 DIAGNOSIS — B00.1 FEVER BLISTER: Primary | ICD-10-CM

## 2023-10-12 RX ORDER — ACYCLOVIR 400 MG/1
400 TABLET ORAL 3 TIMES DAILY
Qty: 15 TABLET | Refills: 0 | Status: SHIPPED | OUTPATIENT
Start: 2023-10-12 | End: 2023-10-17

## 2023-10-12 NOTE — TELEPHONE ENCOUNTER
Caller: Marin Cuenca    Relationship: Self    Best call back number: [phone]    What medication are you requesting: SOMETHING FOR FEVER BLISTER     What are your current symptoms: FEVER BLISTER     How long have you been experiencing symptoms: 2 DAYS   Have you had these symptoms before:    [x] Yes  [] No    Have you been treated for these symptoms before:   [x] Yes  [] No    If a prescription is needed, what is your preferred pharmacy and phone number: [facility] [id number] - [address].  [phone]  - [phone] FX

## 2023-10-12 NOTE — TELEPHONE ENCOUNTER
LVM for patient to inform that his appointment must be pushed back to 12 PM on the same date that it is scheduled.             Please respond if patient calls back to confirm the new appointment time and I will get it switched.    If this time does not work schedule first available with any APC/ APRN.

## 2023-10-12 NOTE — TELEPHONE ENCOUNTER
HUB OKAY TO READ PCP MESSAGE BELOW. PER JAMIE     Acyclovir sent to his pharmacy.   If worsens or doesn't improve - notify office.

## 2023-10-13 ENCOUNTER — TELEPHONE (OUTPATIENT)
Dept: INTERNAL MEDICINE | Facility: CLINIC | Age: 73
End: 2023-10-13
Payer: MEDICARE

## 2023-10-13 ENCOUNTER — TELEPHONE (OUTPATIENT)
Dept: NEUROSURGERY | Facility: CLINIC | Age: 73
End: 2023-10-13
Payer: MEDICARE

## 2023-10-13 NOTE — TELEPHONE ENCOUNTER
Patient was advised that pcp is out of office and advised to keep appointment with neurosurgery on 10/26

## 2023-10-13 NOTE — TELEPHONE ENCOUNTER
Caller: Marin Cuenca    Relationship: Self    Best call back number: [phone]     What is the medical concern/diagnosis: NERVE PAIN IN SHOULDER     What specialty or service is being requested: PAIN MANAGEMENT     What is the provider, practice or medical service name: [facility]    What is the office location: [address] [place] [address] [place] [address]    What is the office phone number: [phone]    Any additional details: PATIENT STATES HE WILL SEE NEUROSURGEON NEXT WEEK BUT HE HAS BEEN WAITING ON THEM FOR MONTHS     PATIENT WOULD LIKE A CALLBACK

## 2023-10-13 NOTE — TELEPHONE ENCOUNTER
Name: Marin Cuenca  Relationship: Self  Best Callback Number: [phone]  HUB PROVIDED THE RELAY MESSAGE FROM THE OFFICE   PATIENT HAS FURTHER QUESTIONS AND WOULD LIKE A CALL BACK AT THE FOLLOWING PHONE NUMBER [phone]  ADDITIONAL INFORMATION: PT AGREED TO SEE RANJITHW ON 10-26-23 @12PM BUT IS HIGHLY UPSET THAT HE WAITED 2 MONTHS TO SEE GRACEMAGDY SCHOFIELD AND THAT APPT WAS CANCELLED. PT REALLY WANTS TO SEE GRACE CHANDU BUT DOES NOT WANT TO TAKE THE NEXT F/A HUB HAS END OF NOVEMBER. HE WOULD LIKE A CALL BACK TO DISCUSS THIS WITH OFFICE STAFF.     THANK YOU,

## 2023-10-13 NOTE — TELEPHONE ENCOUNTER
PATIENT: JEYSON ESCOBEDO    PROVIDER: DR. VIDES    REASON: PM ORDER    CALL BACK # [phone]     CALL BACK ANYTIME    PATIENT CALLED STATING THAT HE WOULD LIKE A PM REFERRAL SENT FOR SEVERE NERVE ROOT PAIN FOLLOWING AN ED VISIT 10/03/23. PLEASE ADVISE. THANKS.

## 2023-10-16 ENCOUNTER — TELEPHONE (OUTPATIENT)
Dept: INTERNAL MEDICINE | Facility: CLINIC | Age: 73
End: 2023-10-16
Payer: MEDICARE

## 2023-10-16 NOTE — TELEPHONE ENCOUNTER
"LM for informing per Dr. Meneses    \"He has not been seen since February and at that time was having no pain at all.  We can make him a next available appointment or he can check in with his PCP and see if he needs to be seen.\"        "

## 2023-10-16 NOTE — TELEPHONE ENCOUNTER
Hub staff attempted to follow warm transfer process and was unsuccessful     Caller: Marin Cuenca    Relationship to patient: Self    Best call back number: [phone]    Patient is needing: PATIENT WOULD LIKE A CALL BACK REGARDING HE STATED HE NEEDS TO BE REFERRED TO PAIN MANAGEMENT ASAP! PATIENT STATED HE NEEDS ORDER SENT TO PAIN MANAGEMENT. PLEASE RETURN THE CALL ASAP! THANKS

## 2023-10-16 NOTE — TELEPHONE ENCOUNTER
Caller: Marin Cuenca    Relationship: Self    Best call back number: [phone]     What orders are you requesting (i.e. lab or imaging): PAIN MANAGEMENT    In what timeframe would the patient need to come in: ASAP    Where will you receive your lab/imaging services: [facility]    Additional notes: PATIENT WOULD LIKE FOR JAMIE JR TO SEND ORDERS OVER FOR PAIN MANAGEMENT TO     [facility]     FOR BACK PAIN DUE TO NERVE ISSUES.

## 2023-10-16 NOTE — TELEPHONE ENCOUNTER
Called pt to let him know oni is out of office and offered pt an appointment with a nurse practioner in the office.     Patient has an appointment tomorrow to discuss pain management referral with np.     Patient is calling the office to speak to .

## 2023-10-17 ENCOUNTER — OFFICE VISIT (OUTPATIENT)
Dept: INTERNAL MEDICINE | Facility: CLINIC | Age: 73
End: 2023-10-17
Payer: MEDICARE

## 2023-10-17 VITALS
SYSTOLIC BLOOD PRESSURE: 132 MMHG | HEART RATE: 98 BPM | DIASTOLIC BLOOD PRESSURE: 82 MMHG | WEIGHT: 218.5 LBS | HEIGHT: 70 IN | BODY MASS INDEX: 31.28 KG/M2 | OXYGEN SATURATION: 99 %

## 2023-10-17 DIAGNOSIS — M54.12 CERVICAL RADICULOPATHY: Primary | ICD-10-CM

## 2023-10-17 PROCEDURE — 1159F MED LIST DOCD IN RCRD: CPT

## 2023-10-17 PROCEDURE — 1160F RVW MEDS BY RX/DR IN RCRD: CPT

## 2023-10-17 PROCEDURE — 3075F SYST BP GE 130 - 139MM HG: CPT

## 2023-10-17 PROCEDURE — 99213 OFFICE O/P EST LOW 20 MIN: CPT

## 2023-10-17 PROCEDURE — 3079F DIAST BP 80-89 MM HG: CPT

## 2023-10-17 NOTE — PROGRESS NOTES
"Chief Complaint  Referral  (Patient would like a referral to pain management )    Subjective        Marin Cuenca presents to [facility] PRIMARY [facility]ARE  History of Present Illness  73-year-old male presenting with cervical radiculopathy.  Patient of DANNY Benedict.  Has severe cervical radiculopathy.  Has tingling pain in upper back shoulders and neck.  Denies numbness or tingling down the arms.  Pain is 9 or 10 out of 10.  States pain can be crippling.  Recently in ER for pain symptoms.  Prescribed naproxen and Percocet.  Pain still not managed well.  Requesting referral to pain management.  Patient would also like second opinion with neurosurgery.          Objective   Vital Signs:  /82 (BP Location: Left arm, Patient Position: Sitting, Cuff Size: Adult)   Pulse 98   Ht 177.8 cm (70\")   Wt 99.1 kg (218 lb 8 oz)   SpO2 99%   BMI 31.35 kg/m²   Estimated body mass index is 31.35 kg/m² as calculated from the following:    Height as of this encounter: 177.8 cm (70\").    Weight as of this encounter: 99.1 kg (218 lb 8 oz).               Physical Exam  Vitals reviewed.   Constitutional:       Appearance: Normal appearance.   Musculoskeletal:         General: Tenderness present. No swelling. Normal range of motion.      Cervical back: Normal range of motion.   Skin:     General: Skin is warm and dry.      Capillary Refill: Capillary refill takes less than 2 seconds.   Neurological:      General: No focal deficit present.      Mental Status: He is alert and oriented to person, place, and time.   Psychiatric:         Mood and Affect: Mood normal.         Behavior: Behavior normal.         Thought Content: Thought content normal.         Judgment: Judgment normal.        Result Review :    Common labs          1/18/2023    08:53 3/20/2023    15:03 7/20/2023    08:13   Common Labs   Glucose 106  100  105    BUN 10  12  9    Creatinine 1.33  1.22  1.33    Sodium 138  141  141    Potassium 4.9  " 4.6  4.6    Chloride 103  103  105    Calcium 9.6  9.4  9.7    Total Protein 6.0   6.0    Albumin 4.4   4.2    Total Bilirubin 0.5   0.5    Alkaline Phosphatase 92   91    AST (SGOT) 16   13    ALT (SGPT) 13   11    WBC 6.50  8.78  6.54    Hemoglobin 14.5  14.6  14.3    Hematocrit 44.0  42.3  42.7    Platelets 257  267  238    Total Cholesterol 133   129    Triglycerides 85   107    HDL Cholesterol 54   56    LDL Cholesterol  63   54    Hemoglobin A1C   5.90          Current Outpatient Medications on File Prior to Visit   Medication Sig Dispense Refill    acyclovir (Zovirax) 400 MG tablet Take 1 tablet by mouth 3 (Three) Times a Day for 5 days. Take no more than 5 doses a day. 15 tablet 0    aspirin 81 MG tablet Take 1 tablet by mouth Daily. (Patient taking differently: Take 1 tablet by mouth 3 (Three) Times a Week. Stopped 12/27/2021  Indications: Ok to restart on Friday 1/7/22) 100 tablet 3    atorvastatin (LIPITOR) 20 MG tablet TAKE ONE TABLET BY MOUTH DAILY 90 tablet 1    Calcium Carb-Cholecalciferol 500-600 MG-UNIT chewable tablet Chew 1 tablet 2 (Two) Times a Day. 180 tablet 3    esomeprazole (nexIUM) 40 MG capsule Take 1 capsule by mouth 2 (Two) Times a Day. 180 capsule 1    fexofenadine (ALLEGRA) 180 MG tablet Take 1 tablet by mouth Daily.      finasteride (PROSCAR) 5 MG tablet TAKE ONE TABLET BY MOUTH EVERY MORNING 90 tablet 1    irbesartan (AVAPRO) 75 MG tablet TAKE ONE TABLET BY MOUTH DAILY 90 tablet 1    levothyroxine (SYNTHROID, LEVOTHROID) 100 MCG tablet TAKE ONE TABLET BY MOUTH DAILY 90 tablet 1    pregabalin (LYRICA) 100 MG capsule TAKE 1 CAPSULE 3 TIMES A    capsule 1    tamsulosin (FLOMAX) 0.4 MG capsule 24 hr capsule Take 1 capsule by mouth Daily. 90 capsule 1    methylPREDNISolone (MEDROL) 4 MG dose pack Take as directed on package instructions. (Patient not taking: Reported on 10/17/2023) 21 each 0    naproxen (NAPROSYN) 250 MG tablet Take 1 tablet by mouth Daily As Needed (Neck Pain).  (Patient not taking: Reported on 10/17/2023) 14 tablet 0    oxyCODONE-acetaminophen (PERCOCET) 5-325 MG per tablet Take 1-2 tablets by mouth Every 6 (Six) Hours As Needed (pain). (Patient not taking: Reported on 10/17/2023) 12 tablet 0    promethazine (PHENERGAN) 25 MG tablet Take 1 tablet by mouth Every 6 (Six) Hours As Needed for Nausea or Vomiting. (Patient not taking: Reported on 10/17/2023) 10 tablet 0     No current facility-administered medications on file prior to visit.              Assessment and Plan   Diagnoses and all orders for this visit:    1. Cervical radiculopathy (Primary)  -     Ambulatory Referral to Pain Management  -     Ambulatory Referral to Neurosurgery      Patient Instructions   Urgent referral to pain management ordered. Scheduling center to call with appointment.   Referral to Dr. Tejada with neurosurgery placed. Scheduling center to call with appointment. Keep upcoming appointment with Dr. Yee as scheduled. Follow-up in as scheduled.           Follow Up   Return if symptoms worsen or fail to improve.  Patient was given instructions and counseling regarding his condition or for health maintenance advice. Please see specific information pulled into the AVS if appropriate.

## 2023-10-17 NOTE — PATIENT INSTRUCTIONS
Urgent referral to pain management ordered. Scheduling center to call with appointment.   Referral to Dr. Tejada with neurosurgery placed. Scheduling center to call with appointment. Keep upcoming appointment with Dr. Yee as scheduled. Follow-up in as scheduled.

## 2023-10-18 ENCOUNTER — TELEPHONE (OUTPATIENT)
Dept: INTERNAL MEDICINE | Facility: CLINIC | Age: 73
End: 2023-10-18
Payer: MEDICARE

## 2023-10-18 DIAGNOSIS — M54.12 CERVICAL RADICULOPATHY: Primary | ICD-10-CM

## 2023-10-18 NOTE — TELEPHONE ENCOUNTER
Caller: Marin Cuenca    Relationship: Self    Best call back number: [phone]         Who are you requesting to speak with (clinical staff, provider,  specific staff member):     RUPA THE         What was the call regarding:     WANTS TO SPEAK WITH YOU REGARDING HIS ORDERS AND THEY WHERE SENT.

## 2023-10-19 ENCOUNTER — TELEPHONE (OUTPATIENT)
Dept: NEUROSURGERY | Facility: CLINIC | Age: 73
End: 2023-10-19
Payer: MEDICARE

## 2023-10-19 NOTE — TELEPHONE ENCOUNTER
Patient called stated someone from office called him and did not LVM. Patient agitaed that we do not know who called him.

## 2023-10-24 ENCOUNTER — TELEPHONE (OUTPATIENT)
Dept: INTERNAL MEDICINE | Facility: CLINIC | Age: 73
End: 2023-10-24
Payer: MEDICARE

## 2023-10-24 NOTE — TELEPHONE ENCOUNTER
Caller: Marin Cuenca    Relationship to patient: Self    Best call back number: [phone]    Patient is needing: PATIENT ADVISED THAT DR. NICK URRUTIA WILL NOT SEE HIM BECAUSE HE SAW DR. QUIÑONES.  HE DOES NOT WANT TO SEE DR. QUIÑONES AGAIN.  PLEASE ADVISE.  ARE THERE OTHER [facility] SURGEONS THAT HE CAN SEE IN THE [place] AREA?

## 2023-10-24 NOTE — TELEPHONE ENCOUNTER
Caller: Marin Cuenca    Relationship to patient: Self    Best call back number: [phone]    Patient is needing: THE PATIENT WANTED TO ADD THAT HE WILL BE HAVING A CERVICAL EPIDURAL DONE ON HIS NECK NEXT WEEK. THIS WILL BE TAKING PLACE ON 10/31/2023 AT PAIN MANAGEMENT.

## 2023-10-24 NOTE — TELEPHONE ENCOUNTER
"  Per the neurosurgery referral they advised him:     \"PT WILL NEED TO GO OUTSIDE OF THE [facility] SYSTEM FOR SECOND OPINION. MD'S IN OUR OFFICE DO NOT SEE EACH OTHER'S PATIENTS. \"    Please call and see if he wants referral outside [facility].       [facility]  "

## 2023-10-25 NOTE — TELEPHONE ENCOUNTER
"   Per the neurosurgery referral they advised him:      \"PT WILL NEED TO GO OUTSIDE OF THE [facility] SYSTEM FOR SECOND OPINION. MD'S IN OUR OFFICE DO NOT SEE EACH OTHER'S PATIENTS. \"     Please call and see if he wants referral outside [facility].         NORA ARMSTRONG OKAY TO READ       "

## 2023-10-26 ENCOUNTER — TELEPHONE (OUTPATIENT)
Dept: INTERNAL MEDICINE | Facility: CLINIC | Age: 73
End: 2023-10-26

## 2023-10-26 ENCOUNTER — TELEPHONE (OUTPATIENT)
Dept: INTERNAL MEDICINE | Facility: CLINIC | Age: 73
End: 2023-10-26
Payer: MEDICARE

## 2023-10-26 DIAGNOSIS — M54.12 CERVICAL RADICULOPATHY: Primary | ICD-10-CM

## 2023-10-26 NOTE — TELEPHONE ENCOUNTER
Caller: Marin Cuenca    Relationship: Self    Best call back number: [phone]      What is the best time to reach you: ANYTIME BEFORE OR AFTER 3PM    Who are you requesting to speak with (clinical staff, provider,  specific staff member):     Do you know the name of the person who called: LUNA    What was the call regarding: PATIENT WOULD LIKE TO KNOW IF HE CAN GET THE REFERRAL INFORMATION THAT WAS SENT TO [facility]    Is it okay if the provider responds through MyChart:

## 2023-10-26 NOTE — TELEPHONE ENCOUNTER
Dr. Derrick Cuenca  [facility] - neurosurgery  [address]  [place] [address]  [phone]  Fax: [phone]    Referral placed.

## 2023-10-26 NOTE — TELEPHONE ENCOUNTER
Caller: Marin Cuenca    Relationship: Self    Best call back number: [phone]    What was the call regarding: PATIENT STATED THAT HE WAS INFORMED TO TRY AND GET A SECOND OPINION ON THE NERVE ISSUE HE HAS BEEN EXPERIENCING SINCE MID-AUGUST. STATED THAT THE PREVIOUS PERSON HE WAS REFERRED TO INFORMED HIM THAT THEY DO NOT DO SECOND OPINIONS. STATED THAT HE WOULD LIKE TO TRY TO GET ONE FROM OUTSIDE OF THE [facility] NETWORK IF [facility] HAS ANY HE WOULD RECOMMEND. PLEASE CALL AND ADVISE ON FURTHER ACTION

## 2023-10-31 ENCOUNTER — HOSPITAL ENCOUNTER (OUTPATIENT)
Dept: PAIN MEDICINE | Facility: HOSPITAL | Age: 73
Discharge: HOME | End: 2023-10-31
Payer: MEDICARE

## 2023-10-31 ENCOUNTER — HOSPITAL ENCOUNTER (OUTPATIENT)
Dept: GENERAL RADIOLOGY | Facility: HOSPITAL | Age: 73
Discharge: HOME | End: 2023-10-31
Payer: MEDICARE

## 2023-10-31 ENCOUNTER — ANESTHESIA (OUTPATIENT)
Dept: PAIN MEDICINE | Facility: HOSPITAL | Age: 73
End: 2023-10-31
Payer: MEDICARE

## 2023-10-31 ENCOUNTER — ANESTHESIA EVENT (OUTPATIENT)
Dept: PAIN MEDICINE | Facility: HOSPITAL | Age: 73
End: 2023-10-31
Payer: MEDICARE

## 2023-10-31 VITALS
RESPIRATION RATE: 16 BRPM | SYSTOLIC BLOOD PRESSURE: 136 MMHG | DIASTOLIC BLOOD PRESSURE: 83 MMHG | HEART RATE: 56 BPM | OXYGEN SATURATION: 100 %

## 2023-10-31 DIAGNOSIS — R52 PAIN: ICD-10-CM

## 2023-10-31 DIAGNOSIS — M54.12 CERVICAL RADICULOPATHY: ICD-10-CM

## 2023-10-31 PROCEDURE — 25010000002 METHYLPREDNISOLONE PER 80 MG: Performed by: ANESTHESIOLOGY

## 2023-10-31 PROCEDURE — 77003 FLUOROGUIDE FOR SPINE INJECT: CPT

## 2023-10-31 RX ORDER — LIDOCAINE HYDROCHLORIDE 10 MG/ML
1 INJECTION, SOLUTION INFILTRATION; PERINEURAL ONCE AS NEEDED
Status: DISCONTINUED | OUTPATIENT
Start: 2023-10-31 | End: 2023-11-01 | Stop reason: HOSPADM

## 2023-10-31 RX ORDER — MIDAZOLAM HYDROCHLORIDE 1 MG/ML
1 INJECTION INTRAMUSCULAR; INTRAVENOUS AS NEEDED
Status: DISCONTINUED | OUTPATIENT
Start: 2023-10-31 | End: 2023-11-01 | Stop reason: HOSPADM

## 2023-10-31 RX ORDER — IOPAMIDOL 408 MG/ML
12 INJECTION, SOLUTION INTRATHECAL
Status: DISCONTINUED | OUTPATIENT
Start: 2023-10-31 | End: 2023-11-01 | Stop reason: HOSPADM

## 2023-10-31 RX ORDER — SODIUM CHLORIDE 0.9 % (FLUSH) 0.9 %
1-10 SYRINGE (ML) INJECTION AS NEEDED
Status: DISCONTINUED | OUTPATIENT
Start: 2023-10-31 | End: 2023-11-01 | Stop reason: HOSPADM

## 2023-10-31 RX ORDER — FENTANYL CITRATE 50 UG/ML
50 INJECTION, SOLUTION INTRAMUSCULAR; INTRAVENOUS AS NEEDED
Status: DISCONTINUED | OUTPATIENT
Start: 2023-10-31 | End: 2023-11-01 | Stop reason: HOSPADM

## 2023-10-31 RX ORDER — METHYLPREDNISOLONE ACETATE 80 MG/ML
80 INJECTION, SUSPENSION INTRA-ARTICULAR; INTRALESIONAL; INTRAMUSCULAR; SOFT TISSUE ONCE
Status: COMPLETED | OUTPATIENT
Start: 2023-10-31 | End: 2023-10-31

## 2023-10-31 RX ADMIN — METHYLPREDNISOLONE ACETATE 80 MG: 80 INJECTION, SUSPENSION INTRA-ARTICULAR; INTRALESIONAL; INTRAMUSCULAR; SOFT TISSUE at 09:54

## 2023-10-31 NOTE — ANESTHESIA PROCEDURE NOTES
PAIN Epidural block    Pre-sedation assessment completed: 10/31/2023 9:48 AM    Patient reassessed immediately prior to procedure    Patient location during procedure: pain clinic  Start Time: 10/31/2023 9:48 AM  Stop Time: 10/31/2023 9:59 AM  Indication:procedure for pain  Performed By  Anesthesiologist: Socorro Solano MD  Preanesthetic Checklist  Completed: patient identified, IV checked, site marked, risks and benefits discussed, surgical consent, monitors and equipment checked, pre-op evaluation and timeout performed  Additional Notes  Fluoro used.    Prep:  Pt Position:prone  Sterile Tech:cap, gloves, mask and sterile barrier  Prep:chlorhexidine gluconate and isopropyl alcohol  Monitoring:blood pressure monitoring, continuous pulse oximetry and EKG  Procedure:Sedation: no     Approach:midline  Guidance: fluoroscopy  Location:cervical  Level:6-7  Needle Type:Tuohy  Needle Gauge:20  Aspiration:negative  Medications:  Preservative Free Saline:3mL  Isovue:0mL  Comments:No dye d/t CRI  Fluoro noted: Acdf c4,5,6.  Dx: cervical radiculopathy.  Depomedrol:80  Post Assessment:  Dressing:occlusive dressing applied  Pt Tolerance:patient tolerated the procedure well with no apparent complications  Complications:no

## 2023-10-31 NOTE — H&P
[facility]    History and Physical    Patient Name: Marin Cuenca  :  1950  MRN:  [id number]  Date of Admission: 10/31/2023    Subjective     Patient is a 73 y.o. male presents with chief complaint of chronic, moderate, severe neck and shoulder: right pain.  Onset of symptoms was abrupt starting a few months ago.  Was diagnosed w/ cervical radiculopathy & treated w/ a MDP w/ minimal relief in september.  Pain was so severe 10/3 that he presented to the ER at the recommendation of Dr. Meneses's office.  On d/c he was prescribed MDP, s/t course of nsaids, & percocet.  None of which helped, though the percocet made him sick.  Symptoms are associated/aggravated by activity. Symptoms improve with nothing.  He presents today for GIO.  Denies anticoagulation      Mri :  IMPRESSION:  1. No evidence of focal herniation, cord signal abnormality or cord  compression.   2. Multilevel degenerative disease involving the cervical spine is noted  as described in detail above including loss of disc height, broad-based  disc osteophyte complexes and foraminal stenosis. Foraminal stenosis on  the right is most prominent at C3-C4, C4-C5, to a lesser extent C5-C6.  Foraminal stenosis on the left is most prominent at C4-C5 and C5-C6. See  above.    The following portions of the patients history were reviewed and updated as appropriate: current medications, allergies, past medical history, past surgical history, past family history, past social history, and problem list                Objective     Past Medical History:   Past Medical History:   Diagnosis Date   • Acute deep vein thrombosis (DVT) of distal vein of right lower extremity 2019   • Arthritis    • Ramirez esophagus    • Benign prostatic hypertrophy    • Cataract     saloni   • Deep vein thrombosis (DVT) 2022    rt leg 2019   • Ear pain, left     occ   • GERD (gastroesophageal reflux disease)    • History of MRSA infection     2010  blood bhl   •  Hypertension    • Hypothyroidism    • Low back pain    • Neck pain 12/2021    CERVIVAL 4-6   • KARLA on CPAP     CPAP   • Osteoporosis      Past Surgical History:   Past Surgical History:   Procedure Laterality Date   • ANTERIOR CERVICAL DISCECTOMY W/ FUSION N/A 1/3/2022    Procedure: Cervical 4 to cervical 5 and cervical 5 to cervical 6 anterior cervical discectomy, fusion and instrumentation;  Surgeon: Wayne Meneses MD;  Location: [facility];  Service: Neurosurgery;  Laterality: N/A;   • CYSTOSCOPY     • EPIDURAL BLOCK     • EYE SURGERY Right    • HEMORRHOIDECTOMY     • KNEE SURGERY Left     scoped   • LUMBAR LAMINECTOMY     • LUMBAR LAMINECTOMY DISCECTOMY DECOMPRESSION Left 10/18/2019    Procedure: Left L4-5 laminectomy and neural lysis;  Surgeon: Wayne Meneses MD;  Location: [facility];  Service: Neurosurgery   • NISSEN FUNDOPLICATION     • UPPER GASTROINTESTINAL ENDOSCOPY       Family History:   Family History   Problem Relation Age of Onset   • Heart disease Father         cardiovascular disease   • Diabetes Father    • Hypertension Father    • Cancer Paternal Grandmother    • Malig Hyperthermia Neg Hx      Social History:   Social History     Socioeconomic History   • Marital status:    Tobacco Use   • Smoking status: Never   • Smokeless tobacco: Never   Vaping Use   • Vaping Use: Never used   Substance and Sexual Activity   • Alcohol use: Yes     Comment: OCCAS   • Drug use: No     Comment: Consumes 3 caffeinated beverages per day usually coffee.   • Sexual activity: Defer       Vital Signs Range for the last 24 hours  Temperature:     Temp Source:     BP: BP: (167)/(90) 167/90   Pulse: Heart Rate:  [74-90] 74   Respirations: Resp:  [14] 14   SPO2: SpO2:  [97 %] 97 %   O2 Amount (l/min):     O2 Devices Device (Oxygen Therapy): room air   Weight:           --------------------------------------------------------------------------------    Current Outpatient Medications   Medication Sig  Dispense Refill   • aspirin 81 MG tablet Take 1 tablet by mouth Daily. (Patient taking differently: Take 1 tablet by mouth 3 (Three) Times a Week. Stopped 12/27/2021  Indications: Ok to restart on Friday 1/7/22) 100 tablet 3   • atorvastatin (LIPITOR) 20 MG tablet TAKE ONE TABLET BY MOUTH DAILY 90 tablet 1   • Calcium Carb-Cholecalciferol 500-600 MG-UNIT chewable tablet Chew 1 tablet 2 (Two) Times a Day. 180 tablet 3   • esomeprazole (nexIUM) 40 MG capsule Take 1 capsule by mouth 2 (Two) Times a Day. 180 capsule 1   • fexofenadine (ALLEGRA) 180 MG tablet Take 1 tablet by mouth Daily.     • finasteride (PROSCAR) 5 MG tablet TAKE ONE TABLET BY MOUTH EVERY MORNING 90 tablet 1   • irbesartan (AVAPRO) 75 MG tablet TAKE ONE TABLET BY MOUTH DAILY 90 tablet 1   • levothyroxine (SYNTHROID, LEVOTHROID) 100 MCG tablet TAKE ONE TABLET BY MOUTH DAILY 90 tablet 1   • pregabalin (LYRICA) 100 MG capsule TAKE 1 CAPSULE 3 TIMES A    capsule 1   • tamsulosin (FLOMAX) 0.4 MG capsule 24 hr capsule Take 1 capsule by mouth Daily. 90 capsule 1   • triamcinolone (KENALOG) 0.1 % cream Apply 1 application  topically to the appropriate area as directed 3 (Three) Times a Day. 21 g 0     Current Facility-Administered Medications   Medication Dose Route Frequency Provider Last Rate Last Admin   • fentaNYL citrate (PF) (SUBLIMAZE) injection 50 mcg  50 mcg Intravenous PRN Socorro Solano MD       • iopamidol (ISOVUE-M 200) injection 41%  12 mL Epidural Once in imaging Socorro Solano MD       • lidocaine (XYLOCAINE) 1 % injection 1 mL  1 mL Intradermal Once PRN Socorro Solano MD       • methylPREDNISolone acetate (DEPO-medrol) injection 80 mg  80 mg Intra-articular Once Socorro Solano MD       • midazolam (VERSED) injection 1 mg  1 mg Intravenous PRN Socorro Solano MD       • sodium chloride 0.9 % flush 1-10 mL  1-10 mL Intravenous PRN Socorro Solano MD            --------------------------------------------------------------------------------  Assessment & Plan      Anesthesia Evaluation     Patient summary reviewed and Nursing notes reviewed   no history of anesthetic complications:                Airway   Mallampati: II  Dental      Pulmonary    (+) ,sleep apnea on CPAP  Cardiovascular - negative cardio ROS  Exercise tolerance: good (4-7 METS)        Neuro/Psych  (+) numbness  GI/Hepatic/Renal/Endo    (+) obesity, GERD, thyroid problem hypothyroidism    Musculoskeletal     (+) back pain, chronic pain, neck pain, radiculopathy Right upper extremity  Abdominal    Substance History - negative use     OB/GYN negative ob/gyn ROS         Other   arthritis,            Diagnosis and Plan    Treatment Plan  ASA 3      Procedures: Cervical Epidural Steroid Injection(GIO), With fluoroscopy,      Anesthetic plan and risks discussed with patient.      Diagnosis     * Cervical radiculopathy [M54.12]

## 2023-11-09 DIAGNOSIS — K21.00 GASTROESOPHAGEAL REFLUX DISEASE WITH ESOPHAGITIS: ICD-10-CM

## 2023-11-09 DIAGNOSIS — Z87.19 HISTORY OF BARRETT'S ESOPHAGUS: Chronic | ICD-10-CM

## 2023-11-09 DIAGNOSIS — N40.1 BENIGN NON-NODULAR PROSTATIC HYPERPLASIA WITH LOWER URINARY TRACT SYMPTOMS: ICD-10-CM

## 2023-11-09 DIAGNOSIS — M54.16 LUMBAR RADICULOPATHY: ICD-10-CM

## 2023-11-09 RX ORDER — ESOMEPRAZOLE MAGNESIUM 40 MG/1
40 CAPSULE, DELAYED RELEASE ORAL 2 TIMES DAILY
Qty: 180 CAPSULE | Refills: 1 | Status: SHIPPED | OUTPATIENT
Start: 2023-11-09

## 2023-11-09 RX ORDER — PREGABALIN 100 MG/1
CAPSULE ORAL
Qty: 270 CAPSULE | Refills: 1 | Status: SHIPPED | OUTPATIENT
Start: 2023-11-09

## 2023-11-09 RX ORDER — FINASTERIDE 5 MG/1
TABLET, FILM COATED ORAL
Qty: 90 TABLET | Refills: 1 | Status: SHIPPED | OUTPATIENT
Start: 2023-11-09

## 2023-11-09 NOTE — TELEPHONE ENCOUNTER
Rx Refill Note  Requested Prescriptions     Pending Prescriptions Disp Refills    pregabalin (LYRICA) 100 MG capsule [Pharmacy Med Name: PREGABALIN CAP 100MG] 270 capsule 1     Sig: TAKE 1 CAPSULE 3 TIMES A   DAY    esomeprazole (nexIUM) 40 MG capsule [Pharmacy Med Name: ESOMEPRA MAG CAP 40MG DR] 180 capsule 1     Sig: TAKE 1 CAPSULE TWICE DAILY      Last office visit with prescribing clinician: 8/29/2023   Last telemedicine visit with prescribing clinician: Visit date not found   Next office visit with prescribing clinician: 2/8/2024         Soni Benavides MA  11/09/23, 09:41 EST

## 2023-11-25 DIAGNOSIS — E03.9 ACQUIRED HYPOTHYROIDISM: ICD-10-CM

## 2023-11-27 RX ORDER — LEVOTHYROXINE SODIUM 0.1 MG/1
100 TABLET ORAL DAILY
Qty: 90 TABLET | Refills: 1 | Status: SHIPPED | OUTPATIENT
Start: 2023-11-27

## 2023-12-15 ENCOUNTER — OFFICE VISIT (OUTPATIENT)
Dept: INTERNAL MEDICINE | Facility: CLINIC | Age: 73
End: 2023-12-15
Payer: MEDICARE

## 2023-12-15 VITALS
OXYGEN SATURATION: 99 % | BODY MASS INDEX: 29.31 KG/M2 | WEIGHT: 216.4 LBS | HEIGHT: 72 IN | SYSTOLIC BLOOD PRESSURE: 136 MMHG | DIASTOLIC BLOOD PRESSURE: 70 MMHG | HEART RATE: 90 BPM

## 2023-12-15 DIAGNOSIS — J02.9 SORE THROAT: Primary | ICD-10-CM

## 2023-12-15 PROCEDURE — 3078F DIAST BP <80 MM HG: CPT | Performed by: NURSE PRACTITIONER

## 2023-12-15 PROCEDURE — 1159F MED LIST DOCD IN RCRD: CPT | Performed by: NURSE PRACTITIONER

## 2023-12-15 PROCEDURE — 87428 SARSCOV & INF VIR A&B AG IA: CPT | Performed by: NURSE PRACTITIONER

## 2023-12-15 PROCEDURE — 3075F SYST BP GE 130 - 139MM HG: CPT | Performed by: NURSE PRACTITIONER

## 2023-12-15 PROCEDURE — 99213 OFFICE O/P EST LOW 20 MIN: CPT | Performed by: NURSE PRACTITIONER

## 2023-12-15 PROCEDURE — 1160F RVW MEDS BY RX/DR IN RCRD: CPT | Performed by: NURSE PRACTITIONER

## 2023-12-15 RX ORDER — CYCLOSPORINE 0.5 MG/ML
EMULSION OPHTHALMIC
COMMUNITY
Start: 2023-12-12

## 2023-12-15 RX ORDER — BROMPHENIRAMINE MALEATE, PSEUDOEPHEDRINE HYDROCHLORIDE, AND DEXTROMETHORPHAN HYDROBROMIDE 2; 30; 10 MG/5ML; MG/5ML; MG/5ML
5 SYRUP ORAL 4 TIMES DAILY PRN
Qty: 120 ML | Refills: 0 | Status: SHIPPED | OUTPATIENT
Start: 2023-12-15

## 2023-12-15 NOTE — PATIENT INSTRUCTIONS
Honey and Lemon Tea for cough  1 Tbsp lemon juice   2 Tbsp honey   1/2 cup or more of hot water  Directions:   Put honey and lemon juice into a tea cup or mug. Add hot water and stir. Add more lemon juice, honey, or hot water to taste.  Sip on this and have two or three per day while cough is present.     Not for children less than 2 years of age.   Caution if diabetic.        Advised he needs to continue to check COVID home test every 2 days.       You are diagnosed with acute nasopharyngitis, or a common cold, which is likely viral. Viruses are not killed by antibiotics and therefore an antibiotic is not prescribed for you today. Viral illnesses typically last 3-10 days in duration and are treated symptomatically.     Suggestions for over the counter medications:   Many over the counter medications are great at symptomatic relief. For example: Mucinex DM or its generic version is good for loosening up a cough and suppressing it. You should take mucinex with a full glass of water to be effective. Pseudoephedrine or Sudafed is good for unclogging nasal passages due to congestion as in a cold or sinus infection.   Antihistamines such as benadryl (diphenhydramine) and Chlortrimeton (chlorpeniramine) are good for runny nose/sniffles, sneezing, itchy nose/watery eyes. This could make you sleepy.   Antihistamines such as Zyrtec, Allegra and Claritin are good for chronic allergy sufferers.   As with any medication, prescription or over the counter, you must monitor for blood pressure effects or medication interactions when taking these types of medicines.   Rest and increasing fluids are the mainstay of treatment.   To prevent spread of infection, cough or sneeze into a tissue and throw away. Wash hands, eating utensils, toothbrushes well after use.   For increase in symptoms or concerns, follow up with your primary care provider.

## 2023-12-15 NOTE — PROGRESS NOTES
"        Chief Complaint  Sore Throat, Nasal Congestion, and Fatigue (Started last night )     Subjective:      History of Present Illness {CC  Problem List  Visit  Diagnosis   Encounters  Notes  Medications  Labs  Result Review Imaging  Media :23}     Marin Cuenca presents to [facility] for:      Covid/ flu like symptoms:     Onset: yesterday     Home COVID test:   [] Yes:  [] Negative   [] Positive   [] No    Most common symptoms include:  [] Fever  [] Dry cough  [x] Tiredness / fatigue     Less common symptoms:  [] Body aches and pains  [x] Sore throat  [] Diarrhea  [] Conjunctivitis  [] Headache  [] Loss of taste or smell  [] a rash on skin, or discoloration of fingers or toes    Serious symptoms: [x] Denies   [] Difficulty breathing or shortness of breath  [] Chest pain or pressure  [] Loss of speech of movement    Symptom onset:   [] Gradual   [] Sudden     Symptom progression:   [] Improving  [] Worsening   [] Unchanged     Known COVID exposure:  [] Yes  [] No  [x] Unknown     Vaccinated:   [x] Yes  [] Boosted   [] No       States cervical epidural helped immensely.   He also say neurology at [place]. \"May need to deal with a pinched nerve\"     I have reviewed patient's medical history, any new submitted information provided by patient or medical assistant and updated medical record.      Objective:      Physical Exam  Constitutional:       Appearance: He is not ill-appearing.   HENT:      Nose: Rhinorrhea present.      Mouth/Throat:      Pharynx: Posterior oropharyngeal erythema present. No oropharyngeal exudate.   Cardiovascular:      Rate and Rhythm: Normal rate.      Pulses: Normal pulses.      Heart sounds: Normal heart sounds.   Pulmonary:      Effort: Pulmonary effort is normal.      Breath sounds: Normal breath sounds.   Lymphadenopathy:      Cervical: No cervical adenopathy.   Neurological:      Mental Status: He is oriented to person, place, and time. " "       Result Review  Data Reviewed:{ Labs  Result Review  Imaging  Med Tab  Media :23}                POCT SARS-CoV-2 Antigen CHRISTINE + Flu (12/18/2023 3:45 PM)   Negative for flu/covid     Vital Signs:   /70 (BP Location: Left arm, Patient Position: Sitting, Cuff Size: Adult)   Pulse 90   Ht 182.9 cm (72\")   Wt 98.2 kg (216 lb 6.4 oz)   SpO2 99%   BMI 29.35 kg/m²         Requested Prescriptions     Signed Prescriptions Disp Refills    brompheniramine-pseudoephedrine-DM 30-2-10 MG/5ML syrup 120 mL 0     Sig: Take 5 mL by mouth 4 (Four) Times a Day As Needed for Congestion.       Routine medications provided by this office will also be refilled via pharmacy request.       Current Outpatient Medications:     aspirin 81 MG tablet, Take 1 tablet by mouth Daily. (Patient taking differently: Take 1 tablet by mouth 3 (Three) Times a Week. Stopped 12/27/2021  Indications: Ok to restart on Friday 1/7/22), Disp: 100 tablet, Rfl: 3    atorvastatin (LIPITOR) 20 MG tablet, TAKE ONE TABLET BY MOUTH DAILY, Disp: 90 tablet, Rfl: 1    Calcium Carb-Cholecalciferol 500-600 MG-UNIT chewable tablet, Chew 1 tablet 2 (Two) Times a Day., Disp: 180 tablet, Rfl: 3    cycloSPORINE (RESTASIS) 0.05 % ophthalmic emulsion, , Disp: , Rfl:     esomeprazole (nexIUM) 40 MG capsule, TAKE 1 CAPSULE TWICE DAILY, Disp: 180 capsule, Rfl: 1    fexofenadine (ALLEGRA) 180 MG tablet, Take 1 tablet by mouth Daily., Disp: , Rfl:     finasteride (PROSCAR) 5 MG tablet, TAKE ONE TABLET BY MOUTH EVERY MORNING, Disp: 90 tablet, Rfl: 1    irbesartan (AVAPRO) 75 MG tablet, TAKE ONE TABLET BY MOUTH DAILY, Disp: 90 tablet, Rfl: 1    levothyroxine (SYNTHROID, LEVOTHROID) 100 MCG tablet, TAKE 1 TABLET BY MOUTH DAILY, Disp: 90 tablet, Rfl: 1    pregabalin (LYRICA) 100 MG capsule, TAKE 1 CAPSULE 3 TIMES A   DAY, Disp: 270 capsule, Rfl: 1    tamsulosin (FLOMAX) 0.4 MG capsule 24 hr capsule, Take 1 capsule by mouth Daily., Disp: 90 capsule, Rfl: 1    " brompheniramine-pseudoephedrine-DM 30-2-10 MG/5ML syrup, Take 5 mL by mouth 4 (Four) Times a Day As Needed for Congestion., Disp: 120 mL, Rfl: 0     Assessment and Plan:      Assessment and Plan {CC Problem List  Visit Diagnosis  ROS  Review (Popup)  Health Maintenance  Quality  BestPractice  Medications  SmartSets  SnapShot Encounters  Media :23}     Problem List Items Addressed This Visit    None  Visit Diagnoses       Sore throat    -  Primary    Relevant Orders    POCT SARS-CoV-2 Antigen CHRISTINE + Flu (Completed)          Negative for flu/covid.   Advised as symptoms just started - could take several days to be positive on test and to retest every other day.  Use precautions to decrease risk of transmission.     Will treat current symptoms.   Likely viral based on symptoms.     Follow Up {Instructions Charge Capture  Follow-up Communications :23}     Return if symptoms worsen or fail to improve.      Patient was given instructions and counseling regarding his condition or for health maintenance advice. Please see specific information pulled into the AVS if appropriate.    Dragon disclaimer:   Much of this encounter note is an electronic transcription/translation of spoken language to printed text. The electronic translation of spoken language may permit erroneous, or at times, nonsensical words or phrases to be inadvertently transcribed; Although I have reviewed the note for such errors, some may still exist.     Additional Patient Counseling:       Patient Instructions   Honey and Lemon Tea for cough  1 Tbsp lemon juice   2 Tbsp honey   1/2 cup or more of hot water  Directions:   Put honey and lemon juice into a tea cup or mug. Add hot water and stir. Add more lemon juice, honey, or hot water to taste.  Sip on this and have two or three per day while cough is present.     Not for children less than 2 years of age.   Caution if diabetic.        Advised he needs to continue to check COVID home test  every 2 days.       You are diagnosed with acute nasopharyngitis, or a common cold, which is likely viral. Viruses are not killed by antibiotics and therefore an antibiotic is not prescribed for you today. Viral illnesses typically last 3-10 days in duration and are treated symptomatically.     Suggestions for over the counter medications:   Many over the counter medications are great at symptomatic relief. For example: Mucinex DM or its generic version is good for loosening up a cough and suppressing it. You should take mucinex with a full glass of water to be effective. Pseudoephedrine or Sudafed is good for unclogging nasal passages due to congestion as in a cold or sinus infection.   Antihistamines such as benadryl (diphenhydramine) and Chlortrimeton (chlorpeniramine) are good for runny nose/sniffles, sneezing, itchy nose/watery eyes. This could make you sleepy.   Antihistamines such as Zyrtec, Allegra and Claritin are good for chronic allergy sufferers.   As with any medication, prescription or over the counter, you must monitor for blood pressure effects or medication interactions when taking these types of medicines.   Rest and increasing fluids are the mainstay of treatment.   To prevent spread of infection, cough or sneeze into a tissue and throw away. Wash hands, eating utensils, toothbrushes well after use.   For increase in symptoms or concerns, follow up with your primary care provider.

## 2023-12-18 LAB
EXPIRATION DATE: NORMAL
FLUAV AG UPPER RESP QL IA.RAPID: NOT DETECTED
FLUBV AG UPPER RESP QL IA.RAPID: NOT DETECTED
INTERNAL CONTROL: NORMAL
Lab: NORMAL
SARS-COV-2 AG UPPER RESP QL IA.RAPID: NOT DETECTED

## 2024-01-05 ENCOUNTER — OFFICE VISIT (OUTPATIENT)
Dept: INTERNAL MEDICINE | Facility: CLINIC | Age: 74
End: 2024-01-05
Payer: MEDICARE

## 2024-01-05 VITALS
SYSTOLIC BLOOD PRESSURE: 128 MMHG | HEART RATE: 73 BPM | DIASTOLIC BLOOD PRESSURE: 82 MMHG | WEIGHT: 211.6 LBS | BODY MASS INDEX: 28.66 KG/M2 | OXYGEN SATURATION: 99 % | HEIGHT: 72 IN

## 2024-01-05 DIAGNOSIS — U07.1 COVID: ICD-10-CM

## 2024-01-05 DIAGNOSIS — R05.1 ACUTE COUGH: Primary | ICD-10-CM

## 2024-01-05 LAB
EXPIRATION DATE: ABNORMAL
FLUAV AG UPPER RESP QL IA.RAPID: NOT DETECTED
FLUBV AG UPPER RESP QL IA.RAPID: NOT DETECTED
INTERNAL CONTROL: ABNORMAL
Lab: ABNORMAL
SARS-COV-2 AG UPPER RESP QL IA.RAPID: DETECTED

## 2024-01-05 PROCEDURE — 3079F DIAST BP 80-89 MM HG: CPT | Performed by: NURSE PRACTITIONER

## 2024-01-05 PROCEDURE — 1159F MED LIST DOCD IN RCRD: CPT | Performed by: NURSE PRACTITIONER

## 2024-01-05 PROCEDURE — 3074F SYST BP LT 130 MM HG: CPT | Performed by: NURSE PRACTITIONER

## 2024-01-05 PROCEDURE — 99213 OFFICE O/P EST LOW 20 MIN: CPT | Performed by: NURSE PRACTITIONER

## 2024-01-05 PROCEDURE — 1160F RVW MEDS BY RX/DR IN RCRD: CPT | Performed by: NURSE PRACTITIONER

## 2024-01-05 PROCEDURE — 87428 SARSCOV & INF VIR A&B AG IA: CPT | Performed by: NURSE PRACTITIONER

## 2024-01-05 RX ORDER — BENZONATATE 100 MG/1
100 CAPSULE ORAL 3 TIMES DAILY PRN
Qty: 30 CAPSULE | Refills: 0 | Status: SHIPPED | OUTPATIENT
Start: 2024-01-05

## 2024-01-05 NOTE — PROGRESS NOTES
Chief Complaint  Cough and Fatigue (Started Wednesday )     Subjective:      History of Present Illness {CC  Problem List  Visit  Diagnosis   Encounters  Notes  Medications  Labs  Result Review Imaging  Media :23}     Marin Cuenca presents to [facility] for:      Covid/ flu like symptoms:     Onset: Wednesday      Most common symptoms include:  [] Fever  [x] Dry cough  [x] Tiredness / fatigue     Less common symptoms:  [] Body aches and pains  [] Sore throat  [] Diarrhea  [] Conjunctivitis  [x] Headache  [] Loss of taste or smell  [] a rash on skin, or discoloration of fingers or toes    Serious symptoms: [x] Denies   [] Difficulty breathing or shortness of breath  [] Chest pain or pressure  [] Loss of speech of movement    Symptom onset:   [] Gradual   [] Sudden     Symptom progression:   [] Improving  [] Worsening   [x] Unchanged     Known COVID exposure:  [x] Yes: wife positive this week.   [] No  [] Unknown     Vaccinated:   [x] Yes  [] Boosted   [] No     I have reviewed patient's medical history, any new submitted information provided by patient or medical assistant and updated medical record.      Objective:      Physical Exam  Constitutional:       General: He is not in acute distress.     Appearance: Normal appearance. He is not ill-appearing.   Eyes:      Conjunctiva/sclera: Conjunctivae normal.   Cardiovascular:      Rate and Rhythm: Normal rate and regular rhythm.      Pulses: Normal pulses.      Heart sounds: Normal heart sounds.   Lymphadenopathy:      Cervical: No cervical adenopathy.   Neurological:      Mental Status: He is oriented to person, place, and time.        Result Review  Data Reviewed:{ Labs  Result Review  Imaging  Med Tab  Media :23}                POCT SARS-CoV-2 Antigen CHRISTINE + Flu (01/05/2024 10:48 AM)   POC: Covid: positive     Vital Signs:   /82 (BP Location: Left arm, Patient Position: Sitting, Cuff Size: Adult)    "Pulse 73   Ht 182.9 cm (72\")   Wt 96 kg (211 lb 9.6 oz)   SpO2 99%   BMI 28.70 kg/m²         Requested Prescriptions     Signed Prescriptions Disp Refills    Nirmatrelvir & Ritonavir, 300mg/100mg, (PAXLOVID) 20 x 150 MG & 10 x 100MG tablet therapy pack tablet 30 tablet 0     Sig: Take 3 tablets by mouth 2 (Two) Times a Day.    benzonatate (Tessalon Perles) 100 MG capsule 30 capsule 0     Sig: Take 1 capsule by mouth 3 (Three) Times a Day As Needed for Cough.       Routine medications provided by this office will also be refilled via pharmacy request.       Current Outpatient Medications:     aspirin 81 MG tablet, Take 1 tablet by mouth Daily. (Patient taking differently: Take 1 tablet by mouth 3 (Three) Times a Week. Stopped 12/27/2021  Indications: Ok to restart on Friday 1/7/22), Disp: 100 tablet, Rfl: 3    atorvastatin (LIPITOR) 20 MG tablet, TAKE ONE TABLET BY MOUTH DAILY, Disp: 90 tablet, Rfl: 1    Calcium Carb-Cholecalciferol 500-600 MG-UNIT chewable tablet, Chew 1 tablet 2 (Two) Times a Day., Disp: 180 tablet, Rfl: 3    cycloSPORINE (RESTASIS) 0.05 % ophthalmic emulsion, , Disp: , Rfl:     esomeprazole (nexIUM) 40 MG capsule, TAKE 1 CAPSULE TWICE DAILY, Disp: 180 capsule, Rfl: 1    fexofenadine (ALLEGRA) 180 MG tablet, Take 1 tablet by mouth Daily., Disp: , Rfl:     finasteride (PROSCAR) 5 MG tablet, TAKE ONE TABLET BY MOUTH EVERY MORNING, Disp: 90 tablet, Rfl: 1    irbesartan (AVAPRO) 75 MG tablet, TAKE ONE TABLET BY MOUTH DAILY, Disp: 90 tablet, Rfl: 1    levothyroxine (SYNTHROID, LEVOTHROID) 100 MCG tablet, TAKE 1 TABLET BY MOUTH DAILY, Disp: 90 tablet, Rfl: 1    pregabalin (LYRICA) 100 MG capsule, TAKE 1 CAPSULE 3 TIMES A   DAY, Disp: 270 capsule, Rfl: 1    tamsulosin (FLOMAX) 0.4 MG capsule 24 hr capsule, Take 1 capsule by mouth Daily., Disp: 90 capsule, Rfl: 1    benzonatate (Tessalon Perles) 100 MG capsule, Take 1 capsule by mouth 3 (Three) Times a Day As Needed for Cough., Disp: 30 capsule, Rfl: " 0    Nirmatrelvir & Ritonavir, 300mg/100mg, (PAXLOVID) 20 x 150 MG & 10 x 100MG tablet therapy pack tablet, Take 3 tablets by mouth 2 (Two) Times a Day., Disp: 30 tablet, Rfl: 0     Assessment and Plan:      Assessment and Plan {CC Problem List  Visit Diagnosis  ROS  Review (Popup)  Health Maintenance  Quality  BestPractice  Medications  SmartSets  SnapShot Encounters  Media :23}     Problem List Items Addressed This Visit    None  Visit Diagnoses       Acute cough    -  Primary    Relevant Medications    benzonatate (Tessalon Perles) 100 MG capsule    Other Relevant Orders    POCT SARS-CoV-2 Antigen CHRISTINE + Flu (Completed)    COVID        Relevant Medications    Nirmatrelvir & Ritonavir, 300mg/100mg, (PAXLOVID) 20 x 150 MG & 10 x 100MG tablet therapy pack tablet            Follow Up {Instructions Charge Capture  Follow-up Communications :23}     Return if symptoms worsen or fail to improve.      Patient was given instructions and counseling regarding his condition or for health maintenance advice. Please see specific information pulled into the AVS if appropriate.    Dragon disclaimer:   Much of this encounter note is an electronic transcription/translation of spoken language to printed text. The electronic translation of spoken language may permit erroneous, or at times, nonsensical words or phrases to be inadvertently transcribed; Although I have reviewed the note for such errors, some may still exist.     Additional Patient Counseling:       Patient Instructions   Will send paxlovid today: advised to hold statin during treatment and for 4 days after completion.     Symptom Treatment:     Fever/ Chills / Headache / Body Aches:    Tylenol is recommended: if you are not getting any improvement you can alternate Tylenol and Ibuprofen (Ibuprofen should always be taken with food)      You may take over-the-counter Tylenol Cold/Flu Remedy (if you do - do not take additional tylenol as this will have  tylenol included)     We advise that patients stay well hydrated, particularly those patients with sustained or higher fevers, in whom insensible fluid losses may be significant.    ?Cough that is persistent, interferes with sleep, or causes discomfort can be managed with an over-the-counter cough medication.     Additionally take if checked and prescribed:   [x] Benzonatate 100 mg can be taken orally three times a day as needed.  Do not take with a hot beverage.       Breathing exercises  Pursed lip breathing exercises:   Sitting upright or slightly reclining, relax your neck and shoulder muscles.   With your mouth closed, inhale through the nose for 2 seconds, as if smelling a flower.   Exhale slowly (for 4 seconds if possible) through pursed lips, as if blowing out birthday candles.               Repeat inhalation and exhalation cycles for 2 minutes, several times a day and              when needed.   Deep breathing exercises:   Recline in bed or on a sofa with a pillow under your head and knees. If reclining is not possible, this may be done while sitting upright.   Place one hand on your belly, the other hand on your chest.   Slowly inhale through your nose; let your lungs fill with air, allowing your belly to rise. (The hand on the belly should move more than the hand on the chest.)   Breathe out through your nose, and as you exhale, feel your belly lower.   Repeat the inhalation and exhalation cycles for 2 to 5 minutes several times a day and when needed.       Worsening:      If you have new onset of shortness of breath, worsening shortness of breath, dizziness, and mental status changes such as confusion.   GO TO THE EMERGENCY ROOM OR CALL 911.       Isolation:     Current CDC guideline recommends isolation for 5 days from symptom onset and at least 24 hours being fever-free without taking fever reducer like Tylenol or Ibuprofen.       [facility] Coronavirus Hotline: 1.916.382.2806

## 2024-01-05 NOTE — PATIENT INSTRUCTIONS
Will send paxlovid today: advised to hold statin during treatment and for 4 days after completion.     Symptom Treatment:     Fever/ Chills / Headache / Body Aches:    Tylenol is recommended: if you are not getting any improvement you can alternate Tylenol and Ibuprofen (Ibuprofen should always be taken with food)      You may take over-the-counter Tylenol Cold/Flu Remedy (if you do - do not take additional tylenol as this will have tylenol included)     We advise that patients stay well hydrated, particularly those patients with sustained or higher fevers, in whom insensible fluid losses may be significant.    ?Cough that is persistent, interferes with sleep, or causes discomfort can be managed with an over-the-counter cough medication.     Additionally take if checked and prescribed:   [x] Benzonatate 100 mg can be taken orally three times a day as needed.  Do not take with a hot beverage.       Breathing exercises  Pursed lip breathing exercises:   Sitting upright or slightly reclining, relax your neck and shoulder muscles.   With your mouth closed, inhale through the nose for 2 seconds, as if smelling a flower.   Exhale slowly (for 4 seconds if possible) through pursed lips, as if blowing out birthday candles.               Repeat inhalation and exhalation cycles for 2 minutes, several times a day and              when needed.   Deep breathing exercises:   Recline in bed or on a sofa with a pillow under your head and knees. If reclining is not possible, this may be done while sitting upright.   Place one hand on your belly, the other hand on your chest.   Slowly inhale through your nose; let your lungs fill with air, allowing your belly to rise. (The hand on the belly should move more than the hand on the chest.)   Breathe out through your nose, and as you exhale, feel your belly lower.   Repeat the inhalation and exhalation cycles for 2 to 5 minutes several times a day and when needed.       Worsening:      If  you have new onset of shortness of breath, worsening shortness of breath, dizziness, and mental status changes such as confusion.   GO TO THE EMERGENCY ROOM OR CALL 911.       Isolation:     Current CDC guideline recommends isolation for 5 days from symptom onset and at least 24 hours being fever-free without taking fever reducer like Tylenol or Ibuprofen.       [place] Coronavirus Hotline: 1.301.413.1231

## 2024-02-02 ENCOUNTER — APPOINTMENT (OUTPATIENT)
Dept: GENERAL RADIOLOGY | Facility: HOSPITAL | Age: 74
End: 2024-02-02
Payer: MEDICARE

## 2024-02-02 ENCOUNTER — HOSPITAL ENCOUNTER (EMERGENCY)
Facility: HOSPITAL | Age: 74
Discharge: HOME | End: 2024-02-02
Attending: EMERGENCY MEDICINE
Payer: MEDICARE

## 2024-02-02 ENCOUNTER — APPOINTMENT (OUTPATIENT)
Dept: CT IMAGING | Facility: HOSPITAL | Age: 74
End: 2024-02-02
Payer: MEDICARE

## 2024-02-02 VITALS
OXYGEN SATURATION: 99 % | BODY MASS INDEX: 28.44 KG/M2 | DIASTOLIC BLOOD PRESSURE: 72 MMHG | SYSTOLIC BLOOD PRESSURE: 107 MMHG | HEART RATE: 62 BPM | WEIGHT: 210 LBS | RESPIRATION RATE: 20 BRPM | TEMPERATURE: 98.6 F | HEIGHT: 72 IN

## 2024-02-02 DIAGNOSIS — R19.7 VOMITING AND DIARRHEA: Primary | ICD-10-CM

## 2024-02-02 DIAGNOSIS — R11.10 VOMITING AND DIARRHEA: Primary | ICD-10-CM

## 2024-02-02 LAB
ALBUMIN SERPL-MCNC: 3.8 G/DL (ref 3.5–5.2)
ALBUMIN/GLOB SERPL: 1.6 G/DL
ALP SERPL-CCNC: 71 U/L (ref 39–117)
ALT SERPL W P-5'-P-CCNC: 14 U/L (ref 1–41)
ANION GAP SERPL CALCULATED.3IONS-SCNC: 10.3 MMOL/L (ref 5–15)
AST SERPL-CCNC: 14 U/L (ref 1–40)
BASOPHILS # BLD AUTO: 0.04 10*3/MM3 (ref 0–0.2)
BASOPHILS NFR BLD AUTO: 0.7 % (ref 0–1.5)
BILIRUB SERPL-MCNC: 0.6 MG/DL (ref 0–1.2)
BILIRUB UR QL STRIP: NEGATIVE
BUN SERPL-MCNC: 10 MG/DL (ref 8–23)
BUN/CREAT SERPL: 8.5 (ref 7–25)
CALCIUM SPEC-SCNC: 8.9 MG/DL (ref 8.6–10.5)
CHLORIDE SERPL-SCNC: 104 MMOL/L (ref 98–107)
CLARITY UR: CLEAR
CO2 SERPL-SCNC: 25.7 MMOL/L (ref 22–29)
COLOR UR: YELLOW
CREAT SERPL-MCNC: 1.18 MG/DL (ref 0.76–1.27)
DEPRECATED RDW RBC AUTO: 41.7 FL (ref 37–54)
EGFRCR SERPLBLD CKD-EPI 2021: 64.8 ML/MIN/1.73
EOSINOPHIL # BLD AUTO: 0.03 10*3/MM3 (ref 0–0.4)
EOSINOPHIL NFR BLD AUTO: 0.6 % (ref 0.3–6.2)
ERYTHROCYTE [DISTWIDTH] IN BLOOD BY AUTOMATED COUNT: 12.3 % (ref 12.3–15.4)
GLOBULIN UR ELPH-MCNC: 2.4 GM/DL
GLUCOSE BLDC GLUCOMTR-MCNC: 130 MG/DL (ref 70–130)
GLUCOSE SERPL-MCNC: 120 MG/DL (ref 65–99)
GLUCOSE UR STRIP-MCNC: NEGATIVE MG/DL
HCT VFR BLD AUTO: 42.2 % (ref 37.5–51)
HGB BLD-MCNC: 14.1 G/DL (ref 13–17.7)
HGB UR QL STRIP.AUTO: NEGATIVE
HOLD SPECIMEN: NORMAL
HOLD SPECIMEN: NORMAL
IMM GRANULOCYTES # BLD AUTO: 0.02 10*3/MM3 (ref 0–0.05)
IMM GRANULOCYTES NFR BLD AUTO: 0.4 % (ref 0–0.5)
KETONES UR QL STRIP: NEGATIVE
LEUKOCYTE ESTERASE UR QL STRIP.AUTO: NEGATIVE
LYMPHOCYTES # BLD AUTO: 0.72 10*3/MM3 (ref 0.7–3.1)
LYMPHOCYTES NFR BLD AUTO: 13.2 % (ref 19.6–45.3)
MAGNESIUM SERPL-MCNC: 2 MG/DL (ref 1.6–2.4)
MCH RBC QN AUTO: 31.1 PG (ref 26.6–33)
MCHC RBC AUTO-ENTMCNC: 33.4 G/DL (ref 31.5–35.7)
MCV RBC AUTO: 93.2 FL (ref 79–97)
MONOCYTES # BLD AUTO: 0.47 10*3/MM3 (ref 0.1–0.9)
MONOCYTES NFR BLD AUTO: 8.6 % (ref 5–12)
NEUTROPHILS NFR BLD AUTO: 4.17 10*3/MM3 (ref 1.7–7)
NEUTROPHILS NFR BLD AUTO: 76.5 % (ref 42.7–76)
NITRITE UR QL STRIP: NEGATIVE
NRBC BLD AUTO-RTO: 0.2 /100 WBC (ref 0–0.2)
PH UR STRIP.AUTO: 6.5 [PH] (ref 5–8)
PLATELET # BLD AUTO: 182 10*3/MM3 (ref 140–450)
PMV BLD AUTO: 10.3 FL (ref 6–12)
POTASSIUM SERPL-SCNC: 3.9 MMOL/L (ref 3.5–5.2)
PROT SERPL-MCNC: 6.2 G/DL (ref 6–8.5)
PROT UR QL STRIP: NEGATIVE
RBC # BLD AUTO: 4.53 10*6/MM3 (ref 4.14–5.8)
SODIUM SERPL-SCNC: 140 MMOL/L (ref 136–145)
SP GR UR STRIP: 1.01 (ref 1–1.03)
TROPONIN T SERPL HS-MCNC: 11 NG/L
UROBILINOGEN UR QL STRIP: NORMAL
WBC NRBC COR # BLD AUTO: 5.45 10*3/MM3 (ref 3.4–10.8)
WHOLE BLOOD HOLD COAG: NORMAL
WHOLE BLOOD HOLD SPECIMEN: NORMAL

## 2024-02-02 PROCEDURE — 84484 ASSAY OF TROPONIN QUANT: CPT | Performed by: EMERGENCY MEDICINE

## 2024-02-02 PROCEDURE — 93010 ELECTROCARDIOGRAM REPORT: CPT | Performed by: INTERNAL MEDICINE

## 2024-02-02 PROCEDURE — 74176 CT ABD & PELVIS W/O CONTRAST: CPT

## 2024-02-02 PROCEDURE — 71045 X-RAY EXAM CHEST 1 VIEW: CPT

## 2024-02-02 PROCEDURE — 85025 COMPLETE CBC W/AUTO DIFF WBC: CPT

## 2024-02-02 PROCEDURE — 80053 COMPREHEN METABOLIC PANEL: CPT | Performed by: EMERGENCY MEDICINE

## 2024-02-02 PROCEDURE — 36415 COLL VENOUS BLD VENIPUNCTURE: CPT

## 2024-02-02 PROCEDURE — 25810000003 LACTATED RINGERS SOLUTION: Performed by: EMERGENCY MEDICINE

## 2024-02-02 PROCEDURE — 82948 REAGENT STRIP/BLOOD GLUCOSE: CPT

## 2024-02-02 PROCEDURE — 83735 ASSAY OF MAGNESIUM: CPT | Performed by: EMERGENCY MEDICINE

## 2024-02-02 PROCEDURE — 96374 THER/PROPH/DIAG INJ IV PUSH: CPT

## 2024-02-02 PROCEDURE — 81003 URINALYSIS AUTO W/O SCOPE: CPT | Performed by: EMERGENCY MEDICINE

## 2024-02-02 PROCEDURE — 93005 ELECTROCARDIOGRAM TRACING: CPT | Performed by: EMERGENCY MEDICINE

## 2024-02-02 PROCEDURE — 99284 EMERGENCY DEPT VISIT MOD MDM: CPT

## 2024-02-02 PROCEDURE — 25010000002 ONDANSETRON PER 1 MG: Performed by: EMERGENCY MEDICINE

## 2024-02-02 RX ORDER — SODIUM CHLORIDE 0.9 % (FLUSH) 0.9 %
10 SYRINGE (ML) INJECTION AS NEEDED
Status: DISCONTINUED | OUTPATIENT
Start: 2024-02-02 | End: 2024-02-02 | Stop reason: HOSPADM

## 2024-02-02 RX ORDER — ONDANSETRON 2 MG/ML
4 INJECTION INTRAMUSCULAR; INTRAVENOUS ONCE
Status: COMPLETED | OUTPATIENT
Start: 2024-02-02 | End: 2024-02-02

## 2024-02-02 RX ADMIN — ONDANSETRON 4 MG: 2 INJECTION INTRAMUSCULAR; INTRAVENOUS at 17:29

## 2024-02-02 RX ADMIN — SODIUM CHLORIDE, POTASSIUM CHLORIDE, SODIUM LACTATE AND CALCIUM CHLORIDE 1000 ML: 600; 310; 30; 20 INJECTION, SOLUTION INTRAVENOUS at 17:29

## 2024-02-02 NOTE — ED NOTES
Pt to er via pv with c/o diarrhea, abdominal cramping, generalized weakness since yesterday. Pt came from urgent care and provider told him his blood pressure is low.

## 2024-02-02 NOTE — ED PROVIDER NOTES
EMERGENCY DEPARTMENT ENCOUNTER    Room Number:  28/28  PCP: Cara Zavaleta III, NP-C  Historian: Patient      HPI:  Chief Complaint: Abdominal pain vomiting diarrhea  A complete HPI/ROS/PMH/PSH/SH/FH are unobtainable due to: None  Context: Marin Cuenca is a 73 y.o. male who presents to the ED c/o abdominal pain vomiting diarrhea.  Patient states symptoms started yesterday afternoon.  Started having abdominal pain vomiting and diarrhea.  States he got progressively weak.  Patient states he had an episode where he felt like he was in a pass out.  Wife states he did not pass out.  No chest pain pressure tightness.  Patient went to urgent care today and was told his blood pressure was low.  Patient states he actually feels somewhat better.  Vomiting is slowed down and diarrhea has improved.  Patient states abdominal pain has persisted.            PAST MEDICAL HISTORY  Active Ambulatory Problems     Diagnosis Date Noted    Chronic LBP 07/18/2016    Lumbar radiculopathy 07/18/2016    Failed back syndrome of lumbar spine 07/18/2016    Essential hypertension 10/26/2016    Benign non-nodular prostatic hyperplasia with lower urinary tract symptoms 10/26/2016    Acquired hypothyroidism 10/26/2016    Knee pain, right 04/26/2017    Osteoarthritis due to rotator cuff tear 11/01/2017    Gastroesophageal reflux disease with esophagitis 11/01/2017    Obesity (BMI 30-39.9) 04/16/2018    KARLA on CPAP 04/16/2018    Special screening for malignant neoplasm of prostate 11/08/2018    Cervical radiculitis 09/24/2019    Prediabetes 09/03/2020    Osteoporosis 07/26/2022    Neuropathy 07/26/2022    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/02/2019    Gastroesophageal reflux disease 07/26/2022    Back pain 07/26/2022    Asymptomatic varicose veins of both lower extremities 07/26/2022    Controlled substance agreement signed 07/26/2022    Mixed hyperlipidemia 01/25/2023    Environmental allergies 05/16/2023     Resolved Ambulatory  Problems     Diagnosis Date Noted    Rotator cuff tear arthropathy, left 11/01/2017    Hypersomnia with sleep apnea 04/16/2018    Diverticulitis of large intestine without bleeding 05/03/2018    Prostate cancer screening 11/08/2018    Hypothyroidism 11/08/2018    Partial small bowel obstruction 05/17/2019    Acute kidney injury 05/17/2019    Acute deep vein thrombosis (DVT) of distal vein of right lower extremity 11/11/2019    Sprain of abdominal wall 06/01/2020    Viral gastroenteritis 11/13/2020    Close exposure to COVID-19 virus 11/18/2020    Skin infection 12/04/2020    Deep vein thrombosis (DVT) 07/26/2022     Past Medical History:   Diagnosis Date    Arthritis     Ramirez esophagus     Benign prostatic hypertrophy     Cataract     Ear pain, left     GERD (gastroesophageal reflux disease)     History of MRSA infection     Hypertension     Low back pain     Neck pain 12/2021         PAST SURGICAL HISTORY  Past Surgical History:   Procedure Laterality Date    ANTERIOR CERVICAL DISCECTOMY W/ FUSION N/A 1/3/2022    Procedure: Cervical 4 to cervical 5 and cervical 5 to cervical 6 anterior cervical discectomy, fusion and instrumentation;  Surgeon: Wayne Meneses MD;  Location: [facility];  Service: Neurosurgery;  Laterality: N/A;    CYSTOSCOPY      EPIDURAL BLOCK      EYE SURGERY Right     HEMORRHOIDECTOMY      KNEE SURGERY Left     scoped    LUMBAR LAMINECTOMY      LUMBAR LAMINECTOMY DISCECTOMY DECOMPRESSION Left 10/18/2019    Procedure: Left L4-5 laminectomy and neural lysis;  Surgeon: Wayne Meneses MD;  Location: [facility];  Service: Neurosurgery    NISSEN FUNDOPLICATION      UPPER GASTROINTESTINAL ENDOSCOPY           FAMILY HISTORY  Family History   Problem Relation Age of Onset    Heart disease Father         cardiovascular disease    Diabetes Father     Hypertension Father     Cancer Paternal Grandmother     Malig Hyperthermia Neg Hx          SOCIAL HISTORY  Social History     Socioeconomic  History    Marital status:    Tobacco Use    Smoking status: Never    Smokeless tobacco: Never   Vaping Use    Vaping Use: Never used   Substance and Sexual Activity    Alcohol use: Yes     Comment: OCCAS    Drug use: No     Comment: Consumes 3 caffeinated beverages per day usually coffee.    Sexual activity: Defer         ALLERGIES  Hydromorphone hcl and Tetracyclines & related        REVIEW OF SYSTEMS  Review of Systems   \Abdominal pain vomiting diarrhea      PHYSICAL EXAM  ED Triage Vitals   Temp Heart Rate Resp BP SpO2   02/02/24 1620 02/02/24 1619 02/02/24 1619 02/02/24 1623 02/02/24 1619   98.6 °F (37 °C) 103 20 107/72 97 %      Temp src Heart Rate Source Patient Position BP Location FiO2 (%)   -- -- -- -- --              Physical Exam      GENERAL: no acute distress  HENT: nares patent  EYES: no scleral icterus  CV: regular rhythm, normal rate  RESPIRATORY: normal effort  ABDOMEN: soft  MUSCULOSKELETAL: no deformity  NEURO: alert, moves all extremities, follows commands  PSYCH:  calm, cooperative  SKIN: warm, dry    Vital signs and nursing notes reviewed.          LAB RESULTS  Recent Results (from the past 24 hour(s))   Covid-19 + Flu A&B AG, Veritor    Collection Time: 02/02/24  3:41 PM    Specimen: Swab   Result Value Ref Range    SARS Antigen Not Detected Not Detected, Presumptive Negative    Influenza A Antigen CHRISTINE Not Detected Not Detected    Influenza B Antigen CHRISTINE Not Detected Not Detected    Internal Control Passed Passed    Lot Number [id number]     Expiration Date 01/16/25    Comprehensive Metabolic Panel    Collection Time: 02/02/24  4:30 PM    Specimen: Arm, Left; Blood   Result Value Ref Range    Glucose 120 (H) 65 - 99 mg/dL    BUN 10 8 - 23 mg/dL    Creatinine 1.18 0.76 - 1.27 mg/dL    Sodium 140 136 - 145 mmol/L    Potassium 3.9 3.5 - 5.2 mmol/L    Chloride 104 98 - 107 mmol/L    CO2 25.7 22.0 - 29.0 mmol/L    Calcium 8.9 8.6 - 10.5 mg/dL    Total Protein 6.2 6.0 - 8.5 g/dL    Albumin  3.8 3.5 - 5.2 g/dL    ALT (SGPT) 14 1 - 41 U/L    AST (SGOT) 14 1 - 40 U/L    Alkaline Phosphatase 71 39 - 117 U/L    Total Bilirubin 0.6 0.0 - 1.2 mg/dL    Globulin 2.4 gm/dL    A/G Ratio 1.6 g/dL    BUN/Creatinine Ratio 8.5 7.0 - 25.0    Anion Gap 10.3 5.0 - 15.0 mmol/L    eGFR 64.8 >60.0 mL/min/1.73   Single High Sensitivity Troponin T    Collection Time: 02/02/24  4:30 PM    Specimen: Arm, Left; Blood   Result Value Ref Range    HS Troponin T 11 <22 ng/L   Magnesium    Collection Time: 02/02/24  4:30 PM    Specimen: Arm, Left; Blood   Result Value Ref Range    Magnesium 2.0 1.6 - 2.4 mg/dL   Green Top (Gel)    Collection Time: 02/02/24  4:30 PM   Result Value Ref Range    Extra Tube Hold for add-ons.    Lavender Top    Collection Time: 02/02/24  4:30 PM   Result Value Ref Range    Extra Tube hold for add-on    Gold Top - SST    Collection Time: 02/02/24  4:30 PM   Result Value Ref Range    Extra Tube Hold for add-ons.    Light Blue Top    Collection Time: 02/02/24  4:30 PM   Result Value Ref Range    Extra Tube Hold for add-ons.    CBC Auto Differential    Collection Time: 02/02/24  4:30 PM    Specimen: Arm, Left; Blood   Result Value Ref Range    WBC 5.45 3.40 - 10.80 10*3/mm3    RBC 4.53 4.14 - 5.80 10*6/mm3    Hemoglobin 14.1 13.0 - 17.7 g/dL    Hematocrit 42.2 37.5 - 51.0 %    MCV 93.2 79.0 - 97.0 fL    MCH 31.1 26.6 - 33.0 pg    MCHC 33.4 31.5 - 35.7 g/dL    RDW 12.3 12.3 - 15.4 %    RDW-SD 41.7 37.0 - 54.0 fl    MPV 10.3 6.0 - 12.0 fL    Platelets 182 140 - 450 10*3/mm3    Neutrophil % 76.5 (H) 42.7 - 76.0 %    Lymphocyte % 13.2 (L) 19.6 - 45.3 %    Monocyte % 8.6 5.0 - 12.0 %    Eosinophil % 0.6 0.3 - 6.2 %    Basophil % 0.7 0.0 - 1.5 %    Immature Grans % 0.4 0.0 - 0.5 %    Neutrophils, Absolute 4.17 1.70 - 7.00 10*3/mm3    Lymphocytes, Absolute 0.72 0.70 - 3.10 10*3/mm3    Monocytes, Absolute 0.47 0.10 - 0.90 10*3/mm3    Eosinophils, Absolute 0.03 0.00 - 0.40 10*3/mm3    Basophils, Absolute 0.04 0.00 -  0.20 10*3/mm3    Immature Grans, Absolute 0.02 0.00 - 0.05 10*3/mm3    nRBC 0.2 0.0 - 0.2 /100 WBC   POC Glucose Once    Collection Time: 02/02/24  5:02 PM    Specimen: Blood   Result Value Ref Range    Glucose 130 70 - 130 mg/dL   ECG 12 Lead ED Triage Standing Order; Weak / Dizzy / AMS    Collection Time: 02/02/24  5:12 PM   Result Value Ref Range    QT Interval 360 ms    QTC Interval 428 ms   Urinalysis With Microscopic If Indicated (No Culture) - Urine, Clean Catch    Collection Time: 02/02/24  7:13 PM    Specimen: Urine, Clean Catch   Result Value Ref Range    Color, UA Yellow Yellow, Straw    Appearance, UA Clear Clear    pH, UA 6.5 5.0 - 8.0    Specific Gravity, UA 1.006 1.005 - 1.030    Glucose, UA Negative Negative    Ketones, UA Negative Negative    Bilirubin, UA Negative Negative    Blood, UA Negative Negative    Protein, UA Negative Negative    Leuk Esterase, UA Negative Negative    Nitrite, UA Negative Negative    Urobilinogen, UA 0.2 E.U./dL 0.2 - 1.0 E.U./dL       Ordered the above labs and reviewed the results.        RADIOLOGY  CT Abdomen Pelvis Without Contrast    Result Date: 2/2/2024  CT ABDOMEN AND PELVIS WITHOUT IV CONTRAST  HISTORY: Abdominal pain, diarrhea  TECHNIQUE: Radiation dose reduction techniques were utilized, including automated exposure control and exposure modulation based on body size. 3 mm images were obtained through the abdomen and pelvis without IV contrast.  COMPARISON: CT abdomen pelvis 5/17/2019  FINDINGS: Somewhat nodular opacification within the perihilar aspect of the visualized right middle lobe superiorly there is a moderate hiatal hernia. Air-fluid levels are present within the small bowel which is otherwise nondistended, measuring up to approximately 2.7 cm in diameter. The appendix is unremarkable. The gallbladder is mildly distended, new since 5/17/2019. Cystic density lesion within the left hepatic lobe is likely benign. The pancreas, spleen and adrenal glands  have an unremarkable noncontrast CT appearance. Subcentimeter renal lesions are too small to characterize. No hydronephrosis. No abdominal pelvic adenopathy by size criteria. There is colonic diverticulosis. Short segment of apparent fat stranding surrounding small bowel loops within the lower abdomen. No free intraperitoneal air is seen.  No suspicious lytic or blastic osseous lesions.      1.  Please note evaluation is suboptimal intravenous contrast. 2.  A few air-fluid levels within the small bowel with what appears to be a subtle area of groundglass opacification surrounding small bowel loops within the mid to lower abdomen suggestive of enteritis in the appropriate context 3.  Mild distention of the gallbladder. Findings can be seen in setting of early cholecystitis in the appropriate context and correlation with patient history is recommended with follow-up HIDA scan if clinically indicated. 4.  Somewhat nodular opacification within the perihilar aspect of the right right middle lobe incompletely visualized. Further evaluation with chest CT is recommended to exclude pulmonary nodule and/or neoplasm and establish appropriate follow-up. 5.  Other findings above.    This report was finalized on 2/2/2024 8:04 PM by Dr. Nakul Nugent M.D on Workstation: [email or web address]      XR Chest 1 View    Result Date: 2/2/2024  CHEST SINGLE VIEW  HISTORY: Weakness and abdominal pain. Diarrhea.  COMPARISON: None  FINDINGS: Patient is somewhat rotated to the right and allowing for this, the cardiomediastinal silhouette is within normal limits. There are no previous chest x-rays for comparison. Thoracic aorta appears tortuous. There is mild elevation of the right hemidiaphragm. Lungs appear clear of focal airspace disease and there is no evidence for pulmonary edema or pleural effusion. Cervical spine plate and screws are evident. There is a suspected hiatal hernia.      No convincing evidence for active disease in the chest. Suspect  hiatal hernia.  This report was finalized on 2/2/2024 7:09 PM by Dr. Vidal Zambrano M.D on Workstation: [email or web address]       Ordered the above noted radiological studies.  Chest x-ray independently interpreted by me and shows no evidence of pneumonia          PROCEDURES  Procedures    EKG          EKG time: 1712  Rhythm/Rate: Normal sinus rhythm 85  P waves and MA: Normal P waves  QRS, axis: Normal QRS  ST and T waves: Nonspecific ST-T wave    Interpreted Contemporaneously by me, independently viewed  Unchanged compared to prior 12/30/2021      MEDICATIONS GIVEN IN ER  Medications   sodium chloride 0.9 % flush 10 mL (has no administration in time range)   lactated ringers bolus 1,000 mL (1,000 mL Intravenous New Bag 2/2/24 1729)   ondansetron (ZOFRAN) injection 4 mg (4 mg Intravenous Given 2/2/24 1729)                   MEDICAL DECISION MAKING, PROGRESS, and CONSULTS    All labs have been independently reviewed by me.  All radiology studies have been reviewed by me and I have also reviewed the radiology report.   EKG's independently viewed and interpreted by me.  Discussion below represents my analysis of pertinent findings related to patient's condition, differential diagnosis, treatment plan and final disposition.      Additional sources:  - Discussed/ obtained information from independent historians: None    - External (non-ED) record review: Epic reviewed and patient seen in urgent care today for diarrhea and hypotension    - Chronic or social conditions impacting care: None    - Shared decision making: None      Orders placed during this visit:  Orders Placed This Encounter   Procedures    XR Chest 1 View    CT Abdomen Pelvis Without Contrast    [facility] Draw    Comprehensive Metabolic Panel    Single High Sensitivity Troponin T    Magnesium    Urinalysis With Microscopic If Indicated (No Culture) - Urine, Clean Catch    CBC Auto Differential    NPO Diet NPO Type: Strict NPO    Undress & Gown    Continuous Pulse  Oximetry    Vital Signs    Orthostatic Blood Pressure    Oxygen Therapy- Nasal Cannula; Titrate 1-6 LPM Per SpO2; 90 - 95%    POC Glucose Once    POC Glucose Once    ECG 12 Lead ED Triage Standing Order; Weak / Dizzy / AMS    Insert Peripheral IV    Fall Precautions    CBC & Differential    Green Top (Gel)    Lavender Top    Gold Top - SST    Light Blue Top         Additional orders considered but not ordered:  None        Differential diagnosis includes but is not limited to:    Gastritis versus gastroenteritis versus obstruction      Independent interpretation of labs, radiology studies, and discussions with consultants:  ED Course as of 02/02/24 2032 Fri Feb 02, 2024 2017 20:17 EST  Patient presents for nausea and vomiting as well as diarrhea.  Symptoms seem to be improved.  Patient CT scan shows enteritis.  Patient's blood work normal.  States he feels significantly better after his fluids.  Will be discharged home.  Patient instructed to follow-up with primary provider and return here for any concerns. [SL]      ED Course User Index  [SL] Wayne Dan MD                 DIAGNOSIS  Final diagnoses:   Vomiting and diarrhea         DISPOSITION  DISCHARGE    Patient discharged in stable condition.    Reviewed implications of results, diagnosis, meds, responsibility to follow up, warning signs and symptoms of possible worsening, potential complications and reasons to return to ER, including worsening symptoms    Patient/Family voiced understanding of above instructions.    Discussed plan for discharge, as there is no emergent indication for admission. Patient referred to primary care provider for BP management due to today's BP. Pt/family is agreeable and understands need for follow up and repeat testing.  Pt is aware that discharge does not mean that nothing is wrong but it indicates no emergency is present that requires admission and they must continue care with follow-up as given below or physician of  their choice.     FOLLOW-UP  Cara Zavaleta III, NP-C  [address] [id number] [phone]    Schedule an appointment as soon as possible for a visit            Medication List        Changed      aspirin 81 MG tablet  Take 1 tablet by mouth Daily.  What changed:   when to take this  additional instructions                       Latest Documented Vital Signs:  As of 20:32 EST  BP- 107/72 HR- 62 Temp- 98.6 °F (37 °C) O2 sat- 99%              --    Please note that portions of this were completed with a voice recognition program.       Note Disclaimer: At [facility], we believe that sharing information builds trust and better relationships. You are receiving this note because you are receiving care at [facility] or recently visited. It is possible you will see health information before a provider has talked with you about it. This kind of information can be easy to misunderstand. To help you fully understand what it means for your health, we urge you to discuss this note with your provider.            Wayne Dan MD  02/02/24 2034

## 2024-02-04 DIAGNOSIS — E78.2 MIXED HYPERLIPIDEMIA: ICD-10-CM

## 2024-02-04 LAB
QT INTERVAL: 360 MS
QTC INTERVAL: 428 MS

## 2024-02-05 ENCOUNTER — NURSE TRIAGE (OUTPATIENT)
Dept: CALL CENTER | Facility: HOSPITAL | Age: 74
End: 2024-02-05
Payer: MEDICARE

## 2024-02-05 RX ORDER — ATORVASTATIN CALCIUM 20 MG/1
20 TABLET, FILM COATED ORAL DAILY
Qty: 90 TABLET | Refills: 1 | Status: SHIPPED | OUTPATIENT
Start: 2024-02-05

## 2024-02-05 NOTE — TELEPHONE ENCOUNTER
Advised most contagious while has fever and once goes 24 hours with no fever and with no fever lowering medications he is safe enough to be around others.  He read about the virus that can last for weeks.  Advised we can shed virus for weeks but at levels much lower than in the acute contagiouos phase, this is not 100% guarantee that he wont give it to someone but he should be less likely to.   Call office to reschedule appts.   Reason for Disposition  • Vomiting with diarrhea    Additional Information  • Negative: Shock suspected (e.g., cold/pale/clammy skin, too weak to stand, low BP, rapid pulse)  • Negative: Difficult to awaken or acting confused (e.g., disoriented, slurred speech)  • Negative: Sounds like a life-threatening emergency to the triager  • Negative: Vomiting occurs only while coughing  • Negative: Pregnant < 20 Weeks and nausea/vomiting began in early pregnancy (i.e., 4-8 weeks pregnant)  • Negative: Chest pain  • Negative: Headache is main symptom  • Negative: Vomiting red blood or black (coffee ground) material  • Negative: Vomiting and hernia is more painful or swollen than usual  • Negative: Recent head injury (within 3 days)  • Negative: Recent abdominal injury (within 7 days)  • Negative: Insulin-dependent diabetes and glucose > 240 mg/dL (13 mmol/L)  • Negative: Severe pain in one eye  • Negative: SEVERE vomiting (e.g., 6 or more times/day)  (Exception: Patient sounds well, is drinking liquids, does not sound dehydrated, and vomiting has lasted less than 24 hours.)  • Negative: MODERATE vomiting (e.g., 3 - 5 times/day) and age > 60 years  • Negative: Constant abdominal pain lasting > 2 hours  • Negative: High-risk adult (e.g., brain tumor, V-P shunt, hernia)  • Negative: Drinking very little and has signs of dehydration (e.g., no urine > 12 hours, very dry mouth, very lightheaded)  • Negative: Patient sounds very sick or weak to the triager  • Negative: Vomiting and abdomen looks much more  "swollen than usual  • Negative: Vomiting contains bile (green color)  • Negative: Fever > 103 F (39.4 C)  • Negative: Fever > 101 F (38.3 C) and over 60 years of age  • Negative: Fever > 100.0 F (37.8 C) and has a weak immune system (e.g., HIV positive, cancer chemo, organ transplant, splenectomy, chronic steroids)  • Negative: Fever > 100.0 F (37.8 C) and bedridden (e.g., CVA, chronic illness, recovering from surgery)  • Negative: Taking any of the following medications: digoxin (Lanoxin), lithium, theophylline, phenytoin (Dilantin)  • Negative: SEVERE headache and vomiting  • Negative: MILD to MODERATE vomiting (e.g., 1-5 times/day) and lasts > 48 hours (2 days)  • Negative: Fever present > 3 days (72 hours)  • Negative: Patient wants to be seen  • Negative: Vomiting a prescription medication  • Negative: Substance use (drug use) or unhealthy alcohol use, known or suspected  • Negative: Vomiting is a chronic symptom (recurrent or ongoing AND lasting > 4 weeks)  • Negative: Vomiting    Answer Assessment - Initial Assessment Questions  1. VOMITING SEVERITY: \"How many times have you vomited in the past 24 hours?\"      - MILD:  1 - 2 times/day     - MODERATE: 3 - 5 times/day, decreased oral intake without significant weight loss or symptoms of dehydration     - SEVERE: 6 or more times/day, vomits everything or nearly everything, with significant weight loss, symptoms of dehydration       None today  2. ONSET: \"When did the vomiting begin?\"       2/1/24  3. FLUIDS: \"What fluids or food have you vomited up today?\" \"Have you been able to keep any fluids down?\"      water  4. ABDOMEN PAIN: \"Are your having any abdomen pain?\" If Yes : \"How bad is it and what does it feel like?\" (e.g., crampy, dull, intermittent, constant)       None today  5. DIARRHEA: \"Is there any diarrhea?\" If Yes, ask: \"How many times today?\"       None today  6. CONTACTS: \"Is there anyone else in the family with the same symptoms?\"       unknown  7. " "CAUSE: \"What do you think is causing your vomiting?\"      Dx with noravirus  8. HYDRATION STATUS: \"Any signs of dehydration?\" (e.g., dry mouth [not only dry lips], too weak to stand) \"When did you last urinate?\"      Has urinated last night and this morning  9. OTHER SYMPTOMS: \"Do you have any other symptoms?\" (e.g., fever, headache, vertigo, vomiting blood or coffee grounds, recent head injury)      Was seen for weakness, N/V/D. He is getting better.   10. PREGNANCY: \"Is there any chance you are pregnant?\" \"When was your last menstrual period?\"        na    Protocols used: Vomiting-ADULT-[place]    "

## 2024-02-09 ENCOUNTER — TELEPHONE (OUTPATIENT)
Dept: INTERNAL MEDICINE | Facility: CLINIC | Age: 74
End: 2024-02-09
Payer: MEDICARE

## 2024-02-09 DIAGNOSIS — E03.9 ACQUIRED HYPOTHYROIDISM: Primary | ICD-10-CM

## 2024-02-09 DIAGNOSIS — R73.03 PRE-DIABETES: ICD-10-CM

## 2024-02-14 ENCOUNTER — OFFICE VISIT (OUTPATIENT)
Dept: INTERNAL MEDICINE | Facility: CLINIC | Age: 74
End: 2024-02-14
Payer: MEDICARE

## 2024-02-14 VITALS
DIASTOLIC BLOOD PRESSURE: 72 MMHG | SYSTOLIC BLOOD PRESSURE: 122 MMHG | OXYGEN SATURATION: 100 % | HEIGHT: 72 IN | BODY MASS INDEX: 28.33 KG/M2 | HEART RATE: 76 BPM | WEIGHT: 209.2 LBS

## 2024-02-14 DIAGNOSIS — Z00.00 MEDICARE ANNUAL WELLNESS VISIT, SUBSEQUENT: Primary | ICD-10-CM

## 2024-02-14 DIAGNOSIS — Z91.09 ENVIRONMENTAL ALLERGIES: ICD-10-CM

## 2024-02-14 DIAGNOSIS — M54.16 LUMBAR RADICULOPATHY: Chronic | ICD-10-CM

## 2024-02-14 DIAGNOSIS — I10 ESSENTIAL HYPERTENSION: Chronic | ICD-10-CM

## 2024-02-14 DIAGNOSIS — E03.9 ACQUIRED HYPOTHYROIDISM: Chronic | ICD-10-CM

## 2024-02-14 DIAGNOSIS — R73.03 PREDIABETES: Chronic | ICD-10-CM

## 2024-02-14 DIAGNOSIS — G89.29 CHRONIC LOW BACK PAIN WITH SCIATICA, SCIATICA LATERALITY UNSPECIFIED, UNSPECIFIED BACK PAIN LATERALITY: Chronic | ICD-10-CM

## 2024-02-14 DIAGNOSIS — G47.33 OSA ON CPAP: Chronic | ICD-10-CM

## 2024-02-14 DIAGNOSIS — E78.2 MIXED HYPERLIPIDEMIA: Chronic | ICD-10-CM

## 2024-02-14 DIAGNOSIS — K21.00 GASTROESOPHAGEAL REFLUX DISEASE WITH ESOPHAGITIS WITHOUT HEMORRHAGE: ICD-10-CM

## 2024-02-14 DIAGNOSIS — Z87.19 HISTORY OF BARRETT'S ESOPHAGUS: Chronic | ICD-10-CM

## 2024-02-14 DIAGNOSIS — N40.1 BENIGN NON-NODULAR PROSTATIC HYPERPLASIA WITH LOWER URINARY TRACT SYMPTOMS: Chronic | ICD-10-CM

## 2024-02-14 DIAGNOSIS — M54.40 CHRONIC LOW BACK PAIN WITH SCIATICA, SCIATICA LATERALITY UNSPECIFIED, UNSPECIFIED BACK PAIN LATERALITY: Chronic | ICD-10-CM

## 2024-03-01 ENCOUNTER — HOSPITAL ENCOUNTER (OUTPATIENT)
Dept: GENERAL RADIOLOGY | Facility: HOSPITAL | Age: 74
Discharge: HOME | End: 2024-03-01
Payer: MEDICARE

## 2024-03-01 ENCOUNTER — HOSPITAL ENCOUNTER (OUTPATIENT)
Dept: PAIN MEDICINE | Facility: HOSPITAL | Age: 74
Discharge: HOME | End: 2024-03-01
Payer: MEDICARE

## 2024-03-01 ENCOUNTER — ANESTHESIA EVENT (OUTPATIENT)
Dept: PAIN MEDICINE | Facility: HOSPITAL | Age: 74
End: 2024-03-01
Payer: MEDICARE

## 2024-03-01 ENCOUNTER — ANESTHESIA (OUTPATIENT)
Dept: PAIN MEDICINE | Facility: HOSPITAL | Age: 74
End: 2024-03-01
Payer: MEDICARE

## 2024-03-01 VITALS
OXYGEN SATURATION: 99 % | RESPIRATION RATE: 16 BRPM | DIASTOLIC BLOOD PRESSURE: 78 MMHG | HEART RATE: 78 BPM | TEMPERATURE: 98.9 F | SYSTOLIC BLOOD PRESSURE: 118 MMHG

## 2024-03-01 DIAGNOSIS — R52 PAIN: ICD-10-CM

## 2024-03-01 DIAGNOSIS — M54.12 CERVICAL RADICULITIS: Primary | ICD-10-CM

## 2024-03-01 PROCEDURE — 77003 FLUOROGUIDE FOR SPINE INJECT: CPT

## 2024-03-01 PROCEDURE — 25010000002 METHYLPREDNISOLONE PER 80 MG: Performed by: ANESTHESIOLOGY

## 2024-03-01 RX ORDER — FENTANYL CITRATE 50 UG/ML
50 INJECTION, SOLUTION INTRAMUSCULAR; INTRAVENOUS ONCE
Status: DISCONTINUED | OUTPATIENT
Start: 2024-03-01 | End: 2024-03-02 | Stop reason: HOSPADM

## 2024-03-01 RX ORDER — SODIUM CHLORIDE 0.9 % (FLUSH) 0.9 %
1-10 SYRINGE (ML) INJECTION AS NEEDED
Status: DISCONTINUED | OUTPATIENT
Start: 2024-03-01 | End: 2024-03-02 | Stop reason: HOSPADM

## 2024-03-01 RX ORDER — METHYLPREDNISOLONE ACETATE 80 MG/ML
80 INJECTION, SUSPENSION INTRA-ARTICULAR; INTRALESIONAL; INTRAMUSCULAR; SOFT TISSUE ONCE
Status: COMPLETED | OUTPATIENT
Start: 2024-03-01 | End: 2024-03-01

## 2024-03-01 RX ORDER — METHYLPREDNISOLONE ACETATE 80 MG/ML
80 INJECTION, SUSPENSION INTRA-ARTICULAR; INTRALESIONAL; INTRAMUSCULAR; SOFT TISSUE ONCE
Status: DISCONTINUED | OUTPATIENT
Start: 2024-03-01 | End: 2024-03-01 | Stop reason: SDUPTHER

## 2024-03-01 RX ORDER — MIDAZOLAM HYDROCHLORIDE 1 MG/ML
1 INJECTION INTRAMUSCULAR; INTRAVENOUS ONCE AS NEEDED
Status: DISCONTINUED | OUTPATIENT
Start: 2024-03-01 | End: 2024-03-01 | Stop reason: SDUPTHER

## 2024-03-01 RX ORDER — LIDOCAINE HYDROCHLORIDE 10 MG/ML
1 INJECTION, SOLUTION INFILTRATION; PERINEURAL ONCE
Status: DISCONTINUED | OUTPATIENT
Start: 2024-03-01 | End: 2024-03-02 | Stop reason: HOSPADM

## 2024-03-01 RX ORDER — MIDAZOLAM HYDROCHLORIDE 1 MG/ML
1 INJECTION INTRAMUSCULAR; INTRAVENOUS AS NEEDED
Status: DISCONTINUED | OUTPATIENT
Start: 2024-03-01 | End: 2024-03-02 | Stop reason: HOSPADM

## 2024-03-01 RX ORDER — LIDOCAINE HYDROCHLORIDE 10 MG/ML
1 INJECTION, SOLUTION INFILTRATION; PERINEURAL ONCE AS NEEDED
Status: DISCONTINUED | OUTPATIENT
Start: 2024-03-01 | End: 2024-03-02 | Stop reason: HOSPADM

## 2024-03-01 RX ORDER — IOPAMIDOL 408 MG/ML
10 INJECTION, SOLUTION INTRATHECAL
Status: DISCONTINUED | OUTPATIENT
Start: 2024-03-01 | End: 2024-03-02 | Stop reason: HOSPADM

## 2024-03-01 RX ORDER — FENTANYL CITRATE 50 UG/ML
50 INJECTION, SOLUTION INTRAMUSCULAR; INTRAVENOUS AS NEEDED
Status: DISCONTINUED | OUTPATIENT
Start: 2024-03-01 | End: 2024-03-02 | Stop reason: HOSPADM

## 2024-03-01 RX ORDER — IOPAMIDOL 408 MG/ML
12 INJECTION, SOLUTION INTRATHECAL
Status: DISCONTINUED | OUTPATIENT
Start: 2024-03-01 | End: 2024-03-02 | Stop reason: HOSPADM

## 2024-03-01 RX ADMIN — METHYLPREDNISOLONE ACETATE 80 MG: 80 INJECTION, SUSPENSION INTRA-ARTICULAR; INTRALESIONAL; INTRAMUSCULAR; SOFT TISSUE at 08:57

## 2024-03-01 NOTE — ANESTHESIA PROCEDURE NOTES
PAIN Epidural block    Pre-sedation assessment completed: 3/1/2024 8:52 AM    Patient reassessed immediately prior to procedure    Patient location during procedure: pain clinic  Start Time: 3/1/2024 8:53 AM  Stop Time: 3/1/2024 9:06 AM  Indication:procedure for pain  Performed By  Anesthesiologist: Socorro Solano MD  Preanesthetic Checklist  Completed: patient identified, IV checked, site marked, risks and benefits discussed, surgical consent, monitors and equipment checked, pre-op evaluation and timeout performed  Additional Notes  Fluoro used.    Prep:  Pt Position:prone  Sterile Tech:cap, gloves, mask and sterile barrier  Prep:chlorhexidine gluconate and isopropyl alcohol  Monitoring:blood pressure monitoring, continuous pulse oximetry and EKG  Procedure:Sedation: no     Approach:midline  Guidance: fluoroscopy  Location:cervical  Level:6-7  Needle Type:Tuohy  Needle Gauge:20  Aspiration:negative  Medications:  Preservative Free Saline:3mL  Isovue:0mL  Comments:No dye   Dx: cervical radiculopathyDepomedrol:80  Post Assessment:  Dressing:occlusive dressing applied  Pt Tolerance:patient tolerated the procedure well with no apparent complications  Complications:no

## 2024-03-01 NOTE — DISCHARGE INSTRUCTIONS
"Guide To Relieving And Avoiding Neck Pain    Exercise is important to help prevent and treat neck pain.  Good posture, exercise and avoiding injury will help to keep your neck healthy.    When the neck is strained or over worked symptoms may include headache, upper back pain, shoulder pain or arm pain.  Numbness or tingling in the fingers, dizziness or nausea may also occur.    Posture:    Avoid slumping over a desk.  Raise your work (including computer) to eye level to avoid bending at the neck.      Change Position Often:  Changing position prevents overuse of particular muscles.    Sleep On One Pillow:  Using to many pillows or to large of a pillow causes a \"kink\" in you neck.      Move and Exercise:  Living an active lifestyle is an important part of staying healthy.  Be sure to include the exercise to follow specifically for your neck.                    Range of Motion Exercises:  Do these exercises three times a day.  If you experience increased pain stop and contact your physician.  All exercises can be performed sitting or standing.        1.    2.   3.    1.  Place both hands behind you neck.  Gently tilt your neck backward so that you are looking at the ceiling.  Hold for a count of 10.    2.  Look straight facing forward.  Slowly tip your ear toward your right shoulder.  Do not force the motion.  Hold for a count of 10.  Bring head back to starting position and repeat to left side.    3.  Look straight facing forward.  Gently turn your head to the right.  Do not force the motion.  Hold for a count of 10.  Bring head back to starting position and repeat to the left side.    Exercises To Strengthen Muscles:  1.  Look straight facing forward.  Relax your shoulders.  Raise both shoulders toward your ears.  Hold for 3 seconds.       1.       2.   3.      1.  Look straight facing forward.  Relax your shoulders.  Raise both shoulders toward     2.  Raise your ars to your side and bend your elbows.  Squeeze " shoulder blades together as you rotate your arms outward.  Hold for 5 seconds.    3.  Look straight facing forward.  Pull your head straight back.  Do not tip or move your jaw.  Hold for 5 seconds.   EPIDURAL STEROID INJECTION          An epidural steroid injection is a shot of steroid medicine and numbing medicine that is given into the space between the spinal cord and the bones of the back (epidural space).  The injection helps relieve pain by an irritated or swollen nerve root.    TELL YOUR HEALTH CARE PROVIDER ABOUT:  Any allergies you have  All medicines you are taking including any over the counter medicines  Any blood disorders you have  Any surgeries you have had  Any medical conditions you have  Whether you are pregnant or may be pregnant    WHAT ARE THE RISK?  Generally, this is a safe procedure. However,problems may occur, including  Headache  Bleeding  Infection  Allergic Reaction  Nerve Damage    WHAT CAN I EXPECT AFTER THE PROCEDURE?    INJECTION SITE  Remove the Band-Aid/s after 24 hours  Check your injection site every day for signs of infection.  Check for:             Redness             Bleeding (small amt is normal)             Warmth             Pus or bad odor  Some numbness may be experienced for several hours following the procedure.  Avoid using heat on the injection site for 24 hours. You may use ice intermittently if needed by placing a         towel between your skin and the ice bag and using the ice for 20 minutes 2-3 times a day.  Do not take baths, swim or use a hot tub for 24 hours.    ACTIVITY  No strenuous activity for 24 hours then return to normal activity as tolerated.  If your leg is numb, no driving until full sensation and strength has returned.    GENERAL INSTRUCTIONS:  The injection site may feel numb, use ice with caution if numbness is present and no heat for 24 hours or until numbness is gone.   If you have numbness or weakness in your arm or leg, use those areas with  caution until normal sensation returns.  It is not uncommon to notice an increase in discomfort or a change in the location of discomfort for 3-4 days after the procedure.  If discomfort is noticed at the injection site, ice may be            applied to that area for 20 min 2-3 times a day.  Take the pain medicine your physician has prescribed or over the counter pain relievers as long as you do not have any contraindications.  If you are a diabetic, monitor your blood sugar closely.  The steroids used in your procedure may increase your blood sugar level up to 36 hours after the injection.  If your blood sugar is greater than 250, call the physician that helps you monitor your blood sugar.  Keep all follow-up visits as scheduled by your health care provider. This is important.    CONTACT OUR OFFICE IF:  You have any of these signs of infection            -Redness, swelling, or warmth around your injection site.            -Fluid or blood coming from your injection site (small amt of blood is normal)            -Pus or a bad odor from your injection site            -A fever  You develop a severe headache or a stiff neck  You lose control of your bladder or bowel movements      PAIN MANA[facility]EMENT [facility]ENTER HOURS   Monday-Friday 7:30 am. - 4:00 pm.  For any problem related to your procedure we can be reached at [phone].  If you experience an emergency with your procedure, call [phone] or go to the emergency room.

## 2024-03-01 NOTE — H&P
[facility]    History and Physical    Patient Name: Marin Cuenca  :  1950  MRN:  [id number]  Date of Admission: 3/1/2024    Subjective     Patient is a 73 y.o. male presents with chief complaint of chronic, moderate neck, shoulder: bilateral, and upper back/ shoulder pain (R>L).  Onset of symptoms was gradual starting several months ago.  Symptoms are associated/aggravated by nothing in particular or activity. Symptoms improve with injection - more than 75% improvement w/ prior GIO.  W/ time, pain has gradually returned though pain overall improved from where it started.  D/w Katiuska Cuenca beginning PT and encouraged him to do so.  GIO today.      Mri :  IMPRESSION:  1. No evidence of focal herniation, cord signal abnormality or cord  compression.   2. Multilevel degenerative disease involving the cervical spine is noted  as described in detail above including loss of disc height, broad-based  disc osteophyte complexes and foraminal stenosis. Foraminal stenosis on  the right is most prominent at C3-C4, C4-C5, to a lesser extent C5-C6.  Foraminal stenosis on the left is most prominent at C4-C5 and C5-C6. See  above.    The following portions of the patients history were reviewed and updated as appropriate: current medications, allergies, past medical history, past surgical history, past family history, past social history, and problem list                Objective     Past Medical History:   Past Medical History:   Diagnosis Date    Acute deep vein thrombosis (DVT) of distal vein of right lower extremity 2019    Arthritis     Ramirez esophagus     Benign prostatic hypertrophy     Cataract     saloni    Deep vein thrombosis (DVT) 2022    rt leg 2019    Ear pain, left     occ    GERD (gastroesophageal reflux disease)     History of MRSA infection     2010  blood bhl    Hypertension     Hypothyroidism     Low back pain     Neck pain 2021    CERVIVAL 4-6    KARLA on CPAP     CPAP     Osteoporosis      Past Surgical History:   Past Surgical History:   Procedure Laterality Date    ANTERIOR CERVICAL DISCECTOMY W/ FUSION N/A 1/3/2022    Procedure: Cervical 4 to cervical 5 and cervical 5 to cervical 6 anterior cervical discectomy, fusion and instrumentation;  Surgeon: Wayne Meneses MD;  Location: [facility];  Service: Neurosurgery;  Laterality: N/A;    CYSTOSCOPY      EPIDURAL BLOCK      EYE SURGERY Right     HEMORRHOIDECTOMY      KNEE SURGERY Left     scoped    LUMBAR LAMINECTOMY      LUMBAR LAMINECTOMY DISCECTOMY DECOMPRESSION Left 10/18/2019    Procedure: Left L4-5 laminectomy and neural lysis;  Surgeon: Wayne Meneses MD;  Location: [facility];  Service: Neurosurgery    NISSEN FUNDOPLICATION      UPPER GASTROINTESTINAL ENDOSCOPY       Family History:   Family History   Problem Relation Age of Onset    Heart disease Father         cardiovascular disease    Diabetes Father     Hypertension Father     Cancer Paternal Grandmother     Malig Hyperthermia Neg Hx      Social History:   Social History     Socioeconomic History    Marital status:    Tobacco Use    Smoking status: Never    Smokeless tobacco: Never   Vaping Use    Vaping Use: Never used   Substance and Sexual Activity    Alcohol use: Yes     Comment: OCCAS    Drug use: No     Comment: Consumes 3 caffeinated beverages per day usually coffee.    Sexual activity: Defer       Vital Signs Range for the last 24 hours  Temperature: Temp:  [37.2 °C (98.9 °F)] 37.2 °C (98.9 °F)   Temp Source: Temp src: Infrared   BP: BP: (138)/(105) 138/105   Pulse: Heart Rate:  [105] 105   Respirations: Resp:  [16] 16   SPO2: SpO2:  [97 %] 97 %   O2 Amount (l/min):     O2 Devices Device (Oxygen Therapy): room air   Weight:           --------------------------------------------------------------------------------    Current Outpatient Medications   Medication Sig Dispense Refill    aspirin 81 MG tablet Take 1 tablet by mouth Daily. (Patient  taking differently: Take 1 tablet by mouth 3 (Three) Times a Week. Stopped 12/27/2021  Indications: Ok to restart on Friday 1/7/22) 100 tablet 3    atorvastatin (LIPITOR) 20 MG tablet TAKE 1 TABLET BY MOUTH DAILY 90 tablet 1    Calcium Carb-Cholecalciferol 500-600 MG-UNIT chewable tablet Chew 1 tablet 2 (Two) Times a Day. 180 tablet 3    cycloSPORINE (RESTASIS) 0.05 % ophthalmic emulsion       esomeprazole (nexIUM) 40 MG capsule TAKE 1 CAPSULE TWICE DAILY 180 capsule 1    fexofenadine (ALLEGRA) 180 MG tablet Take 1 tablet by mouth Daily.      finasteride (PROSCAR) 5 MG tablet TAKE ONE TABLET BY MOUTH EVERY MORNING 90 tablet 1    irbesartan (AVAPRO) 75 MG tablet TAKE ONE TABLET BY MOUTH DAILY 90 tablet 1    levothyroxine (SYNTHROID, LEVOTHROID) 100 MCG tablet TAKE 1 TABLET BY MOUTH DAILY 90 tablet 1    pregabalin (LYRICA) 100 MG capsule TAKE 1 CAPSULE 3 TIMES A    capsule 1    tamsulosin (FLOMAX) 0.4 MG capsule 24 hr capsule Take 1 capsule by mouth Daily. 90 capsule 1     Current Facility-Administered Medications   Medication Dose Route Frequency Provider Last Rate Last Admin    fentaNYL citrate (PF) (SUBLIMAZE) injection 50 mcg  50 mcg Intravenous Once Socorro Solano MD        iopamidol (ISOVUE-M 200) injection 41%  10 mL Epidural Once in imaging Socorro Solano MD        lidocaine (XYLOCAINE) 1 % injection 1 mL  1 mL Intradermal Once Socorro Solano MD        methylPREDNISolone acetate (DEPO-medrol) injection 80 mg  80 mg Epidural Once Socorro Solano MD        midazolam (VERSED) injection 1 mg  1 mg Intravenous Once PRN Socorro Solano MD           --------------------------------------------------------------------------------  Assessment & Plan      Anesthesia Evaluation     Patient summary reviewed and Nursing notes reviewed   no history of anesthetic complications:                Airway   Mallampati: II  Dental      Pulmonary    (+) ,sleep  apnea on CPAP  Cardiovascular     (+) hypertension, DVT, hyperlipidemia      Neuro/Psych  (+) numbness  GI/Hepatic/Renal/Endo    (+) obesity, morbid obesity, GERD, thyroid problem hypothyroidism    Musculoskeletal     (+) back pain, neck pain, radiculopathy  Abdominal    Substance History - negative use     OB/GYN negative ob/gyn ROS         Other   arthritis,                  Diagnosis and Plan    Treatment Plan  ASA 3   Patient has had previous injection/procedure with % improvement.   Procedures: Cervical Epidural Steroid Injection(GIO), With fluoroscopy,      Anesthetic plan and risks discussed with patient.          Diagnosis     * Cervical radiculopathy [M54.12]

## 2024-03-16 DIAGNOSIS — I10 ESSENTIAL HYPERTENSION: ICD-10-CM

## 2024-03-18 RX ORDER — IRBESARTAN 75 MG/1
75 TABLET ORAL DAILY
Qty: 90 TABLET | Refills: 1 | Status: SHIPPED | OUTPATIENT
Start: 2024-03-18

## 2024-04-09 DIAGNOSIS — K21.00 GASTROESOPHAGEAL REFLUX DISEASE WITH ESOPHAGITIS: ICD-10-CM

## 2024-04-09 DIAGNOSIS — Z87.19 HISTORY OF BARRETT'S ESOPHAGUS: Chronic | ICD-10-CM

## 2024-04-09 RX ORDER — ESOMEPRAZOLE MAGNESIUM 40 MG/1
40 CAPSULE, DELAYED RELEASE ORAL 2 TIMES DAILY
Qty: 180 CAPSULE | Refills: 1 | Status: SHIPPED | OUTPATIENT
Start: 2024-04-09

## 2024-04-09 NOTE — TELEPHONE ENCOUNTER
Rx Refill Note  Requested Prescriptions     Pending Prescriptions Disp Refills    esomeprazole (nexIUM) 40 MG capsule [Pharmacy Med Name: ESOMEPRA MAG CAP 40MG DR] 180 capsule 1     Sig: TAKE 1 CAPSULE TWICE DAILY      Last office visit with prescribing clinician: 2/14/2024  Next office visit with prescribing clinician: 9/20/2024         Soni Benavides MA  04/09/24, 08:52 EDT

## 2024-04-25 DIAGNOSIS — M54.16 LUMBAR RADICULOPATHY: ICD-10-CM

## 2024-04-25 RX ORDER — PREGABALIN 100 MG/1
100 CAPSULE ORAL 3 TIMES DAILY
Qty: 270 CAPSULE | Refills: 1 | Status: SHIPPED | OUTPATIENT
Start: 2024-04-25 | End: 2024-04-26 | Stop reason: SDUPTHER

## 2024-04-25 NOTE — TELEPHONE ENCOUNTER
Caller: Marin Cuenca    Relationship: Self    Best call back number: [phone]    Requested Prescriptions:   Requested Prescriptions     Pending Prescriptions Disp Refills    pregabalin (LYRICA) 100 MG capsule 270 capsule 1     Sig: Take 1 capsule by mouth 3 (Three) Times a Day.        Pharmacy where request should be sent: [facility] [id number] [address] AT [facility] [phone]  - [phone] FX     Last office visit with prescribing clinician: 2/14/2024   Last telemedicine visit with prescribing clinician: Visit date not found   Next office visit with prescribing clinician: 9/20/2024     Additional details provided by patient: PATIENT HAS A 2 WEEK SUPPLY LEFT OF THIS MEDICATION.     Does the patient have less than a 3 day supply:  [] Yes  [x] No    Would you like a call back once the refill request has been completed: [] Yes [x] No    If the office needs to give you a call back, can they leave a voicemail: [] Yes [x] No    Jing Mancera Rep   04/25/24 11:37 EDT

## 2024-04-26 ENCOUNTER — TELEPHONE (OUTPATIENT)
Dept: INTERNAL MEDICINE | Facility: CLINIC | Age: 74
End: 2024-04-26
Payer: MEDICARE

## 2024-04-26 DIAGNOSIS — M54.16 LUMBAR RADICULOPATHY: ICD-10-CM

## 2024-04-26 RX ORDER — PREGABALIN 100 MG/1
100 CAPSULE ORAL 3 TIMES DAILY
Qty: 270 CAPSULE | Refills: 1 | Status: SHIPPED | OUTPATIENT
Start: 2024-04-26

## 2024-05-07 DIAGNOSIS — N40.1 BENIGN NON-NODULAR PROSTATIC HYPERPLASIA WITH LOWER URINARY TRACT SYMPTOMS: ICD-10-CM

## 2024-05-07 RX ORDER — FINASTERIDE 5 MG/1
TABLET, FILM COATED ORAL
Qty: 90 TABLET | Refills: 1 | Status: SHIPPED | OUTPATIENT
Start: 2024-05-07

## 2024-05-13 NOTE — TELEPHONE ENCOUNTER
Caller: Cuenca Marin Rayray    Relationship: Self    Best call back number: [phone]    Requested Prescriptions:   Requested Prescriptions     Pending Prescriptions Disp Refills    meloxicam (MOBIC) 15 MG tablet 30 tablet 1     Sig: Take 1 tablet by mouth Daily.        Pharmacy where request should be sent: [facility] [id number] [facility] [address] [place] [address] RD AT [facility]. - [phone]  - [phone] FX     Last office visit with prescribing clinician: 2/14/2024   Last telemedicine visit with prescribing clinician: Visit date not found   Next office visit with prescribing clinician: 9/20/2024     Additional details provided by patient: OUT OF MEDICATION     Does the patient have less than a 3 day supply:  [x] Yes  [] No    Jing Newberry Rep   05/13/24 11:13 EDT

## 2024-05-15 RX ORDER — MELOXICAM 15 MG/1
15 TABLET ORAL DAILY
Qty: 30 TABLET | Refills: 0 | Status: SHIPPED | OUTPATIENT
Start: 2024-05-15

## 2024-05-15 NOTE — TELEPHONE ENCOUNTER
Rx Refill Note  Requested Prescriptions     Pending Prescriptions Disp Refills    meloxicam (MOBIC) 15 MG tablet 30 tablet 1     Sig: Take 1 tablet by mouth Daily.      Last office visit with prescribing clinician: 2/14/2024  Next office visit with prescribing clinician: 9/20/2024         Soni Benavides MA  05/15/24, 15:46 EDT

## 2024-05-22 DIAGNOSIS — E03.9 ACQUIRED HYPOTHYROIDISM: ICD-10-CM

## 2024-05-22 RX ORDER — LEVOTHYROXINE SODIUM 0.1 MG/1
100 TABLET ORAL DAILY
Qty: 90 TABLET | Refills: 1 | Status: SHIPPED | OUTPATIENT
Start: 2024-05-22

## 2024-05-22 NOTE — TELEPHONE ENCOUNTER
Rx Refill Note  Requested Prescriptions     Pending Prescriptions Disp Refills    levothyroxine (SYNTHROID, LEVOTHROID) 100 MCG tablet [Pharmacy Med Name: LEVOTHYROXINE 100 MCG TABLET] 90 tablet 1     Sig: TAKE 1 TABLET BY MOUTH DAILY      Last office visit with prescribing clinician: 2/14/2024  Next office visit with prescribing clinician: 9/20/2024         Soni Benavides MA  05/22/24, 07:45 EDT

## 2024-06-20 ENCOUNTER — TELEPHONE (OUTPATIENT)
Dept: INTERNAL MEDICINE | Facility: CLINIC | Age: 74
End: 2024-06-20
Payer: MEDICARE

## 2024-06-20 NOTE — TELEPHONE ENCOUNTER
Left Voice Mail for pt regarding message.     If patient is wanting medication. Patient would need to make an appointment to discuss.     HUB OKAY TO READ

## 2024-06-20 NOTE — TELEPHONE ENCOUNTER
Caller: Marin Cuenca    Relationship: Self    Best call back number: [phone]     What medication are you requesting: FLEXERIL 5 MG     What are your current symptoms: SCIATICA FLARE UP    How long have you been experiencing symptoms: ONGOING    Have you had these symptoms before:    [x] Yes  [] No    Have you been treated for these symptoms before:   [x] Yes  [] No    If a prescription is needed, what is your preferred pharmacy and phone number: [facility] [id number] - [place] [id number] [facility]. - [phone] [facility] [phone] FX     Additional notes: PATIENT IS REQUESTING A PRESCRIPTION BE SENT TO HIS PHARMACY FOR THIS MEDICATION DUE TO A SCIATICA FLARE UP. HE IS IN A LOT OF DISCOMFORT RIGHT NOW.

## 2024-06-21 NOTE — TELEPHONE ENCOUNTER
Name: Marin Cuenca      Relationship: Self      Best Callback Number: [phone]      HUB PROVIDED THE RELAY MESSAGE FROM THE OFFICE      PATIENT: VOICED UNDERSTANDING AND HAS NO FURTHER QUESTIONS AT THIS TIME    ADDITIONAL INFORMATION: HAS APPOINTMENT SCHEDULED IN SEPTEMBER, WILL DISCUSS THEN

## 2024-07-16 NOTE — TELEPHONE ENCOUNTER
Caller: Marin Cuenca    Relationship: Self    Best call back number: [phone]     Caller requesting test results: YES    What test was performed: LAB WORK    When was the test performed: 07/26/22    Where was the test performed: IN OFFICE    Additional notes: PATIENT WOULD LIKE TO BE CONTACTED TO REVIEW LAB RESULTS             Detail Level: Detailed Detail Level: Generalized Detail Level: Zone

## 2024-07-23 DIAGNOSIS — E78.2 MIXED HYPERLIPIDEMIA: ICD-10-CM

## 2024-07-23 RX ORDER — ATORVASTATIN CALCIUM 20 MG/1
20 TABLET, FILM COATED ORAL DAILY
Qty: 90 TABLET | Refills: 1 | Status: SHIPPED | OUTPATIENT
Start: 2024-07-23

## 2024-08-30 DIAGNOSIS — K21.00 GASTROESOPHAGEAL REFLUX DISEASE WITH ESOPHAGITIS: ICD-10-CM

## 2024-08-30 DIAGNOSIS — Z87.19 HISTORY OF BARRETT'S ESOPHAGUS: Chronic | ICD-10-CM

## 2024-08-30 RX ORDER — ESOMEPRAZOLE MAGNESIUM 40 MG/1
40 CAPSULE, DELAYED RELEASE ORAL 2 TIMES DAILY
Qty: 180 CAPSULE | Refills: 1 | Status: SHIPPED | OUTPATIENT
Start: 2024-08-30

## 2024-08-30 NOTE — TELEPHONE ENCOUNTER
Rx Refill Note  Requested Prescriptions     Pending Prescriptions Disp Refills    esomeprazole (nexIUM) 40 MG capsule [Pharmacy Med Name: ESOMEPRA MAG CAP 40MG DR] 180 capsule 1     Sig: TAKE 1 CAPSULE TWICE DAILY      Last office visit with prescribing clinician: 2/14/2024  Next office visit with prescribing clinician: 9/20/2024         Soni Benavides MA  08/30/24, 11:39 EDT

## 2024-09-05 ENCOUNTER — LAB (OUTPATIENT)
Facility: HOSPITAL | Age: 74
End: 2024-09-05
Payer: MEDICARE

## 2024-09-05 DIAGNOSIS — I10 ESSENTIAL HYPERTENSION: Chronic | ICD-10-CM

## 2024-09-05 DIAGNOSIS — E03.9 ACQUIRED HYPOTHYROIDISM: Chronic | ICD-10-CM

## 2024-09-05 LAB
ANION GAP SERPL CALCULATED.3IONS-SCNC: 7.9 MMOL/L (ref 5–15)
BUN SERPL-MCNC: 12 MG/DL (ref 8–23)
BUN/CREAT SERPL: 10.7 (ref 7–25)
CALCIUM SPEC-SCNC: 9.3 MG/DL (ref 8.6–10.5)
CHLORIDE SERPL-SCNC: 101 MMOL/L (ref 98–107)
CO2 SERPL-SCNC: 27.1 MMOL/L (ref 22–29)
CREAT SERPL-MCNC: 1.12 MG/DL (ref 0.76–1.27)
EGFRCR SERPLBLD CKD-EPI 2021: 68.5 ML/MIN/1.73
GLUCOSE SERPL-MCNC: 96 MG/DL (ref 65–99)
POTASSIUM SERPL-SCNC: 4.3 MMOL/L (ref 3.5–5.2)
SODIUM SERPL-SCNC: 136 MMOL/L (ref 136–145)
TSH SERPL DL<=0.05 MIU/L-ACNC: 0.55 UIU/ML (ref 0.27–4.2)

## 2024-09-05 PROCEDURE — 84443 ASSAY THYROID STIM HORMONE: CPT

## 2024-09-05 PROCEDURE — 80048 BASIC METABOLIC PNL TOTAL CA: CPT

## 2024-09-05 PROCEDURE — 36415 COLL VENOUS BLD VENIPUNCTURE: CPT

## 2024-09-12 DIAGNOSIS — I10 ESSENTIAL HYPERTENSION: ICD-10-CM

## 2024-09-12 RX ORDER — IRBESARTAN 75 MG/1
75 TABLET ORAL DAILY
Qty: 90 TABLET | Refills: 1 | Status: SHIPPED | OUTPATIENT
Start: 2024-09-12

## 2024-09-20 ENCOUNTER — OFFICE VISIT (OUTPATIENT)
Dept: INTERNAL MEDICINE | Facility: CLINIC | Age: 74
End: 2024-09-20
Payer: MEDICARE

## 2024-09-20 VITALS
HEIGHT: 72 IN | DIASTOLIC BLOOD PRESSURE: 84 MMHG | BODY MASS INDEX: 28.09 KG/M2 | WEIGHT: 207.4 LBS | SYSTOLIC BLOOD PRESSURE: 130 MMHG | HEART RATE: 83 BPM | OXYGEN SATURATION: 99 %

## 2024-09-20 DIAGNOSIS — M54.12 CERVICAL RADICULITIS: ICD-10-CM

## 2024-09-20 DIAGNOSIS — M54.16 LUMBAR RADICULOPATHY: Chronic | ICD-10-CM

## 2024-09-20 DIAGNOSIS — I10 ESSENTIAL HYPERTENSION: Primary | Chronic | ICD-10-CM

## 2024-09-20 DIAGNOSIS — E03.9 ACQUIRED HYPOTHYROIDISM: Chronic | ICD-10-CM

## 2024-09-20 DIAGNOSIS — E78.2 MIXED HYPERLIPIDEMIA: Chronic | ICD-10-CM

## 2024-09-20 PROCEDURE — 3079F DIAST BP 80-89 MM HG: CPT | Performed by: NURSE PRACTITIONER

## 2024-09-20 PROCEDURE — 1160F RVW MEDS BY RX/DR IN RCRD: CPT | Performed by: NURSE PRACTITIONER

## 2024-09-20 PROCEDURE — 99214 OFFICE O/P EST MOD 30 MIN: CPT | Performed by: NURSE PRACTITIONER

## 2024-09-20 PROCEDURE — 1126F AMNT PAIN NOTED NONE PRSNT: CPT | Performed by: NURSE PRACTITIONER

## 2024-09-20 PROCEDURE — 3075F SYST BP GE 130 - 139MM HG: CPT | Performed by: NURSE PRACTITIONER

## 2024-09-20 PROCEDURE — 1159F MED LIST DOCD IN RCRD: CPT | Performed by: NURSE PRACTITIONER

## 2024-09-20 RX ORDER — ORPHENADRINE CITRATE 100 MG/1
100 TABLET, EXTENDED RELEASE ORAL 2 TIMES DAILY PRN
Qty: 40 TABLET | Refills: 2 | Status: SHIPPED | OUTPATIENT
Start: 2024-09-20

## 2024-09-20 RX ORDER — CYCLOBENZAPRINE HCL 10 MG
10 TABLET ORAL
COMMUNITY
Start: 2024-09-06 | End: 2024-09-20

## 2024-11-05 ENCOUNTER — TELEPHONE (OUTPATIENT)
Dept: INTERNAL MEDICINE | Facility: CLINIC | Age: 74
End: 2024-11-05
Payer: MEDICARE

## 2024-11-05 DIAGNOSIS — B00.1 FEVER BLISTER: ICD-10-CM

## 2024-11-05 RX ORDER — ACYCLOVIR 400 MG/1
400 TABLET ORAL 3 TIMES DAILY
Qty: 15 TABLET | Refills: 0 | Status: SHIPPED | OUTPATIENT
Start: 2024-11-05 | End: 2024-11-10

## 2024-11-05 NOTE — TELEPHONE ENCOUNTER
[facility] take one three times a day for 5 days.     Notify me for follow up if worsens or doesn't improve.     WCN

## 2024-11-05 NOTE — TELEPHONE ENCOUNTER
Caller: Marin Cuenca    Relationship: Self    Best call back number: [phone]     What medication are you requesting: MEDICATION TO TREAT FEVER BLISTERS     If a prescription is needed, what is your preferred pharmacy and phone number:      [facility] [id number] - [facility] [address] [facility] AT [facility]. - [phone]  - [phone] FX     Additional notes:  PATIENT STATED THAT THE MEDICATION HE IS REQUESTING WAS SOMETHING THAT WAS SENT IN ABOUT A YEAR AGO THAT REALLY HELPED, HE IS NOT SURE OF THE NAME OF IT. PLEASE ADVISE.

## 2024-11-06 NOTE — TELEPHONE ENCOUNTER
Attempted to call patient.     Left Voice Mail.     HUB OKAY TO READ     Acyclovir sent to [facility] - take one three times a day for 5 days.      Notify me for follow up if worsens or doesn't improve.

## 2024-11-09 DIAGNOSIS — N40.1 BENIGN NON-NODULAR PROSTATIC HYPERPLASIA WITH LOWER URINARY TRACT SYMPTOMS: ICD-10-CM

## 2024-11-11 RX ORDER — FINASTERIDE 5 MG/1
5 TABLET, FILM COATED ORAL EVERY MORNING
Qty: 90 TABLET | Refills: 1 | Status: SHIPPED | OUTPATIENT
Start: 2024-11-11

## 2024-11-16 DIAGNOSIS — M54.16 LUMBAR RADICULOPATHY: ICD-10-CM

## 2024-11-16 DIAGNOSIS — E03.9 ACQUIRED HYPOTHYROIDISM: ICD-10-CM

## 2024-11-18 RX ORDER — PREGABALIN 100 MG/1
100 CAPSULE ORAL 3 TIMES DAILY
Qty: 270 CAPSULE | Refills: 1 | Status: SHIPPED | OUTPATIENT
Start: 2024-11-18

## 2024-11-18 RX ORDER — LEVOTHYROXINE SODIUM 100 UG/1
100 TABLET ORAL DAILY
Qty: 90 TABLET | Refills: 1 | Status: SHIPPED | OUTPATIENT
Start: 2024-11-18

## 2024-11-18 NOTE — TELEPHONE ENCOUNTER
Rx Refill Note  Requested Prescriptions     Pending Prescriptions Disp Refills    pregabalin (LYRICA) 100 MG capsule [Pharmacy Med Name: PREGABALIN CAP 100MG] 270 capsule 1     Sig: TAKE 1 CAPSULE 3 TIMES A   DAY      Last office visit with prescribing clinician: 9/20/2024  Next office visit with prescribing clinician: 3/27/2025         Soni Benavides MA  11/18/24, 09:02 EST

## 2024-11-20 ENCOUNTER — TELEPHONE (OUTPATIENT)
Dept: INTERNAL MEDICINE | Facility: CLINIC | Age: 74
End: 2024-11-20
Payer: MEDICARE

## 2024-11-20 NOTE — TELEPHONE ENCOUNTER
Hub staff attempted to follow warm transfer process and was unsuccessful     Caller: Marin Cuenca    Relationship to patient: Self    Best call back number: [phone]     Patient is needing: PRIOR AUTHORIZATION FOR ESOMEPRAZOLE

## 2024-11-21 ENCOUNTER — TELEPHONE (OUTPATIENT)
Dept: INTERNAL MEDICINE | Facility: CLINIC | Age: 74
End: 2024-11-21
Payer: MEDICARE

## 2024-11-21 NOTE — TELEPHONE ENCOUNTER
PA was approved. Left Voice Mail for pt letting him know esomeprazole was approved on cover my meds. HUB OKAY TO READ

## 2024-11-21 NOTE — TELEPHONE ENCOUNTER
Caller: Marin Cuenca    Relationship to patient: Self    Best call back number: [phone]     Patient is needing: PATIENT WAS WANTING AN UPDATE OF PRIOR AUTH FOR ESOMEPRAZOLE. HE WAS GIVE THE NUMBER FOR [facility] THAT DEALS WITH THE PRIOR AUTH [phone], OPTION 2, THEN OPTION 1, THEN OPTION 1 AGAIN.

## 2024-12-03 ENCOUNTER — TELEPHONE (OUTPATIENT)
Dept: INTERNAL MEDICINE | Facility: CLINIC | Age: 74
End: 2024-12-03
Payer: MEDICARE

## 2024-12-03 DIAGNOSIS — K21.00 GASTROESOPHAGEAL REFLUX DISEASE WITH ESOPHAGITIS WITHOUT HEMORRHAGE: Primary | ICD-10-CM

## 2024-12-03 NOTE — TELEPHONE ENCOUNTER
Caller: Marin Cuenca    Relationship: Self    Best call back number:   ASK TO SPEAK TO AI    What is the medical concern/diagnosis:     What specialty or service is being requested: GASTRO    What is the provider, practice or medical service name: DR CARIDAD BURKETT    What is the office location:     What is the office phone number: [phone]    Any additional details: PATIENT IS CALLING IN TO ASK IF DR NORRIS WILL REFER HIM TO BE SEEN BY GASTRO PROVIDER DR CARIDAD BURKETT. PATIENT SAYS HE NEEDS THIS EXPEDITED AS HE IS HAVING ISSUES WITH A PAST HERNIA ISSUE(THE OFFICE HAS AN OPENING ON 12/16/24)

## 2025-01-17 ENCOUNTER — TELEPHONE (OUTPATIENT)
Dept: INTERNAL MEDICINE | Facility: CLINIC | Age: 75
End: 2025-01-17
Payer: MEDICARE

## 2025-01-17 RX ORDER — VALACYCLOVIR HYDROCHLORIDE 500 MG/1
500 TABLET, FILM COATED ORAL 2 TIMES DAILY
Qty: 14 TABLET | Refills: 0 | Status: SHIPPED | OUTPATIENT
Start: 2025-01-17 | End: 2025-01-24

## 2025-01-17 NOTE — TELEPHONE ENCOUNTER
Caller: Marin Cuenca    Relationship: Self    Best call back number: [phone]     What are your current symptoms: FEVER BLISTER    Have you had these symptoms before:    [x] Yes  [] No    Have you been treated for these symptoms before:   [x] Yes  [] No    If a prescription is needed, what is your preferred pharmacy and phone number: [facility] [id number] - [address] - [phone] [facility] [phone]      Additional notes: PATIENT STATES THAT PCP HAS PREVIOUSLY SENT PRESCRIPTION FOR ISSUE BEFORE, BUT COULD NOT REMEMBER THE NAME OF THE MEDICATION. REQUESTS NEW PRESCRIPTION AT THIS TIME.

## 2025-01-18 DIAGNOSIS — E78.2 MIXED HYPERLIPIDEMIA: ICD-10-CM

## 2025-01-20 RX ORDER — ATORVASTATIN CALCIUM 20 MG/1
20 TABLET, FILM COATED ORAL DAILY
Qty: 90 TABLET | Refills: 1 | Status: SHIPPED | OUTPATIENT
Start: 2025-01-20

## 2025-01-31 ENCOUNTER — OFFICE VISIT (OUTPATIENT)
Dept: GASTROENTEROLOGY | Facility: CLINIC | Age: 75
End: 2025-01-31
Payer: MEDICARE

## 2025-01-31 ENCOUNTER — TELEPHONE (OUTPATIENT)
Dept: GASTROENTEROLOGY | Facility: CLINIC | Age: 75
End: 2025-01-31
Payer: MEDICARE

## 2025-01-31 VITALS
WEIGHT: 210.8 LBS | DIASTOLIC BLOOD PRESSURE: 77 MMHG | TEMPERATURE: 97.3 F | HEART RATE: 97 BPM | OXYGEN SATURATION: 97 % | BODY MASS INDEX: 28.55 KG/M2 | HEIGHT: 72 IN | SYSTOLIC BLOOD PRESSURE: 120 MMHG

## 2025-01-31 DIAGNOSIS — K22.70 BARRETT'S ESOPHAGUS WITHOUT DYSPLASIA: ICD-10-CM

## 2025-01-31 DIAGNOSIS — K21.9 GASTROESOPHAGEAL REFLUX DISEASE, UNSPECIFIED WHETHER ESOPHAGITIS PRESENT: Primary | ICD-10-CM

## 2025-01-31 NOTE — PROGRESS NOTES
Chief Complaint   Patient presents with    Heartburn       HPI    Marin Cuenca is a  74 y.o. male here establish care as a new patient for complaints of GERD.    This patient will also follow with Dr. Gorman.    Past medical history of Ramirez's esophagus, hypertension, thyroid disease, low back pain along with sleep apnea.    On visit today he reports adequate control of dyspeptic symptoms on twice daily dosing Nexium.  He follows an antireflux diet which he finds quite beneficial.  Denies nausea, vomiting, dysphagia, odynophagia, poor appetite or weight loss.  BMI 28.58.    No lower GI complaints.  No family history of colon cancer.    Additional data reviewed:    ENDOSCOPY, INT (05/24/2019) - medium sized hiatal hernia.  Normal esophagus.  Normal duodenum.    SCANNED - COLONOSCOPY (05/24/2019) - diverticular disease otherwise normal.  Recall 10 years.    Past Medical History:   Diagnosis Date    Acute deep vein thrombosis (DVT) of distal vein of right lower extremity 11/11/2019    Arthritis     Ramirez esophagus     Benign prostatic hypertrophy     Cataract     saloni    Deep vein thrombosis (DVT) 7/26/2022    rt leg 2019    Ear pain, left     occ    GERD (gastroesophageal reflux disease)     History of MRSA infection     2010  blood bhl    Hypertension     Hypothyroidism     Low back pain     Neck pain 12/2021    CERVIVAL 4-6    KARLA on CPAP     CPAP    Osteoporosis        Past Surgical History:   Procedure Laterality Date    ANTERIOR CERVICAL DISCECTOMY W/ FUSION N/A 1/3/2022    Procedure: Cervical 4 to cervical 5 and cervical 5 to cervical 6 anterior cervical discectomy, fusion and instrumentation;  Surgeon: Wayne Meneses MD;  Location: [facility];  Service: Neurosurgery;  Laterality: N/A;    CYSTOSCOPY      EPIDURAL BLOCK      EYE SURGERY Right     HEMORRHOIDECTOMY      KNEE SURGERY Left     scoped    LUMBAR LAMINECTOMY      LUMBAR LAMINECTOMY DISCECTOMY DECOMPRESSION Left 10/18/2019    Procedure:  Left L4-5 laminectomy and neural lysis;  Surgeon: Wayne Meneses MD;  Location: [facility] OR;  Service: Neurosurgery    NISSEN FUNDOPLICATION      UPPER GASTROINTESTINAL ENDOSCOPY         Scheduled Meds:     Continuous Infusions: No current facility-administered medications for this visit.      PRN Meds:     Allergies   Allergen Reactions    Hydromorphone Hcl GI Intolerance    Tetracyclines & Related Rash       Social History     Socioeconomic History    Marital status:    Tobacco Use    Smoking status: Never    Smokeless tobacco: Never   Vaping Use    Vaping status: Never Used   Substance and Sexual Activity    Alcohol use: Yes     Comment: OCCAS    Drug use: No    Sexual activity: Defer       Family History   Problem Relation Age of Onset    Heart disease Father         cardiovascular disease    Diabetes Father     Hypertension Father     Cancer Paternal Grandmother     Malig Hyperthermia Neg Hx        Review of Systems   Gastrointestinal: Negative.        Vitals:    01/31/25 1254   BP: 120/77   Pulse: 97   Temp: 97.3 °F (36.3 °C)   SpO2: 97%       Physical Exam  Constitutional:       Appearance: He is well-developed.   Abdominal:      General: Bowel sounds are normal. There is no distension.      Palpations: Abdomen is soft. There is no mass.      Tenderness: There is no abdominal tenderness. There is no guarding.      Hernia: No hernia is present.   Skin:     General: Skin is warm and dry.      Capillary Refill: Capillary refill takes less than 2 seconds.   Neurological:      Mental Status: He is alert and oriented to person, place, and time.   Psychiatric:         Behavior: Behavior normal.     Assessment    Diagnoses and all orders for this visit:    1. Gastroesophageal reflux disease, unspecified whether esophagitis present (Primary)  -     Case Request; Standing  -     Follow Anesthesia Guidelines / Protocol; Future  -     Case Request    2. Ramirez's esophagus without dysplasia  -     Case  Request; Standing  -     Follow Anesthesia Guidelines / Protocol; Future  -     Case Request    Plan    Schedule Ramirez's surveillance EGD with Dr. Gorman  Continue twice daily dosing Nexium  Continue to follow an antireflux diet  Further recommendations and follow-up pending procedural findings         SANTA Henderson  [facility]  [address] [place] [address]  Office: [phone]   Picato Counseling:  I discussed with the patient the risks of Picato including but not limited to erythema, scaling, itching, weeping, crusting, and pain.

## 2025-02-05 ENCOUNTER — TELEPHONE (OUTPATIENT)
Dept: GASTROENTEROLOGY | Facility: CLINIC | Age: 75
End: 2025-02-05

## 2025-02-05 NOTE — TELEPHONE ENCOUNTER
Hub staff attempted to follow warm transfer process and was unsuccessful     Caller: Marin Cuenca    Relationship to patient: Self    Best call back number: [phone]    Patient is needing: PATIENT WOULD LIKE TO LET [facility] KNOW HIS SISTER IN LAW HAS A REFERRAL IN THE SYSTEM TO [facility].  AND WANTS US TO LET [facility] KNOW.  FYI

## 2025-02-11 DIAGNOSIS — M54.16 LUMBAR RADICULOPATHY: ICD-10-CM

## 2025-02-11 NOTE — TELEPHONE ENCOUNTER
Caller: [facility] - [facility] - [address] [facility] AT Portal to [facility] - [phone]  - [phone] FX    Relationship: Pharmacy    Best call back number: [phone]    Requested Prescriptions:   Requested Prescriptions     Pending Prescriptions Disp Refills    pregabalin (LYRICA) 100 MG capsule 270 capsule 1     Sig: Take 1 capsule by mouth 3 (Three) Times a Day.        Pharmacy where request should be sent: [facility] - [address] [facility] AT PORT[address] TO [facility] - [phone]  - [phone] FX     Last office visit with prescribing clinician: 9/20/2024   Last telemedicine visit with prescribing clinician: Visit date not found   Next office visit with prescribing clinician: 5/9/2025     Additional details provided by patient: PATIENTS PHARMACY STATES THAT THIS PRESCRIPTION COULD BE LOST IN TRANSIT AND THEY WOULD LIKE TO KNOW IF AN ADDITIONAL REFILL COULD BE ADDED TO THIS MEDICATION IN CASE THEY ARE UNABLE TO FIND IF AND HAVE TO SHIP IT OUT AGAIN.     Does the patient have less than a 3 day supply:  [] Yes  [x] No    Would you like a call back once the refill request has been completed: [] Yes [x] No    If the office needs to give you a call back, can they leave a voicemail: [] Yes [x] No    Jing Mancera Rep   02/11/25 13:46 EST

## 2025-02-12 RX ORDER — PREGABALIN 100 MG/1
100 CAPSULE ORAL 3 TIMES DAILY
Qty: 270 CAPSULE | Refills: 1 | Status: SHIPPED | OUTPATIENT
Start: 2025-02-12

## 2025-02-12 NOTE — TELEPHONE ENCOUNTER
Rx Refill Note  Requested Prescriptions     Pending Prescriptions Disp Refills    pregabalin (LYRICA) 100 MG capsule 270 capsule 1     Sig: Take 1 capsule by mouth 3 (Three) Times a Day.      Last office visit with prescribing clinician: 9/20/2024  Next office visit with prescribing clinician: 5/9/2025         Soni Benavides MA  02/12/25, 07:59 EST

## 2025-02-19 DIAGNOSIS — K21.00 GASTROESOPHAGEAL REFLUX DISEASE WITH ESOPHAGITIS: ICD-10-CM

## 2025-02-19 DIAGNOSIS — M54.16 LUMBAR RADICULOPATHY: ICD-10-CM

## 2025-02-19 DIAGNOSIS — Z87.19 HISTORY OF BARRETT'S ESOPHAGUS: Chronic | ICD-10-CM

## 2025-02-19 NOTE — TELEPHONE ENCOUNTER
Caller: Marin Cuenca    Relationship: Self    Best call back number: [phone]     Requested Prescriptions:   Requested Prescriptions     Pending Prescriptions Disp Refills    esomeprazole (nexIUM) 40 MG capsule 180 capsule 1     Sig: Take 1 capsule by mouth 2 (Two) Times a Day.    pregabalin (LYRICA) 100 MG capsule 270 capsule 1     Sig: Take 1 capsule by mouth 3 (Three) Times a Day.        Pharmacy where request should be sent: [facility] [id number] [address] AT [facility]. - [phone] [facility] [phone] FX     Last office visit with prescribing clinician: 9/20/2024   Last telemedicine visit with prescribing clinician: Visit date not found   Next office visit with prescribing clinician: 5/9/2025     Additional details provided by patient: PATIENT WOULD LIKE FOR THESE MEDICATIONS TO BE SWITCHED TO THE PHARMACY LISTED ABOVE. HE HAS BEEN WAITING FOR A MONTH ON REFILLS FOR THE MEDICATION AND JUST WANTS TO TRANSFER TO THE [facility].     PLEASE CALL TO ADVISE WHEN THIS HAS BEEN COMPLETED.     Does the patient have less than a 3 day supply:  [x] Yes  [] No    Would you like a call back once the refill request has been completed: [x] Yes [] No    If the office needs to give you a call back, can they leave a voicemail: [x] Yes [] No    Jing Ramsey Rep   02/19/25 09:05 EST

## 2025-02-21 DIAGNOSIS — Z87.19 HISTORY OF BARRETT'S ESOPHAGUS: Chronic | ICD-10-CM

## 2025-02-21 DIAGNOSIS — M54.16 LUMBAR RADICULOPATHY: ICD-10-CM

## 2025-02-21 DIAGNOSIS — K21.00 GASTROESOPHAGEAL REFLUX DISEASE WITH ESOPHAGITIS: ICD-10-CM

## 2025-02-21 RX ORDER — PREGABALIN 100 MG/1
100 CAPSULE ORAL 3 TIMES DAILY
Qty: 270 CAPSULE | Refills: 1 | OUTPATIENT
Start: 2025-02-21

## 2025-02-21 RX ORDER — ESOMEPRAZOLE MAGNESIUM 40 MG/1
40 CAPSULE, DELAYED RELEASE ORAL 2 TIMES DAILY
Qty: 180 CAPSULE | Refills: 1 | OUTPATIENT
Start: 2025-02-21

## 2025-02-24 RX ORDER — PREGABALIN 100 MG/1
100 CAPSULE ORAL 3 TIMES DAILY
Qty: 270 CAPSULE | Refills: 1 | Status: SHIPPED | OUTPATIENT
Start: 2025-02-24

## 2025-02-24 RX ORDER — ESOMEPRAZOLE MAGNESIUM 40 MG/1
40 CAPSULE, DELAYED RELEASE ORAL 2 TIMES DAILY
Qty: 180 CAPSULE | Refills: 1 | Status: SHIPPED | OUTPATIENT
Start: 2025-02-24

## 2025-03-03 ENCOUNTER — TELEPHONE (OUTPATIENT)
Dept: INTERNAL MEDICINE | Facility: CLINIC | Age: 75
End: 2025-03-03
Payer: MEDICARE

## 2025-03-03 RX ORDER — ONDANSETRON 4 MG/1
4 TABLET, FILM COATED ORAL EVERY 8 HOURS PRN
Qty: 12 TABLET | Refills: 0 | Status: SHIPPED | OUTPATIENT
Start: 2025-03-03

## 2025-03-03 NOTE — TELEPHONE ENCOUNTER
Admission medication history interview status for this patient is complete. See [facility] admission navigator for allergy information, prior to admission medications and immunization status.     Medication history interview done, indicate source(s): Patient  Medication history resources (including written lists, pill bottles, clinic record): NodePing and Productiv  Pharmacy: [id number] - [place], [place] - [address]  AT [facility] [address] [facility]      Changes made to PTA medication list:  Added: None  Changed: None  Reported as Not Taking: None  Removed: Prednisone, Testosterone, Dexastat, Vit D, Home Oxygen, Albuterol, Doxycycline    Actions taken by pharmacist (provider contacted, etc): Sticky note to provider     Additional medication history information: Pt. Stated part of [facility] program in [place]. Taking 100 mg per day. Unable to verify as facility closed on weekends.     Medication reconciliation/reorder completed by provider prior to medication history?  N   (Y/N)     Prior to Admission medications    Medication Sig Last Dose Taking? Auth Provider Long Term End Date   atorvastatin (LIPITOR) 10 MG tablet Take 10 mg by mouth daily 6/18/2022 at Unknown time Yes Reported, Patient Yes    dextroamphetamine (DEXTROSTAT) 10 MG tablet Take one tablet Four times a day 6/18/2022 at Unknown time Yes Reported, Patient     hydrochlorothiazide (HYDRODIURIL) 12.5 MG tablet Take 1 tablet by mouth daily 6/18/2022 at Unknown time Yes Reported, Patient Yes    ibuprofen (ADVIL/MOTRIN) 200 MG tablet Take 200 mg by mouth every 4 hours as needed for mild pain  at PRN Yes Unknown, Entered By History     lisinopril (ZESTRIL) 20 MG tablet Take 20 mg by mouth daily 6/18/2022 at Unknown time Yes Reported, Patient Yes    clonazePAM (KLONOPIN) 1 MG tablet Take 1/2 - 1 tablet by mouth at bedtime as needed  at PRN  Reported, Patient Yes    METHADONE HCL PO    Reported, Patient            Please advise

## 2025-03-03 NOTE — TELEPHONE ENCOUNTER
Zofran sent.     He's not having any issues with bowel movements, fever or chills?     If worsens or doesn't improve - needs to come in to be seen.     WCN

## 2025-03-03 NOTE — TELEPHONE ENCOUNTER
Caller: Marin Cuenca    Relationship: Self    Best call back number: [phone]     What medication are you requesting: ZOFRAN     What are your current symptoms: NAUSEA, LITTLE VOMITING     If a prescription is needed, what is your preferred pharmacy and phone number: [facility] [id number] - [facility] [address] AT [facility]. - [phone]  - [phone] FX     Additional notes:

## 2025-03-06 ENCOUNTER — TELEPHONE (OUTPATIENT)
Dept: GASTROENTEROLOGY | Facility: CLINIC | Age: 75
End: 2025-03-06
Payer: MEDICARE

## 2025-03-06 NOTE — TELEPHONE ENCOUNTER
Hub staff attempted to follow warm transfer process and was unsuccessful     Caller: Marin Cuenca    Relationship to patient: Self    Best call back number: [phone]    Patient is needing: PATIENT MISSED CALL FROM CLINICAL AND IS REACHING BACK OUT.  PLEASE REACH OUT TO ASSIST.  THANK YOU

## 2025-03-06 NOTE — TELEPHONE ENCOUNTER
Provider: DR MARR     Caller: Marin Cuenca    Relationship to Patient: Self    Phone Number: [phone]    Reason for Call: [PT CALLED TO CONFIRM APPT AND ALSO ADVISE THAT HE WILL NEED PHENERGAN OR ZOFRAN TO HELP WITH THE NAUSEA FROM THE ANESTHESIA DURING THE 3/13 PROCEDURE.

## 2025-03-06 NOTE — TELEPHONE ENCOUNTER
Called pt and advised to discuss with the anesthesia team prior to his procedure for medication for nausea. Pt verbalized understanding.

## 2025-03-06 NOTE — TELEPHONE ENCOUNTER
Provider: DR MARR     Caller: Marin Cuenca    Relationship to Patient: Self    Phone Number: [phone]    Reason for Call: [PT CALLED TO CONFIRM APPT AND ALSO ADVISE THAT HE WILL NEED PHENERGAN OR ZOFRAN TO HELP WITH THE NAUSEA FROM THE ANESTHESIA DURING THE 3/13 PROCEDURE.

## 2025-03-10 DIAGNOSIS — I10 ESSENTIAL HYPERTENSION: ICD-10-CM

## 2025-03-10 RX ORDER — IRBESARTAN 75 MG/1
75 TABLET ORAL DAILY
Qty: 90 TABLET | Refills: 1 | Status: SHIPPED | OUTPATIENT
Start: 2025-03-10

## 2025-03-11 ENCOUNTER — TELEPHONE (OUTPATIENT)
Dept: INTERNAL MEDICINE | Facility: CLINIC | Age: 75
End: 2025-03-11
Payer: MEDICARE

## 2025-03-11 NOTE — TELEPHONE ENCOUNTER
Generally it doesn't cause constipation.   He can take over-the-counter colace as needed.     Also note: he does have norflex on his med list which is also a muscle relaxer.    Advise that they are not to be taken on the same day.     NORA

## 2025-03-11 NOTE — TELEPHONE ENCOUNTER
Caller: Marin Cuenca    Relationship: Self    Best call back number: [phone]    What medication are you requesting: SOMETHING FOR CONSTIPATION     What are your current symptoms: CONSTIPATION     If a prescription is needed, what is your preferred pharmacy and phone number: [facility] [id number] - [facility] [address] AT [facility]. - [phone]  - [phone] FX     Additional notes: PATIENT STATES THAT HE HAD A PROCEDURE DONE IN AUGUST OF 2024 ON A PINCHED NERVE AND HE WAS GIVEN A PRESCRIPTION FOR CYCLOBENZAPRINE.     PATIENT STATES THAT THIS MEDICATION HELPS, BUT IT DOES CAUSE CONSTIPATION AND HE WOULD LIKE TO KNOW IF THERE IS SOMETHING HE CAN TAKE ALONG WITH THIS MEDICATION TO HELP WITH THE CONSTIPATION.     PLEASE ADVISE

## 2025-03-13 ENCOUNTER — ANESTHESIA EVENT (OUTPATIENT)
Dept: GASTROENTEROLOGY | Facility: HOSPITAL | Age: 75
End: 2025-03-13
Payer: MEDICARE

## 2025-03-13 ENCOUNTER — ANESTHESIA (OUTPATIENT)
Dept: GASTROENTEROLOGY | Facility: HOSPITAL | Age: 75
End: 2025-03-13
Payer: MEDICARE

## 2025-03-13 ENCOUNTER — HOSPITAL ENCOUNTER (OUTPATIENT)
Facility: HOSPITAL | Age: 75
Setting detail: HOSPITAL OUTPATIENT SURGERY
Discharge: HOME | End: 2025-03-13
Attending: INTERNAL MEDICINE | Admitting: INTERNAL MEDICINE
Payer: MEDICARE

## 2025-03-13 VITALS
BODY MASS INDEX: 27.82 KG/M2 | HEART RATE: 87 BPM | TEMPERATURE: 98.1 F | RESPIRATION RATE: 19 BRPM | OXYGEN SATURATION: 96 % | WEIGHT: 205.4 LBS | DIASTOLIC BLOOD PRESSURE: 81 MMHG | HEIGHT: 72 IN | SYSTOLIC BLOOD PRESSURE: 108 MMHG

## 2025-03-13 DIAGNOSIS — K21.9 GASTROESOPHAGEAL REFLUX DISEASE, UNSPECIFIED WHETHER ESOPHAGITIS PRESENT: ICD-10-CM

## 2025-03-13 DIAGNOSIS — K22.70 BARRETT'S ESOPHAGUS WITHOUT DYSPLASIA: ICD-10-CM

## 2025-03-13 PROCEDURE — 25810000003 LACTATED RINGERS PER 1000 ML: Performed by: NURSE ANESTHETIST, CERTIFIED REGISTERED

## 2025-03-13 PROCEDURE — 25010000002 GLYCOPYRROLATE 0.2 MG/ML SOLUTION: Performed by: NURSE ANESTHETIST, CERTIFIED REGISTERED

## 2025-03-13 PROCEDURE — 43239 EGD BIOPSY SINGLE/MULTIPLE: CPT | Performed by: INTERNAL MEDICINE

## 2025-03-13 PROCEDURE — 25010000002 PROPOFOL 1000 MG/100ML EMULSION: Performed by: NURSE ANESTHETIST, CERTIFIED REGISTERED

## 2025-03-13 PROCEDURE — 25010000002 LIDOCAINE 2% SOLUTION: Performed by: NURSE ANESTHETIST, CERTIFIED REGISTERED

## 2025-03-13 PROCEDURE — 88305 TISSUE EXAM BY PATHOLOGIST: CPT | Performed by: INTERNAL MEDICINE

## 2025-03-13 PROCEDURE — 25810000003 LACTATED RINGERS PER 1000 ML: Performed by: INTERNAL MEDICINE

## 2025-03-13 PROCEDURE — 25010000002 PROPOFOL 200 MG/20ML EMULSION: Performed by: NURSE ANESTHETIST, CERTIFIED REGISTERED

## 2025-03-13 RX ORDER — PROPOFOL 10 MG/ML
INJECTION, EMULSION INTRAVENOUS AS NEEDED
Status: DISCONTINUED | OUTPATIENT
Start: 2025-03-13 | End: 2025-03-13 | Stop reason: SURG

## 2025-03-13 RX ORDER — SODIUM CHLORIDE, SODIUM LACTATE, POTASSIUM CHLORIDE, CALCIUM CHLORIDE 600; 310; 30; 20 MG/100ML; MG/100ML; MG/100ML; MG/100ML
INJECTION, SOLUTION INTRAVENOUS CONTINUOUS PRN
Status: DISCONTINUED | OUTPATIENT
Start: 2025-03-13 | End: 2025-03-13 | Stop reason: SURG

## 2025-03-13 RX ORDER — GLYCOPYRROLATE 0.2 MG/ML
INJECTION INTRAMUSCULAR; INTRAVENOUS AS NEEDED
Status: DISCONTINUED | OUTPATIENT
Start: 2025-03-13 | End: 2025-03-13 | Stop reason: SURG

## 2025-03-13 RX ORDER — SODIUM CHLORIDE, SODIUM LACTATE, POTASSIUM CHLORIDE, CALCIUM CHLORIDE 600; 310; 30; 20 MG/100ML; MG/100ML; MG/100ML; MG/100ML
20 INJECTION, SOLUTION INTRAVENOUS ONCE
Status: COMPLETED | OUTPATIENT
Start: 2025-03-13 | End: 2025-03-13

## 2025-03-13 RX ORDER — SODIUM CHLORIDE 0.9 % (FLUSH) 0.9 %
10 SYRINGE (ML) INJECTION AS NEEDED
Status: DISCONTINUED | OUTPATIENT
Start: 2025-03-13 | End: 2025-03-13 | Stop reason: HOSPADM

## 2025-03-13 RX ORDER — LIDOCAINE HYDROCHLORIDE 10 MG/ML
0.5 INJECTION, SOLUTION INFILTRATION; PERINEURAL ONCE AS NEEDED
Status: DISCONTINUED | OUTPATIENT
Start: 2025-03-13 | End: 2025-03-13 | Stop reason: HOSPADM

## 2025-03-13 RX ORDER — PROPOFOL 10 MG/ML
INJECTION, EMULSION INTRAVENOUS CONTINUOUS PRN
Status: DISCONTINUED | OUTPATIENT
Start: 2025-03-13 | End: 2025-03-13 | Stop reason: SURG

## 2025-03-13 RX ORDER — ONDANSETRON 2 MG/ML
INJECTION INTRAMUSCULAR; INTRAVENOUS
Status: DISCONTINUED
Start: 2025-03-13 | End: 2025-03-13 | Stop reason: HOSPADM

## 2025-03-13 RX ORDER — LIDOCAINE HYDROCHLORIDE 20 MG/ML
INJECTION, SOLUTION INFILTRATION; PERINEURAL AS NEEDED
Status: DISCONTINUED | OUTPATIENT
Start: 2025-03-13 | End: 2025-03-13 | Stop reason: SURG

## 2025-03-13 RX ADMIN — GLYCOPYRROLATE 0.2 MG: 0.2 INJECTION INTRAMUSCULAR; INTRAVENOUS at 10:08

## 2025-03-13 RX ADMIN — PROPOFOL INJECTABLE EMULSION 100 MG: 10 INJECTION, EMULSION INTRAVENOUS at 10:10

## 2025-03-13 RX ADMIN — PROPOFOL 200 MCG/KG/MIN: 10 INJECTION, EMULSION INTRAVENOUS at 10:10

## 2025-03-13 RX ADMIN — SODIUM CHLORIDE, POTASSIUM CHLORIDE, SODIUM LACTATE AND CALCIUM CHLORIDE: 600; 310; 30; 20 INJECTION, SOLUTION INTRAVENOUS at 09:32

## 2025-03-13 RX ADMIN — LIDOCAINE HYDROCHLORIDE 60 MG: 20 INJECTION, SOLUTION INFILTRATION; PERINEURAL at 10:08

## 2025-03-13 RX ADMIN — SODIUM CHLORIDE, SODIUM LACTATE, POTASSIUM CHLORIDE, AND CALCIUM CHLORIDE 20 ML/HR: 600; 310; 30; 20 INJECTION, SOLUTION INTRAVENOUS at 09:32

## 2025-03-13 NOTE — DISCHARGE INSTRUCTIONS
For the next 24 hours patient needs to be with a responsible adult.    For 24 hours DO NOT drive, operate machinery, appliances, drink alcohol, make important decisions or sign legal documents.    Start with a light or bland diet if you are feeling sick to your stomach otherwise advance to regular diet as tolerated.    Follow recommendations on procedure report if provided by your doctor.    Call Dr Gorman for problems [phone]    Problems may include but not limited to: large amounts of bleeding, trouble breathing, repeated vomiting, severe unrelieved pain, fever or chills.

## 2025-03-13 NOTE — H&P
[facility]  Pre Procedure History & Physical    Chief Complaint:   GERD    Subjective     HPI:   74 y.o. male here for EGD for c/o GERD    Past Medical History:   Past Medical History:   Diagnosis Date    Acute deep vein thrombosis (DVT) of distal vein of right lower extremity 11/11/2019    Arthritis     Ramirez esophagus     Benign prostatic hypertrophy     Cataract     saloni    Deep vein thrombosis (DVT) 07/26/2022    rt leg 2019    Ear pain, left     occ    GERD (gastroesophageal reflux disease)     History of MRSA infection     2010  blood bhl    Hypertension     Hypothyroidism     Low back pain     Neck pain 12/2021    CERVIVAL 4-6    Osteoporosis     PONV (postoperative nausea and vomiting)        Past Surgical History:  Past Surgical History:   Procedure Laterality Date    ANTERIOR CERVICAL DISCECTOMY W/ FUSION N/A 1/3/2022    Procedure: Cervical 4 to cervical 5 and cervical 5 to cervical 6 anterior cervical discectomy, fusion and instrumentation;  Surgeon: Wayne Meneses MD;  Location: [facility];  Service: Neurosurgery;  Laterality: N/A;    CYSTOSCOPY      EPIDURAL BLOCK      EYE SURGERY Right     HEMORRHOIDECTOMY      KNEE SURGERY Left     scoped    LUMBAR LAMINECTOMY      LUMBAR LAMINECTOMY DISCECTOMY DECOMPRESSION Left 10/18/2019    Procedure: Left L4-5 laminectomy and neural lysis;  Surgeon: Wayne Meneses MD;  Location: [facility];  Service: Neurosurgery    NISSEN FUNDOPLICATION      UPPER GASTROINTESTINAL ENDOSCOPY         Family History:  Family History   Problem Relation Age of Onset    Heart disease Father         cardiovascular disease    Diabetes Father     Hypertension Father     Cancer Paternal Grandmother     Malig Hyperthermia Neg Hx        Social History:   reports that he has never smoked. He has never used smokeless tobacco. He reports current alcohol use. He reports that he does not use drugs.    Medications:   Medications Prior to Admission  "  Medication Sig Dispense Refill Last Dose/Taking    aspirin 81 MG tablet Take 1 tablet by mouth Daily. (Patient taking differently: Take 1 tablet by mouth 3 (Three) Times a Week. Stopped 12/27/2021  Indications: Ok to restart on Friday 1/7/22) 100 tablet 3 3/12/2025    atorvastatin (LIPITOR) 20 MG tablet TAKE 1 TABLET BY MOUTH DAILY 90 tablet 1 3/12/2025    Calcium Carb-Cholecalciferol 500-600 MG-UNIT chewable tablet Chew 1 tablet 2 (Two) Times a Day. 180 tablet 3 3/12/2025    cycloSPORINE (RESTASIS) 0.05 % ophthalmic emulsion    3/12/2025    esomeprazole (nexIUM) 40 MG capsule Take 1 capsule by mouth 2 (Two) Times a Day. 180 capsule 1 3/12/2025    fexofenadine (ALLEGRA) 180 MG tablet Take 1 tablet by mouth Daily.   3/12/2025    finasteride (PROSCAR) 5 MG tablet TAKE 1 TABLET BY MOUTH EVERY MORNING 90 tablet 1 3/12/2025    irbesartan (AVAPRO) 75 MG tablet TAKE 1 TABLET BY MOUTH DAILY 90 tablet 1 3/13/2025 Morning    levothyroxine (SYNTHROID, LEVOTHROID) 100 MCG tablet TAKE 1 TABLET BY MOUTH DAILY 90 tablet 1 3/12/2025    meloxicam (MOBIC) 15 MG tablet Take 1 tablet by mouth Daily. Take with food. 30 tablet 0 3/12/2025    orphenadrine (NORFLEX) 100 MG 12 hr tablet Take 1 tablet by mouth 2 (Two) Times a Day As Needed for Muscle Spasms. 40 tablet 2 Past Week    pregabalin (LYRICA) 100 MG capsule Take 1 capsule by mouth 3 (Three) Times a Day. 270 capsule 1 3/12/2025    tamsulosin (FLOMAX) 0.4 MG capsule 24 hr capsule Take 1 capsule by mouth Daily. 90 capsule 1 3/12/2025    ondansetron (Zofran) 4 MG tablet Take 1 tablet by mouth Every 8 (Eight) Hours As Needed for Nausea or Vomiting. 12 tablet 0 More than a month       Allergies:  Hydrocodone, Hydromorphone hcl, and Tetracyclines & related    ROS:    Pertinent items are noted in HPI     Objective     Blood pressure 130/84, pulse 61, temperature 98.1 °F (36.7 °C), temperature source Oral, resp. rate 16, height 182.9 cm (72\"), weight 93.2 kg (205 lb 6.4 oz), SpO2 " 98%.    Physical Exam   Constitutional: Pt is oriented to person, place, and time and well-developed, well-nourished, and in no distress.   Mouth/Throat: Oropharynx is clear and moist.   Neck: Normal range of motion.   Cardiovascular: Normal rate, regular rhythm and normal heart sounds.    Pulmonary/Chest: Effort normal and breath sounds normal.   Abdominal: Soft. Nontender  Skin: Skin is warm and dry.   Psychiatric: Mood, memory, affect and judgment normal.     Assessment & Plan     Diagnosis:  GERD    Anticipated Surgical Procedure:  EGD    The risks, benefits, and alternatives of this procedure have been discussed with the patient or the responsible party- the patient understands and agrees to proceed.

## 2025-03-13 NOTE — ANESTHESIA POSTPROCEDURE EVALUATION
Patient: Marin Cuenca    Procedure Summary       Date: 03/13/25 Room / Location:  [facility]    Anesthesia Start: 1004 Anesthesia Stop: 1022    Procedure: ESOPHAGOGASTRODUODENOSCOPY WITH COLD BIOPSIES (Esophagus) Diagnosis:       Gastroesophageal reflux disease, unspecified whether esophagitis present      Ramirez's esophagus without dysplasia      (Gastroesophageal reflux disease, unspecified whether esophagitis present [K21.9])      (Ramirez's esophagus without dysplasia [K22.70])    Surgeons: Reinier Gorman MD Provider: Aldo Olvera MD    Anesthesia Type: MAC ASA Status: 3            Anesthesia Type: MAC    Vitals  Vitals Value Taken Time   /81 03/13/25 10:40   Temp     Pulse 85 03/13/25 10:41   Resp 19 03/13/25 10:39   SpO2 96 % 03/13/25 10:41   Vitals shown include unfiled device data.        Post Anesthesia Care and Evaluation    Patient location during evaluation: PACU  Patient participation: complete - patient participated  Level of consciousness: awake and alert  Pain management: adequate    Airway patency: patent  Anesthetic complications: No anesthetic complications    Cardiovascular status: acceptable  Respiratory status: acceptable  Hydration status: acceptable    Comments: --------------------            03/13/25               1039     --------------------   BP:       108/81     Pulse:      87       Resp:       19       Temp:                SpO2:      96%      --------------------

## 2025-03-13 NOTE — ANESTHESIA PREPROCEDURE EVALUATION
Anesthesia Evaluation     Patient summary reviewed and Nursing notes reviewed   history of anesthetic complications:  PONV               Airway   Mallampati: III  Neck ROM: limited  Difficult intubation highly probable  Dental      Pulmonary    (+) ,sleep apnea on CPAP  Cardiovascular     ECG reviewed  Rhythm: regular  Rate: normal    (+) hypertension, DVT resolved, hyperlipidemia      Neuro/Psych  (+) numbness  GI/Hepatic/Renal/Endo    (+) obesity, GERD, thyroid problem hypothyroidism    Musculoskeletal     (+) back pain, neck pain  Abdominal    Substance History   (+) alcohol use     OB/GYN negative ob/gyn ROS         Other   arthritis,                 Anesthesia Plan    ASA 3     MAC     (Hiatal Hernia  BMI  CMAC intubation in the past without difficulty  Probable difficult mask ventilation    Recommend NPO at least 2 days PT any elective procedure due to HH and retained food products with only one day of prep)  intravenous induction     Anesthetic plan, risks, benefits, and alternatives have been provided, discussed and informed consent has been obtained with: patient.    CODE STATUS:

## 2025-03-14 LAB
CYTO UR: NORMAL
LAB AP CASE REPORT: NORMAL
PATH REPORT.FINAL DX SPEC: NORMAL
PATH REPORT.GROSS SPEC: NORMAL

## 2025-03-27 ENCOUNTER — TELEPHONE (OUTPATIENT)
Dept: INTERNAL MEDICINE | Facility: CLINIC | Age: 75
End: 2025-03-27

## 2025-03-27 NOTE — TELEPHONE ENCOUNTER
Caller: Marin Cuenca    Relationship: Self    Best call back number: [phone]     What medication are you requesting: PAIN RELIEF    What are your current symptoms: BACK PAIN    Have you had these symptoms before:    [x] Yes  [] No    Have you been treated for these symptoms before:   [x] Yes  [] No    If a prescription is needed, what is your preferred pharmacy and phone number: [facility] [id number] - [facility] [address] AT [facility]. - [phone] [facility] [phone] FX     Additional notes: PATIENT STATES THAT HE IS CURRENTLY TAKING TRAMADOL 50 MG AND CELECOXIB 100 MG FOR BACK PAIN FROM DR JAMEL CUENCA AT [place] FOR HIS BACK PAIN. THEY SEEM TO HAVE LOST EFFECTIVENESS, AND HE DOES NOT HAVE ANOTHER APPOINTMENT WITH DR CUENCA FOR OVER A MONTH. REQUESTS A PRESCRIPTION TO TREAT FURTHER. MENTIONED THAT HE HAS HAD HYDROCODONE IN THE PAST, BUT IT MADE HIM VERY NAUSEOUS, SO HE DOES NOT WANT THAT. CALL IF ADDITIONAL FOLLOW UP NEEDED.

## 2025-03-28 NOTE — TELEPHONE ENCOUNTER
"Please call him - I would not be able to send anything base on symptoms.      I did look at note from Dr Cuenca.     \"We plan to see him again in 2 months (sooner if needed) to reassess his progress.\"     So it looks like they would get him in or be able to address the issue if has worsened.     WCN  "

## 2025-04-02 ENCOUNTER — TELEPHONE (OUTPATIENT)
Dept: INTERNAL MEDICINE | Facility: CLINIC | Age: 75
End: 2025-04-02
Payer: MEDICARE

## 2025-04-02 NOTE — TELEPHONE ENCOUNTER
"Relay     \" He would need to be seen due to being a controlled medication.   Also advise - can't prescribe long term.  It would be pain management if they can't get controlled. \"    Please schedule appt if patient wishes                "

## 2025-04-02 NOTE — TELEPHONE ENCOUNTER
Yes - he would need to be seen due to being a controlled medication.   Also advise - can't prescribe long term.  It would be pain management if they can't get controlled.     NORA

## 2025-04-04 NOTE — TELEPHONE ENCOUNTER
Name: Marin Cuenca    Relationship: Self    Best Callback Number: [phone]    HUB PROVIDED THE RELAY MESSAGE FROM THE OFFICE   PATIENT VOICED UNDERSTANDING AND HAS NO FURTHER QUESTIONS AT THIS TIME    ADDITIONAL INFORMATION: STATED THAT HE WOULD SPEAK WITH PAIN MANAGEMENT NEXT WEEK WHEN THEY GO FOR AN EPIDURAL.

## 2025-04-10 ENCOUNTER — ANESTHESIA EVENT (OUTPATIENT)
Dept: PAIN MEDICINE | Facility: HOSPITAL | Age: 75
End: 2025-04-10
Payer: MEDICARE

## 2025-04-10 ENCOUNTER — ANESTHESIA (OUTPATIENT)
Dept: PAIN MEDICINE | Facility: HOSPITAL | Age: 75
End: 2025-04-10
Payer: MEDICARE

## 2025-04-10 ENCOUNTER — HOSPITAL ENCOUNTER (OUTPATIENT)
Dept: GENERAL RADIOLOGY | Facility: HOSPITAL | Age: 75
Discharge: HOME | End: 2025-04-10
Payer: MEDICARE

## 2025-04-10 ENCOUNTER — HOSPITAL ENCOUNTER (OUTPATIENT)
Dept: PAIN MEDICINE | Facility: HOSPITAL | Age: 75
Discharge: HOME | End: 2025-04-10
Payer: MEDICARE

## 2025-04-10 VITALS
DIASTOLIC BLOOD PRESSURE: 62 MMHG | HEART RATE: 91 BPM | RESPIRATION RATE: 16 BRPM | HEIGHT: 72 IN | TEMPERATURE: 97.1 F | OXYGEN SATURATION: 99 % | BODY MASS INDEX: 28.44 KG/M2 | WEIGHT: 210 LBS | SYSTOLIC BLOOD PRESSURE: 107 MMHG

## 2025-04-10 DIAGNOSIS — R52 PAIN: ICD-10-CM

## 2025-04-10 DIAGNOSIS — M54.16 LUMBAR RADICULOPATHY: Primary | Chronic | ICD-10-CM

## 2025-04-10 PROCEDURE — 77003 FLUOROGUIDE FOR SPINE INJECT: CPT

## 2025-04-10 PROCEDURE — 25010000002 DEXAMETHASONE SODIUM PHOSPHATE 10 MG/ML SOLUTION: Performed by: ANESTHESIOLOGY

## 2025-04-10 PROCEDURE — 25010000002 BUPIVACAINE (PF) 0.25 % SOLUTION: Performed by: ANESTHESIOLOGY

## 2025-04-10 RX ORDER — MIDAZOLAM HYDROCHLORIDE 1 MG/ML
1 INJECTION, SOLUTION INTRAMUSCULAR; INTRAVENOUS ONCE AS NEEDED
Status: DISCONTINUED | OUTPATIENT
Start: 2025-04-10 | End: 2025-04-11 | Stop reason: HOSPADM

## 2025-04-10 RX ORDER — FENTANYL CITRATE 50 UG/ML
50 INJECTION, SOLUTION INTRAMUSCULAR; INTRAVENOUS ONCE
Status: DISCONTINUED | OUTPATIENT
Start: 2025-04-10 | End: 2025-04-11 | Stop reason: HOSPADM

## 2025-04-10 RX ORDER — BUPIVACAINE HYDROCHLORIDE 2.5 MG/ML
10 INJECTION, SOLUTION EPIDURAL; INFILTRATION; INTRACAUDAL; PERINEURAL ONCE
Status: COMPLETED | OUTPATIENT
Start: 2025-04-10 | End: 2025-04-10

## 2025-04-10 RX ORDER — LIDOCAINE HYDROCHLORIDE 10 MG/ML
1 INJECTION, SOLUTION INFILTRATION; PERINEURAL ONCE
Status: DISCONTINUED | OUTPATIENT
Start: 2025-04-10 | End: 2025-04-11 | Stop reason: HOSPADM

## 2025-04-10 RX ORDER — TRAMADOL HYDROCHLORIDE 50 MG/1
50 TABLET ORAL EVERY 6 HOURS PRN
COMMUNITY

## 2025-04-10 RX ORDER — CYCLOBENZAPRINE HCL 5 MG
5 TABLET ORAL 3 TIMES DAILY PRN
COMMUNITY

## 2025-04-10 RX ORDER — IOPAMIDOL 408 MG/ML
10 INJECTION, SOLUTION INTRATHECAL
Status: DISCONTINUED | OUTPATIENT
Start: 2025-04-10 | End: 2025-04-11 | Stop reason: HOSPADM

## 2025-04-10 RX ORDER — DEXAMETHASONE SODIUM PHOSPHATE 10 MG/ML
10 INJECTION, SOLUTION INTRAMUSCULAR; INTRAVENOUS ONCE
Status: COMPLETED | OUTPATIENT
Start: 2025-04-10 | End: 2025-04-10

## 2025-04-10 RX ADMIN — DEXAMETHASONE SODIUM PHOSPHATE 10 MG: 10 INJECTION, SOLUTION INTRAMUSCULAR; INTRAVENOUS at 08:56

## 2025-04-10 RX ADMIN — BUPIVACAINE HYDROCHLORIDE 10 ML: 2.5 INJECTION, SOLUTION EPIDURAL; INFILTRATION; INTRACAUDAL; PERINEURAL at 08:55

## 2025-04-10 NOTE — H&P
HISTORY:    Is undergone a C4-6 cervical fusion in 2022 as well as a C3-4 discectomy in 2020 for.  Over the past months has complained of low lumbar back pain radiating down his left leg to the foot.  Worse with activity or straining.  He has an MRI which shows foraminal stenosis.  He was referred for a left transforaminal lumbar 4 epidural steroid injection  Current Outpatient Medications on File Prior to Encounter   Medication Sig Dispense Refill    aspirin 81 MG tablet Take 1 tablet by mouth Daily. (Patient taking differently: Take 1 tablet by mouth 3 (Three) Times a Week. Stopped 12/27/2021  Indications: Ok to restart on Friday 1/7/22) 100 tablet 3    atorvastatin (LIPITOR) 20 MG tablet TAKE 1 TABLET BY MOUTH DAILY 90 tablet 1    Calcium Carb-Cholecalciferol 500-600 MG-UNIT chewable tablet Chew 1 tablet 2 (Two) Times a Day. 180 tablet 3    cycloSPORINE (RESTASIS) 0.05 % ophthalmic emulsion       esomeprazole (nexIUM) 40 MG capsule Take 1 capsule by mouth 2 (Two) Times a Day. 180 capsule 1    fexofenadine (ALLEGRA) 180 MG tablet Take 1 tablet by mouth Daily.      finasteride (PROSCAR) 5 MG tablet TAKE 1 TABLET BY MOUTH EVERY MORNING 90 tablet 1    irbesartan (AVAPRO) 75 MG tablet TAKE 1 TABLET BY MOUTH DAILY 90 tablet 1    levothyroxine (SYNTHROID, LEVOTHROID) 100 MCG tablet TAKE 1 TABLET BY MOUTH DAILY 90 tablet 1    meloxicam (MOBIC) 15 MG tablet Take 1 tablet by mouth Daily. Take with food. 30 tablet 0    ondansetron (Zofran) 4 MG tablet Take 1 tablet by mouth Every 8 (Eight) Hours As Needed for Nausea or Vomiting. 12 tablet 0    orphenadrine (NORFLEX) 100 MG 12 hr tablet Take 1 tablet by mouth 2 (Two) Times a Day As Needed for Muscle Spasms. 40 tablet 2    pregabalin (LYRICA) 100 MG capsule Take 1 capsule by mouth 3 (Three) Times a Day. 270 capsule 1    tamsulosin (FLOMAX) 0.4 MG capsule 24 hr capsule Take 1 capsule by mouth Daily. 90 capsule 1     No current facility-administered medications on file prior to  encounter.       Past Medical History:   Diagnosis Date    Acute deep vein thrombosis (DVT) of distal vein of right lower extremity 11/11/2019    Arthritis     Ramirez esophagus     Benign prostatic hypertrophy     Cataract     saloni    Deep vein thrombosis (DVT) 07/26/2022    rt leg 2019    Ear pain, left     occ    GERD (gastroesophageal reflux disease)     History of MRSA infection     2010  blood bhl    Hypertension     Hypothyroidism     Low back pain     Neck pain 12/2021    CERVIVAL 4-6    Osteoporosis     PONV (postoperative nausea and vomiting)        No hematologic infectious or constitutional symptoms    Exam:  There were no vitals taken for this visit.  Airway Mallampatti 2  Alert and oriented      Diagnosis:  Post-Op Diagnosis Codes:     * Lumbar radiculopathy [M54.16]    Plan: Left transforaminal lumbar 4 epidural steroid injection under fluoroscopic guidance    I have encouraged them to continue:  1.  Physical therapy exercises at home as prescribed by physical therapy or from the pain clinic handout.  Continuation of these exercises every day, or multiple times per week, even when the patient has good pain relief, was stressed to the patient as a preventative measure to decrease the frequency and severity of future pain episodes.  2.  Continue pain medicines as already prescribed.  If patient not currently taking any, it is recommended to begin Acetaminophen 1000 mg po q 8 hours.  If other medicines containing Acetaminophen are currently prescribed, maintain daily dose at 3000mg.    3.  If they can tolerate NSAIDS, it is recommended to take Ibuprofen 600 mg po q 6 hours for 7 days during pain exacerbations.   Alternatively, they may substitute an NSAID of their choice (e.g. Aleve)  4.  Heat and ice to the affected area as tolerated for pain control.   5.  Low impact exercise such as walking or water exercise was recommended to maintain overall health and aid in weight control.   6.  Follow up as needed  for subsequent injections.

## 2025-04-10 NOTE — ANESTHESIA PROCEDURE NOTES
Left transforaminal lumbar 4 epidural steroid injection      Patient reassessed immediately prior to procedure    Patient location during procedure: pain clinic  Indication:procedure for pain  Performed By  Anesthesiologist: Levi Zavaleta MD  Preanesthetic Checklist  Completed: patient identified and risks and benefits discussed  Additional Notes  Diagnosis:  Post-Op Diagnosis Codes:     * Lumbar radiculopathy [M54.16]    Sedation:    Sedation time:    Under fluoroscopic guidance, a transforaminal epidural steroid injection was performed.  Confirmation by Isovue dye  Prep:  Pt Position:prone  Sterile Tech:gloves, sterile barrier and mask  Prep:chlorhexidine gluconate and isopropyl alcohol  Monitoring:blood pressure monitoring, continuous pulse oximetry and EKG  Procedure:Sedation: no     Approach:left paramedian  Guidance: fluoroscopy  Location:lumbar  Level:4-5  Epidural needle type: Magui.  Needle Gauge:22 G  Medications:  Amount (mL): 2 cc.  Comments:Decadron 10 mg PF in Bupivacaine 0.25% PF 2cc  Post Assessment:  Pt Tolerance:patient tolerated the procedure well with no apparent complications  Complications:no

## 2025-04-10 NOTE — DISCHARGE INSTRUCTIONS
EPIDURAL STEROID INJECTION          An epidural steroid injection is a shot of steroid medicine and numbing medicine that is given into the space between the spinal cord and the bones of the back (epidural space).  The injection helps relieve pain by an irritated or swollen nerve root.    TELL YOUR HEALTH CARE PROVIDER ABOUT:  Any allergies you have  All medicines you are taking including any over the counter medicines  Any blood disorders you have  Any surgeries you have had  Any medical conditions you have  Whether you are pregnant or may be pregnant    WHAT ARE THE RISK?  Generally, this is a safe procedure. However,problems may occur, including  Headache  Bleeding  Infection  Allergic Reaction  Nerve Damage    WHAT CAN I EXPECT AFTER THE PROCEDURE?    INJECTION SITE  Remove the Band-Aid/s after 24 hours  Check your injection site every day for signs of infection.  Check for:             Redness             Bleeding (small amt is normal)             Warmth             Pus or bad odor  Some numbness may be experienced for several hours following the procedure.  Avoid using heat on the injection site for 24 hours. You may use ice intermittently if needed by placing a         towel between your skin and the ice bag and using the ice for 20 minutes 2-3 times a day.  Do not take baths, swim or use a hot tub for 24 hours.    ACTIVITY  No strenuous activity for 24 hours then return to normal activity as tolerated.  If your leg is numb, no driving until full sensation and strength has returned.    GENERAL INSTRUCTIONS:  The injection site may feel numb, use ice with caution if numbness is present and no heat for 24 hours or until numbness is gone.   If you have numbness or weakness in your arm or leg, use those areas with caution until normal sensation returns.  It is not uncommon to notice an increase in discomfort or a change in the location of discomfort for 3-4 days after the procedure.  If discomfort is noticed  at the injection site, ice may be            applied to that area for 20 min 2-3 times a day.  Take the pain medicine your physician has prescribed or over the counter pain relievers as long as you do not have any contraindications.  If you are a diabetic, monitor your blood sugar closely.  The steroids used in your procedure may increase your blood sugar level up to 36 hours after the injection.  If your blood sugar is greater than 250, call the physician that helps you monitor your blood sugar.  Keep all follow-up visits as scheduled by your health care provider. This is important.    CONTACT OUR OFFICE IF:  You have any of these signs of infection            -Redness, swelling, or warmth around your injection site.            -Fluid or blood coming from your injection site (small amt of blood is normal)            -Pus or a bad odor from your injection site            -A fever  You develop a severe headache or a stiff neck  You lose control of your bladder or bowel movements      PAIN MANAGEMENT CENTER HOURS   Monday-Friday 7:30 am. - 4:00 pm.  For any problem related to your procedure we can be reached at [phone].  If you experience an emergency with your procedure, call [phone] or go to the emergency room.    Back Exercises  These exercises help to make your trunk and back strong. They also help to keep the lower back flexible. Doing these exercises can help to prevent or lessen pain in your lower back.  If you have back pain, try to do these exercises 2-3 times each day or as told by your doctor.  As you get better, do the exercises once each day. Repeat the exercises more often as told by your doctor.  To stop back pain from coming back, do the exercises once each day, or as told by your doctor.  Do exercises exactly as told by your doctor. Stop right away if you feel sudden pain or your pain gets worse.  Exercises  Single knee to chest  Do these steps 3-5 times in a row for each leg:  Lie on your  back on a firm bed or the floor with your legs stretched out.  Bring one knee to your chest.  Grab your knee or thigh with both hands and hold it in place.  Pull on your knee until you feel a gentle stretch in your lower back or butt.  Keep doing the stretch for 10-30 seconds.  Slowly let go of your leg and straighten it.  Pelvic tilt  Do these steps 5-10 times in a row:  Lie on your back on a firm bed or the floor with your legs stretched out.  Bend your knees so they point up to the ceiling. Your feet should be flat on the floor.  Tighten your lower belly (abdomen) muscles to press your lower back against the floor. This will make your tailbone point up to the ceiling instead of pointing down to your feet or the floor.  Stay in this position for 5-10 seconds while you gently tighten your muscles and breathe evenly.  Cat-cow  Do these steps until your lower back bends more easily:  Get on your hands and knees on a firm bed or the floor. Keep your hands under your shoulders, and keep your knees under your hips. You may put padding under your knees.  Let your head hang down toward your chest. Tighten (contract) the muscles in your belly. Point your tailbone toward the floor so your lower back becomes rounded like the back of a cat.  Stay in this position for 5 seconds.  Slowly lift your head. Let the muscles of your belly relax. Point your tailbone up toward the ceiling so your back forms a sagging arch like the back of a cow.  Stay in this position for 5 seconds.     Press-ups  Do these steps 5-10 times in a row:  Lie on your belly (face-down) on a firm bed or the floor.  Place your hands near your head, about shoulder-width apart.  While you keep your back relaxed and keep your hips on the floor, slowly straighten your arms to raise the top half of your body and lift your shoulders. Do not use your back muscles. You may change where you place your hands to make yourself more comfortable.  Stay in this position for  5 seconds. Keep your back relaxed.  Slowly return to lying flat on the floor.     Bridges  Do these steps 10 times in a row:  Lie on your back on a firm bed or the floor.  Bend your knees so they point up to the ceiling. Your feet should be flat on the floor. Your arms should be flat at your sides, next to your body.  Tighten your butt muscles and lift your butt off the floor until your waist is almost as high as your knees. If you do not feel the muscles working in your butt and the back of your thighs, slide your feet 1-2 inches (2.5-5 cm) farther away from your butt.  Stay in this position for 3-5 seconds.  Slowly lower your butt to the floor, and let your butt muscles relax.  If this exercise is too easy, try doing it with your arms crossed over your chest.  Belly crunches  Do these steps 5-10 times in a row:  Lie on your back on a firm bed or the floor with your legs stretched out.  Bend your knees so they point up to the ceiling. Your feet should be flat on the floor.  Cross your arms over your chest.  Tip your chin a little bit toward your chest, but do not bend your neck.  Tighten your belly muscles and slowly raise your chest just enough to lift your shoulder blades a tiny bit off the floor. Avoid raising your body higher than that because it can put too much stress on your lower back.  Slowly lower your chest and your head to the floor.  Back lifts  Do these steps 5-10 times in a row:  Lie on your belly (face-down) with your arms at your sides, and rest your forehead on the floor.  Tighten the muscles in your legs and your butt.  Slowly lift your chest off the floor while you keep your hips on the floor. Keep the back of your head in line with the curve in your back. Look at the floor while you do this.  Stay in this position for 3-5 seconds.  Slowly lower your chest and your face to the floor.  Contact a doctor if:  Your back pain gets a lot worse when you do an exercise.  Your back pain does not get  better within 2 hours after you exercise.  If you have any of these problems, stop doing the exercises. Do not do them again unless your doctor says it is okay.  Get help right away if:  You have sudden, very bad back pain. If this happens, stop doing the exercises. Do not do them again unless your doctor says it is okay.  This information is not intended to replace advice given to you by your health care provider. Make sure you discuss any questions you have with your health care provider.  Document Revised: 03/02/2022 Document Reviewed: 03/02/2022  [employer] Patient Education © 2024 [employer]    What is [employer]?  [employer] is a fitness and wellness program offered at no additional cost to seniors 65+ on eligible Medicare plans that helps you get active, get fit, and connect with others.  The program is designed for all levels and abilities and provides access to online and in-person classes, over 15,000 fitness locations, and health & wellness discounts.  Before starting any exercise program, consult with your primary care provider.  For additional information and to check eligibility:  [email or web address]

## 2025-05-07 DIAGNOSIS — N40.1 BENIGN NON-NODULAR PROSTATIC HYPERPLASIA WITH LOWER URINARY TRACT SYMPTOMS: ICD-10-CM

## 2025-05-07 RX ORDER — FINASTERIDE 5 MG/1
5 TABLET, FILM COATED ORAL EVERY MORNING
Qty: 90 TABLET | Refills: 1 | Status: SHIPPED | OUTPATIENT
Start: 2025-05-07

## 2025-05-21 DIAGNOSIS — E03.9 ACQUIRED HYPOTHYROIDISM: ICD-10-CM

## 2025-05-21 RX ORDER — LEVOTHYROXINE SODIUM 100 UG/1
100 TABLET ORAL DAILY
Qty: 90 TABLET | Refills: 1 | Status: SHIPPED | OUTPATIENT
Start: 2025-05-21

## 2025-06-02 ENCOUNTER — OFFICE VISIT (OUTPATIENT)
Dept: GASTROENTEROLOGY | Facility: CLINIC | Age: 75
End: 2025-06-02
Payer: MEDICARE

## 2025-06-02 VITALS
HEIGHT: 72 IN | BODY MASS INDEX: 28.58 KG/M2 | WEIGHT: 211 LBS | SYSTOLIC BLOOD PRESSURE: 122 MMHG | HEART RATE: 78 BPM | DIASTOLIC BLOOD PRESSURE: 81 MMHG | TEMPERATURE: 97.3 F

## 2025-06-02 DIAGNOSIS — K21.00 GASTROESOPHAGEAL REFLUX DISEASE WITH ESOPHAGITIS WITHOUT HEMORRHAGE: Primary | ICD-10-CM

## 2025-06-02 DIAGNOSIS — K44.9 HIATAL HERNIA: ICD-10-CM

## 2025-06-02 PROCEDURE — 3079F DIAST BP 80-89 MM HG: CPT | Performed by: NURSE PRACTITIONER

## 2025-06-02 PROCEDURE — 3074F SYST BP LT 130 MM HG: CPT | Performed by: NURSE PRACTITIONER

## 2025-06-02 PROCEDURE — 99214 OFFICE O/P EST MOD 30 MIN: CPT | Performed by: NURSE PRACTITIONER

## 2025-06-02 RX ORDER — FUROSEMIDE 40 MG/1
40 TABLET ORAL AS NEEDED
COMMUNITY

## 2025-06-02 NOTE — PROGRESS NOTES
Chief Complaint   Patient presents with    Heartburn       HPI    Marin Cuenca is a  75 y.o. male here for a follow up visit for GERD status post EGD.    This patient follows with myself and Dr. Gorman.    Past medical history of Ramirez's esophagus, hypertension, thyroid disease, low back pain along with sleep apnea.     Upper GI Endoscopy (03/13/2025 10:02) - Grade B reflux esophagitis.  5 cm hiatal hernia.  Small amount of food residue in the stomach.  Normal duodenum.  Pathology was benign.  Postprocedural recommendations to schedule upper GI series.    On visit today he reports doing quite well from a GI standpoint.  He takes Nexium twice a day and tries to follow an antireflux diet.  Denies breakthrough dyspeptic symptoms, nausea, vomiting, dysphagia, odynophagia, poor appetite or weight loss.  No diarrhea, constipation, rectal bleeding or rectal pain.    Additional endoscopic history reviewed:    SCANNED - COLONOSCOPY (05/24/2019) - diverticular disease otherwise normal.  Recall 10 years.     Past Medical History:   Diagnosis Date    Acute deep vein thrombosis (DVT) of distal vein of right lower extremity 11/11/2019    Arthritis     Ramirez esophagus     Benign prostatic hypertrophy     Cataract     saloni    Deep vein thrombosis (DVT) 07/26/2022    rt leg 2019    Ear pain, left     occ    GERD (gastroesophageal reflux disease)     History of MRSA infection     2010  blood bhl    Hypertension     Hypothyroidism     Kidney disease     patient states no contrast, drink plenty of water, followed by MACHO Benedict    Low back pain     Neck pain 12/2021    CERVIVAL 4-6    Osteoporosis     PONV (postoperative nausea and vomiting)        Past Surgical History:   Procedure Laterality Date    ANTERIOR CERVICAL DISCECTOMY W/ FUSION N/A 01/03/2022    Procedure: Cervical 4 to cervical 5 and cervical 5 to cervical 6 anterior cervical discectomy, fusion and instrumentation;  Surgeon: Wayne Meneses MD;  Location:  [facility] MAIN OR;  Service: Neurosurgery;  Laterality: N/A;    CYSTOSCOPY      ENDOSCOPY N/A 03/13/2025    Procedure: ESOPHAGOGASTRODUODENOSCOPY WITH COLD BIOPSIES;  Surgeon: Reinier Gorman MD;  Location: [facility] ENDOSCOPY;  Service: Gastroenterology;  Laterality: N/A;  PRE- GERD  POST-  ESOPHAGITIS, HIATAL HERNIA 41-35    EPIDURAL BLOCK      EYE SURGERY Right     HEMORRHOIDECTOMY      KNEE SURGERY Left     scoped    LUMBAR LAMINECTOMY      LUMBAR LAMINECTOMY DISCECTOMY DECOMPRESSION Left 10/18/2019    Procedure: Left L4-5 laminectomy and neural lysis;  Surgeon: Wayne Meneses MD;  Location: [facility] MAIN OR;  Service: Neurosurgery    NISSEN FUNDOPLICATION      UPPER GASTROINTESTINAL ENDOSCOPY         Scheduled Meds:     Continuous Infusions: No current facility-administered medications for this visit.      PRN Meds:     Allergies   Allergen Reactions    Hydrocodone Nausea And Vomiting    Contrast Dye (Echo Or Unknown Ct/Mr) Other (See Comments)     Patient states no contrast per ARNP, kidney function on labs    Hydromorphone Hcl GI Intolerance    Tetracyclines & Related Rash       Social History     Socioeconomic History    Marital status:    Tobacco Use    Smoking status: Never    Smokeless tobacco: Never   Vaping Use    Vaping status: Never Used   Substance and Sexual Activity    Alcohol use: Yes     Comment: OCCAS    Drug use: No    Sexual activity: Defer       Family History   Problem Relation Age of Onset    Heart disease Father         cardiovascular disease    Diabetes Father     Hypertension Father     Cancer Paternal Grandmother     Malig Hyperthermia Neg Hx      Vitals:    06/02/25 0846   BP: 122/81   Pulse: 78   Temp: 97.3 °F (36.3 °C)       Physical Exam  Constitutional:       Appearance: He is well-developed.   Abdominal:      General: Bowel sounds are normal. There is no distension.      Palpations: Abdomen is soft. There is no mass.      Tenderness: There is no abdominal tenderness.  There is no guarding.      Hernia: No hernia is present.   Skin:     General: Skin is warm and dry.      Capillary Refill: Capillary refill takes less than 2 seconds.   Neurological:      Mental Status: He is alert and oriented to person, place, and time.   Psychiatric:         Behavior: Behavior normal.     Assessment    Diagnoses and all orders for this visit:    1. Gastroesophageal reflux disease with esophagitis without hemorrhage (Primary)  -     FL Upper GI Double Contrast; Future    2. Hiatal hernia  -     FL Upper GI Double Contrast; Future       Plan    Reviewed procedural findings with the patient as well as pathology and all questions were answered  Continue twice daily dosing Nexium  Antireflux measures and dietary modifications reviewed. Low acid diet reviewed. Keep head of bed elevated. Stop eating/drinking at least 3 hours prior to bedtime. Eliminate caffeine and carbonated beverages.  Weight loss encouraged if BMI over 25.  Upper GI series to further evaluate the extent of reflux disease as recommended post EGD  Follow-up and further recommendations pending imaging results         SANTA Henderson  [facility]  [address] [place] [address]  Office: [phone]    I spent 30 minutes caring for Marin on this date of service. This time includes time spent by me in the following activities: preparing for the visit, reviewing tests, obtaining and/or reviewing a separately obtained history, performing a medically appropriate examination and/or evaluation , counseling and educating the patient/family/caregiver, ordering medications, tests, or procedures, documenting information in the medical record, independently interpreting results and communicating that information with the patient/family/caregiver and care coordination.

## 2025-06-03 ENCOUNTER — TELEPHONE (OUTPATIENT)
Dept: INTERNAL MEDICINE | Facility: CLINIC | Age: 75
End: 2025-06-03
Payer: MEDICARE

## 2025-06-03 NOTE — TELEPHONE ENCOUNTER
Caller: Marin Cuenca    Relationship: Self    Best call back number: [phone]     What medication are you requesting: CELECOXIB 100 MG CAPSULE, ONCE DAILY    What are your current symptoms: PINCHED NERVE IN BACK    Have you had these symptoms before:    [x] Yes  [] No    Have you been treated for these symptoms before:   [x] Yes  [] No    If a prescription is needed, what is your preferred pharmacy and phone number: [facility] [id number] - [address] AT [facility]. - [phone] [facility] [phone] FX     Additional notes: PATIENT STATES THAT HE WAS PRESCRIBED THE MEDICATION BY HIS NEUROSURGEON A WHILE BACK, BUT WOULD LIKE PCP TO TAKE OVER AND WRITE A REFILL. PLEASE CALL IF ADDITIONAL FOLLOW UP NEEDED.

## 2025-06-04 ENCOUNTER — TELEPHONE (OUTPATIENT)
Dept: INTERNAL MEDICINE | Facility: CLINIC | Age: 75
End: 2025-06-04
Payer: MEDICARE

## 2025-06-04 ENCOUNTER — LAB (OUTPATIENT)
Facility: HOSPITAL | Age: 75
End: 2025-06-04
Payer: MEDICARE

## 2025-06-04 DIAGNOSIS — E55.9 VITAMIN D DEFICIENCY: ICD-10-CM

## 2025-06-04 DIAGNOSIS — E78.2 MIXED HYPERLIPIDEMIA: ICD-10-CM

## 2025-06-04 DIAGNOSIS — R73.03 PRE-DIABETES: ICD-10-CM

## 2025-06-04 DIAGNOSIS — I10 ESSENTIAL HYPERTENSION: ICD-10-CM

## 2025-06-04 DIAGNOSIS — E03.9 ACQUIRED HYPOTHYROIDISM: ICD-10-CM

## 2025-06-04 DIAGNOSIS — E03.9 ACQUIRED HYPOTHYROIDISM: Primary | ICD-10-CM

## 2025-06-04 LAB
25(OH)D3 SERPL-MCNC: 21.8 NG/ML (ref 30–100)
ALBUMIN SERPL-MCNC: 4.2 G/DL (ref 3.5–5.2)
ALBUMIN/GLOB SERPL: 1.8 G/DL
ALP SERPL-CCNC: 75 U/L (ref 39–117)
ALT SERPL W P-5'-P-CCNC: 8 U/L (ref 1–41)
ANION GAP SERPL CALCULATED.3IONS-SCNC: 8.2 MMOL/L (ref 5–15)
AST SERPL-CCNC: 11 U/L (ref 1–40)
BILIRUB SERPL-MCNC: 0.7 MG/DL (ref 0–1.2)
BUN SERPL-MCNC: 9 MG/DL (ref 8–23)
BUN/CREAT SERPL: 6.7 (ref 7–25)
CALCIUM SPEC-SCNC: 9.6 MG/DL (ref 8.6–10.5)
CHLORIDE SERPL-SCNC: 104 MMOL/L (ref 98–107)
CHOLEST SERPL-MCNC: 145 MG/DL (ref 0–200)
CO2 SERPL-SCNC: 26.8 MMOL/L (ref 22–29)
CREAT SERPL-MCNC: 1.34 MG/DL (ref 0.76–1.27)
DEPRECATED RDW RBC AUTO: 40.4 FL (ref 37–54)
EGFRCR SERPLBLD CKD-EPI 2021: 54.9 ML/MIN/1.73
ERYTHROCYTE [DISTWIDTH] IN BLOOD BY AUTOMATED COUNT: 11.7 % (ref 12.3–15.4)
GLOBULIN UR ELPH-MCNC: 2.4 GM/DL
GLUCOSE SERPL-MCNC: 122 MG/DL (ref 65–99)
HBA1C MFR BLD: 5.7 % (ref 4.8–5.6)
HCT VFR BLD AUTO: 43.2 % (ref 37.5–51)
HDLC SERPL-MCNC: 67 MG/DL (ref 40–60)
HGB BLD-MCNC: 14.8 G/DL (ref 13–17.7)
LDLC SERPL CALC-MCNC: 65 MG/DL (ref 0–100)
LDLC/HDLC SERPL: 0.97 {RATIO}
MCH RBC QN AUTO: 32.7 PG (ref 26.6–33)
MCHC RBC AUTO-ENTMCNC: 34.3 G/DL (ref 31.5–35.7)
MCV RBC AUTO: 95.4 FL (ref 79–97)
PLATELET # BLD AUTO: 218 10*3/MM3 (ref 140–450)
PMV BLD AUTO: 10.8 FL (ref 6–12)
POTASSIUM SERPL-SCNC: 4.6 MMOL/L (ref 3.5–5.2)
PROT SERPL-MCNC: 6.6 G/DL (ref 6–8.5)
RBC # BLD AUTO: 4.53 10*6/MM3 (ref 4.14–5.8)
SODIUM SERPL-SCNC: 139 MMOL/L (ref 136–145)
TRIGL SERPL-MCNC: 66 MG/DL (ref 0–150)
TSH SERPL DL<=0.05 MIU/L-ACNC: 0.6 UIU/ML (ref 0.27–4.2)
VLDLC SERPL-MCNC: 13 MG/DL (ref 5–40)
WBC NRBC COR # BLD AUTO: 7.67 10*3/MM3 (ref 3.4–10.8)

## 2025-06-04 PROCEDURE — 80061 LIPID PANEL: CPT

## 2025-06-04 PROCEDURE — 85027 COMPLETE CBC AUTOMATED: CPT

## 2025-06-04 PROCEDURE — 82306 VITAMIN D 25 HYDROXY: CPT

## 2025-06-04 PROCEDURE — 83036 HEMOGLOBIN GLYCOSYLATED A1C: CPT

## 2025-06-04 PROCEDURE — 36415 COLL VENOUS BLD VENIPUNCTURE: CPT

## 2025-06-04 PROCEDURE — 84443 ASSAY THYROID STIM HORMONE: CPT

## 2025-06-04 PROCEDURE — 80053 COMPREHEN METABOLIC PANEL: CPT

## 2025-06-04 NOTE — TELEPHONE ENCOUNTER
Caller: Marin Cuenca    Relationship: Self    Best call back number: [phone]    What orders are you requesting (i.e. lab or imaging): LABS    In what timeframe would the patient need to come in: TODAY    Where will you receive your lab/imaging services: [facility]     Additional notes: STATED THAT THEY ARE NEEDING TO GET THEIR LABS DONE AHEAD OF THEIR APPOINTMENT NEXT WEEK. STATED THAT THEY HAVE ALWAYS DONE IT THIS WAY SO THEY DON'T KNOW WHY THEY COULDN'T TODAY WHEN THEY GOT TO THE LAB. PLEASE CALL AND ADVISE

## 2025-06-10 ENCOUNTER — TELEPHONE (OUTPATIENT)
Dept: INTERNAL MEDICINE | Facility: CLINIC | Age: 75
End: 2025-06-10

## 2025-06-10 RX ORDER — VALACYCLOVIR HYDROCHLORIDE 500 MG/1
500 TABLET, FILM COATED ORAL 2 TIMES DAILY
Qty: 14 TABLET | Refills: 0 | Status: SHIPPED | OUTPATIENT
Start: 2025-06-10 | End: 2025-06-17

## 2025-06-10 NOTE — TELEPHONE ENCOUNTER
Caller: Marin Cuenca    Relationship: Self    Best call back number: [phone]     What medication are you requesting: VALACYCLOVIR     What are your current symptoms: FEVER BLISTER    How long have you been experiencing symptoms: 2-3 DAYS    Have you had these symptoms before:    [x] Yes  [] No    Have you been treated for these symptoms before:   [x] Yes  [] No    If a prescription is needed, what is your preferred pharmacy and phone number: [facility] [id number] - [facility] [address] [facility]. - [phone] [facility] [phone] FX     Additional notes: PATIENT HAS A FEVER BLISTER AND IS REQUESTING THIS MEDICATION IF POSSIBLE.     PLEASE CALL TO ADVISE.

## 2025-06-10 NOTE — TELEPHONE ENCOUNTER
Rx Refill Note  Requested Prescriptions     Pending Prescriptions Disp Refills    valACYclovir (Valtrex) 500 MG tablet 14 tablet 0     Sig: Take 1 tablet by mouth 2 (Two) Times a Day for 7 days.      Last office visit with prescribing clinician: 9/20/2024  Next office visit with prescribing clinician: 6/13/2025         Soni Benavides MA  06/10/25, 08:51 EDT

## 2025-06-13 ENCOUNTER — OFFICE VISIT (OUTPATIENT)
Dept: INTERNAL MEDICINE | Facility: CLINIC | Age: 75
End: 2025-06-13
Payer: MEDICARE

## 2025-06-13 VITALS
WEIGHT: 207.4 LBS | DIASTOLIC BLOOD PRESSURE: 70 MMHG | OXYGEN SATURATION: 99 % | SYSTOLIC BLOOD PRESSURE: 122 MMHG | HEART RATE: 88 BPM | BODY MASS INDEX: 28.09 KG/M2 | HEIGHT: 72 IN

## 2025-06-13 DIAGNOSIS — E03.9 ACQUIRED HYPOTHYROIDISM: Chronic | ICD-10-CM

## 2025-06-13 DIAGNOSIS — I10 ESSENTIAL HYPERTENSION: Chronic | ICD-10-CM

## 2025-06-13 DIAGNOSIS — E78.2 MIXED HYPERLIPIDEMIA: Chronic | ICD-10-CM

## 2025-06-13 DIAGNOSIS — G47.33 OSA ON CPAP: Chronic | ICD-10-CM

## 2025-06-13 DIAGNOSIS — Z00.00 MEDICARE ANNUAL WELLNESS VISIT, SUBSEQUENT: Primary | ICD-10-CM

## 2025-06-13 PROCEDURE — 1160F RVW MEDS BY RX/DR IN RCRD: CPT | Performed by: NURSE PRACTITIONER

## 2025-06-13 PROCEDURE — 1125F AMNT PAIN NOTED PAIN PRSNT: CPT | Performed by: NURSE PRACTITIONER

## 2025-06-13 PROCEDURE — 3078F DIAST BP <80 MM HG: CPT | Performed by: NURSE PRACTITIONER

## 2025-06-13 PROCEDURE — 1159F MED LIST DOCD IN RCRD: CPT | Performed by: NURSE PRACTITIONER

## 2025-06-13 PROCEDURE — 99214 OFFICE O/P EST MOD 30 MIN: CPT | Performed by: NURSE PRACTITIONER

## 2025-06-13 PROCEDURE — 3074F SYST BP LT 130 MM HG: CPT | Performed by: NURSE PRACTITIONER

## 2025-06-13 PROCEDURE — G0439 PPPS, SUBSEQ VISIT: HCPCS | Performed by: NURSE PRACTITIONER

## 2025-06-13 NOTE — PATIENT INSTRUCTIONS
Medicare Wellness  Personal Prevention Plan of Service     Date of Office Visit:    Encounter Provider:  Cara Zavaleta III, NP-C  Place of Service:  [facility] PRIMARY [facility]ARE  Patient Name: Marin Cuenca  :  1950    As part of the Medicare Wellness portion of your visit today, we are providing you with this personalized preventive plan of services (PPPS). This plan is based upon recommendations of the [place] Preventive Services Task Force (USPSTF) and the Advisory Committee on Immunization Practices (ACIP).    This lists the preventive care services that should be considered, and provides dates of when you are due. Items listed as completed are up-to-date and do not require any further intervention.    Health Maintenance   Topic Date Due    ANNUAL WELLNESS VISIT  2025    COVID-19 Vaccine ( season) 2025    INFLUENZA VACCINE  2025    TDAP/TD VACCINES (2 - Td or Tdap) 2026    LIPID PANEL  2026    COLORECTAL CANCER SCREENING  2029    HEPATITIS C SCREENING  Completed    RSV Vaccine - Adults  Completed    Pneumococcal Vaccine 50+  Completed    ZOSTER VACCINE  Completed    HEMOGLOBIN A1C  Discontinued    URINE MICROALBUMIN-CREATININE RATIO (uACR)  Discontinued       Orders Placed This Encounter   Procedures    Comprehensive Metabolic Panel     Standing Status:   Future     Expected Date:   2025     Expiration Date:   2026     Release to patient:   Routine Release [[id number]]    Lipid Panel With LDL / HDL Ratio     Standing Status:   Future     Expected Date:   2025     Expiration Date:   2026     Release to patient:   Routine Release [[id number]]    CBC (No Diff)     Standing Status:   Future     Expected Date:   2025     Expiration Date:   2026     Release to patient:   Routine Release [[id number]]    TSH Rfx On Abnormal To Free T4     Standing Status:   Future     Expected Date:    12/13/2025     Expiration Date:   9/13/2026     Release to patient:   Routine Release [[id number]]       Return in about 6 months (around 12/13/2025).

## 2025-06-13 NOTE — PROGRESS NOTES
The ABCs of the Annual Wellness Visit  Subsequent Medicare Wellness Visit    Subjective    Marin Cuenca is a 75 y.o. male who presents for a Subsequent Medicare Wellness Visit.    The following portions of the patient's history were reviewed and   updated as appropriate: allergies, current medications, past family history, past medical history, past social history, past surgical history, and problem list.    Wt Readings from Last 4 Encounters:   06/13/25 94.1 kg (207 lb 6.4 oz)   06/02/25 95.7 kg (211 lb)   04/10/25 95.3 kg (210 lb)   03/13/25 93.2 kg (205 lb 6.4 oz)       Weight trend is stable.    His cardiovascular risks are:    [] No Known risk factors  [] Known CAD and being treated     [x] Hypertension    [x] Hyperlipidemia  [] Diabetes     [x] Obesity  [] Family history    [] Current or hx tobacco use  [] Sedentary lifestyle       Male:   [] Testosterone use     Mobility    [x] Ambulates independently  [] Ambulates with cane   [] Ambulates with quad cane  [] Ambulates with rollator   [] Ambulates with walker   [] Mobile with wheelchair   [] Mobile with electric wheelchair     Continence    [x] Continent of bowel and bladder  [] Incontinent bowel and bladder   [] Incontinent bladder   [] Incontinent bowel      Social     Walks a mile daily.     Been working on diet modification and has lost 4 lbs.     Compared to one year ago, the patient feels his physical   health is worse.    Compared to one year ago, the patient feels his mental   health is the same.    Recent Hospitalizations:  He was not admitted to the hospital during the last year.       Current Medical Providers:  Patient Care Team:  Cara Zavaleta III, NP-SEKOU as PCP - General (Internal Medicine)  Bubba Dos Santos Jr., MD as Consulting Physician (Urology)  Wayne Meneses MD as Surgeon (Neurosurgery)  Marco Dunbar MD as Consulting Physician (Gastroenterology)  Deedee Rosales MD as Consulting Physician (Pulmonary  Disease)    Outpatient Medications Prior to Visit   Medication Sig Dispense Refill    aspirin 81 MG tablet Take 1 tablet by mouth Daily. (Patient taking differently: Take 1 tablet by mouth 3 (Three) Times a Week. Stopped 12/27/2021  Indications: Ok to restart on Friday 1/7/22) 100 tablet 3    atorvastatin (LIPITOR) 20 MG tablet TAKE 1 TABLET BY MOUTH DAILY (Patient taking differently: Take 0.5 tablets by mouth Daily.) 90 tablet 1    Calcium Carb-Cholecalciferol 500-600 MG-UNIT chewable tablet Chew 1 tablet 2 (Two) Times a Day. 180 tablet 3    cyclobenzaprine (FLEXERIL) 5 MG tablet Take 1 tablet by mouth 3 (Three) Times a Day As Needed for Muscle Spasms.      cycloSPORINE (RESTASIS) 0.05 % ophthalmic emulsion       esomeprazole (nexIUM) 40 MG capsule Take 1 capsule by mouth 2 (Two) Times a Day. 180 capsule 1    fexofenadine (ALLEGRA) 180 MG tablet Take 1 tablet by mouth Daily.      finasteride (PROSCAR) 5 MG tablet TAKE 1 TABLET BY MOUTH EVERY MORNING 90 tablet 1    furosemide (Lasix) 40 MG tablet Take 1 tablet by mouth As Needed.      irbesartan (AVAPRO) 75 MG tablet TAKE 1 TABLET BY MOUTH DAILY 90 tablet 1    levothyroxine (SYNTHROID, LEVOTHROID) 100 MCG tablet TAKE 1 TABLET BY MOUTH DAILY 90 tablet 1    meloxicam (MOBIC) 15 MG tablet Take 1 tablet by mouth Daily. Take with food. 30 tablet 0    ondansetron (Zofran) 4 MG tablet Take 1 tablet by mouth Every 8 (Eight) Hours As Needed for Nausea or Vomiting. 12 tablet 0    orphenadrine (NORFLEX) 100 MG 12 hr tablet Take 1 tablet by mouth 2 (Two) Times a Day As Needed for Muscle Spasms. 40 tablet 2    pregabalin (LYRICA) 100 MG capsule Take 1 capsule by mouth 3 (Three) Times a Day. 270 capsule 1    tamsulosin (FLOMAX) 0.4 MG capsule 24 hr capsule Take 1 capsule by mouth Daily. 90 capsule 1    traMADol (ULTRAM) 50 MG tablet Take 1 tablet by mouth Every 6 (Six) Hours As Needed for Moderate Pain.      valACYclovir (Valtrex) 500 MG tablet Take 1 tablet by mouth 2 (Two)  "Times a Day for 7 days. 14 tablet 0     No facility-administered medications prior to visit.       Opioid medication/s are on active medication list.  and I have evaluated his active treatment plan and pain score trends (see table).  Vitals:    06/13/25 0941   PainSc: 9    PainLoc: Back     I have reviewed the chart for potential of high risk medication and harmful drug interactions in the elderly.          Aspirin is on active medication list. Aspirin use is indicated based on review of current medical condition/s. Pros and cons of this therapy have been discussed today. Benefits of this medication outweigh potential harm.  Patient has been encouraged to continue taking this medication.  .      Patient Active Problem List   Diagnosis    Chronic LBP    Lumbar radiculopathy    Failed back syndrome of lumbar spine    Essential hypertension    Benign non-nodular prostatic hyperplasia with lower urinary tract symptoms    Acquired hypothyroidism    Knee pain, right    Osteoarthritis due to rotator cuff tear    Gastroesophageal reflux disease with esophagitis    Obesity (BMI 30-39.9)    KARLA on CPAP    Special screening for malignant neoplasm of prostate    Cervical radiculitis    Prediabetes    Osteoporosis    Neuropathy    History of Ramirez's esophagus    Gastroesophageal reflux disease    Back pain    Asymptomatic varicose veins of both lower extremities    Controlled substance agreement signed    Mixed hyperlipidemia    Environmental allergies    Ramirez's esophagus without dysplasia     Advance Care Planning  (Click this link to access ACP Navigator)      Advance Directive is not on file.  ACP discussion was held with the patient during this visit. Patient has an advance directive (not in EMR), copy requested.     Objective    Vitals:    06/13/25 0941   BP: 122/70   BP Location: Left arm   Patient Position: Sitting   Cuff Size: Adult   Pulse: 88   SpO2: 99%   Weight: 94.1 kg (207 lb 6.4 oz)   Height: 182.9 cm (72\") " "  PainSc: 9    PainLoc: Back     Estimated body mass index is 28.13 kg/m² as calculated from the following:    Height as of this encounter: 182.9 cm (72\").    Weight as of this encounter: 94.1 kg (207 lb 6.4 oz).           Jump to Steadi Fall Risk Flowsheet  Gait and Balance Evaluation:  Normal    Does the patient have evidence of cognitive impairment? No    Lab Results   Component Value Date    TRIG 66 2025    HDL 67 (H) 2025    LDL 65 2025    VLDL 13 2025    HGBA1C 5.70 (H) 2025        HEALTH RISK ASSESSMENT    Smoking Status:  Social History     Tobacco Use   Smoking Status Never   Smokeless Tobacco Never     Alcohol Consumption:  Social History     Substance and Sexual Activity   Alcohol Use Yes    Comment: OCCAS     Fall Risk Screen:    STEADI Fall Risk Assessment was completed, and patient is at LOW risk for falls.Assessment completed on:2025    Depression Screenin/13/2025     9:42 AM   PHQ-2/PHQ-9 Depression Screening   Little interest or pleasure in doing things Not at all   Feeling down, depressed, or hopeless Not at all       Health Habits and Functional and Cognitive Screenin/13/2025     9:42 AM   Functional & Cognitive Status   Do you have difficulty preparing food and eating? No   Do you have difficulty bathing yourself, getting dressed or grooming yourself? No   Do you have difficulty using the toilet? No   Do you have difficulty moving around from place to place? No   Do you have trouble with steps or getting out of a bed or a chair? No   Current Diet Well Balanced Diet   Dental Exam Up to date   Eye Exam Up to date   Exercise (times per week) 2 times per week   Current Exercises Include Walking   Do you need help using the phone?  No   Are you deaf or do you have serious difficulty hearing?  No   Do you need help to go to places out of walking distance? No   Do you need help shopping? No   Do you need help preparing meals?  No   Do you need help " with housework?  No   Do you need help with laundry? No   Do you need help taking your medications? Yes   Do you need help managing money? No   Do you ever drive or ride in a car without wearing a seat belt? No   Have you felt unusual stress, anger or loneliness in the last month? No   Who do you live with? Spouse   If you need help, do you have trouble finding someone available to you? No   Have you been bothered in the last four weeks by sexual problems? No   Do you have difficulty concentrating, remembering or making decisions? No       Age-appropriate Screening Schedule:  Refer to the list below for future screening recommendations based on patient's age, sex and/or medical conditions. Orders for these recommended tests are listed in the plan section. The patient has been provided with a written plan.    Health Maintenance   Topic Date Due    ANNUAL WELLNESS VISIT  02/14/2025    COVID-19 Vaccine (8 - 2024-25 season) 04/11/2025    INFLUENZA VACCINE  07/01/2025    TDAP/TD VACCINES (2 - Td or Tdap) 04/18/2026    LIPID PANEL  06/04/2026    COLORECTAL CANCER SCREENING  05/24/2029    HEPATITIS C SCREENING  Completed    RSV Vaccine - Adults  Completed    Pneumococcal Vaccine 50+  Completed    ZOSTER VACCINE  Completed    HEMOGLOBIN A1C  Discontinued    URINE MICROALBUMIN-CREATININE RATIO (uACR)  Discontinued                CMS Preventative Services Quick Reference  Risk Factors Identified During Encounter  Immunizations Discussed/Encouraged: Influenza and COVID19      The above risks/problems have been discussed with the patient.  Pertinent information has been shared with the patient in the After Visit Summary.  An After Visit Summary and PPPS were made available to the patient.    Follow Up:   Next Medicare Wellness visit to be scheduled in 1 year.       Additional E&M Note during same encounter follows:  Patient has multiple medical problems which are significant and separately identifiable that require additional  work above and beyond the Medicare Wellness Visit.      Chief Complaint  Medicare Wellness-subsequent    Subjective        Marin Cuenca is also being seen today for AWV and follow up chronic conditions: prediabetes, hyperlipidemia (treated for one year) , hypothyroid (since 2000 Dr Soto initially), hypertension, bph (followed by Dr Dos Santos), KARLA (CPAP - Dr Rosales), CKD (last cr 1.33) , GERD (hiatal hernia), diverticulosis, Ramirez's esophagus   He sees Dr Meneses - he had  C4-C5 and C5-C6 ACDF and instrumentation on 01.03.2022.   He continues lyrica.   He had DVT in 2019 after prior surgery.   Varicose veins - sees Dr Sims and wears compression socks.       Pre-diabetes   Lab Results   Component Value Date    HGBA1C 5.70 (H) 06/04/2025      Hyperlipidemia    Lab Results   Component Value Date    CHOL 145 06/04/2025    CHLPL 134 02/01/2024    TRIG 66 06/04/2025    HDL 67 (H) 06/04/2025    LDL 65 06/04/2025          Hx Ramirez's esophagus 2016   EGD/Colonoscopy: 5/24/2019: [facility]: next scope 5 years: due 2024: will want to see Dwight.          Left lumbar radiculopathy:   6/26/25: Plans on MINIMALLY INVASIVE LEFT L4-L5 AND L5-S1 HEMILAMINECTOMY, MEDIAL FACETECTOMY, AND FORAMINOTOMY   Dr Cuenca       He chronically has prediabetes, hyperlipidemia (treated for one year) , hypothyroid (since 2000 Dr Soto initially), hypertension, bph (followed by Dr Dos Santos), KARLA (CPAP - Dr Rosales), CKD (last cr 1.33) , GERD (hiatal hernia)   He sees Dr Meneses - he had  C4-C5 and C5-C6 ACDF and instrumentation on 01.03.2022.   He continues lyrica.   He had DVT in 2019 after prior surgery.   Varicose veins - sees Dr Sims and wears compression socks.      Hx Ramirez's esophagus 2016   EGD/Colonoscopy: 5/24/2019: [facility]: next scope 5 years: due 2024: will want to see Dwight.      Dx: diverticulosis entire colon - further colonoscopy not indicated per note.   Esophagus was normal          Objective   Vital Signs:  /70 (BP  "Location: Left arm, Patient Position: Sitting, Cuff Size: Adult)   Pulse 88   Ht 182.9 cm (72\")   Wt 94.1 kg (207 lb 6.4 oz)   SpO2 99%   BMI 28.13 kg/m²     Physical Exam  Vitals reviewed.   Constitutional:       Appearance: Normal appearance. He is well-developed.   Neck:      Thyroid: No thyromegaly.   Cardiovascular:      Rate and Rhythm: Normal rate and regular rhythm.      Pulses: Normal pulses.      Heart sounds: Normal heart sounds.   Pulmonary:      Effort: Pulmonary effort is normal.      Breath sounds: Normal breath sounds.      Comments: E/U   Musculoskeletal:         General: Normal range of motion.      Cervical back: Normal range of motion and neck supple.   Lymphadenopathy:      Cervical: No cervical adenopathy.   Skin:     General: Skin is warm and dry.      Capillary Refill: Capillary refill takes 2 to 3 seconds.   Neurological:      Mental Status: He is alert and oriented to person, place, and time.   Psychiatric:         Mood and Affect: Mood normal.         Behavior: Behavior normal. Behavior is cooperative.         Thought Content: Thought content normal.         Judgment: Judgment normal.          The following data was reviewed by: Cara Zavaleta III, NP-C on 06/13/2025:  Common labs          8/14/2024    04:00 9/5/2024    11:33 6/4/2025    11:20   Common Labs   Glucose  96  122    BUN  12  9.0    Creatinine  1.12  1.34    Sodium  136  139    Potassium  4.3  4.6    Chloride  101  104    Calcium  9.3  9.6    Albumin   4.2    Total Bilirubin   0.7    Alkaline Phosphatase   75    AST (SGOT)   11    ALT (SGPT)   8    WBC   7.67    Hemoglobin 13.1      14.8    Hematocrit 38.8      43.2    Platelets   218    Total Cholesterol   145    Triglycerides   66    HDL Cholesterol   67    LDL Cholesterol    65    Hemoglobin A1C   5.70       Details          This result is from an external source.                        Assessment and Plan     Problem List Items Addressed This Visit       "    Cardiac and Vasculature    Essential hypertension (Chronic)    Current Assessment & Plan   Hypertension is improving with treatment.  Continue current treatment regimen.  Dietary sodium restriction.  Weight loss.  Continue current medications.  Blood pressure will be reassessed at the next regular appointment.    Continue avapro         Mixed hyperlipidemia (Chronic)    Current Assessment & Plan   Lipid abnormalities are improving with treatment.  Pharmacotherapy as ordered.  Lipids will be reassessed in 6 months.    Lab Results   Component Value Date    CHOL 145 06/04/2025    CHLPL 134 02/01/2024    TRIG 66 06/04/2025    HDL 67 (H) 06/04/2025    LDL 65 06/04/2025            Relevant Orders    Comprehensive Metabolic Panel    Lipid Panel With LDL / HDL Ratio    CBC (No Diff)       Endocrine and Metabolic    Acquired hypothyroidism (Chronic)    Current Assessment & Plan   Stable   Check lab for ongoing therapeutic drug monitoring.  Will modify dose if needed based upon results.          Relevant Orders    TSH Rfx On Abnormal To Free T4       Sleep    KARLA on CPAP (Chronic)    Current Assessment & Plan   Compliant            Other Visit Diagnoses         Medicare annual wellness visit, subsequent    -  Primary                      Follow Up     Return in about 6 months (around 12/13/2025).    Patient was given instructions and counseling regarding his condition or for health maintenance advice. Please see specific information pulled into the AVS if appropriate.

## 2025-06-16 NOTE — ASSESSMENT & PLAN NOTE
Lipid abnormalities are improving with treatment.  Pharmacotherapy as ordered.  Lipids will be reassessed in 6 months.    Lab Results   Component Value Date    CHOL 145 06/04/2025    CHLPL 134 02/01/2024    TRIG 66 06/04/2025    HDL 67 (H) 06/04/2025    LDL 65 06/04/2025

## 2025-06-16 NOTE — ASSESSMENT & PLAN NOTE
Hypertension is improving with treatment.  Continue current treatment regimen.  Dietary sodium restriction.  Weight loss.  Continue current medications.  Blood pressure will be reassessed at the next regular appointment.    Continue avapro

## 2025-06-18 ENCOUNTER — PATIENT MESSAGE (OUTPATIENT)
Dept: GASTROENTEROLOGY | Facility: CLINIC | Age: 75
End: 2025-06-18

## 2025-06-18 ENCOUNTER — TELEPHONE (OUTPATIENT)
Dept: INTERNAL MEDICINE | Facility: CLINIC | Age: 75
End: 2025-06-18
Payer: MEDICARE

## 2025-06-18 NOTE — TELEPHONE ENCOUNTER
Caller: Marin Cuenca    Relationship: Self    Best call back number: [phone]    What medication are you requesting: CELECOXIB 100 MG       If a prescription is needed, what is your preferred pharmacy and phone number: [facility] [id number] - [facility] [address] AT [facility]. - [phone]  - [phone]     Additional notes:WAS PRESCRIBED BY NEUROSURGEON. PATIENT WOULD LIKE FOR JAMIE NORRIS TO TAKE OVER MANAGEMENT OF MEDICATION

## 2025-07-24 DIAGNOSIS — E78.2 MIXED HYPERLIPIDEMIA: ICD-10-CM

## 2025-07-24 RX ORDER — ATORVASTATIN CALCIUM 20 MG/1
20 TABLET, FILM COATED ORAL DAILY
Qty: 90 TABLET | Refills: 1 | Status: SHIPPED | OUTPATIENT
Start: 2025-07-24

## 2025-07-29 ENCOUNTER — TELEPHONE (OUTPATIENT)
Dept: GASTROENTEROLOGY | Facility: CLINIC | Age: 75
End: 2025-07-29
Payer: MEDICARE

## 2025-07-29 NOTE — TELEPHONE ENCOUNTER
Hub staff attempted to follow warm transfer process and was unsuccessful     Caller: Marin Cuenca    Relationship to patient: Self    Best call back number: [phone]    Patient is needing: HAS A COUPLE QUESTIONS ABOUT AN UPCOMING PROCEDURE WANTS TO SPEAK WITH BUBBA BEFORE HE SCHEDULES

## 2025-08-04 ENCOUNTER — TELEPHONE (OUTPATIENT)
Dept: GASTROENTEROLOGY | Facility: CLINIC | Age: 75
End: 2025-08-04
Payer: MEDICARE

## 2025-08-15 ENCOUNTER — HOSPITAL ENCOUNTER (OUTPATIENT)
Dept: GENERAL RADIOLOGY | Facility: HOSPITAL | Age: 75
Discharge: HOME | End: 2025-08-15
Payer: MEDICARE

## 2025-08-15 DIAGNOSIS — K44.9 HIATAL HERNIA: ICD-10-CM

## 2025-08-15 DIAGNOSIS — K21.00 GASTROESOPHAGEAL REFLUX DISEASE WITH ESOPHAGITIS WITHOUT HEMORRHAGE: ICD-10-CM

## 2025-08-15 PROCEDURE — 63710000001 BARIUM SULFATE 98 % RECONSTITUTED SUSPENSION: Performed by: NURSE PRACTITIONER

## 2025-08-15 PROCEDURE — A9270 NON-COVERED ITEM OR SERVICE: HCPCS | Performed by: NURSE PRACTITIONER

## 2025-08-15 PROCEDURE — 63710000001 BARIUM SULFATE 96 % RECONSTITUTED SUSPENSION: Performed by: NURSE PRACTITIONER

## 2025-08-15 PROCEDURE — 74246 X-RAY XM UPR GI TRC 2CNTRST: CPT

## 2025-08-15 PROCEDURE — 63710000001 SOD BICARB-CITRIC ACID-SIMETHICONE 2.21-1.53-0.04 G PACK: Performed by: NURSE PRACTITIONER

## 2025-08-15 PROCEDURE — 63710000001 BARIUM SULFATE 700 MG TABLET: Performed by: NURSE PRACTITIONER

## 2025-08-15 RX ADMIN — ANTACID/ANTIFLATULENT 1 PACKET: 380; 550; 10; 10 GRANULE, EFFERVESCENT ORAL at 08:27

## 2025-08-15 RX ADMIN — BARIUM SULFATE 183 ML: 960 POWDER, FOR SUSPENSION ORAL at 08:26

## 2025-08-15 RX ADMIN — BARIUM SULFATE 135 ML: 980 POWDER, FOR SUSPENSION ORAL at 08:26

## 2025-08-15 RX ADMIN — BARIUM SULFATE 700 MG: 700 TABLET ORAL at 08:26

## 2025-08-18 DIAGNOSIS — K21.00 GASTROESOPHAGEAL REFLUX DISEASE WITH ESOPHAGITIS: ICD-10-CM

## 2025-08-18 DIAGNOSIS — Z87.19 HISTORY OF BARRETT'S ESOPHAGUS: Chronic | ICD-10-CM

## 2025-08-19 RX ORDER — ESOMEPRAZOLE MAGNESIUM 40 MG/1
40 CAPSULE, DELAYED RELEASE ORAL 2 TIMES DAILY
Qty: 180 CAPSULE | Refills: 1 | Status: SHIPPED | OUTPATIENT
Start: 2025-08-19

## 2025-08-20 ENCOUNTER — RESULTS FOLLOW-UP (OUTPATIENT)
Dept: GASTROENTEROLOGY | Facility: CLINIC | Age: 75
End: 2025-08-20
Payer: MEDICARE

## (undated) DEVICE — SUT SILK 2/0 FS BLK 18IN 685G

## (undated) DEVICE — PENCL E/S ULTRAVAC TELESCP NOSE HOLSTR 10FT

## (undated) DEVICE — CODMAN® SURGICAL PATTIES 3/4" X 3/4" (1.91CM X 1.91CM): Brand: CODMAN®

## (undated) DEVICE — Device

## (undated) DEVICE — 1 ML TUBERCULIN SYRINGE REGULAR TIP: Brand: MONOJECT

## (undated) DEVICE — DRP MICROSCOPE 4 BINOCULAR CV 54X150IN

## (undated) DEVICE — ADAPT CLN BIOGUARD AIR/H2O DISP

## (undated) DEVICE — CONN TBG Y 5 IN 1 LF STRL

## (undated) DEVICE — SHEET, DRAPE, SPLIT, STERILE: Brand: MEDLINE

## (undated) DEVICE — CANN O2 ETCO2 FITS ALL CONN CO2 SMPL A/ 7IN DISP LF

## (undated) DEVICE — SENSR O2 OXIMAX FNGR A/ 18IN NONSTR

## (undated) DEVICE — TOOL MR8-15BA50T MR8 15CM BAL SYMTRI 5MM: Brand: MIDAS REX MR8

## (undated) DEVICE — BLCK/BITE BLOX W/DENTL/RIM W/STRAP 54F

## (undated) DEVICE — PATIENT RETURN ELECTRODE, SINGLE-USE, CONTACT QUALITY MONITORING, ADULT, WITH 9FT CORD, FOR PATIENTS WEIGING OVER 33LBS. (15KG): Brand: MEGADYNE

## (undated) DEVICE — PK NEURO SPINE 40

## (undated) DEVICE — ANTIBACTERIAL UNDYED BRAIDED (POLYGLACTIN 910), SYNTHETIC ABSORBABLE SUTURE: Brand: COATED VICRYL

## (undated) DEVICE — SMOKE EVACUATION TUBING WITH 7/8 IN TO 1/4 IN REDUCER: Brand: BUFFALO FILTER

## (undated) DEVICE — FRCP BX RADJAW4 NDL 2.8 240CM LG OG BX40

## (undated) DEVICE — TOOL MR8-15MH30 MR8 15CM MATCH 3MM: Brand: MIDAS REX MR8

## (undated) DEVICE — COVER,TABLE,HEAVY DUTY,79"X110",STRL: Brand: MEDLINE

## (undated) DEVICE — TUBING, SUCTION, 1/4" X 20', STRAIGHT: Brand: MEDLINE INDUSTRIES, INC.

## (undated) DEVICE — SOL ISO/ALC RUB 70PCT 4OZ

## (undated) DEVICE — SUT VIC 0 CT1 CR8 27IN JJ41G

## (undated) DEVICE — ADHS SKIN DERMABOND TOP ADVANCED

## (undated) DEVICE — GLV SURG BIOGEL LTX PF 7

## (undated) DEVICE — LN SMPL CO2 SHTRM SD STREAM W/M LUER

## (undated) DEVICE — APPL CHLORAPREP W/TINT 26ML ORNG

## (undated) DEVICE — DRN WND JP RND W TROC SIL 10F 1/8IN

## (undated) DEVICE — COLR CERV MED/DENS NRW LNG

## (undated) DEVICE — TUBING, SUCTION, 1/4" X 10', STRAIGHT: Brand: MEDLINE

## (undated) DEVICE — ADHS LIQ MASTISOL 2/3ML

## (undated) DEVICE — INTENDED FOR TISSUE SEPARATION, AND OTHER PROCEDURES THAT REQUIRE A SHARP SURGICAL BLADE TO PUNCTURE OR CUT.: Brand: BARD-PARKER ® STAINLESS STEEL BLADES

## (undated) DEVICE — TRAP FLD MINIVAC MEGADYNE 100ML

## (undated) DEVICE — DISPOSABLE IRRIGATION BIPOLAR CORD, M1000 TYPE: Brand: KIRWAN

## (undated) DEVICE — SPNG GZ WOVN 4X4IN 12PLY 10/BX STRL

## (undated) DEVICE — DRSNG WND GZ PAD BORDERED 4X8IN STRL

## (undated) DEVICE — GLV SURG SENSICARE W/ALOE PF LF 7.5 STRL

## (undated) DEVICE — JACKSON-PRATT 100CC BULB RESERVOIR: Brand: CARDINAL HEALTH

## (undated) DEVICE — ELECTRD BLD EZ CLN MOD XLNG 2.75IN

## (undated) DEVICE — 3.0MM NEURO (MATCH HEAD) LESS AGGRESSIVE

## (undated) DEVICE — NDL SPINE 20G 3 1/2 YEL STRL 1P/U

## (undated) DEVICE — KT ORCA ORCAPOD DISP STRL

## (undated) DEVICE — SYR CONTRL LUERLOK 10CC

## (undated) DEVICE — MSK PROC POM O2 2/ADAPT MIC/STM/COMPAT W/SMPL/LN 7FT A/

## (undated) DEVICE — 6.0MM PRECISION ROUND

## (undated) DEVICE — NEEDLE, QUINCKE, 18GX3.5": Brand: MEDLINE

## (undated) DEVICE — SUT SILK 2/0 TIES 18IN A185H

## (undated) DEVICE — VAGINAL PACKING: Brand: DEROYAL

## (undated) DEVICE — 3M™ STERI-STRIP™ ANTIMICROBIAL SKIN CLOSURES 1/2 INCH X 4 INCHES 50/CARTON 4 CARTONS/CASE A1847: Brand: 3M™ STERI-STRIP™